# Patient Record
Sex: FEMALE | Race: WHITE | NOT HISPANIC OR LATINO | URBAN - METROPOLITAN AREA
[De-identification: names, ages, dates, MRNs, and addresses within clinical notes are randomized per-mention and may not be internally consistent; named-entity substitution may affect disease eponyms.]

---

## 2019-03-23 ENCOUNTER — EMERGENCY (EMERGENCY)
Facility: HOSPITAL | Age: 64
LOS: 0 days | Discharge: HOME | End: 2019-03-23
Admitting: PHYSICIAN ASSISTANT

## 2019-03-23 VITALS
RESPIRATION RATE: 18 BRPM | HEART RATE: 74 BPM | TEMPERATURE: 97 F | OXYGEN SATURATION: 99 % | SYSTOLIC BLOOD PRESSURE: 132 MMHG | DIASTOLIC BLOOD PRESSURE: 78 MMHG

## 2019-03-23 DIAGNOSIS — Z79.899 OTHER LONG TERM (CURRENT) DRUG THERAPY: ICD-10-CM

## 2019-03-23 DIAGNOSIS — R21 RASH AND OTHER NONSPECIFIC SKIN ERUPTION: ICD-10-CM

## 2019-03-23 DIAGNOSIS — F17.200 NICOTINE DEPENDENCE, UNSPECIFIED, UNCOMPLICATED: ICD-10-CM

## 2019-03-23 DIAGNOSIS — B86 SCABIES: ICD-10-CM

## 2019-03-23 RX ORDER — PERMETHRIN CREAM 5% W/W 50 MG/G
1 CREAM TOPICAL
Qty: 30 | Refills: 1
Start: 2019-03-23 | End: 2019-03-24

## 2019-03-23 NOTE — ED PROVIDER NOTE - CARE PROVIDER_API CALL
Oren Tobias)  Dermatology; Internal Medicine  [address] [place] [address]  Phone: [phone]  Fax: [phone]  Follow Up Time: 4-6 Days

## 2019-03-23 NOTE — ED ADULT NURSE NOTE - NSIMPLEMENTINTERV_GEN_ALL_ED
Implemented All Universal Safety Interventions:  [place] to call system. Call bell, personal items and telephone within reach. Instruct patient to call for assistance. Room bathroom lighting operational. Non-slip footwear when patient is off stretcher. Physically safe environment: no spills, clutter or unnecessary equipment. Stretcher in lowest position, wheels locked, appropriate side rails in place.

## 2019-03-23 NOTE — ED PROVIDER NOTE - PHYSICAL EXAMINATION
CONST: Well appearing in NAD  EYES: PERRL, EOMI, Sclera and conjunctiva clear.   ENT: No nasal discharge. TM's clear B/L without drainage. Oropharynx normal appearing, no erythema or exudates. Uvula midline.  NECK: Non-tender, no meningeal signs  CARD: Normal S1 S2; Normal rate and rhythm  RESP: Equal BS B/L, No wheezes, rhonchi or rales. No distress  GI: Soft, non-tender, non-distended.  MS: Normal ROM in all extremities. No midline spinal tenderness.  SKIN: Diffuse dried red rash all over body. No cellulitis. No mucous membrane involvement  NEURO: A&Ox3, No focal deficits. Strength 5/5 with no sensory deficits. Steady gait

## 2019-03-23 NOTE — ED PROVIDER NOTE - CLINICAL SUMMARY MEDICAL DECISION MAKING FREE TEXT BOX
Pt with itchy rash x 1 week. Pt exposed to scabies again after being treated form same recently. Pt has dried diffuse rash s/w scabies. Will treat with permethrin. Pt educted to wash all clothes and linens in hot watere and to repeat cream in one wee. Derm FU as well.   I have discussed the discharge plan with the patient. The patient agrees with the plan, as discussed.  The patient understands Emergency Department diagnosis is a preliminary diagnosis often based on limited information and that the patient must adhere to the follow-up plan as discussed.  The patient understands that if the symptoms worsen or if prescribed medications do not have the desired/planned effect that the patient may return to the Emergency Department at any time for further evaluation and treatment.

## 2019-03-23 NOTE — ED PROVIDER NOTE - OBJECTIVE STATEMENT
Pt with diffuse itchy rash x 1 week. Worse at night. Was treated for scabies and improved but returned after exposed to others with scabies at her adult home. Denies fever, SOB

## 2019-07-05 ENCOUNTER — EMERGENCY (EMERGENCY)
Facility: HOSPITAL | Age: 64
LOS: 0 days | Discharge: NURSING, REHAB OR LONG-TERM CARE | End: 2019-07-05
Attending: STUDENT IN AN ORGANIZED HEALTH CARE EDUCATION/TRAINING PROGRAM | Admitting: STUDENT IN AN ORGANIZED HEALTH CARE EDUCATION/TRAINING PROGRAM
Payer: MEDICARE

## 2019-07-05 VITALS
HEART RATE: 71 BPM | DIASTOLIC BLOOD PRESSURE: 67 MMHG | RESPIRATION RATE: 18 BRPM | OXYGEN SATURATION: 98 % | TEMPERATURE: 99 F | SYSTOLIC BLOOD PRESSURE: 138 MMHG

## 2019-07-05 VITALS
OXYGEN SATURATION: 99 % | DIASTOLIC BLOOD PRESSURE: 75 MMHG | WEIGHT: 139.99 LBS | HEART RATE: 74 BPM | RESPIRATION RATE: 18 BRPM | SYSTOLIC BLOOD PRESSURE: 141 MMHG | TEMPERATURE: 98 F

## 2019-07-05 DIAGNOSIS — L97.929 NON-PRESSURE CHRONIC ULCER OF UNSPECIFIED PART OF LEFT LOWER LEG WITH UNSPECIFIED SEVERITY: ICD-10-CM

## 2019-07-05 DIAGNOSIS — L97.519 NON-PRESSURE CHRONIC ULCER OF OTHER PART OF RIGHT FOOT WITH UNSPECIFIED SEVERITY: ICD-10-CM

## 2019-07-05 DIAGNOSIS — L03.116 CELLULITIS OF LEFT LOWER LIMB: ICD-10-CM

## 2019-07-05 DIAGNOSIS — L97.919 NON-PRESSURE CHRONIC ULCER OF UNSPECIFIED PART OF RIGHT LOWER LEG WITH UNSPECIFIED SEVERITY: ICD-10-CM

## 2019-07-05 DIAGNOSIS — Z88.0 ALLERGY STATUS TO PENICILLIN: ICD-10-CM

## 2019-07-05 DIAGNOSIS — Z88.1 ALLERGY STATUS TO OTHER ANTIBIOTIC AGENTS STATUS: ICD-10-CM

## 2019-07-05 DIAGNOSIS — M79.604 PAIN IN RIGHT LEG: ICD-10-CM

## 2019-07-05 DIAGNOSIS — Z88.5 ALLERGY STATUS TO NARCOTIC AGENT: ICD-10-CM

## 2019-07-05 DIAGNOSIS — Z79.899 OTHER LONG TERM (CURRENT) DRUG THERAPY: ICD-10-CM

## 2019-07-05 DIAGNOSIS — L97.529 NON-PRESSURE CHRONIC ULCER OF OTHER PART OF LEFT FOOT WITH UNSPECIFIED SEVERITY: ICD-10-CM

## 2019-07-05 DIAGNOSIS — L03.115 CELLULITIS OF RIGHT LOWER LIMB: ICD-10-CM

## 2019-07-05 DIAGNOSIS — Z88.6 ALLERGY STATUS TO ANALGESIC AGENT: ICD-10-CM

## 2019-07-05 LAB
ALBUMIN SERPL ELPH-MCNC: 3.9 G/DL — SIGNIFICANT CHANGE UP (ref 3.5–5.2)
ALP SERPL-CCNC: 84 U/L — SIGNIFICANT CHANGE UP (ref 30–115)
ALT FLD-CCNC: 7 U/L — SIGNIFICANT CHANGE UP (ref 0–41)
ANION GAP SERPL CALC-SCNC: 10 MMOL/L — SIGNIFICANT CHANGE UP (ref 7–14)
AST SERPL-CCNC: 20 U/L — SIGNIFICANT CHANGE UP (ref 0–41)
BASOPHILS # BLD AUTO: 0.01 K/UL — SIGNIFICANT CHANGE UP (ref 0–0.2)
BASOPHILS NFR BLD AUTO: 0.2 % — SIGNIFICANT CHANGE UP (ref 0–1)
BILIRUB SERPL-MCNC: 0.2 MG/DL — SIGNIFICANT CHANGE UP (ref 0.2–1.2)
BUN SERPL-MCNC: 16 MG/DL — SIGNIFICANT CHANGE UP (ref 10–20)
CALCIUM SERPL-MCNC: 8.9 MG/DL — SIGNIFICANT CHANGE UP (ref 8.5–10.1)
CHLORIDE SERPL-SCNC: 106 MMOL/L — SIGNIFICANT CHANGE UP (ref 98–110)
CO2 SERPL-SCNC: 28 MMOL/L — SIGNIFICANT CHANGE UP (ref 17–32)
CREAT SERPL-MCNC: 0.9 MG/DL — SIGNIFICANT CHANGE UP (ref 0.7–1.5)
EOSINOPHIL # BLD AUTO: 0.12 K/UL — SIGNIFICANT CHANGE UP (ref 0–0.7)
EOSINOPHIL NFR BLD AUTO: 1.9 % — SIGNIFICANT CHANGE UP (ref 0–8)
GLUCOSE SERPL-MCNC: 104 MG/DL — HIGH (ref 70–99)
HCT VFR BLD CALC: 38 % — SIGNIFICANT CHANGE UP (ref 37–47)
HGB BLD-MCNC: 12.1 G/DL — SIGNIFICANT CHANGE UP (ref 12–16)
IMM GRANULOCYTES NFR BLD AUTO: 0.3 % — SIGNIFICANT CHANGE UP (ref 0.1–0.3)
LACTATE SERPL-SCNC: 0.8 MMOL/L — SIGNIFICANT CHANGE UP (ref 0.5–2.2)
LYMPHOCYTES # BLD AUTO: 1.71 K/UL — SIGNIFICANT CHANGE UP (ref 1.2–3.4)
LYMPHOCYTES # BLD AUTO: 26.9 % — SIGNIFICANT CHANGE UP (ref 20.5–51.1)
MCHC RBC-ENTMCNC: 27.2 PG — SIGNIFICANT CHANGE UP (ref 27–31)
MCHC RBC-ENTMCNC: 31.8 G/DL — LOW (ref 32–37)
MCV RBC AUTO: 85.4 FL — SIGNIFICANT CHANGE UP (ref 81–99)
MONOCYTES # BLD AUTO: 0.28 K/UL — SIGNIFICANT CHANGE UP (ref 0.1–0.6)
MONOCYTES NFR BLD AUTO: 4.4 % — SIGNIFICANT CHANGE UP (ref 1.7–9.3)
NEUTROPHILS # BLD AUTO: 4.21 K/UL — SIGNIFICANT CHANGE UP (ref 1.4–6.5)
NEUTROPHILS NFR BLD AUTO: 66.3 % — SIGNIFICANT CHANGE UP (ref 42.2–75.2)
NRBC # BLD: 0 /100 WBCS — SIGNIFICANT CHANGE UP (ref 0–0)
PLATELET # BLD AUTO: 250 K/UL — SIGNIFICANT CHANGE UP (ref 130–400)
POTASSIUM SERPL-MCNC: 4.4 MMOL/L — SIGNIFICANT CHANGE UP (ref 3.5–5)
POTASSIUM SERPL-SCNC: 4.4 MMOL/L — SIGNIFICANT CHANGE UP (ref 3.5–5)
PROT SERPL-MCNC: 7.2 G/DL — SIGNIFICANT CHANGE UP (ref 6–8)
RBC # BLD: 4.45 M/UL — SIGNIFICANT CHANGE UP (ref 4.2–5.4)
RBC # FLD: 13.9 % — SIGNIFICANT CHANGE UP (ref 11.5–14.5)
SODIUM SERPL-SCNC: 144 MMOL/L — SIGNIFICANT CHANGE UP (ref 135–146)
WBC # BLD: 6.35 K/UL — SIGNIFICANT CHANGE UP (ref 4.8–10.8)
WBC # FLD AUTO: 6.35 K/UL — SIGNIFICANT CHANGE UP (ref 4.8–10.8)

## 2019-07-05 PROCEDURE — 99284 EMERGENCY DEPT VISIT MOD MDM: CPT

## 2019-07-05 RX ORDER — ACETAMINOPHEN 500 MG
650 TABLET ORAL ONCE
Refills: 0 | Status: COMPLETED | OUTPATIENT
Start: 2019-07-05 | End: 2019-07-05

## 2019-07-05 RX ORDER — SODIUM CHLORIDE 9 MG/ML
1000 INJECTION INTRAMUSCULAR; INTRAVENOUS; SUBCUTANEOUS
Refills: 0 | Status: DISCONTINUED | OUTPATIENT
Start: 2019-07-05 | End: 2019-07-05

## 2019-07-05 RX ORDER — CEFAZOLIN SODIUM 1 G
1000 VIAL (EA) INJECTION ONCE
Refills: 0 | Status: DISCONTINUED | OUTPATIENT
Start: 2019-07-05 | End: 2019-07-05

## 2019-07-05 RX ADMIN — Medication 650 MILLIGRAM(S): at 18:11

## 2019-07-05 RX ADMIN — SODIUM CHLORIDE 250 MILLILITER(S): 9 INJECTION INTRAMUSCULAR; INTRAVENOUS; SUBCUTANEOUS at 14:46

## 2019-07-05 RX ADMIN — SODIUM CHLORIDE 1000 MILLILITER(S): 9 INJECTION INTRAMUSCULAR; INTRAVENOUS; SUBCUTANEOUS at 17:07

## 2019-07-05 RX ADMIN — Medication 650 MILLIGRAM(S): at 17:07

## 2019-07-05 RX ADMIN — Medication 600 MILLIGRAM(S): at 17:06

## 2019-07-05 RX ADMIN — Medication 100 MILLIGRAM(S): at 14:48

## 2019-07-05 NOTE — ED PROVIDER NOTE - ATTENDING CONTRIBUTION TO CARE
62 y/o F from [facility] PMH, HTN CVA, SLE, and as noted,  p/w b/l feet and tib ulcers x months, w/ increased pain and redness x 1 wk  ROS PE above  IMP: cellulitis and ulcers  P: labs, ivf, analgesia, abx, reassess.

## 2019-07-05 NOTE — ED PROVIDER NOTE - NSFOLLOWUPINSTRUCTIONS_ED_ALL_ED_FT
Cellulitis    Cellulitis is a skin infection caused by bacteria. This condition occurs most often in the arms and lower legs but can occur anywhere over the body. Symptoms include redness, swelling, warm skin, tenderness, and chills/fever. If you were prescribed an antibiotic medicine, take it as told by your health care provider. Do not stop taking the antibiotic even if you start to feel better.    SEEK IMMEDIATE MEDICAL CARE IF YOU HAVE ANY OF THE FOLLOWING SYMPTOMS: worsening fever, red streaks coming from affected area, vomiting or diarrhea, or dizziness/lightheadedness.    Please follow up with your vascular physician Dr. Martin within one week.

## 2019-07-05 NOTE — ED ADULT NURSE NOTE - NSIMPLEMENTINTERV_GEN_ALL_ED
Implemented All Fall with Harm Risk Interventions:  [place] to call system. Call bell, personal items and telephone within reach. Instruct patient to call for assistance. Room bathroom lighting operational. Non-slip footwear when patient is off stretcher. Physically safe environment: no spills, clutter or unnecessary equipment. Stretcher in lowest position, wheels locked, appropriate side rails in place. Provide visual cue, wrist band, yellow gown, etc. Monitor gait and stability. Monitor for mental status changes and reorient to person, place, and time. Review medications for side effects contributing to fall risk. Reinforce activity limits and safety measures with patient and family. Provide visual clues: red socks.

## 2019-07-05 NOTE — ED PROVIDER NOTE - OBJECTIVE STATEMENT
63 year old F with hx of HTN, CVA, SLE c/o pain/redness surrounding ulcers over b/l lower extremities. Pt sts she has had ulcers over b/l lower extremities x months and has been following with her vascular Dr. Martin. Pt sts within the last few days she has noticed worsening pain/redness surrounding ulcers. No fever/chills, nausea, vomiting, calf pain, worsening leg swelling, chest pain, sob, purulent discharge, trauma/injuries.

## 2019-07-05 NOTE — ED PROVIDER NOTE - CLINICAL SUMMARY MEDICAL DECISION MAKING FREE TEXT BOX
64 y/o F w/ b/l LE cellulitis, reassuring labs. Pt stable for dc w/ abx, PMD f/up, and care as discussed.  Pt/ family understands plan and signs and symptoms for ED return.

## 2019-07-05 NOTE — ED ADULT NURSE NOTE - CHIEF COMPLAINT QUOTE
pt biba from [facility], sent to ER for further eval of chronic bilateral lower leg cellulitis, pt c/o 10/10 pain to bilat lower legs, open wounds with exudate.

## 2019-07-05 NOTE — ED PROVIDER NOTE - PHYSICAL EXAMINATION
CONSTITUTIONAL: Well-appearing; well-nourished; in no apparent distress.   NECK: Supple; non-tender; no cervical lymphadenopathy.   CARDIOVASCULAR: Normal S1, S2; no murmurs, rubs, or gallops.   RESPIRATORY: Normal chest excursion with respiration; breath sounds clear and equal bilaterally; no wheezes, rhonchi, or rales.  GI/: Normal bowel sounds; non-distended; non-tender; no palpable organomegaly.   MS: + multiple small superficial ulcers noted over b/l anterior tibia/dorsum of feet with surrounding erythema/warmth. no discharge noted. PEdal pulses intact confirmed with doppler. + mild b/l peripheral edema. No calf ttp. Full rom of all extrem  NEURO/PSYCH: A & O x 4; grossly unremarkable. mood and manner are appropriate. No focal deficits. Sensation intact

## 2019-07-05 NOTE — ED PROVIDER NOTE - NS ED ROS FT
Constitutional: no fever, chills, no recent weight loss, change in appetite or malaise  Cardiac: No chest pain, SOB or edema.  Respiratory: No cough or respiratory distress  GI: No nausea, vomiting, diarrhea or abdominal pain.  : No dysuria, frequency, urgency or hematuria  MS: no pain to back or extremities, no loss of ROM, no weakness  Neuro: No headache or weakness. No LOC.  Extrem/skin: + multiple ulcers over b/l LE with surrounding redness/warmth. + swelling over b/l LE  Endocrine: No history of thyroid disease or diabetes.  Except as documented in the HPI, all other systems are negative.

## 2019-07-05 NOTE — ED ADULT TRIAGE NOTE - CHIEF COMPLAINT QUOTE
pt biba from [facility], sent to ER for further eval of chronic bilateral lower leg cellulitis, pt c/o 10/10 pain to bilat lower legs, open wounds with exudate.

## 2019-12-26 ENCOUNTER — INPATIENT (INPATIENT)
Facility: HOSPITAL | Age: 64
LOS: 3 days | Discharge: HOME | End: 2019-12-30
Attending: HOSPITALIST | Admitting: HOSPITALIST
Payer: MEDICARE

## 2019-12-26 VITALS
RESPIRATION RATE: 18 BRPM | DIASTOLIC BLOOD PRESSURE: 69 MMHG | SYSTOLIC BLOOD PRESSURE: 136 MMHG | TEMPERATURE: 99 F | OXYGEN SATURATION: 100 % | HEART RATE: 72 BPM

## 2019-12-26 DIAGNOSIS — Z90.710 ACQUIRED ABSENCE OF BOTH CERVIX AND UTERUS: Chronic | ICD-10-CM

## 2019-12-26 DIAGNOSIS — Z96.659 PRESENCE OF UNSPECIFIED ARTIFICIAL KNEE JOINT: Chronic | ICD-10-CM

## 2019-12-26 DIAGNOSIS — Z82.69 FAMILY HISTORY OF OTHER DISEASES OF THE MUSCULOSKELETAL SYSTEM AND CONNECTIVE TISSUE: Chronic | ICD-10-CM

## 2019-12-26 LAB
ALBUMIN SERPL ELPH-MCNC: 4.1 G/DL — SIGNIFICANT CHANGE UP (ref 3.5–5.2)
ALP SERPL-CCNC: 91 U/L — SIGNIFICANT CHANGE UP (ref 30–115)
ALT FLD-CCNC: 12 U/L — SIGNIFICANT CHANGE UP (ref 0–41)
ANION GAP SERPL CALC-SCNC: 12 MMOL/L — SIGNIFICANT CHANGE UP (ref 7–14)
APTT BLD: 31.2 SEC — SIGNIFICANT CHANGE UP (ref 27–39.2)
AST SERPL-CCNC: 20 U/L — SIGNIFICANT CHANGE UP (ref 0–41)
BASOPHILS # BLD AUTO: 0.01 K/UL — SIGNIFICANT CHANGE UP (ref 0–0.2)
BASOPHILS NFR BLD AUTO: 0.2 % — SIGNIFICANT CHANGE UP (ref 0–1)
BILIRUB SERPL-MCNC: 0.2 MG/DL — SIGNIFICANT CHANGE UP (ref 0.2–1.2)
BUN SERPL-MCNC: 15 MG/DL — SIGNIFICANT CHANGE UP (ref 10–20)
CALCIUM SERPL-MCNC: 9.2 MG/DL — SIGNIFICANT CHANGE UP (ref 8.5–10.1)
CHLORIDE SERPL-SCNC: 103 MMOL/L — SIGNIFICANT CHANGE UP (ref 98–110)
CO2 SERPL-SCNC: 27 MMOL/L — SIGNIFICANT CHANGE UP (ref 17–32)
CREAT SERPL-MCNC: 0.9 MG/DL — SIGNIFICANT CHANGE UP (ref 0.7–1.5)
EOSINOPHIL # BLD AUTO: 0.08 K/UL — SIGNIFICANT CHANGE UP (ref 0–0.7)
EOSINOPHIL NFR BLD AUTO: 1.5 % — SIGNIFICANT CHANGE UP (ref 0–8)
ERYTHROCYTE [SEDIMENTATION RATE] IN BLOOD: 35 MM/HR — HIGH (ref 0–20)
GLUCOSE SERPL-MCNC: 116 MG/DL — HIGH (ref 70–99)
HCT VFR BLD CALC: 38 % — SIGNIFICANT CHANGE UP (ref 37–47)
HGB BLD-MCNC: 12 G/DL — SIGNIFICANT CHANGE UP (ref 12–16)
IMM GRANULOCYTES NFR BLD AUTO: 0.2 % — SIGNIFICANT CHANGE UP (ref 0.1–0.3)
INR BLD: 0.93 RATIO — SIGNIFICANT CHANGE UP (ref 0.65–1.3)
LACTATE SERPL-SCNC: 1 MMOL/L — SIGNIFICANT CHANGE UP (ref 0.7–2)
LYMPHOCYTES # BLD AUTO: 1.31 K/UL — SIGNIFICANT CHANGE UP (ref 1.2–3.4)
LYMPHOCYTES # BLD AUTO: 24.1 % — SIGNIFICANT CHANGE UP (ref 20.5–51.1)
MCHC RBC-ENTMCNC: 27.6 PG — SIGNIFICANT CHANGE UP (ref 27–31)
MCHC RBC-ENTMCNC: 31.6 G/DL — LOW (ref 32–37)
MCV RBC AUTO: 87.4 FL — SIGNIFICANT CHANGE UP (ref 81–99)
MONOCYTES # BLD AUTO: 0.33 K/UL — SIGNIFICANT CHANGE UP (ref 0.1–0.6)
MONOCYTES NFR BLD AUTO: 6.1 % — SIGNIFICANT CHANGE UP (ref 1.7–9.3)
NEUTROPHILS # BLD AUTO: 3.7 K/UL — SIGNIFICANT CHANGE UP (ref 1.4–6.5)
NEUTROPHILS NFR BLD AUTO: 67.9 % — SIGNIFICANT CHANGE UP (ref 42.2–75.2)
NRBC # BLD: 0 /100 WBCS — SIGNIFICANT CHANGE UP (ref 0–0)
PLATELET # BLD AUTO: 236 K/UL — SIGNIFICANT CHANGE UP (ref 130–400)
POTASSIUM SERPL-MCNC: 3.7 MMOL/L — SIGNIFICANT CHANGE UP (ref 3.5–5)
POTASSIUM SERPL-SCNC: 3.7 MMOL/L — SIGNIFICANT CHANGE UP (ref 3.5–5)
PROT SERPL-MCNC: 7.7 G/DL — SIGNIFICANT CHANGE UP (ref 6–8)
PROTHROM AB SERPL-ACNC: 10.7 SEC — SIGNIFICANT CHANGE UP (ref 9.95–12.87)
RBC # BLD: 4.35 M/UL — SIGNIFICANT CHANGE UP (ref 4.2–5.4)
RBC # FLD: 14.8 % — HIGH (ref 11.5–14.5)
SODIUM SERPL-SCNC: 142 MMOL/L — SIGNIFICANT CHANGE UP (ref 135–146)
WBC # BLD: 5.44 K/UL — SIGNIFICANT CHANGE UP (ref 4.8–10.8)
WBC # FLD AUTO: 5.44 K/UL — SIGNIFICANT CHANGE UP (ref 4.8–10.8)

## 2019-12-26 PROCEDURE — 71045 X-RAY EXAM CHEST 1 VIEW: CPT | Mod: 26

## 2019-12-26 PROCEDURE — 99223 1ST HOSP IP/OBS HIGH 75: CPT | Mod: AI

## 2019-12-26 PROCEDURE — 99285 EMERGENCY DEPT VISIT HI MDM: CPT | Mod: GC

## 2019-12-26 RX ORDER — HYDROXYCHLOROQUINE SULFATE 200 MG
200 TABLET ORAL
Refills: 0 | Status: DISCONTINUED | OUTPATIENT
Start: 2019-12-26 | End: 2019-12-30

## 2019-12-26 RX ORDER — ENOXAPARIN SODIUM 100 MG/ML
40 INJECTION SUBCUTANEOUS DAILY
Refills: 0 | Status: DISCONTINUED | OUTPATIENT
Start: 2019-12-26 | End: 2019-12-30

## 2019-12-26 RX ORDER — LOSARTAN POTASSIUM 100 MG/1
1 TABLET, FILM COATED ORAL
Qty: 0 | Refills: 0 | DISCHARGE

## 2019-12-26 RX ORDER — OXYCODONE AND ACETAMINOPHEN 5; 325 MG/1; MG/1
1 TABLET ORAL DAILY
Refills: 0 | Status: DISCONTINUED | OUTPATIENT
Start: 2019-12-26 | End: 2019-12-30

## 2019-12-26 RX ORDER — TRAMADOL HYDROCHLORIDE 50 MG/1
50 TABLET ORAL EVERY 6 HOURS
Refills: 0 | Status: DISCONTINUED | OUTPATIENT
Start: 2019-12-26 | End: 2019-12-30

## 2019-12-26 RX ORDER — LEVOTHYROXINE SODIUM 125 MCG
112 TABLET ORAL DAILY
Refills: 0 | Status: DISCONTINUED | OUTPATIENT
Start: 2019-12-26 | End: 2019-12-30

## 2019-12-26 RX ORDER — LOSARTAN POTASSIUM 100 MG/1
25 TABLET, FILM COATED ORAL DAILY
Refills: 0 | Status: DISCONTINUED | OUTPATIENT
Start: 2019-12-26 | End: 2019-12-30

## 2019-12-26 RX ORDER — CEFAZOLIN SODIUM 1 G
2000 VIAL (EA) INJECTION EVERY 8 HOURS
Refills: 0 | Status: DISCONTINUED | OUTPATIENT
Start: 2019-12-26 | End: 2019-12-28

## 2019-12-26 RX ORDER — CEFAZOLIN SODIUM 1 G
2000 VIAL (EA) INJECTION ONCE
Refills: 0 | Status: COMPLETED | OUTPATIENT
Start: 2019-12-26 | End: 2019-12-26

## 2019-12-26 RX ADMIN — OXYCODONE AND ACETAMINOPHEN 1 TABLET(S): 5; 325 TABLET ORAL at 23:16

## 2019-12-26 RX ADMIN — Medication 100 MILLIGRAM(S): at 19:08

## 2019-12-26 NOTE — PATIENT PROFILE ADULT - NSPROPTRIGHTBILLOFRIGHTS_GEN_A_NUR
After reviewing your labs, I believe they are within normal  limits for your age. Keep working hard on diet and taking your medications that are prescribed. If you have any acute care needs and are having trouble getting an appointment. .. please send me a   [facility] message or have the  send me a message. Have a blessed day and  be kind  to others! If you have any questions, feel free to email thru [facility], or give us   a call back at [phone]. Maranda Peña M.D.   Good Help to Those in Need  \"You maybe whatever you resolve to be\"
Letter mailed to pt.
patient

## 2019-12-26 NOTE — H&P ADULT - HISTORY OF PRESENT ILLNESS
68 yo F with PMH of CVA  with no residual , SLE and HTN comes ot the hospital for the worsening of right LE cellulitis. She had multiple hospitalizations in the past for the same problem was recently in [facility] for the same problem where she received iv antibiotics. She has purulent discharge present and subjective fevers. No purulent drainage.         Denies chills, chest pain, palpitations, shortness of breath, dizziness. 68 yo F with PMH of CVA  with no residual , SLE,Hypothyroidism and HTN comes ot the hospital for the worsening of right LE cellulitis. She had multiple hospitalizations in the past for the same problem was recently in [facility] for the same problem where she received iv antibiotics. She has purulent discharge present and subjective fevers. No purulent drainage.  She also has swelling and redness on the left leg.     She c/o diarrhea for three days. NO nausea, vomiting or abdominal pain.     Denies chills, chest pain, palpitations, shortness of breath, dizziness.

## 2019-12-26 NOTE — H&P ADULT - ATTENDING COMMENTS
A 63 yo female with PMH of  HTN, SLE, CVA  with no residual and hypothyroidism came to ED c/o worsening right leg pain and redness. She has this problem for over a year, she was treated with IV antibiotics for possible cellulitis. She visited a vascular doctor on 11/25 and she was given Unna boot but since then her redness and pain got worse. She noticed small wound with purulent drainage few days ago, she also reports fever. She is able to walk, no other complaints. In the ED /69, HR 72, Temp 98.8F,     PHYSICAL EXAM:  GENERAL: NAD, well-developed  HEAD:  Atraumatic, Normocephalic  EYES: EOMI, PERRLA, conjunctiva and sclera clear  NECK: Supple, No JVD  CHEST/LUNG: Clear to auscultation bilaterally; No wheeze  HEART: Regular rate and rhythm; S1 S2  ABDOMEN: Soft, Nontender, Nondistended; Bowel sounds present  EXTREMITIES:  RLE edema, erythematous changes with small wound, crusted, no active drainage, moderate tenderness.  PSYCH: AAOx3  NEUROLOGY: non-focal    A/P:   Right lower extremity cellulitis  No sepsis on admission. Start on Cefazolin. Burn consult  Venous and arterial duplex. Wound care.    HTN  Continue Losartan.     SLE: continue Plaquenil.

## 2019-12-26 NOTE — H&P ADULT - NSHPPHYSICALEXAM_GEN_ALL_CORE
T(C): 37.1 (12-26-19 @ 15:09), Max: 37.1 (12-26-19 @ 15:09)  HR: 72 (12-26-19 @ 15:09) (72 - 72)  BP: 136/69 (12-26-19 @ 15:09) (136/69 - 136/69)  RR: 18 (12-26-19 @ 15:09) (18 - 18)  SpO2: 100% (12-26-19 @ 15:09) (100% - 100%)    PHYSICAL EXAM:  GENERAL: NAD, well-developed  HEAD:  Atraumatic, Normocephalic  EYES: EOMI, PERRLA, conjunctiva and sclera clear  ENT: Normal tympanic membrane. No nasal obstruction or discharge. No tonsillar exudate, swelling or erythema.  NECK: Supple, No JVD  CHEST/LUNG: Clear to auscultation bilaterally; No wheeze  HEART: Regular rate and rhythm; No murmurs, rubs, or gallops  ABDOMEN: Soft, Nontender, Nondistended; Bowel sounds present  EXTREMITIES:  2+ Peripheral Pulses, No clubbing, cyanosis, or edema  PSYCH: AAOx3  NEUROLOGY: non-focal  SKIN: No rashes or lesions T(C): 37.1 (12-26-19 @ 15:09), Max: 37.1 (12-26-19 @ 15:09)  HR: 72 (12-26-19 @ 15:09) (72 - 72)  BP: 136/69 (12-26-19 @ 15:09) (136/69 - 136/69)  RR: 18 (12-26-19 @ 15:09) (18 - 18)  SpO2: 100% (12-26-19 @ 15:09) (100% - 100%)    PHYSICAL EXAM:  GENERAL: NAD, well-developed  HEAD:  Atraumatic, Normocephalic  EYES: EOMI, PERRLA, conjunctiva and sclera clear  CHEST/LUNG: Clear to auscultation bilaterally; No wheeze  HEART: Regular rate and rhythm; No murmurs, rubs, or gallops  ABDOMEN: Soft, Nontender, Nondistended; Bowel sounds present  EXTREMITIES:  edema present LE open wound in the right side with serosanguinous discharge present warm to touch erythema present Left leg swelling tenderness present   PSYCH: AAOx3  NEUROLOGY: non-focal  SKIN: No rashes or lesions

## 2019-12-26 NOTE — H&P ADULT - NSHPLABSRESULTS_GEN_ALL_CORE
12.0   5.44  )-----------( 236      ( 26 Dec 2019 18:09 )             38.0       12-26    142  |  103  |  15  ----------------------------<  116<H>  3.7   |  27  |  0.9    Ca    9.2      26 Dec 2019 18:09    TPro  7.7  /  Alb  4.1  /  TBili  0.2  /  DBili  x   /  AST  20  /  ALT  12  /  AlkPhos  91  12-26

## 2019-12-26 NOTE — H&P ADULT - NSICDXPASTSURGICALHX_GEN_ALL_CORE_FT
PAST SURGICAL HISTORY:  FH: bilateral hip replacements     H/O abdominal hysterectomy     H/O total knee replacement

## 2019-12-26 NOTE — ED PROVIDER NOTE - ATTENDING CONTRIBUTION TO CARE
70 yo f with pmh of cva, sle, htn, scabies, [facility], prsents with c/o RLE pain, redness and swelling.  pt also with low grade fever and chills at home.  no cough, no abd pain, has mild dysuria.  exam: nad, ncat, perrl, eomi, mmm, rrr, ctab, abd soft, nt,nd, RLE erythematous and weeping, swollen, warm to touch, imp: pt with RLE cellulitis, start abx and admit to med

## 2019-12-26 NOTE — H&P ADULT - NSICDXPASTMEDICALHX_GEN_ALL_CORE_FT
PAST MEDICAL HISTORY:  CVA (cerebral vascular accident) 1987    Essential hypertension     Lupus PAST MEDICAL HISTORY:  CVA (cerebral vascular accident) 1987    Essential hypertension     Hypothyroidism     Lupus

## 2019-12-26 NOTE — H&P ADULT - I WAS PHYSICALLY PRESENT FOR THE KEY PORTIONS OF THE EVALUATION AND MANAGEMENT (E/M) SERVICE PROVIDED.  I AGREE WITH THE ABOVE HISTORY, PHYSICAL, AND PLAN WHICH I HAVE REVIEWED AND EDITED WHERE APPROPRIATE
- Diagnosis


Diagnosis: right sided weakness, paresthesias, neck pain, s/p neck surgery


Code Status: Full Code





- Medication Management


Discharge Medications: electronically signed and located in the Home Medication 

List.





- Orders


Services needed: Registered Nurse, Certified Nursing Aide, Physical Therapy, 

Occupational Therapy


Isolation Type: None


Diet Recommendation: no restrictions on diet


Diet Texture: Regular Texture Diet, Thin Liquids, Meds Whole w/Liquids


Wound Care Instructions: Follow up with Neurosurgery within 1 week for wound 

check


Activity/Weight Bearing Restrictions: Wear rigid C spine collar at all times.


Additional Instructions: 


Return with increasing pain, weakness, numbness or any other concerns.  Take 

your entire course of steroid.


Started amlodipine 5mg daily for HTN secondary to being on steroid and from 

pain. May DC this medication in the future, following recovery. Discuss with 

PCP first.   





- Follow Up Care


Current Providers and Referrals: 


Verna Henderson MD [Primary Care Provider] - 3 days of d/c SNF/Rehab


STEPHANIA Mariano MD [Medical Doctor] - 5-7 days, call for appt.
Statement Selected

## 2019-12-26 NOTE — ED PROVIDER NOTE - OBJECTIVE STATEMENT
68 yo F, poor historian, with PMHx of CVA without residual weakness (1987), SLE, essential hypertension, hx of scabies presents from [facility] with worsening R leg swelling and erythema. She has multiple admissions for cellulitis, only one ER visit documented at [facility] in 07/2019 treated with oral antibiotics, but she frequents [facility]. She was at [facility] on 11/05 where she stayed for 3 days for the treatment of bilateral lower extremity cellulitis with IV antibiotics. She says that there has been an acute worsening of R leg swelling/erythema/pain over an unknown duration, pain is described as burning, associated with oozing of clear liquid over the anteromedial aspect of her R leg, no bleeding, no pus. She was mildly febrile today (T100.6 F) as per patient. Also, she says that she has bladder pressure and urinary frequency since yesterday which occurs when she has a UTI.  Denies chills, chest pain, palpitations, shortness of breath, dizziness.     She says that she is allergic to Aspirin due to intestinal bleeding. Last colonoscopy was normal as per patient. She also reports allergy to morphine which causes altered mental status, Levaquin resulting in whole body stiffness, compazine causing "crazy eyes", penicillin causing nausea.

## 2019-12-26 NOTE — H&P ADULT - ASSESSMENT
63 yo F with PMH of HTN comes to the hospital for worsening lower extremity cellulitis.    #Right LE cellulitis  - IV ancef   - Burn evaluation  - Wound care  - Wound culture    # DVT ppx    # Dispo 65 yo F with PMH of HTN comes to the hospital for worsening lower extremity cellulitis.    #Bilateral cellulitis  - Right >Left   - IV ancef   - Burn evaluation  - Wound care  - Wound culture    # SLE  - Plaquenil    # Diarrhea  - No abdominal pain    - HOld Magnesium  - Doubt C.diff  - IF patient continues to have diarrhea do c.diff pcr     # DVT ppx  - Lovenox  # Dispo  - [facility] assisted living

## 2019-12-27 ENCOUNTER — TRANSCRIPTION ENCOUNTER (OUTPATIENT)
Age: 64
End: 2019-12-27

## 2019-12-27 LAB
ALBUMIN SERPL ELPH-MCNC: 3.8 G/DL — SIGNIFICANT CHANGE UP (ref 3.5–5.2)
ALP SERPL-CCNC: 83 U/L — SIGNIFICANT CHANGE UP (ref 30–115)
ALT FLD-CCNC: 9 U/L — SIGNIFICANT CHANGE UP (ref 0–41)
ANION GAP SERPL CALC-SCNC: 13 MMOL/L — SIGNIFICANT CHANGE UP (ref 7–14)
AST SERPL-CCNC: 19 U/L — SIGNIFICANT CHANGE UP (ref 0–41)
BASOPHILS # BLD AUTO: 0.02 K/UL — SIGNIFICANT CHANGE UP (ref 0–0.2)
BASOPHILS NFR BLD AUTO: 0.4 % — SIGNIFICANT CHANGE UP (ref 0–1)
BILIRUB SERPL-MCNC: 0.3 MG/DL — SIGNIFICANT CHANGE UP (ref 0.2–1.2)
BUN SERPL-MCNC: 13 MG/DL — SIGNIFICANT CHANGE UP (ref 10–20)
CALCIUM SERPL-MCNC: 8.7 MG/DL — SIGNIFICANT CHANGE UP (ref 8.5–10.1)
CHLORIDE SERPL-SCNC: 104 MMOL/L — SIGNIFICANT CHANGE UP (ref 98–110)
CO2 SERPL-SCNC: 26 MMOL/L — SIGNIFICANT CHANGE UP (ref 17–32)
CREAT SERPL-MCNC: 0.8 MG/DL — SIGNIFICANT CHANGE UP (ref 0.7–1.5)
EOSINOPHIL # BLD AUTO: 0 K/UL — SIGNIFICANT CHANGE UP (ref 0–0.7)
EOSINOPHIL NFR BLD AUTO: 0 % — SIGNIFICANT CHANGE UP (ref 0–8)
GLUCOSE SERPL-MCNC: 84 MG/DL — SIGNIFICANT CHANGE UP (ref 70–99)
HCT VFR BLD CALC: 35.6 % — LOW (ref 37–47)
HCV AB S/CO SERPL IA: 0.2 S/CO — SIGNIFICANT CHANGE UP (ref 0–0.99)
HCV AB SERPL-IMP: SIGNIFICANT CHANGE UP
HGB BLD-MCNC: 11.2 G/DL — LOW (ref 12–16)
IMM GRANULOCYTES NFR BLD AUTO: 0.4 % — HIGH (ref 0.1–0.3)
LYMPHOCYTES # BLD AUTO: 1.22 K/UL — SIGNIFICANT CHANGE UP (ref 1.2–3.4)
LYMPHOCYTES # BLD AUTO: 23.6 % — SIGNIFICANT CHANGE UP (ref 20.5–51.1)
MCHC RBC-ENTMCNC: 27.8 PG — SIGNIFICANT CHANGE UP (ref 27–31)
MCHC RBC-ENTMCNC: 31.5 G/DL — LOW (ref 32–37)
MCV RBC AUTO: 88.3 FL — SIGNIFICANT CHANGE UP (ref 81–99)
MONOCYTES # BLD AUTO: 0.32 K/UL — SIGNIFICANT CHANGE UP (ref 0.1–0.6)
MONOCYTES NFR BLD AUTO: 6.2 % — SIGNIFICANT CHANGE UP (ref 1.7–9.3)
NEUTROPHILS # BLD AUTO: 3.59 K/UL — SIGNIFICANT CHANGE UP (ref 1.4–6.5)
NEUTROPHILS NFR BLD AUTO: 69.4 % — SIGNIFICANT CHANGE UP (ref 42.2–75.2)
NRBC # BLD: 0 /100 WBCS — SIGNIFICANT CHANGE UP (ref 0–0)
PLATELET # BLD AUTO: 207 K/UL — SIGNIFICANT CHANGE UP (ref 130–400)
POTASSIUM SERPL-MCNC: 3.9 MMOL/L — SIGNIFICANT CHANGE UP (ref 3.5–5)
POTASSIUM SERPL-SCNC: 3.9 MMOL/L — SIGNIFICANT CHANGE UP (ref 3.5–5)
PROT SERPL-MCNC: 6.6 G/DL — SIGNIFICANT CHANGE UP (ref 6–8)
RBC # BLD: 4.03 M/UL — LOW (ref 4.2–5.4)
RBC # FLD: 14.9 % — HIGH (ref 11.5–14.5)
SODIUM SERPL-SCNC: 143 MMOL/L — SIGNIFICANT CHANGE UP (ref 135–146)
WBC # BLD: 5.17 K/UL — SIGNIFICANT CHANGE UP (ref 4.8–10.8)
WBC # FLD AUTO: 5.17 K/UL — SIGNIFICANT CHANGE UP (ref 4.8–10.8)

## 2019-12-27 PROCEDURE — 99238 HOSP IP/OBS DSCHRG MGMT 30/<: CPT

## 2019-12-27 PROCEDURE — 93970 EXTREMITY STUDY: CPT | Mod: 26

## 2019-12-27 RX ORDER — CEPHALEXIN 500 MG
1 CAPSULE ORAL
Qty: 10 | Refills: 0
Start: 2019-12-27 | End: 2019-12-31

## 2019-12-27 RX ORDER — LANOLIN ALCOHOL/MO/W.PET/CERES
5 CREAM (GRAM) TOPICAL AT BEDTIME
Refills: 0 | Status: DISCONTINUED | OUTPATIENT
Start: 2019-12-27 | End: 2019-12-30

## 2019-12-27 RX ADMIN — LOSARTAN POTASSIUM 25 MILLIGRAM(S): 100 TABLET, FILM COATED ORAL at 06:11

## 2019-12-27 RX ADMIN — TRAMADOL HYDROCHLORIDE 50 MILLIGRAM(S): 50 TABLET ORAL at 15:56

## 2019-12-27 RX ADMIN — Medication 200 MILLIGRAM(S): at 06:11

## 2019-12-27 RX ADMIN — Medication 200 MILLIGRAM(S): at 17:54

## 2019-12-27 RX ADMIN — Medication 100 MILLIGRAM(S): at 11:40

## 2019-12-27 RX ADMIN — Medication 100 MILLIGRAM(S): at 18:00

## 2019-12-27 RX ADMIN — ENOXAPARIN SODIUM 40 MILLIGRAM(S): 100 INJECTION SUBCUTANEOUS at 11:56

## 2019-12-27 RX ADMIN — Medication 112 MICROGRAM(S): at 06:11

## 2019-12-27 RX ADMIN — Medication 100 MILLIGRAM(S): at 03:51

## 2019-12-27 RX ADMIN — Medication 5 MILLIGRAM(S): at 21:37

## 2019-12-27 RX ADMIN — TRAMADOL HYDROCHLORIDE 50 MILLIGRAM(S): 50 TABLET ORAL at 22:06

## 2019-12-27 RX ADMIN — TRAMADOL HYDROCHLORIDE 50 MILLIGRAM(S): 50 TABLET ORAL at 16:30

## 2019-12-27 RX ADMIN — OXYCODONE AND ACETAMINOPHEN 1 TABLET(S): 5; 325 TABLET ORAL at 06:19

## 2019-12-27 NOTE — CONSULT NOTE ADULT - SUBJECTIVE AND OBJECTIVE BOX
LATOYA FULTON  64y, Female  Allergy: aspirin (Unknown)  Compazine (Unknown)  Levaquin (Unknown)  morphine (Unknown)  penicillins (Unknown)      All historical available data reviewed.    HPI:  68 yo F with PMH of CVA  with no residual , SLE,Hypothyroidism and HTN comes ot the hospital for the worsening of right LE cellulitis. She had multiple hospitalizations in the past for the same problem was recently in [facility] for the same problem where she received iv antibiotics. She has purulent discharge present and subjective fevers. No purulent drainage.  She also has swelling and redness on the left leg.     She c/o diarrhea for three days. NO nausea, vomiting or abdominal pain.     Denies chills, chest pain, palpitations, shortness of breath, dizziness. (26 Dec 2019 20:00)    FAMILY HISTORY:    PAST MEDICAL & SURGICAL HISTORY:  Hypothyroidism  CVA (cerebral vascular accident): 1987  Essential hypertension  Lupus  H/O abdominal hysterectomy  H/O total knee replacement  FH: bilateral hip replacements        VITALS:  T(F): 98.5, Max: 98.5 (12-27-19 @ 16:23)  HR: 75  BP: 120/66  RR: 18Vital Signs Last 24 Hrs  T(C): 36.9 (27 Dec 2019 16:23), Max: 36.9 (26 Dec 2019 21:47)  T(F): 98.5 (27 Dec 2019 16:23), Max: 98.5 (27 Dec 2019 16:23)  HR: 75 (27 Dec 2019 16:23) (59 - 75)  BP: 120/66 (27 Dec 2019 16:23) (120/60 - 148/79)  BP(mean): --  RR: 18 (27 Dec 2019 16:23) (14 - 18)  SpO2: 98% (27 Dec 2019 00:46) (98% - 99%)    TESTS & MEASUREMENTS:                        11.2   5.17  )-----------( 207      ( 27 Dec 2019 07:23 )             35.6     12-27    143  |  104  |  13  ----------------------------<  84  3.9   |  26  |  0.8    Ca    8.7      27 Dec 2019 07:23    TPro  6.6  /  Alb  3.8  /  TBili  0.3  /  DBili  x   /  AST  19  /  ALT  9   /  AlkPhos  83  12-27    LIVER FUNCTIONS - ( 27 Dec 2019 07:23 )  Alb: 3.8 g/dL / Pro: 6.6 g/dL / ALK PHOS: 83 U/L / ALT: 9 U/L / AST: 19 U/L / GGT: x                   RADIOLOGY & ADDITIONAL TESTS:  Personal review of radiological diagnostics performed  Echo and EKG results noted when applicable.     ANTIBIOTICS:  ceFAZolin   IVPB 2000 milliGRAM(s) IV Intermittent every 8 hours  hydroxychloroquine 200 milliGRAM(s) Oral two times a day

## 2019-12-27 NOTE — DISCHARGE NOTE PROVIDER - NSDCMRMEDTOKEN_GEN_ALL_CORE_FT
Cozaar 25 mg oral tablet: 1 tab(s) orally once a day  Keflex 250 mg oral capsule: 1 cap(s) orally 2 times a day   magnesium oxide 400 mg (241.3 mg elemental magnesium) oral tablet: 1 tab(s) orally once a day  Percocet 5/325 oral tablet: 1 tab(s) orally once a day, As Needed  Plaquenil 200 mg oral tablet: orally 2 times a day  Synthroid 112 mcg (0.112 mg) oral tablet: 1 tab(s) orally once a day  traMADol 50 mg oral tablet: 1 tab(s) orally 3 to 4 times a day, As Needed Cozaar 25 mg oral tablet: 1 tab(s) orally once a day  doxycycline monohydrate 100 mg oral capsule: 1 cap(s) orally every 12 hours  magnesium oxide 400 mg (241.3 mg elemental magnesium) oral tablet: 1 tab(s) orally once a day  Percocet 5/325 oral tablet: 1 tab(s) orally once a day, As Needed  Plaquenil 200 mg oral tablet: orally 2 times a day  silver sulfADIAZINE 1% topical cream: 1 application topically 2 times a day  Synthroid 112 mcg (0.112 mg) oral tablet: 1 tab(s) orally once a day  traMADol 50 mg oral tablet: 1 tab(s) orally 3 to 4 times a day, As Needed

## 2019-12-27 NOTE — DISCHARGE NOTE PROVIDER - HOSPITAL COURSE
65 yo F with PMH of HTN comes to the hospital for worsening lower extremity cellulitis.        #Bilateral cellulitis    - Right >Left     - keflex        # SLE    - Plaquenil        # Diarrhea    - No abdominal pain      - HOld Magnesium    - Doubt C.diff    - IF patient continues to have diarrhea do c.diff pcr         # DVT ppx    - Lovenox    # Dispo    - [facility] assisted living         D/C today 63 yo F with PMH of HTN and venous insufficiency comes to the hospital for worsening lower extremity cellulitis. No venous ulcers on exam. She was started on Cefazolin. She showed improvement and is stable for discharge on PO antibiotics and vascular follow up as outpatient for stasis dermatitis. 63 yo F with PMH of HTN and venous insufficiency comes to the hospital for worsening lower extremity cellulitis. No venous ulcers on exam. She was started on Cefazolin. She showed improvement and is stable for discharge on PO antibiotics and vascular follow up as outpatient for stasis dermatitis.         Attending     Per Bernardo Timmons Pt was on Plavix per their medication list however pt was not on it here in the hospital. Will add plavix to be resumed outpt        Pt has been stable for discharge         Meds per medrec

## 2019-12-27 NOTE — PROGRESS NOTE ADULT - SUBJECTIVE AND OBJECTIVE BOX
· Culture came back positive with MR   · Contact isolation  · Change p  Jose Antonio Russell to p petra   Doxycycline to treat   · Wound care SUBJECTIVE:    Patient is a 64y old Female who presents with a chief complaint of cellulitis (26 Dec 2019 20:00)    Currently admitted to medicine with the primary diagnosis of Cellulitis     Today is hospital day 1d. This morning she is resting comfortably in bed and reports no new issues or overnight events.     PAST MEDICAL & SURGICAL HISTORY  Hypothyroidism  CVA (cerebral vascular accident): 1987  Essential hypertension  Lupus  H/O abdominal hysterectomy  H/O total knee replacement  FH: bilateral hip replacements    SOCIAL HISTORY:  Negative for smoking/alcohol/drug use.     ALLERGIES:  aspirin (Unknown)  Compazine (Unknown)  Levaquin (Unknown)  morphine (Unknown)  penicillins (Unknown)    MEDICATIONS:  STANDING MEDICATIONS  ceFAZolin   IVPB 2000 milliGRAM(s) IV Intermittent every 8 hours  enoxaparin Injectable 40 milliGRAM(s) SubCutaneous daily  hydroxychloroquine 200 milliGRAM(s) Oral two times a day  levothyroxine 112 MICROGram(s) Oral daily  losartan 25 milliGRAM(s) Oral daily    PRN MEDICATIONS  oxycodone    5 mG/acetaminophen 325 mG 1 Tablet(s) Oral daily PRN  traMADol 50 milliGRAM(s) Oral every 6 hours PRN    VITALS:   T(F): 97.8  HR: 59  BP: 123/78  RR: 18  SpO2: 98%    PHYSICAL EXAM:  GEN: No acute distress  LUNGS: Clear to auscultation bilaterally   HEART: S1/S2 present. RRR.   ABD: Soft, non-tender, non-distended. Bowel sounds present  EXT: LE changes  NEURO: AAOX3      LABS:                        11.2   5.17  )-----------( 207      ( 27 Dec 2019 07:23 )             35.6     12-27    143  |  104  |  13  ----------------------------<  84  3.9   |  26  |  0.8    Ca    8.7      27 Dec 2019 07:23    TPro  6.6  /  Alb  3.8  /  TBili  0.3  /  DBili  x   /  AST  19  /  ALT  9   /  AlkPhos  83  12-27    PT/INR - ( 26 Dec 2019 18:09 )   PT: 10.70 sec;   INR: 0.93 ratio         PTT - ( 26 Dec 2019 18:09 )  PTT:31.2 sec        Lactate, Blood: 1.0 mmol/L (12-26-19 @ 18:09)  Sedimentation Rate, Erythrocyte: 35 mm/Hr <H> (12-26-19 @ 18:09)            RADIOLOGY:

## 2019-12-27 NOTE — PROGRESS NOTE ADULT - ASSESSMENT
65 yo F with PMH of HTN comes to the hospital for worsening lower extremity cellulitis.    #Bilateral cellulitis  - Right >Left   - keflex    # SLE  - Plaquenil    # Diarrhea  - No abdominal pain    - HOld Magnesium  - Doubt C.diff  - IF patient continues to have diarrhea do c.diff pcr     # DVT ppx  - Lovenox  # Dispo  - [facility] assisted living     D/C today

## 2019-12-27 NOTE — DISCHARGE NOTE PROVIDER - NSDCCPCAREPLAN_GEN_ALL_CORE_FT
PRINCIPAL DISCHARGE DIAGNOSIS  Diagnosis: Cellulitis  Assessment and Plan of Treatment: will be discharged on antibiotics.

## 2019-12-27 NOTE — DISCHARGE NOTE PROVIDER - CARE PROVIDER_API CALL
Amadeo Alvarenga)  Plastic Surgery  [address] [place] [address]  Phone: [phone]  Fax: [phone]  Follow Up Time:

## 2019-12-28 PROCEDURE — 99231 SBSQ HOSP IP/OBS SF/LOW 25: CPT

## 2019-12-28 RX ADMIN — Medication 1 APPLICATION(S): at 18:22

## 2019-12-28 RX ADMIN — LOSARTAN POTASSIUM 25 MILLIGRAM(S): 100 TABLET, FILM COATED ORAL at 05:31

## 2019-12-28 RX ADMIN — Medication 200 MILLIGRAM(S): at 18:22

## 2019-12-28 RX ADMIN — Medication 100 MILLIGRAM(S): at 18:21

## 2019-12-28 RX ADMIN — Medication 5 MILLIGRAM(S): at 21:27

## 2019-12-28 RX ADMIN — Medication 100 MILLIGRAM(S): at 11:30

## 2019-12-28 RX ADMIN — Medication 200 MILLIGRAM(S): at 05:31

## 2019-12-28 RX ADMIN — ENOXAPARIN SODIUM 40 MILLIGRAM(S): 100 INJECTION SUBCUTANEOUS at 11:30

## 2019-12-28 RX ADMIN — TRAMADOL HYDROCHLORIDE 50 MILLIGRAM(S): 50 TABLET ORAL at 14:44

## 2019-12-28 RX ADMIN — TRAMADOL HYDROCHLORIDE 50 MILLIGRAM(S): 50 TABLET ORAL at 05:35

## 2019-12-28 RX ADMIN — Medication 112 MICROGRAM(S): at 05:31

## 2019-12-28 RX ADMIN — Medication 100 MILLIGRAM(S): at 02:32

## 2019-12-28 RX ADMIN — OXYCODONE AND ACETAMINOPHEN 1 TABLET(S): 5; 325 TABLET ORAL at 04:06

## 2019-12-28 NOTE — PROGRESS NOTE ADULT - ASSESSMENT
63 yo F with PMH of HTN, SLE, who comes to the hospital for worsening lower extremity cellulitis.    #RLE cellulitis - change to PO doxy x7d, local wound care.  ID appreciated.  duplex neg  #SLE - Plaquenil  #Diarrhea - resolved.  #PPx - lovenox    has been medically stable for discharge since yesterday 12/27, however, from [facility].  d/c monday (or sooner if allowed)

## 2019-12-28 NOTE — PROGRESS NOTE ADULT - SUBJECTIVE AND OBJECTIVE BOX
LATOYA FULTON  64y, Female  Allergy: aspirin (Unknown)  Compazine (Unknown)  Levaquin (Unknown)  morphine (Unknown)  penicillins (Unknown)    Hospital Day: 2d    Patient seen and examined earlier today. no complaints, wants to go back home.    PMH/PSH:  PAST MEDICAL & SURGICAL HISTORY:  Hypothyroidism  CVA (cerebral vascular accident): 1987  Essential hypertension  Lupus  H/O abdominal hysterectomy  H/O total knee replacement  FH: bilateral hip replacements      VITALS:  T(F): 97.1 (12-28-19 @ 08:04), Max: 98.5 (12-27-19 @ 16:23)  HR: 65 (12-28-19 @ 08:04)  BP: 119/56 (12-28-19 @ 08:04) (119/56 - 148/68)  RR: 18 (12-28-19 @ 08:04)  SpO2: --    PHYSICAL EXAM:  GENERAL: NAD  HEENT: MMM  CVS:  RRR  CHEST/LUNG: Good air entry, no wheeze  ABD:  soft, NT/ND +bs  EXTREMITIES:  RLE wrapped  NEURO: AOx3    TESTS & MEASUREMENTS:                          11.2   5.17  )-----------( 207      ( 27 Dec 2019 07:23 )             35.6     PT/INR - ( 26 Dec 2019 18:09 )   PT: 10.70 sec;   INR: 0.93 ratio         PTT - ( 26 Dec 2019 18:09 )  PTT:31.2 sec  12-27    143  |  104  |  13  ----------------------------<  84  3.9   |  26  |  0.8    Ca    8.7      27 Dec 2019 07:23    TPro  6.6  /  Alb  3.8  /  TBili  0.3  /  DBili  x   /  AST  19  /  ALT  9   /  AlkPhos  83  12-27    LIVER FUNCTIONS - ( 27 Dec 2019 07:23 )  Alb: 3.8 g/dL / Pro: 6.6 g/dL / ALK PHOS: 83 U/L / ALT: 9 U/L / AST: 19 U/L / GGT: x                 Culture - Blood (collected 12-26-19 @ 18:09)  Source: .Blood Blood  Preliminary Report (12-28-19 @ 02:16):    No growth to date.          RADIOLOGY & ADDITIONAL TESTS:    MEDICATIONS:  MEDICATIONS  (STANDING):  doxycycline hyclate Capsule 100 milliGRAM(s) Oral every 12 hours  enoxaparin Injectable 40 milliGRAM(s) SubCutaneous daily  hydroxychloroquine 200 milliGRAM(s) Oral two times a day  levothyroxine 112 MICROGram(s) Oral daily  losartan 25 milliGRAM(s) Oral daily  melatonin 5 milliGRAM(s) Oral at bedtime  silver sulfADIAZINE 1% Cream 1 Application(s) Topical two times a day    MEDICATIONS  (PRN):  oxycodone    5 mG/acetaminophen 325 mG 1 Tablet(s) Oral daily PRN Severe Pain (7 - 10)  traMADol 50 milliGRAM(s) Oral every 6 hours PRN Moderate Pain (4 - 6)      HOME MEDICATIONS:  Cozaar 25 mg oral tablet (12-26)  magnesium oxide 400 mg (241.3 mg elemental magnesium) oral tablet (12-26)  Percocet 5/325 oral tablet (12-26)  Plaquenil 200 mg oral tablet (12-26)  Synthroid 112 mcg (0.112 mg) oral tablet (12-26)  traMADol 50 mg oral tablet (12-26)

## 2019-12-29 PROCEDURE — 99231 SBSQ HOSP IP/OBS SF/LOW 25: CPT

## 2019-12-29 RX ADMIN — TRAMADOL HYDROCHLORIDE 50 MILLIGRAM(S): 50 TABLET ORAL at 00:54

## 2019-12-29 RX ADMIN — ENOXAPARIN SODIUM 40 MILLIGRAM(S): 100 INJECTION SUBCUTANEOUS at 11:27

## 2019-12-29 RX ADMIN — TRAMADOL HYDROCHLORIDE 50 MILLIGRAM(S): 50 TABLET ORAL at 15:11

## 2019-12-29 RX ADMIN — Medication 100 MILLIGRAM(S): at 05:41

## 2019-12-29 RX ADMIN — Medication 1 APPLICATION(S): at 05:41

## 2019-12-29 RX ADMIN — Medication 112 MICROGRAM(S): at 05:40

## 2019-12-29 RX ADMIN — Medication 200 MILLIGRAM(S): at 05:40

## 2019-12-29 RX ADMIN — Medication 1 APPLICATION(S): at 17:51

## 2019-12-29 RX ADMIN — Medication 5 MILLIGRAM(S): at 21:50

## 2019-12-29 RX ADMIN — Medication 100 MILLIGRAM(S): at 17:51

## 2019-12-29 RX ADMIN — LOSARTAN POTASSIUM 25 MILLIGRAM(S): 100 TABLET, FILM COATED ORAL at 05:40

## 2019-12-29 RX ADMIN — Medication 200 MILLIGRAM(S): at 17:51

## 2019-12-29 RX ADMIN — TRAMADOL HYDROCHLORIDE 50 MILLIGRAM(S): 50 TABLET ORAL at 14:41

## 2019-12-29 NOTE — PROGRESS NOTE ADULT - SUBJECTIVE AND OBJECTIVE BOX
LATOYA FULTON  64y, Female  Allergy: aspirin (Unknown)  Compazine (Unknown)  Levaquin (Unknown)  morphine (Unknown)  penicillins (Unknown)    Hospital Day: 3d    Patient seen and examined earlier today.  no complaints, legs look better    PMH/PSH:  PAST MEDICAL & SURGICAL HISTORY:  Hypothyroidism  CVA (cerebral vascular accident): 1987  Essential hypertension  Lupus  H/O abdominal hysterectomy  H/O total knee replacement  FH: bilateral hip replacements        VITALS:  T(F): 97.2 (12-29-19 @ 07:51), Max: 97.2 (12-29-19 @ 07:51)  HR: 59 (12-29-19 @ 07:51)  BP: 121/81 (12-29-19 @ 07:51) (121/81 - 134/61)  RR: 18 (12-29-19 @ 07:51)  SpO2: --    PHYSICAL EXAM:  GENERAL: NAD  HEENT: MMM  CVS:  RRR  CHEST/LUNG: Good air entry, no wheeze  ABD:  soft, NT/ND +bs  EXTREMITIES:  RLE wrapped, less erythema  NEURO: AOx3    TESTS & MEASUREMENTS:    Culture - Blood (collected 12-26-19 @ 18:09)  Source: .Blood Blood  Preliminary Report (12-28-19 @ 02:16):    No growth to date.    RADIOLOGY & ADDITIONAL TESTS:    MEDICATIONS:  MEDICATIONS  (STANDING):  doxycycline hyclate Capsule 100 milliGRAM(s) Oral every 12 hours  enoxaparin Injectable 40 milliGRAM(s) SubCutaneous daily  hydroxychloroquine 200 milliGRAM(s) Oral two times a day  levothyroxine 112 MICROGram(s) Oral daily  losartan 25 milliGRAM(s) Oral daily  melatonin 5 milliGRAM(s) Oral at bedtime  silver sulfADIAZINE 1% Cream 1 Application(s) Topical two times a day    MEDICATIONS  (PRN):  oxycodone    5 mG/acetaminophen 325 mG 1 Tablet(s) Oral daily PRN Severe Pain (7 - 10)  traMADol 50 milliGRAM(s) Oral every 6 hours PRN Moderate Pain (4 - 6)      HOME MEDICATIONS:  Cozaar 25 mg oral tablet (12-26)  magnesium oxide 400 mg (241.3 mg elemental magnesium) oral tablet (12-26)  Percocet 5/325 oral tablet (12-26)  Plaquenil 200 mg oral tablet (12-26)  Synthroid 112 mcg (0.112 mg) oral tablet (12-26)  traMADol 50 mg oral tablet (12-26)

## 2019-12-29 NOTE — CONSULT NOTE ADULT - SUBJECTIVE AND OBJECTIVE BOX
64y  Female  HPI:  70 yo F with PMH of CVA  with no residual , SLE,Hypothyroidism and HTN comes ot the hospital for the worsening of right LE cellulitis. She had multiple hospitalizations in the past for the same problem was recently in [facility] for the same problem where she received iv antibiotics. She has purulent discharge present and subjective fevers. No purulent drainage.  She also has swelling and redness on the left leg.     She c/o diarrhea for three days. NO nausea, vomiting or abdominal pain.     Denies chills, chest pain, palpitations, shortness of breath, dizziness. (26 Dec 2019 20:00)    Hospital course***  Allergies    aspirin (Unknown)  Compazine (Unknown)  Levaquin (Unknown)  morphine (Unknown)  penicillins (Unknown)    Intolerances      PAST MEDICAL & SURGICAL HISTORY:  Hypothyroidism  CVA (cerebral vascular accident): 1987  Essential hypertension  Lupus  H/O abdominal hysterectomy  H/O total knee replacement  FH: bilateral hip replacements      Culture - Blood (collected 26 Dec 2019 18:09)  Source: .Blood Blood  Preliminary Report (28 Dec 2019 02:16):    No growth to date.        PE:  PHYSICAL EXAM:  Left leg dried scabs  swelling+

## 2019-12-29 NOTE — PROGRESS NOTE ADULT - ASSESSMENT
63 yo F with PMH of HTN, SLE, who comes to the hospital for worsening lower extremity cellulitis.    #RLE cellulitis - change to PO doxy x7d, local wound care.  ID appreciated.  duplex neg  #SLE - Plaquenil  #Diarrhea - resolved.  #PPx - lovenox    has been medically stable for discharge since yesterday 12/27, however, from [facility].  d/c monday (or sooner if allowed)

## 2019-12-29 NOTE — CONSULT NOTE ADULT - ASSESSMENT
ASSESSMENT:  LLE cellulitis    RECOMMENDATION:  Wound care - SSD/Adaptic/Kerlex/ACE Wrap BID  Elevation  No surgical debridement
64y old Female who presents with a chief complaint of changes LEs RIght >left    IMPRESSION:  Doubt bacterial cellulitis RLE  Chronic changes with desquamation  More inflammation than infection    RECOMMENDATIONS:  Local Silverdene cream q12h to RLE  Ancef 2 gm iv q8h  Change to po Doxycycline 100 mg q12h on 12/28 for 7 more days  recall prn please

## 2019-12-30 ENCOUNTER — TRANSCRIPTION ENCOUNTER (OUTPATIENT)
Age: 64
End: 2019-12-30

## 2019-12-30 VITALS — SYSTOLIC BLOOD PRESSURE: 139 MMHG | RESPIRATION RATE: 18 BRPM | DIASTOLIC BLOOD PRESSURE: 67 MMHG | TEMPERATURE: 99 F

## 2019-12-30 PROCEDURE — 99239 HOSP IP/OBS DSCHRG MGMT >30: CPT

## 2019-12-30 RX ADMIN — Medication 200 MILLIGRAM(S): at 05:07

## 2019-12-30 RX ADMIN — TRAMADOL HYDROCHLORIDE 50 MILLIGRAM(S): 50 TABLET ORAL at 14:17

## 2019-12-30 RX ADMIN — Medication 100 MILLIGRAM(S): at 17:53

## 2019-12-30 RX ADMIN — Medication 112 MICROGRAM(S): at 05:07

## 2019-12-30 RX ADMIN — Medication 200 MILLIGRAM(S): at 17:49

## 2019-12-30 RX ADMIN — OXYCODONE AND ACETAMINOPHEN 1 TABLET(S): 5; 325 TABLET ORAL at 04:43

## 2019-12-30 RX ADMIN — ENOXAPARIN SODIUM 40 MILLIGRAM(S): 100 INJECTION SUBCUTANEOUS at 11:17

## 2019-12-30 RX ADMIN — Medication 1 APPLICATION(S): at 05:07

## 2019-12-30 RX ADMIN — OXYCODONE AND ACETAMINOPHEN 1 TABLET(S): 5; 325 TABLET ORAL at 04:13

## 2019-12-30 RX ADMIN — LOSARTAN POTASSIUM 25 MILLIGRAM(S): 100 TABLET, FILM COATED ORAL at 05:07

## 2019-12-30 RX ADMIN — Medication 100 MILLIGRAM(S): at 05:07

## 2019-12-30 NOTE — DISCHARGE NOTE NURSING/CASE MANAGEMENT/SOCIAL WORK - PATIENT PORTAL LINK FT
You can access the FollowMyHealth Patient Portal offered by [facility] by registering at the following website: [email or web address]. By joining [employer]’s FollowMyHealth portal, you will also be able to view your health information using other applications (apps) compatible with our system.

## 2019-12-30 NOTE — PROGRESS NOTE ADULT - SUBJECTIVE AND OBJECTIVE BOX
LATOYA FULTON  64y, Female  Allergy: aspirin (Unknown)  Compazine (Unknown)  Levaquin (Unknown)  morphine (Unknown)  penicillins (Unknown)    Hospital Day: 3d    Patient seen and examined earlier today.  no complaints, legs look better    PMH/PSH:  PAST MEDICAL & SURGICAL HISTORY:  Hypothyroidism  CVA (cerebral vascular accident): 1987  Essential hypertension  Lupus  H/O abdominal hysterectomy  H/O total knee replacement  FH: bilateral hip replacements      VITALS:  Vital Signs Last 24 Hrs  T(C): 36.2 (30 Dec 2019 08:45), Max: 36.2 (30 Dec 2019 00:00)  T(F): 97.1 (30 Dec 2019 08:45), Max: 97.2 (30 Dec 2019 00:00)  HR: 60 (30 Dec 2019 08:45) (60 - 61)  BP: 127/59 (30 Dec 2019 08:45) (127/59 - 160/75)  RR: 18 (30 Dec 2019 08:45) (18 - 18)      PHYSICAL EXAM:  GENERAL: NAD  HEENT: MMM  CVS:  RRR  CHEST/LUNG: Good air entry, no wheeze  ABD:  soft, NT/ND +bs  EXTREMITIES:  RLE wrapped, less erythema  NEURO: AOx3    TESTS & MEASUREMENTS:    Culture - Blood (collected 12-26-19 @ 18:09)  Source: .Blood Blood  Preliminary Report (12-28-19 @ 02:16):    No growth to date.    RADIOLOGY & ADDITIONAL TESTS:    MEDICATIONS:  MEDICATIONS  (STANDING):  doxycycline hyclate Capsule 100 milliGRAM(s) Oral every 12 hours  enoxaparin Injectable 40 milliGRAM(s) SubCutaneous daily  hydroxychloroquine 200 milliGRAM(s) Oral two times a day  levothyroxine 112 MICROGram(s) Oral daily  losartan 25 milliGRAM(s) Oral daily  melatonin 5 milliGRAM(s) Oral at bedtime  silver sulfADIAZINE 1% Cream 1 Application(s) Topical two times a day    MEDICATIONS  (PRN):  oxycodone    5 mG/acetaminophen 325 mG 1 Tablet(s) Oral daily PRN Severe Pain (7 - 10)  traMADol 50 milliGRAM(s) Oral every 6 hours PRN Moderate Pain (4 - 6)      HOME MEDICATIONS:  Cozaar 25 mg oral tablet (12-26)  magnesium oxide 400 mg (241.3 mg elemental magnesium) oral tablet (12-26)  Percocet 5/325 oral tablet (12-26)  Plaquenil 200 mg oral tablet (12-26)  Synthroid 112 mcg (0.112 mg) oral tablet (12-26)  traMADol 50 mg oral tablet (12-26)

## 2019-12-30 NOTE — PROGRESS NOTE ADULT - ASSESSMENT
65 yo F with PMH of HTN, SLE, who comes to the hospital for worsening lower extremity cellulitis.  Still no acute events pt cleared for d/c since Friday. Med rec done and can be d/lori if accepted to [facility].     #RLE cellulitis - change to PO doxy x7d, local wound care.  ID appreciated.  duplex neg  #SLE - Plaquenil  #Diarrhea - resolved.  #PPx - lovenox    has been medically stable for discharge since yesterday 12/27, however, from [facility].  d/c today. Add Plavix on med list as per [facility] records pt was on this medication.

## 2019-12-30 NOTE — DISCHARGE NOTE NURSING/CASE MANAGEMENT/SOCIAL WORK - NSDCPEPTSTRK_GEN_ALL_CORE
Call 911 for stroke/Need for follow up after discharge/Stroke education booklet/Stroke support groups for patients, families, and friends/Stroke warning signs and symptoms/Signs and symptoms of stroke/Prescribed medications/Risk factors for stroke

## 2019-12-30 NOTE — CHART NOTE - NSCHARTNOTEFT_GEN_A_CORE
<<<RESIDENT DISCHARGE NOTE>>>     LATOYA FULTON  MRN-[id number]    VITAL SIGNS:  T(F): 97.1 (12-30-19 @ 08:45), Max: 97.6 (12-29-19 @ 15:30)  HR: 60 (12-30-19 @ 08:45)  BP: 127/59 (12-30-19 @ 08:45)  SpO2: --      PHYSICAL EXAMINATION:  GENERAL: NAD  HEENT: MMM  CVS:  RRR  CHEST/LUNG: Good air entry, no wheeze  ABD:  soft, NT/ND +bs  EXTREMITIES:  RLE wrapped, less erythema  NEURO: AOx3      TEST RESULTS:              FINAL DISCHARGE INTERVIEW:  Resident(s) Present: (Name:______milton_______), RN Present: (Name:  ____chastity_______)    DISCHARGE MEDICATION RECONCILIATION  reviewed with Attending (Name:____flex_______)    DISPOSITION:   [  ] Home,    [  ] Home with Visiting Nursing Services,   [    ]  SNF/ NH,    [   ] Acute Rehab (4A),   [  x ] Other (Specify:_____group home____)

## 2020-01-01 LAB
CULTURE RESULTS: SIGNIFICANT CHANGE UP
SPECIMEN SOURCE: SIGNIFICANT CHANGE UP

## 2020-01-05 DIAGNOSIS — I10 ESSENTIAL (PRIMARY) HYPERTENSION: ICD-10-CM

## 2020-01-05 DIAGNOSIS — Z88.1 ALLERGY STATUS TO OTHER ANTIBIOTIC AGENTS STATUS: ICD-10-CM

## 2020-01-05 DIAGNOSIS — Z88.0 ALLERGY STATUS TO PENICILLIN: ICD-10-CM

## 2020-01-05 DIAGNOSIS — Z96.659 PRESENCE OF UNSPECIFIED ARTIFICIAL KNEE JOINT: ICD-10-CM

## 2020-01-05 DIAGNOSIS — Z90.710 ACQUIRED ABSENCE OF BOTH CERVIX AND UTERUS: ICD-10-CM

## 2020-01-05 DIAGNOSIS — M32.9 SYSTEMIC LUPUS ERYTHEMATOSUS, UNSPECIFIED: ICD-10-CM

## 2020-01-05 DIAGNOSIS — Z96.643 PRESENCE OF ARTIFICIAL HIP JOINT, BILATERAL: ICD-10-CM

## 2020-01-05 DIAGNOSIS — I87.2 VENOUS INSUFFICIENCY (CHRONIC) (PERIPHERAL): ICD-10-CM

## 2020-01-05 DIAGNOSIS — Z88.5 ALLERGY STATUS TO NARCOTIC AGENT: ICD-10-CM

## 2020-01-05 DIAGNOSIS — Z88.6 ALLERGY STATUS TO ANALGESIC AGENT: ICD-10-CM

## 2020-01-05 DIAGNOSIS — Z86.73 PERSONAL HISTORY OF TRANSIENT ISCHEMIC ATTACK (TIA), AND CEREBRAL INFARCTION WITHOUT RESIDUAL DEFICITS: ICD-10-CM

## 2020-01-05 DIAGNOSIS — L03.115 CELLULITIS OF RIGHT LOWER LIMB: ICD-10-CM

## 2020-01-05 DIAGNOSIS — R26.89 OTHER ABNORMALITIES OF GAIT AND MOBILITY: ICD-10-CM

## 2020-01-05 DIAGNOSIS — F17.210 NICOTINE DEPENDENCE, CIGARETTES, UNCOMPLICATED: ICD-10-CM

## 2020-01-05 DIAGNOSIS — L03.116 CELLULITIS OF LEFT LOWER LIMB: ICD-10-CM

## 2020-01-05 DIAGNOSIS — R19.7 DIARRHEA, UNSPECIFIED: ICD-10-CM

## 2020-01-05 DIAGNOSIS — E03.9 HYPOTHYROIDISM, UNSPECIFIED: ICD-10-CM

## 2020-01-07 PROBLEM — I63.9 CEREBRAL INFARCTION, UNSPECIFIED: Chronic | Status: ACTIVE | Noted: 2019-12-26

## 2020-01-07 PROBLEM — M32.9 SYSTEMIC LUPUS ERYTHEMATOSUS, UNSPECIFIED: Chronic | Status: ACTIVE | Noted: 2019-12-26

## 2020-01-07 PROBLEM — E03.9 HYPOTHYROIDISM, UNSPECIFIED: Chronic | Status: ACTIVE | Noted: 2019-12-26

## 2020-01-07 PROBLEM — I10 ESSENTIAL (PRIMARY) HYPERTENSION: Chronic | Status: ACTIVE | Noted: 2019-12-26

## 2020-01-16 ENCOUNTER — OUTPATIENT (OUTPATIENT)
Dept: OUTPATIENT SERVICES | Facility: HOSPITAL | Age: 65
LOS: 1 days | Discharge: HOME | End: 2020-01-16

## 2020-01-16 ENCOUNTER — APPOINTMENT (OUTPATIENT)
Dept: BURN CARE | Facility: CLINIC | Age: 65
End: 2020-01-16
Payer: MEDICARE

## 2020-01-16 DIAGNOSIS — Z82.69 FAMILY HISTORY OF OTHER DISEASES OF THE MUSCULOSKELETAL SYSTEM AND CONNECTIVE TISSUE: Chronic | ICD-10-CM

## 2020-01-16 DIAGNOSIS — Z96.659 PRESENCE OF UNSPECIFIED ARTIFICIAL KNEE JOINT: Chronic | ICD-10-CM

## 2020-01-16 DIAGNOSIS — Z90.710 ACQUIRED ABSENCE OF BOTH CERVIX AND UTERUS: Chronic | ICD-10-CM

## 2020-01-16 PROBLEM — Z00.00 ENCOUNTER FOR PREVENTIVE HEALTH EXAMINATION: Status: ACTIVE | Noted: 2020-01-16

## 2020-01-16 PROCEDURE — 99213 OFFICE O/P EST LOW 20 MIN: CPT | Mod: 25

## 2020-01-16 PROCEDURE — 16030 DRESS/DEBRID P-THICK BURN L: CPT

## 2020-01-21 DIAGNOSIS — I83.009 VARICOSE VEINS OF UNSPECIFIED LOWER EXTREMITY WITH ULCER OF UNSPECIFIED SITE: ICD-10-CM

## 2020-01-21 DIAGNOSIS — L97.909 NON-PRESSURE CHRONIC ULCER OF UNSPECIFIED PART OF UNSPECIFIED LOWER LEG WITH UNSPECIFIED SEVERITY: ICD-10-CM

## 2020-02-20 ENCOUNTER — APPOINTMENT (OUTPATIENT)
Dept: BURN CARE | Facility: CLINIC | Age: 65
End: 2020-02-20
Payer: MEDICARE

## 2020-02-20 ENCOUNTER — OUTPATIENT (OUTPATIENT)
Dept: OUTPATIENT SERVICES | Facility: HOSPITAL | Age: 65
LOS: 1 days | Discharge: HOME | End: 2020-02-20

## 2020-02-20 DIAGNOSIS — Z96.659 PRESENCE OF UNSPECIFIED ARTIFICIAL KNEE JOINT: Chronic | ICD-10-CM

## 2020-02-20 DIAGNOSIS — Z82.69 FAMILY HISTORY OF OTHER DISEASES OF THE MUSCULOSKELETAL SYSTEM AND CONNECTIVE TISSUE: Chronic | ICD-10-CM

## 2020-02-20 DIAGNOSIS — Z90.710 ACQUIRED ABSENCE OF BOTH CERVIX AND UTERUS: Chronic | ICD-10-CM

## 2020-02-20 PROCEDURE — 16030 DRESS/DEBRID P-THICK BURN L: CPT

## 2020-02-20 PROCEDURE — 99213 OFFICE O/P EST LOW 20 MIN: CPT | Mod: 25

## 2020-04-16 ENCOUNTER — APPOINTMENT (OUTPATIENT)
Dept: BURN CARE | Facility: CLINIC | Age: 65
End: 2020-04-16

## 2020-10-29 NOTE — ED PROVIDER NOTE - CROS ED GI ALL NEG
Jo Guillen needs to be seen for an appointment before further refills are given.     Patient has not seen me since Jan 2019 negative...

## 2022-01-04 NOTE — PATIENT PROFILE ADULT - NSASFUNCLEVELADLTOILET_GEN_A_NUR
You have been given a 24 hour COVID-19 and influenza test here in the emergency department; please remain quarantine at home until your results are received  This should be about  24 hours  You may use Tylenol and ibuprofen for pain and fever relief  Please see the back of bottle for dosing instructions  Please return to the emergency department for worsening symptoms including chest pain, shortness of breath, dizziness, lightheadedness, fainting episodes, fever greater than 103, unremitting nausea, unilateral leg swelling, unremitting fever, etc  Please follow-up with your family practice provider at your earliest convenience  We will call in antibiotic in to your pharmacy if you do have strep throat  2 = assistive person

## 2022-02-09 ENCOUNTER — INPATIENT (INPATIENT)
Facility: HOSPITAL | Age: 67
LOS: 5 days | Discharge: NURSING, REHAB OR LONG-TERM CARE | End: 2022-02-15
Attending: INTERNAL MEDICINE | Admitting: INTERNAL MEDICINE
Payer: MEDICARE

## 2022-02-09 VITALS
DIASTOLIC BLOOD PRESSURE: 58 MMHG | SYSTOLIC BLOOD PRESSURE: 116 MMHG | RESPIRATION RATE: 20 BRPM | HEIGHT: 62 IN | TEMPERATURE: 101 F | OXYGEN SATURATION: 99 % | HEART RATE: 88 BPM

## 2022-02-09 DIAGNOSIS — Z90.710 ACQUIRED ABSENCE OF BOTH CERVIX AND UTERUS: Chronic | ICD-10-CM

## 2022-02-09 DIAGNOSIS — Z82.69 FAMILY HISTORY OF OTHER DISEASES OF THE MUSCULOSKELETAL SYSTEM AND CONNECTIVE TISSUE: Chronic | ICD-10-CM

## 2022-02-09 DIAGNOSIS — Z96.659 PRESENCE OF UNSPECIFIED ARTIFICIAL KNEE JOINT: Chronic | ICD-10-CM

## 2022-02-09 LAB
ANION GAP SERPL CALC-SCNC: 15 MMOL/L — HIGH (ref 7–14)
BASOPHILS # BLD AUTO: 0 K/UL — SIGNIFICANT CHANGE UP (ref 0–0.2)
BASOPHILS NFR BLD AUTO: 0 % — SIGNIFICANT CHANGE UP (ref 0–1)
BUN SERPL-MCNC: 26 MG/DL — HIGH (ref 10–20)
CALCIUM SERPL-MCNC: 8.7 MG/DL — SIGNIFICANT CHANGE UP (ref 8.5–10.1)
CHLORIDE SERPL-SCNC: 100 MMOL/L — SIGNIFICANT CHANGE UP (ref 98–110)
CO2 SERPL-SCNC: 24 MMOL/L — SIGNIFICANT CHANGE UP (ref 17–32)
CREAT SERPL-MCNC: 1.1 MG/DL — SIGNIFICANT CHANGE UP (ref 0.7–1.5)
EOSINOPHIL # BLD AUTO: 0 K/UL — SIGNIFICANT CHANGE UP (ref 0–0.7)
EOSINOPHIL NFR BLD AUTO: 0 % — SIGNIFICANT CHANGE UP (ref 0–8)
GIANT PLATELETS BLD QL SMEAR: PRESENT — SIGNIFICANT CHANGE UP
GLUCOSE SERPL-MCNC: 95 MG/DL — SIGNIFICANT CHANGE UP (ref 70–99)
HCT VFR BLD CALC: 33.1 % — LOW (ref 37–47)
HGB BLD-MCNC: 10.8 G/DL — LOW (ref 12–16)
LACTATE SERPL-SCNC: 1.4 MMOL/L — SIGNIFICANT CHANGE UP (ref 0.7–2)
LYMPHOCYTES # BLD AUTO: 1.09 K/UL — LOW (ref 1.2–3.4)
LYMPHOCYTES # BLD AUTO: 4.4 % — LOW (ref 20.5–51.1)
MANUAL SMEAR VERIFICATION: SIGNIFICANT CHANGE UP
MCHC RBC-ENTMCNC: 27.8 PG — SIGNIFICANT CHANGE UP (ref 27–31)
MCHC RBC-ENTMCNC: 32.6 G/DL — SIGNIFICANT CHANGE UP (ref 32–37)
MCV RBC AUTO: 85.3 FL — SIGNIFICANT CHANGE UP (ref 81–99)
MONOCYTES # BLD AUTO: 0.87 K/UL — HIGH (ref 0.1–0.6)
MONOCYTES NFR BLD AUTO: 3.5 % — SIGNIFICANT CHANGE UP (ref 1.7–9.3)
NEUTROPHILS # BLD AUTO: 22.88 K/UL — HIGH (ref 1.4–6.5)
NEUTROPHILS NFR BLD AUTO: 88.6 % — HIGH (ref 42.2–75.2)
NEUTS BAND # BLD: 3.5 % — SIGNIFICANT CHANGE UP (ref 0–6)
PLAT MORPH BLD: NORMAL — SIGNIFICANT CHANGE UP
PLATELET # BLD AUTO: 208 K/UL — SIGNIFICANT CHANGE UP (ref 130–400)
POTASSIUM SERPL-MCNC: 3 MMOL/L — LOW (ref 3.5–5)
POTASSIUM SERPL-SCNC: 3 MMOL/L — LOW (ref 3.5–5)
RBC # BLD: 3.88 M/UL — LOW (ref 4.2–5.4)
RBC # FLD: 18.2 % — HIGH (ref 11.5–14.5)
RBC BLD AUTO: NORMAL — SIGNIFICANT CHANGE UP
SODIUM SERPL-SCNC: 139 MMOL/L — SIGNIFICANT CHANGE UP (ref 135–146)
WBC # BLD: 24.84 K/UL — HIGH (ref 4.8–10.8)
WBC # FLD AUTO: 24.84 K/UL — HIGH (ref 4.8–10.8)

## 2022-02-09 PROCEDURE — 93010 ELECTROCARDIOGRAM REPORT: CPT

## 2022-02-09 PROCEDURE — 99285 EMERGENCY DEPT VISIT HI MDM: CPT

## 2022-02-09 PROCEDURE — 93970 EXTREMITY STUDY: CPT | Mod: 26

## 2022-02-09 PROCEDURE — 71045 X-RAY EXAM CHEST 1 VIEW: CPT | Mod: 26

## 2022-02-09 RX ORDER — OXYCODONE AND ACETAMINOPHEN 5; 325 MG/1; MG/1
1 TABLET ORAL ONCE
Refills: 0 | Status: DISCONTINUED | OUTPATIENT
Start: 2022-02-09 | End: 2022-02-09

## 2022-02-09 RX ORDER — POTASSIUM CHLORIDE 20 MEQ
40 PACKET (EA) ORAL ONCE
Refills: 0 | Status: COMPLETED | OUTPATIENT
Start: 2022-02-09 | End: 2022-02-09

## 2022-02-09 RX ORDER — SODIUM CHLORIDE 9 MG/ML
2000 INJECTION INTRAMUSCULAR; INTRAVENOUS; SUBCUTANEOUS ONCE
Refills: 0 | Status: COMPLETED | OUTPATIENT
Start: 2022-02-09 | End: 2022-02-09

## 2022-02-09 RX ORDER — ACETAMINOPHEN 500 MG
650 TABLET ORAL ONCE
Refills: 0 | Status: COMPLETED | OUTPATIENT
Start: 2022-02-09 | End: 2022-02-09

## 2022-02-09 RX ADMIN — Medication 40 MILLIEQUIVALENT(S): at 22:44

## 2022-02-09 RX ADMIN — Medication 100 MILLIGRAM(S): at 21:17

## 2022-02-09 RX ADMIN — SODIUM CHLORIDE 2000 MILLILITER(S): 9 INJECTION INTRAMUSCULAR; INTRAVENOUS; SUBCUTANEOUS at 22:44

## 2022-02-09 NOTE — ED ADULT NURSE NOTE - OBJECTIVE STATEMENT
pt is 67 y/o female BIBA for c/o b/l chronic foot cellulitis. pt also reports decreased appetite. pt denies CP, SOb and fevers

## 2022-02-09 NOTE — ED ADULT TRIAGE NOTE - CHIEF COMPLAINT QUOTE
BIBA from [facility] for left leg pain and B/L foot pain with chronic cellulitis. also c'o loss of appetite for 3 weeks

## 2022-02-10 LAB
ALBUMIN SERPL ELPH-MCNC: 3.6 G/DL — SIGNIFICANT CHANGE UP (ref 3.5–5.2)
ALP SERPL-CCNC: 72 U/L — SIGNIFICANT CHANGE UP (ref 30–115)
ALT FLD-CCNC: 10 U/L — SIGNIFICANT CHANGE UP (ref 0–41)
ANION GAP SERPL CALC-SCNC: 13 MMOL/L — SIGNIFICANT CHANGE UP (ref 7–14)
APPEARANCE UR: CLEAR — SIGNIFICANT CHANGE UP
APTT BLD: 33.6 SEC — SIGNIFICANT CHANGE UP (ref 27–39.2)
AST SERPL-CCNC: 21 U/L — SIGNIFICANT CHANGE UP (ref 0–41)
BACTERIA # UR AUTO: ABNORMAL
BILIRUB DIRECT SERPL-MCNC: 0.2 MG/DL — SIGNIFICANT CHANGE UP (ref 0–0.3)
BILIRUB INDIRECT FLD-MCNC: 0.2 MG/DL — SIGNIFICANT CHANGE UP (ref 0.2–1.2)
BILIRUB SERPL-MCNC: 0.4 MG/DL — SIGNIFICANT CHANGE UP (ref 0.2–1.2)
BILIRUB UR-MCNC: NEGATIVE — SIGNIFICANT CHANGE UP
BLD GP AB SCN SERPL QL: SIGNIFICANT CHANGE UP
BUN SERPL-MCNC: 17 MG/DL — SIGNIFICANT CHANGE UP (ref 10–20)
CALCIUM SERPL-MCNC: 8.2 MG/DL — LOW (ref 8.5–10.1)
CHLORIDE SERPL-SCNC: 103 MMOL/L — SIGNIFICANT CHANGE UP (ref 98–110)
CO2 SERPL-SCNC: 25 MMOL/L — SIGNIFICANT CHANGE UP (ref 17–32)
COLOR SPEC: YELLOW — SIGNIFICANT CHANGE UP
CREAT SERPL-MCNC: 0.7 MG/DL — SIGNIFICANT CHANGE UP (ref 0.7–1.5)
DIFF PNL FLD: ABNORMAL
EPI CELLS # UR: 5 /HPF — SIGNIFICANT CHANGE UP (ref 0–5)
ERYTHROCYTE [SEDIMENTATION RATE] IN BLOOD: 109 MM/HR — HIGH (ref 0–20)
GLUCOSE SERPL-MCNC: 76 MG/DL — SIGNIFICANT CHANGE UP (ref 70–99)
GLUCOSE UR QL: NEGATIVE — SIGNIFICANT CHANGE UP
HYALINE CASTS # UR AUTO: 0 /LPF — SIGNIFICANT CHANGE UP (ref 0–7)
INR BLD: 1.06 RATIO — SIGNIFICANT CHANGE UP (ref 0.65–1.3)
KETONES UR-MCNC: NEGATIVE — SIGNIFICANT CHANGE UP
LEUKOCYTE ESTERASE UR-ACNC: NEGATIVE — SIGNIFICANT CHANGE UP
MAGNESIUM SERPL-MCNC: 2 MG/DL — SIGNIFICANT CHANGE UP (ref 1.8–2.4)
NITRITE UR-MCNC: NEGATIVE — SIGNIFICANT CHANGE UP
PH UR: 6 — SIGNIFICANT CHANGE UP (ref 5–8)
POTASSIUM SERPL-MCNC: 3.3 MMOL/L — LOW (ref 3.5–5)
POTASSIUM SERPL-SCNC: 3.3 MMOL/L — LOW (ref 3.5–5)
PROT SERPL-MCNC: 6.8 G/DL — SIGNIFICANT CHANGE UP (ref 6–8)
PROT UR-MCNC: ABNORMAL
PROTHROM AB SERPL-ACNC: 12.2 SEC — SIGNIFICANT CHANGE UP (ref 9.95–12.87)
RAPID RVP RESULT: SIGNIFICANT CHANGE UP
RBC CASTS # UR COMP ASSIST: 10 /HPF — HIGH (ref 0–4)
SARS-COV-2 RNA SPEC QL NAA+PROBE: SIGNIFICANT CHANGE UP
SODIUM SERPL-SCNC: 141 MMOL/L — SIGNIFICANT CHANGE UP (ref 135–146)
SP GR SPEC: >1.05 (ref 1.01–1.03)
UROBILINOGEN FLD QL: SIGNIFICANT CHANGE UP
WBC UR QL: 2 /HPF — SIGNIFICANT CHANGE UP (ref 0–5)

## 2022-02-10 PROCEDURE — 99223 1ST HOSP IP/OBS HIGH 75: CPT

## 2022-02-10 PROCEDURE — 74177 CT ABD & PELVIS W/CONTRAST: CPT | Mod: 26,MA

## 2022-02-10 RX ORDER — HYDROXYCHLOROQUINE SULFATE 200 MG
200 TABLET ORAL AT BEDTIME
Refills: 0 | Status: DISCONTINUED | OUTPATIENT
Start: 2022-02-10 | End: 2022-02-15

## 2022-02-10 RX ORDER — CEFTRIAXONE 500 MG/1
1000 INJECTION, POWDER, FOR SOLUTION INTRAMUSCULAR; INTRAVENOUS EVERY 24 HOURS
Refills: 0 | Status: DISCONTINUED | OUTPATIENT
Start: 2022-02-10 | End: 2022-02-11

## 2022-02-10 RX ORDER — CEPHALEXIN 500 MG
500 CAPSULE ORAL
Refills: 0 | Status: DISCONTINUED | OUTPATIENT
Start: 2022-02-10 | End: 2022-02-10

## 2022-02-10 RX ORDER — TRAMADOL HYDROCHLORIDE 50 MG/1
1 TABLET ORAL
Qty: 0 | Refills: 0 | DISCHARGE

## 2022-02-10 RX ORDER — ENOXAPARIN SODIUM 100 MG/ML
40 INJECTION SUBCUTANEOUS AT BEDTIME
Refills: 0 | Status: DISCONTINUED | OUTPATIENT
Start: 2022-02-10 | End: 2022-02-15

## 2022-02-10 RX ORDER — LOSARTAN POTASSIUM 100 MG/1
25 TABLET, FILM COATED ORAL DAILY
Refills: 0 | Status: DISCONTINUED | OUTPATIENT
Start: 2022-02-10 | End: 2022-02-10

## 2022-02-10 RX ORDER — OXYCODONE AND ACETAMINOPHEN 5; 325 MG/1; MG/1
1 TABLET ORAL EVERY 6 HOURS
Refills: 0 | Status: DISCONTINUED | OUTPATIENT
Start: 2022-02-10 | End: 2022-02-11

## 2022-02-10 RX ORDER — MAGNESIUM OXIDE 400 MG ORAL TABLET 241.3 MG
1 TABLET ORAL
Qty: 0 | Refills: 0 | DISCHARGE

## 2022-02-10 RX ORDER — LEVOTHYROXINE SODIUM 125 MCG
112 TABLET ORAL DAILY
Refills: 0 | Status: DISCONTINUED | OUTPATIENT
Start: 2022-02-10 | End: 2022-02-11

## 2022-02-10 RX ORDER — LOSARTAN POTASSIUM 100 MG/1
25 TABLET, FILM COATED ORAL DAILY
Refills: 0 | Status: DISCONTINUED | OUTPATIENT
Start: 2022-02-11 | End: 2022-02-15

## 2022-02-10 RX ADMIN — ENOXAPARIN SODIUM 40 MILLIGRAM(S): 100 INJECTION SUBCUTANEOUS at 21:50

## 2022-02-10 RX ADMIN — CEFTRIAXONE 100 MILLIGRAM(S): 500 INJECTION, POWDER, FOR SOLUTION INTRAMUSCULAR; INTRAVENOUS at 21:43

## 2022-02-10 RX ADMIN — Medication 650 MILLIGRAM(S): at 00:10

## 2022-02-10 RX ADMIN — OXYCODONE AND ACETAMINOPHEN 1 TABLET(S): 5; 325 TABLET ORAL at 10:40

## 2022-02-10 RX ADMIN — OXYCODONE AND ACETAMINOPHEN 1 TABLET(S): 5; 325 TABLET ORAL at 22:38

## 2022-02-10 RX ADMIN — OXYCODONE AND ACETAMINOPHEN 1 TABLET(S): 5; 325 TABLET ORAL at 00:09

## 2022-02-10 RX ADMIN — Medication 500 MILLIGRAM(S): at 12:25

## 2022-02-10 RX ADMIN — OXYCODONE AND ACETAMINOPHEN 1 TABLET(S): 5; 325 TABLET ORAL at 22:08

## 2022-02-10 RX ADMIN — Medication 650 MILLIGRAM(S): at 01:47

## 2022-02-10 RX ADMIN — Medication 200 MILLIGRAM(S): at 21:52

## 2022-02-10 RX ADMIN — OXYCODONE AND ACETAMINOPHEN 1 TABLET(S): 5; 325 TABLET ORAL at 01:47

## 2022-02-10 RX ADMIN — Medication 1 APPLICATION(S): at 21:50

## 2022-02-10 RX ADMIN — OXYCODONE AND ACETAMINOPHEN 1 TABLET(S): 5; 325 TABLET ORAL at 09:58

## 2022-02-10 NOTE — ED PROVIDER NOTE - ATTENDING CONTRIBUTION TO CARE
I personally evaluated the patient. I reviewed the Resident’s or Physician Assistant’s note (as assigned above), and agree with the findings and plan except as documented in my note.  66 F with hx of HTN, SLE, venous insufficiency, severe eczema and chronic  intermittent lower leg cellulitis was brought in from [facility] for evaluation of worsening LE cellulitis with difficulty ambulating. In addition, pt's note from the assisted living facility states that she has had a poor appetite for the past 3 weeks. No vomiting, diarrhea, abd pain. VS reviewed, and pt is febrile, pt non-toxic appearing, NAD. Head ncat, MMM, neck supple, normal ROM, normal s1s2 without any murmurs, Lungs CTAB with normal work of breathing. abd +BS, s/nd/nttp, no guarding or rebound, extremities wnl, neuro exam grossly normal. + erythema and ttp of b/l distal legs circumferentially with left leg worse than right. Dorsum of the feet with chronic scaly rash. Plan is labs, meds, imaging and manage accordingly.

## 2022-02-10 NOTE — H&P ADULT - NSHPREVIEWOFSYSTEMS_GEN_ALL_CORE
CONSTITUTIONAL: + weakness, no fevers or chills  RESPIRATORY: No cough, wheezing, hemoptysis; No shortness of breath  CARDIOVASCULAR: No chest pain or palpitations  GASTROINTESTINAL: No abdominal or epigastric pain. No nausea, vomiting, or hematemesis; No diarrhea or constipation. No melena or hematochezia.  GENITOURINARY: No dysuria, frequency or hematuria  NEUROLOGICAL: No numbness or weakness  SKIN: as per HPI

## 2022-02-10 NOTE — H&P ADULT - HISTORY OF PRESENT ILLNESS
69 y/o hx of CVA 1987 without residual defects, SLE on plaquenil, Hypothyroid, HTN presents from [facility] for leg pain.  Pt. reports b/l leg pain to touch x weeks, left leg worse than right. No alleviating or aggravating factors. Pt. also endorses generalized weakness with decreased appetite. Pt. denies fever chills, SOB, chest pain. Of note Pt. has not seen rheumatologist in 2 years and has extensive dermatitis/ eczema on left arm and b/l legs.  In ED febrile to 100.8, WBC 24, lactate 1.4 Normocytic anemia at baseline, Hypokalemia which was repleted in ED.  Pt. was given clindamycin for diagnosis if cellulitis.  Incidental finding on duplex and also seen on CT were multiple enlarged lymph nodes without clear etiology neoplastic cause not excluded, also seen on CT was cystic lesion of pancreas head measuring 3.5cm likely needing further endoscopic eval.

## 2022-02-10 NOTE — ED PROVIDER NOTE - PHYSICAL EXAMINATION
CONSTITUTIONAL: febrile on exam, but well-appearing; well-nourished; in no apparent distress.   CARDIOVASCULAR: Normal S1, S2; no murmurs, rubs, or gallops.   RESPIRATORY: Normal chest excursion with respiration; breath sounds clear and equal bilaterally; no wheezes, rhonchi, or rales.  GI/: non-distended; non-tender; no palpable organomegaly.   MS: b/l LE swelling and diffuse acute on chronic skin changes suspicious for cellulitis with some weeping/crusting; otherwise non-tender to palpation. Normal ROM in all four extremities  SKIN: see above, otherwise normal for age and race  NEURO/PSYCH: A & O x 4; grossly unremarkable. mood and manner are appropriate.

## 2022-02-10 NOTE — ED PROVIDER NOTE - NSICDXPASTMEDICALHX_GEN_ALL_CORE_FT
PAST MEDICAL HISTORY:  CVA (cerebral vascular accident) 1987    Essential hypertension     Hypothyroidism     Lupus

## 2022-02-10 NOTE — CONSULT NOTE ADULT - ATTENDING COMMENTS
Patient seen and examined during GI rounds with the GI PA and fellow, Will continue to follow, may benefit from EGD with EUS-FNA, given location of lesion concern exists for ultimate obstruction of CBD if has a solid component , will require ERCP with CBD stent placement prophylactically
70 year old female with a history of SLE who presents with leg pain.    -Patient has tender, warm, erythematous and pruritic rash lower extremities concerning for cellulitis. She has pruritic excoriated skin in bilateral arms. Patient has no systemic symptoms of SLE. She was septic on admission with T 100.8F, WBC 24.8. UA showed protein 30, moderate blood, 10 RBCs, few bacteria. Doppler ultrasound LE showed multiple lymph nodes in the left and right groin, CT abdomen pelvis showed enlarged bilateral inguinal and iliac crest lymph nodes, non specific, and cystic lesion on pancreatic head.   -Dermatitis with cellulitis  -Check labs for SLE activity  -Continue  mg daily   -Can add topical hydrocortisone  -Treatment of cellulitis per primary team   -Lymph node adenopathy could be reactive consider heme eval

## 2022-02-10 NOTE — CONSULT NOTE ADULT - SUBJECTIVE AND OBJECTIVE BOX
HPI:  71 y/o hx of CVA 1987 without residual defects, SLE on plaquenil, Hypothyroid, HTN presents from [facility] for leg pain.  Pt. reports b/l leg pain to touch x weeks, left leg worse than right. No alleviating or aggravating factors. Pt. also endorses generalized weakness with decreased appetite. Pt. denies fever chills, SOB, chest pain. Of note Pt. has not seen rheumatologist in 2 years and has extensive dermatitis/ eczema on left arm and b/l legs.  In ED febrile to 100.8, WBC 24, lactate 1.4 Normocytic anemia at baseline, Hypokalemia which was repleted in ED.  Pt. was given clindamycin for diagnosis if cellulitis.  Incidental finding on duplex and also seen on CT were multiple enlarged lymph nodes without clear etiology neoplastic cause not excluded, also seen on CT was cystic lesion of pancreas head measuring 3.5cm likely needing further endoscopic eval. (10 Feb 2022 08:41)      PAST MEDICAL & SURGICAL HISTORY  Lupus    Essential hypertension    CVA (cerebral vascular accident)  1987    Hypothyroidism    FH: bilateral hip replacements    H/O total knee replacement    H/O abdominal hysterectomy        FAMILY HISTORY:  FAMILY HISTORY:      SOCIAL HISTORY:  []smoker  []Alcohol  []Drug    ALLERGIES:  aspirin (Unknown)  Compazine (Unknown)  Levaquin (Unknown)  morphine (Unknown)  penicillins (Unknown)      MEDICATIONS:  MEDICATIONS  (STANDING):  cephalexin 500 milliGRAM(s) Oral four times a day  enoxaparin Injectable 40 milliGRAM(s) SubCutaneous at bedtime  hydroxychloroquine 200 milliGRAM(s) Oral at bedtime  levothyroxine 112 MICROGram(s) Oral daily  silver sulfADIAZINE 1% Cream 1 Application(s) Topical two times a day    MEDICATIONS  (PRN):  oxycodone    5 mG/acetaminophen 325 mG 1 Tablet(s) Oral every 6 hours PRN Severe Pain (7 - 10)      HOME MEDICATIONS:  Home Medications:  Cozaar 25 mg oral tablet: 1 tab(s) orally once a day (10 Feb 2022 08:44)  Plaquenil 200 mg oral tablet: orally 2 times a day (10 Feb 2022 08:44)  Synthroid 112 mcg (0.112 mg) oral tablet: 1 tab(s) orally once a day (10 Feb 2022 08:44)      VITALS:   T(F): 97.6 (02-10 @ 01:53), Max: 100.9 (02-09 @ 19:29)  HR: 90 (02-10 @ 00:19) (85 - 90)  BP: 110/55 (02-10 @ 00:19) (110/55 - 116/58)  BP(mean): --  RR: 19 (02-10 @ 00:19) (19 - 20)  SpO2: 95% (02-10 @ 00:19) (95% - 99%)    I&O's Summary      REVIEW OF SYSTEMS:  CONSTITUTIONAL: No weakness, fevers or chills  EYES: No visual changes  ENT: No vertigo or throat pain   NECK: No pain or stiffness  RESPIRATORY: No cough, wheezing, hemoptysis; No shortness of breath  CARDIOVASCULAR: No chest pain or palpitations  GASTROINTESTINAL: No abdominal or epigastric pain. No nausea, vomiting, or hematemesis; No diarrhea or constipation. No melena or hematochezia.  GENITOURINARY: No dysuria, frequency or hematuria  NEUROLOGICAL: No numbness or weakness  SKIN: No itching, no rashes  MSK: No pain    PHYSICAL EXAM:  GENERAL: NAD, speaks in full sentences, no signs of respiratory distress  HEAD:  Atraumatic, Normocephalic  EYES: EOMI, PERRLA, non-icteric, no injected sclera  NECK: Supple, No JVD  CHEST/LUNG: Clear to auscultation bilaterally; No wheeze; No crackles; No accessory muscles used  HEART: Regular rate and rhythm; No murmurs;   ABDOMEN: Soft, Nontender, Nondistended; Bowel sounds present; No guarding  EXTREMITIES/ skin left hand with dry and cracked and scaly skin, raised plaques, b/l legs  cracked skin chronic dermatitis, left leg with overlying erythema, warmth, tenderness to touch  PSYCH: AAOx3  NEUROLOGY: non-focal    LABS:                        10.8   24.84 )-----------( 208      ( 09 Feb 2022 20:53 )             33.1     02-09    139  |  100  |  26<H>  ----------------------------<  95  3.0<L>   |  24  |  1.1    Ca    8.7      09 Feb 2022 20:53        Lactate, Blood: 1.4 mmol/L (02-09-22 @ 20:53)          Troponin trend:         HPI:  71 y/o hx of CVA 1987 without residual defects, SLE on plaquenil, Hypothyroid, HTN presents from [facility] for leg pain.  Pt. reports b/l leg pain to touch x weeks, left leg worse than right. No alleviating or aggravating factors. Pt. also endorses generalized weakness with decreased appetite. Pt. denies fever chills, SOB, chest pain. Of note Pt. has not seen rheumatologist in 2 years and has extensive dermatitis/ eczema on left arm and b/l legs.  In ED febrile to 100.8, WBC 24, lactate 1.4 Normocytic anemia at baseline, Hypokalemia which was repleted in ED.  Pt. was given clindamycin for diagnosis if cellulitis.  Incidental finding on duplex and also seen on CT were multiple enlarged lymph nodes without clear etiology neoplastic cause not excluded, also seen on CT was cystic lesion of pancreas head measuring 3.5cm likely needing further endoscopic eval. (10 Feb 2022 08:41)    Patient followed with Dr. Donaldo Dukes ([place]) she hasn't seen him for 2 years (prior to covid-19 pandemic). She was diagnosed with SLE young at age 17. 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 She has been treated with Plaquenil and steroids. Plaquenil was decreased to 1 tab daily due to limited supply at her living facility and need for use for covid. She denies oral ulcers, nasal ulcers, dry eyes, She has photosensitivity and hair loss. Reports a 4 month history of rash on her legs, and arms both are pruritic.       PAST MEDICAL & SURGICAL HISTORY  Lupus    Essential hypertension    CVA (cerebral vascular accident)  1987    Hypothyroidism    FH: bilateral hip replacements    H/O total knee replacement    H/O abdominal hysterectomy        FAMILY HISTORY:  FAMILY HISTORY:      SOCIAL HISTORY:  []smoker  []Alcohol  []Drug    ALLERGIES:  aspirin (Unknown)  Compazine (Unknown)  Levaquin (Unknown)  morphine (Unknown)  penicillins (Unknown)      MEDICATIONS:  MEDICATIONS  (STANDING):  cephalexin 500 milliGRAM(s) Oral four times a day  enoxaparin Injectable 40 milliGRAM(s) SubCutaneous at bedtime  hydroxychloroquine 200 milliGRAM(s) Oral at bedtime  levothyroxine 112 MICROGram(s) Oral daily  silver sulfADIAZINE 1% Cream 1 Application(s) Topical two times a day    MEDICATIONS  (PRN):  oxycodone    5 mG/acetaminophen 325 mG 1 Tablet(s) Oral every 6 hours PRN Severe Pain (7 - 10)      HOME MEDICATIONS:  Home Medications:  Cozaar 25 mg oral tablet: 1 tab(s) orally once a day (10 Feb 2022 08:44)  Plaquenil 200 mg oral tablet: orally 2 times a day (10 Feb 2022 08:44)  Synthroid 112 mcg (0.112 mg) oral tablet: 1 tab(s) orally once a day (10 Feb 2022 08:44)      VITALS:   T(F): 97.6 (02-10 @ 01:53), Max: 100.9 (02-09 @ 19:29)  HR: 90 (02-10 @ 00:19) (85 - 90)  BP: 110/55 (02-10 @ 00:19) (110/55 - 116/58)  BP(mean): --  RR: 19 (02-10 @ 00:19) (19 - 20)  SpO2: 95% (02-10 @ 00:19) (95% - 99%)    I&O's Summary      REVIEW OF SYSTEMS:  CONSTITUTIONAL: No weakness, fevers or chills  EYES: No visual changes  ENT: No vertigo or throat pain   NECK: No pain or stiffness  RESPIRATORY: No cough, wheezing, hemoptysis; No shortness of breath  CARDIOVASCULAR: No chest pain or palpitations  GASTROINTESTINAL: No abdominal or epigastric pain. No nausea, vomiting, or hematemesis; No diarrhea or constipation. No melena or hematochezia.  GENITOURINARY: No dysuria, frequency or hematuria  NEUROLOGICAL: No numbness or weakness  SKIN: +pruritis and rash in arms and legs  MSK: No pain    PHYSICAL EXAM:  GENERAL: NAD, speaks in full sentences, no signs of respiratory distress  HEAD:  Atraumatic, Normocephalic  EYES: EOMI, PERRLA, non-icteric, no injected sclera  NECK: Supple, No JVD  CHEST/LUNG: Clear to auscultation bilaterally; No wheeze; No crackles; No accessory muscles used  HEART: Regular rate and rhythm; No murmurs;   ABDOMEN: Soft, Nontender, Nondistended; Bowel sounds present; No guarding  EXTREMITIES/ skin left hand with dry and cracked and scaly skin, raised plaques, b/l legs  cracked skin chronic dermatitis, left leg with overlying erythema, warmth, tenderness to touch  PSYCH: AAOx3  NEUROLOGY: non-focal    LABS:                        10.8   24.84 )-----------( 208      ( 09 Feb 2022 20:53 )             33.1     02-09    139  |  100  |  26<H>  ----------------------------<  95  3.0<L>   |  24  |  1.1    Ca    8.7      09 Feb 2022 20:53        Lactate, Blood: 1.4 mmol/L (02-09-22 @ 20:53)          Troponin trend:

## 2022-02-10 NOTE — CONSULT NOTE ADULT - SUBJECTIVE AND OBJECTIVE BOX
Patient is a 69 y/o female with PMHx of CVA 1987 without residual defects, SLE on plaquenil, Hypothyroid, HTN who presented from [facility] for leg pain. Patient notes to me pain in legs has become worse and she cannot get comfortable. Patient notes no prior history of Pancreatic or liver issues. She has no abdominal pain, notes no change in appetite, bowels, weight, or discoloration of the skin.        PAST MEDICAL & SURGICAL HISTORY:  Lupus  Essential hypertension  CVA (cerebral vascular accident) 1987  Hypothyroidism  FH: bilateral hip replacements  H/O total knee replacement  H/O abdominal hysterectomy    Social History  Denies Current Tobacco use  Denies Current ETOH use  Denies Current Illicit Drug use       Family Hx:  Father: Non Contributory   Mother: Non Contributory      MEDICATIONS  (STANDING):  cephalexin 500 milliGRAM(s) Oral four times a day  enoxaparin Injectable 40 milliGRAM(s) SubCutaneous at bedtime  hydroxychloroquine 200 milliGRAM(s) Oral at bedtime  levothyroxine 112 MICROGram(s) Oral daily  losartan 25 milliGRAM(s) Oral daily  silver sulfADIAZINE 1% Cream 1 Application(s) Topical two times a day    MEDICATIONS  (PRN):  oxycodone    5 mG/acetaminophen 325 mG 1 Tablet(s) Oral every 6 hours PRN Severe Pain (7 - 10)      Allergies  aspirin (Unknown)  Compazine (Unknown)  Levaquin (Unknown)  morphine (Unknown)  penicillins (Unknown)    Intolerances    Review of Systems  General:  Denies Fatigue, Denies Fever, Denies Weakness ,Denies Weight Loss   HEENT: Denies Trouble Swallowing ,Denies  Sore Throat , Denies Change in hearing/vision/speech ,Denies Dizziness    Cardio: Denies  Chest Pain , Palpitations    Respiratory: Denies worsening of SOB, Denies Cough  Abdomen: Denies abdominal pain, nausea, emesis, or weight ,loss  Neuro: Denies Headache Denies Dizziness, Denies Paresthesias  MSK: See HPI  Psych: Patient denies depression, denies suicidal or homicidal ideations  Integ: Patient Denies rash, or new skin lesions       Vital Signs   T(F): 97.6 (10 Feb 2022 01:53), Max: 100.9 (09 Feb 2022 19:29)  HR: 90 (10 Feb 2022 00:19) (85 - 90)  BP: 110/55 (10 Feb 2022 00:19) (110/55 - 116/58)  RR: 19 (10 Feb 2022 00:19) (19 - 20)  SpO2: 95% (10 Feb 2022 00:19) (95% - 99%)  Physical Exam  Gen: NAD  HEENT: NC/AT, Mucosal Membranes Moist  Cardio: S1/S2 No S3/S4, Regular  Resp: CTA B/L  Abdomen: Soft, ND/NT  Neuro: AAOx3  Extremities: B/L leg excoriation, poor rom  Skin: No jaundice         Labs:                          10.8   24.84 )-----------( 208      ( 09 Feb 2022 20:53 )             33.1       Auto Neutrophil %: 88.6 % (02-09-22 @ 20:53)  Band Neutrophils %: 3.5 % (02-09-22 @ 20:53)    02-09    139  |  100  |  26<H>  ----------------------------<  95  3.0<L>   |  24  |  1.1      Calcium, Total Serum: 8.7 mg/dL (02-09-22 @ 20:53)      LFTs:   Lactate, Blood: 1.4 mmol/L (02-09-22 @ 20:53)      RADIOLOGY & ADDITIONAL STUDIES:  CT Abdomen and Pelvis w/ IV Cont (02.10.22 @ 00:35) >  IMPRESSION:    Enlarged bilateral inguinal and iliac chain lymph nodes as above.   Findings are nonspecific and of unclear etiology. The possibility of   neoplasm is not excluded. Can consider further evaluation with PET/CT or   tissue sampling to evaluate for underlying lymphoproliferative disorder.    Cystic lesion in the pancreatic head measuring up to 3.5 cm without   upstream ductal dilatation (felt to be unrelated pelvic adenopathy).   Further evaluation can be obtained with MRI to evaluate for any solid   components or communication with the CBD. Ultimately endoscopic   evaluation/fluid sampling may be needed secondary to size.    Cholelithiasis without CT evidence for cholecystitis.

## 2022-02-10 NOTE — PATIENT PROFILE ADULT - HAS THE PATIENT RECEIVED THE INFLUENZA VACCINE THIS SEASON?
ASSESSMENT AND PLAN:  Diaper dermatitis  Still flaring The primary goal in irritant or chafing dermatitis is to keep the area as clean and dry as possible. Frequent diaper changes, gentle cleansing with a moistened soft cloth or fragrance-free diaper wipe, exposure to air whenever possible, and the judicious use of topical therapy may be sufficient in most cases.    For inflammation start   -mix  desonide (DESOWEN) 0.05 % ointment in 1 : 1 ratio with ketoconazole cream and ; Apply to buttock  rash BID x 2 weeks.  Dispense: 30 g; Refill: 0  For yeast overgrowth component which is notable in moist areas use  -mix ketoconazole (NIZORAL) 2 % cream with desonide ointment in a 1 : 1 ratio ; Apply topically 2 times daily. Use only twice daily in between diaper changes  Dispense: 15 g; Refill: 0  In between diaper changes apply aquaphor as a barrier of protection    Follow up in 2-4 weeks  
yes...

## 2022-02-10 NOTE — CONSULT NOTE ADULT - ASSESSMENT
Patient is a 71 y/o female with PMHx of CVA 1987 without residual defects, SLE on plaquenil, Hypothyroid, HTN who presented from [facility] for leg pain. Patient notes to me pain in legs has become worse and she cannot get comfortable. Patient notes no prior history of Pancreatic or liver issues. She has no abdominal pain, notes no change in appetite, bowels, weight, or discoloration of the skin. Patient on imaging was found to have a 3.5cm Pancreatic head lesion. Patient without LFT or Coag. Patient cannot get MR imaging because of hardware.     Incidental finding of Pancreatic Head Mass 3.5cm  - No GI complaints  - Obtain CMP and Coags  - Patient wants her leg issues to be addressed before discussing plan for Endoscopic evaluation  - Will continue to follow, may benefit from EGD with EUS, given location of lesion concern exists for ultimate obstruction of CBD  - Will follow Patient is a 71 y/o female with PMHx of CVA 1987 without residual defects, SLE on plaquenil, Hypothyroid, HTN who presented from [facility] for leg pain. Patient notes to me pain in legs has become worse and she cannot get comfortable. Patient notes no prior history of Pancreatic or liver issues. She has no abdominal pain, notes no change in appetite, bowels, weight, or discoloration of the skin. Patient on imaging was found to have a 3.5cm Pancreatic head lesion. Patient without LFT or Coag. Patient cannot get MR imaging because of hardware.     Incidental finding of Pancreatic Head Mass 3.5cm  - No GI complaints  - Obtain CMP and Coags  - Patient wants her leg issues to be addressed before discussing plan for Endoscopic evaluation  - Will continue to follow, may benefit from EGD with EUS-FNA, given location of lesion concern exists for ultimate obstruction of CBD if has a solid component , will require ERCP with CBD stent placement prophylactically   - Will follow

## 2022-02-10 NOTE — H&P ADULT - NSHPPHYSICALEXAM_GEN_ALL_CORE
GENERAL: NAD, speaks in full sentences, no signs of respiratory distress  HEAD:  Atraumatic, Normocephalic  EYES: EOMI, PERRLA, non-icteric, no injected sclera  NECK: Supple, No JVD  CHEST/LUNG: Clear to auscultation bilaterally; No wheeze; No crackles; No accessory muscles used  HEART: Regular rate and rhythm; No murmurs;   ABDOMEN: Soft, Nontender, Nondistended; Bowel sounds present; No guarding  EXTREMITIES/ skin left hand with dry and cracked and scaly skin, raised plaques, b/l legs  cracked skin chronic dermatitis, left leg with overlying erythema, warmth, tenderness to touch  PSYCH: AAOx3  NEUROLOGY: non-focal

## 2022-02-10 NOTE — PATIENT PROFILE ADULT - FALL HARM RISK - HARM RISK INTERVENTIONS

## 2022-02-10 NOTE — ED PROVIDER NOTE - CLINICAL SUMMARY MEDICAL DECISION MAKING FREE TEXT BOX
Pt presented with fever, LE cellulitis and decreased appetite. Required labs, abx, meds. Will admit for further management.

## 2022-02-10 NOTE — H&P ADULT - NSHPSOCIALHISTORY_GEN_ALL_CORE
smokes 3 cigarretes daily has tried to quit in past  lives with roomate at [facility]

## 2022-02-10 NOTE — ED PROVIDER NOTE - CARE PLAN
1 Principal Discharge DX:	Cellulitis  Secondary Diagnosis:	Lymph nodes enlarged  Secondary Diagnosis:	Sepsis

## 2022-02-10 NOTE — ED PROVIDER NOTE - NS ED ROS FT
Constitutional: chills, no recent weight loss  Eyes: no redness/discharge/pain/vision changes  ENT: no rhinorrhea/ear pain/sore throat  Cardiac: No chest pain, SOB or edema.  Respiratory: No cough or respiratory distress  GI: No nausea, vomiting, diarrhea or abdominal pain.  : No dysuria, frequency, urgency or hematuria  MS: no pain to back, no loss of ROM, no weakness  Neuro: No headache or weakness. No LOC.  Skin: No skin rash.  Except as documented in the HPI, all other systems are negative.

## 2022-02-10 NOTE — ED PROVIDER NOTE - OBJECTIVE STATEMENT
pt with pmhx CVA in 1987, HTN, hypothyroidism, Lupus presents from [facility] for LE cellulitis and decreased appetite. admission for same in 2019. Denies fever/chill/HA/dizziness/chest pain/palpitation/sob/abd pain/n/v/d/ black stool/bloody stool/urinary sxs

## 2022-02-10 NOTE — H&P ADULT - ASSESSMENT
69 y/o hx of CVA 1987 without residual defects, SLE on plaquenil, Hypothyroid, HTN presents from [facility] for leg pain. FOund to have CT cystic lesion of pancreatic head and multiple enlarged lymph nodes.    #b/l chronic dermatitis (related to uncontrolled lupus?) with overlyiong cellulitis of left leg  #septic on admission  -keflex, wound consult for diffuse chronic dermatitis  - rheum consult- hasnt seen her Rheum doctor who is in [place] in 2 years, per Pt. plaquenil is now only once a day  -can apply local petroleum to dry skin    #decreased appetite send albumin, consider consulting nutrition    #Incidental findings on [facility] duplex LE and then on CT of multiple enlarged lymph nodes with CT findings of cystic lesion of pancreatic head--  -Consult advanced GI  -MRI    #hypokalemia in ED s/p repletion f/ u repeat labs    #Hypothyroid c/w synthroid- TSH  #HTN c/w cozaar    #DVT ppx- lovenox   71 y/o hx of CVA 1987 without residual defects, SLE on plaquenil, Hypothyroid, HTN presents from [facility] for leg pain. FOund to have CT cystic lesion of pancreatic head and multiple enlarged lymph nodes.    #b/l chronic dermatitis (related to uncontrolled lupus?) with overlyiong cellulitis of left leg  #septic on admission  -keflex, wound consult for diffuse chronic dermatitis  - rheum consult- hasnt seen her Rheum doctor who is in [place] in 2 years, per Pt. plaquenil is now only once a day  -can apply local petroleum to dry skin    #decreased appetite send albumin, consider consulting nutrition    #Incidental findings on [facility] duplex LE and then on CT of multiple enlarged lymph nodes with CT findings of cystic lesion of pancreatic head--  -Consult advanced GI- needs eus, will orer coags/ type and screen/liver enzymes  -Pt. has b./ hip and knee replacement which is not MRI compatible     #hypokalemia in ED s/p repletion f/ u repeat labs    #Hypothyroid c/w synthroid- TSH  #HTN c/w cozaar    #DVT ppx- lovenox

## 2022-02-10 NOTE — H&P ADULT - ATTENDING COMMENTS
patient seen and examined. Discussed with resident and interdisciplinary team    # left lower extremity cellulitis in the setting of chronic dermatitis  # sepsis- POA  monitor blood and urine culture  start on ceftriaxone    # chronic dermatitis  patient with h/o eczema  consult dermatology    # SLE  Rheumatologist evaluated evaluated- check kalpesh, ds dna, c3, c4  continue hydroxychloroquine    # lymphadenopathy- Enlarged bilateral inguinal and iliac chain lymph nodes   # pancreatic head cystic lesion  Gi team consulted- will await further plans for EGD and EUS  Patient will need OP hematology follow up

## 2022-02-10 NOTE — CONSULT NOTE ADULT - ASSESSMENT
71 y/o hx of CVA 1987 without residual defects, SLE on plaquenil, Hypothyroid, HTN presents from [facility] for leg pain.    #Bilateral lower extremities chronic dermatitis with excoriations   #left lower extremity overlying cellulitis   #Lupus   -Patient was diagnosed with lupus at the age of 17. was previously on steroid and then on hydroxychloroquine 200 mg PO BID and then daily  -Currently complaining only of pain in the ankles, no knee pain/swelling, no hand pain/swelling   -Follow up CRP, ESR, Anti-DsDNA ( lupus activity ) and DANIS   -Continue Hydroxychloroquine for now   -Treat cellulitis as per primary team     #This is an incomplete note, attending recs to follow  69 y/o hx of CVA 1987 without residual defects, SLE on plaquenil, Hypothyroid, HTN presents from [facility] for leg pain.    #Bilateral lower extremities chronic dermatitis with excoriations   #left lower extremity overlying cellulitis   #Lupus   -Patient was diagnosed with lupus at the age of 17. was previously on steroid and then on hydroxychloroquine 200 mg PO BID and then daily  -Currently complaining only of pain in the ankles, no knee pain/swelling, no hand pain/swelling   -Follow up CRP, ESR, Anti-DsDNA, C3 and C4 for lupus activity  -Continue Hydroxychloroquine for now  -Consider hematology evaluation for lymphadenopathy, could be SLE related   -Add topical agent for skim itchiness    -Treat cellulitis as per primary team      71 y/o hx of CVA 1987 without residual defects, SLE on plaquenil, Hypothyroid, HTN presents from [facility] for leg pain.    #Bilateral lower extremities chronic dermatitis with excoriations   #left lower extremity overlying cellulitis   #Lupus   -Patient was diagnosed with lupus at the age of 17. was previously on steroid and then on hydroxychloroquine 200 mg PO BID and then daily  -Currently complaining only of pain in the ankles, no knee pain/swelling, no hand pain/swelling   -Follow up CRP, ESR, Anti-DsDNA, C3 and C4 for lupus activity  -Continue Hydroxychloroquine for now  -Consider hematology evaluation for lymphadenopathy, could be SLE related   -Add topical agent for skin pruritis  -Treat cellulitis as per primary team      No

## 2022-02-10 NOTE — H&P ADULT - NSHPLABSRESULTS_GEN_ALL_CORE
10.8   24.84 )-----------( 208      ( 09 Feb 2022 20:53 )             33.1     02-09    139  |  100  |  26<H>  ----------------------------<  95  3.0<L>   |  24  |  1.1    Ca    8.7      09 Feb 2022 20:53        < from: CT Abdomen and Pelvis w/ IV Cont (02.10.22 @ 00:35) >    LOWER CHEST: There is mild bibasilar subsegmental atelectasis. Coronary   artery calcifications are present.    LIVER: Unremarkable.    SPLEEN: Unremarkable.    PANCREAS: There is a 2.9 x 2.5 x 3.5 cm cystic lesion in the pancreatic   head. No evidence for upstream ductal dilatation.    GALLBLADDER AND BILIARY TREE: Cholelithiasis without CT evidence for   pericholecystic inflammation. The distal CBD is not definitively   visualized    ADRENALS: Unremarkable.    KIDNEYS: Symmetric pattern of renal enhancement. No hydronephrosis. There   are renal and parapelvic cysts as well as hypodensities too small to   characterize.    LYMPH NODES: There are bilateral enlarged inguinal and pelvic lymph nodes   along the iliac chains. For reference: Left inguinal lymph node measuring   2.4 x 1.7 cm (6/383), lymph node bordering inguinal/external iliac on the   left measuring 2.2 x 2.0 cm (6/314) and left external iliac lymph node   showing 2.2 x 1.7 cm (6/288). Right inguinal lymph node measuring 3.5 x   1.8 cm (6/327) right external iliac lymph node measuring 2.2 x 1.6 cm   (6/294).    VASCULATURE: Normal caliber abdominal aorta with atherosclerotic changes.    BOWEL: There is no evidence for bowel obstruction or bowel wall   thickening. Colonic diverticulosis without evidence for diverticulitis.    PERITONEUM/RETROPERITONEUM/MESENTERY: There is no ascites or   pneumoperitoneum.    PELVIC VISCERA: Limited evaluation given obscuration by adjacent   bilateral hip hardware. No obvious acute abnormality was identified.    BONES AND SOFT TISSUES: Osteopenia and bilateral total hip arthroplasty.      IMPRESSION:    Enlarged bilateral inguinal and iliac chain lymph nodes as above.   Findings are nonspecific and of unclear etiology. The possibility of   neoplasm is not excluded. Can consider further evaluation with PET/CT or   tissue sampling to evaluate for underlying lymphoproliferative disorder.    Cystic lesion in the pancreatic head measuring up to 3.5 cm without   upstream ductal dilatation (felt to be unrelated pelvic adenopathy).   Further evaluation can be obtained with MRI to evaluate for any solid   components or communication with the CBD. Ultimately endoscopic   evaluation/fluid sampling may be needed secondary to size.    Cholelithiasis without CT evidence for cholecystitis.    < end of copied text >

## 2022-02-11 LAB
ALBUMIN SERPL ELPH-MCNC: 3.3 G/DL — LOW (ref 3.5–5.2)
ALP SERPL-CCNC: 74 U/L — SIGNIFICANT CHANGE UP (ref 30–115)
ALT FLD-CCNC: 18 U/L — SIGNIFICANT CHANGE UP (ref 0–41)
ANION GAP SERPL CALC-SCNC: 14 MMOL/L — SIGNIFICANT CHANGE UP (ref 7–14)
AST SERPL-CCNC: 37 U/L — SIGNIFICANT CHANGE UP (ref 0–41)
BASOPHILS # BLD AUTO: 0.02 K/UL — SIGNIFICANT CHANGE UP (ref 0–0.2)
BASOPHILS NFR BLD AUTO: 0.1 % — SIGNIFICANT CHANGE UP (ref 0–1)
BILIRUB SERPL-MCNC: 0.4 MG/DL — SIGNIFICANT CHANGE UP (ref 0.2–1.2)
BUN SERPL-MCNC: 11 MG/DL — SIGNIFICANT CHANGE UP (ref 10–20)
CALCIUM SERPL-MCNC: 8.4 MG/DL — LOW (ref 8.5–10.1)
CHLORIDE SERPL-SCNC: 102 MMOL/L — SIGNIFICANT CHANGE UP (ref 98–110)
CO2 SERPL-SCNC: 25 MMOL/L — SIGNIFICANT CHANGE UP (ref 17–32)
CREAT SERPL-MCNC: 0.7 MG/DL — SIGNIFICANT CHANGE UP (ref 0.7–1.5)
CRP SERPL-MCNC: 329 MG/L — HIGH
EOSINOPHIL # BLD AUTO: 0.02 K/UL — SIGNIFICANT CHANGE UP (ref 0–0.7)
EOSINOPHIL NFR BLD AUTO: 0.1 % — SIGNIFICANT CHANGE UP (ref 0–8)
GLUCOSE SERPL-MCNC: 93 MG/DL — SIGNIFICANT CHANGE UP (ref 70–99)
HCT VFR BLD CALC: 33.4 % — LOW (ref 37–47)
HGB BLD-MCNC: 10.4 G/DL — LOW (ref 12–16)
IMM GRANULOCYTES NFR BLD AUTO: 1.5 % — HIGH (ref 0.1–0.3)
LYMPHOCYTES # BLD AUTO: 0.66 K/UL — LOW (ref 1.2–3.4)
LYMPHOCYTES # BLD AUTO: 3.8 % — LOW (ref 20.5–51.1)
MAGNESIUM SERPL-MCNC: 2 MG/DL — SIGNIFICANT CHANGE UP (ref 1.8–2.4)
MCHC RBC-ENTMCNC: 26.6 PG — LOW (ref 27–31)
MCHC RBC-ENTMCNC: 31.1 G/DL — LOW (ref 32–37)
MCV RBC AUTO: 85.4 FL — SIGNIFICANT CHANGE UP (ref 81–99)
MONOCYTES # BLD AUTO: 0.63 K/UL — HIGH (ref 0.1–0.6)
MONOCYTES NFR BLD AUTO: 3.6 % — SIGNIFICANT CHANGE UP (ref 1.7–9.3)
MRSA PCR RESULT.: NEGATIVE — SIGNIFICANT CHANGE UP
NEUTROPHILS # BLD AUTO: 15.88 K/UL — HIGH (ref 1.4–6.5)
NEUTROPHILS NFR BLD AUTO: 90.9 % — HIGH (ref 42.2–75.2)
NRBC # BLD: 0 /100 WBCS — SIGNIFICANT CHANGE UP (ref 0–0)
PLATELET # BLD AUTO: 243 K/UL — SIGNIFICANT CHANGE UP (ref 130–400)
POTASSIUM SERPL-MCNC: 3 MMOL/L — LOW (ref 3.5–5)
POTASSIUM SERPL-SCNC: 3 MMOL/L — LOW (ref 3.5–5)
PROT SERPL-MCNC: 6.6 G/DL — SIGNIFICANT CHANGE UP (ref 6–8)
RBC # BLD: 3.91 M/UL — LOW (ref 4.2–5.4)
RBC # FLD: 17.8 % — HIGH (ref 11.5–14.5)
SODIUM SERPL-SCNC: 141 MMOL/L — SIGNIFICANT CHANGE UP (ref 135–146)
TSH SERPL-MCNC: 9.89 UIU/ML — HIGH (ref 0.27–4.2)
WBC # BLD: 17.47 K/UL — HIGH (ref 4.8–10.8)
WBC # FLD AUTO: 17.47 K/UL — HIGH (ref 4.8–10.8)

## 2022-02-11 PROCEDURE — 99232 SBSQ HOSP IP/OBS MODERATE 35: CPT

## 2022-02-11 PROCEDURE — 99233 SBSQ HOSP IP/OBS HIGH 50: CPT

## 2022-02-11 RX ORDER — CEFEPIME 1 G/1
2000 INJECTION, POWDER, FOR SOLUTION INTRAMUSCULAR; INTRAVENOUS ONCE
Refills: 0 | Status: DISCONTINUED | OUTPATIENT
Start: 2022-02-11 | End: 2022-02-11

## 2022-02-11 RX ORDER — GABAPENTIN 400 MG/1
100 CAPSULE ORAL EVERY 8 HOURS
Refills: 0 | Status: DISCONTINUED | OUTPATIENT
Start: 2022-02-11 | End: 2022-02-14

## 2022-02-11 RX ORDER — CEFEPIME 1 G/1
INJECTION, POWDER, FOR SOLUTION INTRAMUSCULAR; INTRAVENOUS
Refills: 0 | Status: DISCONTINUED | OUTPATIENT
Start: 2022-02-11 | End: 2022-02-11

## 2022-02-11 RX ORDER — LANOLIN ALCOHOL/MO/W.PET/CERES
5 CREAM (GRAM) TOPICAL ONCE
Refills: 0 | Status: COMPLETED | OUTPATIENT
Start: 2022-02-11 | End: 2022-02-11

## 2022-02-11 RX ORDER — LEVOTHYROXINE SODIUM 125 MCG
125 TABLET ORAL DAILY
Refills: 0 | Status: DISCONTINUED | OUTPATIENT
Start: 2022-02-12 | End: 2022-02-15

## 2022-02-11 RX ORDER — CEFEPIME 1 G/1
2000 INJECTION, POWDER, FOR SOLUTION INTRAMUSCULAR; INTRAVENOUS EVERY 8 HOURS
Refills: 0 | Status: DISCONTINUED | OUTPATIENT
Start: 2022-02-11 | End: 2022-02-11

## 2022-02-11 RX ORDER — POTASSIUM CHLORIDE 20 MEQ
40 PACKET (EA) ORAL EVERY 4 HOURS
Refills: 0 | Status: DISCONTINUED | OUTPATIENT
Start: 2022-02-11 | End: 2022-02-11

## 2022-02-11 RX ORDER — HYDROMORPHONE HYDROCHLORIDE 2 MG/ML
2 INJECTION INTRAMUSCULAR; INTRAVENOUS; SUBCUTANEOUS EVERY 6 HOURS
Refills: 0 | Status: DISCONTINUED | OUTPATIENT
Start: 2022-02-11 | End: 2022-02-15

## 2022-02-11 RX ORDER — POTASSIUM CHLORIDE 20 MEQ
20 PACKET (EA) ORAL
Refills: 0 | Status: DISCONTINUED | OUTPATIENT
Start: 2022-02-11 | End: 2022-02-11

## 2022-02-11 RX ORDER — POTASSIUM CHLORIDE 20 MEQ
20 PACKET (EA) ORAL
Refills: 0 | Status: COMPLETED | OUTPATIENT
Start: 2022-02-11 | End: 2022-02-12

## 2022-02-11 RX ORDER — POTASSIUM CHLORIDE 20 MEQ
40 PACKET (EA) ORAL ONCE
Refills: 0 | Status: COMPLETED | OUTPATIENT
Start: 2022-02-11 | End: 2022-02-11

## 2022-02-11 RX ORDER — ACETAMINOPHEN 500 MG
650 TABLET ORAL EVERY 12 HOURS
Refills: 0 | Status: DISCONTINUED | OUTPATIENT
Start: 2022-02-11 | End: 2022-02-15

## 2022-02-11 RX ADMIN — Medication 5 MILLIGRAM(S): at 02:22

## 2022-02-11 RX ADMIN — Medication 100 MILLIGRAM(S): at 21:04

## 2022-02-11 RX ADMIN — HYDROMORPHONE HYDROCHLORIDE 2 MILLIGRAM(S): 2 INJECTION INTRAMUSCULAR; INTRAVENOUS; SUBCUTANEOUS at 21:31

## 2022-02-11 RX ADMIN — GABAPENTIN 100 MILLIGRAM(S): 400 CAPSULE ORAL at 09:26

## 2022-02-11 RX ADMIN — GABAPENTIN 100 MILLIGRAM(S): 400 CAPSULE ORAL at 16:36

## 2022-02-11 RX ADMIN — Medication 1 TABLET(S): at 12:04

## 2022-02-11 RX ADMIN — Medication 100 MILLIGRAM(S): at 12:04

## 2022-02-11 RX ADMIN — Medication 1 APPLICATION(S): at 05:54

## 2022-02-11 RX ADMIN — Medication 1 APPLICATION(S): at 17:48

## 2022-02-11 RX ADMIN — HYDROMORPHONE HYDROCHLORIDE 2 MILLIGRAM(S): 2 INJECTION INTRAMUSCULAR; INTRAVENOUS; SUBCUTANEOUS at 21:01

## 2022-02-11 RX ADMIN — Medication 40 MILLIEQUIVALENT(S): at 17:46

## 2022-02-11 RX ADMIN — Medication 200 MILLIGRAM(S): at 21:02

## 2022-02-11 RX ADMIN — Medication 112 MICROGRAM(S): at 05:53

## 2022-02-11 RX ADMIN — LOSARTAN POTASSIUM 25 MILLIGRAM(S): 100 TABLET, FILM COATED ORAL at 05:53

## 2022-02-11 RX ADMIN — OXYCODONE AND ACETAMINOPHEN 1 TABLET(S): 5; 325 TABLET ORAL at 05:52

## 2022-02-11 RX ADMIN — ENOXAPARIN SODIUM 40 MILLIGRAM(S): 100 INJECTION SUBCUTANEOUS at 21:02

## 2022-02-11 RX ADMIN — GABAPENTIN 100 MILLIGRAM(S): 400 CAPSULE ORAL at 21:02

## 2022-02-11 NOTE — PROGRESS NOTE ADULT - SUBJECTIVE AND OBJECTIVE BOX
LATOYA FULTON  66y  Female  ***My note supersedes ALL resident notes that I sign.  My corrections for their notes are in my note.***    I can be reached directly on [id number]. My office number is [phone]. My personal cell number is [phone].    INTERVAL EVENTS: Here for f/u of left leg cellulitis. Pt has chr ulcers in legs. Left leg is very tender. Pt can walk at baseline, but not walk now 2/2 pain.    T(F): 98.3 (02-11-22 @ 14:21), Max: 100.9 (02-11-22 @ 05:44)  HR: 78 (02-11-22 @ 14:21) (67 - 92)  BP: 92/49 (02-11-22 @ 14:21) (92/49 - 166/70)  RR: 17 (02-11-22 @ 14:21) (17 - 18)  SpO2: 98% (02-11-22 @ 05:44) (98% - 98%)    Gen: No resp distress; + left leg pain  HEENT: PERRL, EOMI, mouth clr, nose clr  Neck: no nodes, no JVD, thyroid nl  lungs: clr  hrt: s1 s2 rrr no murmur  abd: soft, NT/ND, no HS megaly  ext: no c/c  left leg: very swollen; hot; very tender (from knee to toes); skin (valery distal) is in rough shape w/ xerosis and severe, deep cracking of skin; no round ulcer  right leg: not much pain; more chronic looking; also w/ deep cracks in skin; chr hyperpig; tr-1+ edema  neuro: aa ox3, cn intact, can move all 3 ext - rt leg can move, but weak; not moving left leg 2/2 pain    LABS:                      10.4    (    85.4   17.47 )-----------( ---------      243      ( 11 Feb 2022 05:59 )             33.4    (    17.8     WBC Count: 17.47 K/uL (02-11-22 @ 05:59)  WBC Count: 24.84 K/uL (02-09-22 @ 20:53)    141   (   102   (   93      02-11-22 @ 05:59  ----------------------               3.0   (   25   (   11                             -----                        0.7  Ca  8.4   Mg  2.0    P   --     LFT  6.6  (  0.4  (  37       02-11-22 @ 05:59  -------------------------  3.3  (  74  (  18    PT/INR - ( 10 Feb 2022 11:00 )   PT: 12.20 sec;   INR: 1.06 ratio    PTT - ( 10 Feb 2022 11:00 )  PTT: 33.6 sec    Urinalysis Basic - ( 10 Feb 2022 02:00 )    Color: Yellow / Appearance: Clear / SG: >1.050 / pH: x  Gluc: x / Ketone: Negative  / Bili: Negative / Urobili: <2 mg/dL   Blood: x / Protein: 30 mg/dL / Nitrite: Negative   Leuk Esterase: Negative / RBC: 10 /HPF / WBC 2 /HPF   Sq Epi: x / Non Sq Epi: 5 /HPF / Bacteria: Few    Culture - Blood (collected 02-09-22 @ 21:14)  Source: .Blood Blood-Peripheral  Preliminary Report (02-11-22 @ 02:01):    No growth to date.    Culture - Blood (collected 02-09-22 @ 21:14)  Source: .Blood Blood-Peripheral  Preliminary Report (02-11-22 @ 02:01):    No growth to date.    RADIOLOGY & ADDITIONAL TESTS:  < from: CT Abdomen and Pelvis w/ IV Cont (02.10.22 @ 00:35) >  FINDINGS:    LOWER CHEST: There is mild bibasilar subsegmental atelectasis. Coronary artery calcifications are present.    LIVER: Unremarkable.    SPLEEN: Unremarkable.    PANCREAS: There is a 2.9 x 2.5 x 3.5 cm cystic lesion in the pancreatic head. No evidence for upstream ductal dilatation.    GALLBLADDER AND BILIARY TREE: Cholelithiasis without CT evidence for   pericholecystic inflammation. The distal CBD is not definitively visualized    ADRENALS: Unremarkable.    KIDNEYS: Symmetric pattern of renal enhancement. No hydronephrosis. There   are renal and parapelvic cysts as well as hypodensities too small to characterize.    LYMPH NODES: There are bilateral enlarged inguinal and pelvic lymph nodes   along the iliac chains. For reference: Left inguinal lymph node measuring   2.4 x 1.7 cm (6/383), lymph node bordering inguinal/external iliac on the   left measuring 2.2 x 2.0 cm (6/314) and left external iliac lymph node   showing 2.2 x 1.7 cm (6/288). Right inguinal lymph node measuring 3.5 x   1.8 cm (6/327) right external iliac lymph node measuring 2.2 x 1.6 cm   (6/294).    VASCULATURE: Normal caliber abdominal aorta with atherosclerotic changes.    BOWEL: There is no evidence for bowel obstruction or bowel wall   thickening. Colonic diverticulosis without evidence for diverticulitis.    PERITONEUM/RETROPERITONEUM/MESENTERY: There is no ascites or pneumoperitoneum.    PELVIC VISCERA: Limited evaluation given obscuration by adjacent   bilateral hip hardware. No obvious acute abnormality was identified.    BONES AND SOFT TISSUES: Osteopenia and bilateral total hip arthroplasty.    IMPRESSION:    Enlarged bilateral inguinal and iliac chain lymph nodes as above.   Findings are nonspecific and of unclear etiology. The possibility of   neoplasm is not excluded. Can consider further evaluation with PET/CT or   tissue sampling to evaluate for underlying lymphoproliferative disorder.    Cystic lesion in the pancreatic head measuring up to 3.5 cm without   upstream ductal dilatation (felt to be unrelated pelvic adenopathy).   Further evaluation can be obtained with MRI to evaluate for any solid   components or communication with the CBD. Ultimately endoscopic   evaluation/fluid sampling may be needed secondary to size.    Cholelithiasis without CT evidence for cholecystitis.    < end of copied text >    < from: [facility] Duplex Lower Ext Vein Scan, Bilat (02.09.22 @ 22:21) >  Impression:    No evidence of deep venous thrombosis or superficial thrombophlebitis in   the visualized veins of bilateral lower extremities.    bilateral anterior tibial veins, peroneal veins were not visualized. Left   posterior tibial vein was not visualized    Multiple lymph nodes seen in the left groin and the right groin. Right   groin lymph nodes measuring 3.4 x 3.1 cm, left groin lymph node measuring   2.4 x 2.7 cm    < end of copied text >    MEDICATIONS:  clindamycin IVPB      clindamycin IVPB 900 milliGRAM(s) IV Intermittent every 8 hours  hydroxychloroquine 200 milliGRAM(s) Oral at bedtime    acetaminophen     Tablet .. 650 milliGRAM(s) Oral every 12 hours PRN  enoxaparin Injectable 40 milliGRAM(s) SubCutaneous at bedtime  gabapentin 100 milliGRAM(s) Oral every 8 hours  HYDROmorphone   Tablet 2 milliGRAM(s) Oral every 6 hours PRN  levothyroxine 112 MICROGram(s) Oral daily  losartan 25 milliGRAM(s) Oral daily  multivitamin 1 Tablet(s) Oral daily  potassium chloride   Powder 40 milliEquivalent(s) Oral every 4 hours  silver sulfADIAZINE 1% Cream 1 Application(s) Topical two times a day  triamcinolone 0.1% Ointment 1 Application(s) Topical every 12 hours

## 2022-02-11 NOTE — PROGRESS NOTE ADULT - SUBJECTIVE AND OBJECTIVE BOX
Patient is a 69 y/o female with PMHx of CVA 1987 without residual defects, SLE on plaquenil, Hypothyroid, HTN who presented from [facility] for leg pain. Patient admitted and being treated for Cellulitis. Rheumatology note was noted. She feels ok today, tolerating breakfast. She still notes to me significant leg pain but somewhat better from yesterday . Liver functions noted and were normal.       PAST MEDICAL & SURGICAL HISTORY:  Lupus  Essential hypertension  CVA (cerebral vascular accident) 1987  Hypothyroidism  FH: bilateral hip replacements  H/O total knee replacement  H/O abdominal hysterectomy        MEDICATIONS  (STANDING):  cephalexin 500 milliGRAM(s) Oral four times a day  enoxaparin Injectable 40 milliGRAM(s) SubCutaneous at bedtime  hydroxychloroquine 200 milliGRAM(s) Oral at bedtime  levothyroxine 112 MICROGram(s) Oral daily  losartan 25 milliGRAM(s) Oral daily  silver sulfADIAZINE 1% Cream 1 Application(s) Topical two times a day    MEDICATIONS  (PRN):  oxycodone    5 mG/acetaminophen 325 mG 1 Tablet(s) Oral every 6 hours PRN Severe Pain (7 - 10)      Allergies  aspirin (Unknown)  Compazine (Unknown)  Levaquin (Unknown)  morphine (Unknown)  penicillins (Unknown)        Review of Systems  General:  Denies Fatigue, Denies Fever, Denies Weakness ,Denies Weight Loss   HEENT: Denies Trouble Swallowing ,Denies  Sore Throat , Denies Change in hearing/vision/speech ,Denies Dizziness    Cardio: Denies  Chest Pain , Palpitations    Respiratory: Denies worsening of SOB, Denies Cough  Abdomen: Denies abdominal pain, nausea, emesis, or weight ,loss  Neuro: Denies Headache Denies Dizziness, Denies Paresthesias  MSK: See HPI  Psych: Patient denies depression, denies suicidal or homicidal ideations  Integ: Patient Denies rash, or new skin lesions       Vital Signs   T(F): 100.9 (11 Feb 2022 05:44), Max: 100.9 (11 Feb 2022 05:44)  HR: 83 (11 Feb 2022 05:44) (67 - 92)  BP: 106/60 (11 Feb 2022 05:44) (106/60 - 166/70)  RR: 18 (11 Feb 2022 05:44) (17 - 18)  SpO2: 98% (11 Feb 2022 05:44) (98% - 100%)  Physical Exam  Gen: NAD  HEENT: NC/AT, Mucosal Membranes Moist  Cardio: S1/S2 No S3/S4, Regular  Resp: CTA B/L  Abdomen: Soft, ND/NT  Neuro: AAOx3  Extremities: B/L leg excoriation, poor rom  Skin: No jaundice         Labs:                        10.4   17.47 )-----------( 243      ( 11 Feb 2022 05:59 )             33.4     02-11    141  |  102  |  11  ----------------------------<  93  3.0<L>   |  25  |  0.7    Ca    8.4<L>      11 Feb 2022 05:59  Mg     2.0     02-11    TPro  6.6  /  Alb  3.3<L>  /  TBili  0.4  /  DBili  x   /  AST  37  /  ALT  18  /  AlkPhos  74  02-11      RADIOLOGY & ADDITIONAL STUDIES:  CT Abdomen and Pelvis w/ IV Cont (02.10.22 @ 00:35) >  IMPRESSION:    Enlarged bilateral inguinal and iliac chain lymph nodes as above.   Findings are nonspecific and of unclear etiology. The possibility of   neoplasm is not excluded. Can consider further evaluation with PET/CT or   tissue sampling to evaluate for underlying lymphoproliferative disorder.    Cystic lesion in the pancreatic head measuring up to 3.5 cm without   upstream ductal dilatation (felt to be unrelated pelvic adenopathy).   Further evaluation can be obtained with MRI to evaluate for any solid   components or communication with the CBD. Ultimately endoscopic   evaluation/fluid sampling may be needed secondary to size.    Cholelithiasis without CT evidence for cholecystitis.

## 2022-02-11 NOTE — PROGRESS NOTE ADULT - SUBJECTIVE AND OBJECTIVE BOX
LATOYA FULTON 66y Female      Patient is a 66y old  Female who presents with a chief complaint of leg pain (11 Feb 2022 15:31)        INTERVAL HPI/OVERNIGHT EVENTS: No acute events overnight. Patient was seen and evaluated at the bedside. The patient denies pain. Vitals stable. Patient denies fever/chills, chest pain, shortness of breath, abdominal pain, headaches, nausea/vomiting, and diarrhea/constipation.      PHYSICAL EXAM:  GENERAL: NAD  HEAD:  Normocephalic  EYES:  conjunctiva and sclera clear  ENMT: Moist mucous membranes  NECK: Supple  NERVOUS SYSTEM:  Alert, awake  CHEST/LUNG: Good air exchange bilaterally, no wheeze  HEART: Regular rate and rhythm        Vital Signs Last 24 Hrs  T(C): 36.8 (11 Feb 2022 14:21), Max: 38.3 (11 Feb 2022 05:44)  T(F): 98.3 (11 Feb 2022 14:21), Max: 100.9 (11 Feb 2022 05:44)  HR: 78 (11 Feb 2022 14:21) (67 - 92)  BP: 92/49 (11 Feb 2022 14:21) (92/49 - 166/70)  BP(mean): --  RR: 17 (11 Feb 2022 14:21) (17 - 18)  SpO2: 98% (11 Feb 2022 05:44) (98% - 98%)      Consultant(s) Notes Reviewed:  [X] YES  [ ] NO  Care Discussed with Consultants/Other Providers [X] YES  [ ] NO  Imaging Personally Reviewed:  [X] YES  [ ] NO      LABS:                        10.4   17.47 )-----------( 243      ( 11 Feb 2022 05:59 )             33.4     02-11    141  |  102  |  11  ----------------------------<  93  3.0<L>   |  25  |  0.7    Ca    8.4<L>      11 Feb 2022 05:59  Mg     2.0     02-11    TPro  6.6  /  Alb  3.3<L>  /  TBili  0.4  /  DBili  x   /  AST  37  /  ALT  18  /  AlkPhos  74  02-11    PT/INR - ( 10 Feb 2022 11:00 )   PT: 12.20 sec;   INR: 1.06 ratio         PTT - ( 10 Feb 2022 11:00 )  PTT:33.6 sec  Urinalysis Basic - ( 10 Feb 2022 02:00 )    Color: Yellow / Appearance: Clear / SG: >1.050 / pH: x  Gluc: x / Ketone: Negative  / Bili: Negative / Urobili: <2 mg/dL   Blood: x / Protein: 30 mg/dL / Nitrite: Negative   Leuk Esterase: Negative / RBC: 10 /HPF / WBC 2 /HPF   Sq Epi: x / Non Sq Epi: 5 /HPF / Bacteria: Few        CAPILLARY BLOOD GLUCOSE               LATOYA FULTNO 66y Female      Patient is a 66y old  Female who presents with a chief complaint of leg pain (11 Feb 2022 15:31)        INTERVAL HPI/OVERNIGHT EVENTS: Pt reporting improvement in her L lower extremity, but R lower extremity is still painful. Pt reports decreased PO intake. Pt is passing BM. Pt unable to ambulate.       PHYSICAL EXAM:  GENERAL: NAD  HEAD:  Normocephalic  EYES:  conjunctiva and sclera clear  ENMT: Moist mucous membranes  NECK: Supple  NERVOUS SYSTEM:  Alert, awake  CHEST/LUNG: Good air exchange bilaterally, no wheeze  SKIN: +pruritis and rash in arms and legs  HEART: Regular rate and rhythm        Vital Signs Last 24 Hrs  T(C): 36.8 (11 Feb 2022 14:21), Max: 38.3 (11 Feb 2022 05:44)  T(F): 98.3 (11 Feb 2022 14:21), Max: 100.9 (11 Feb 2022 05:44)  HR: 78 (11 Feb 2022 14:21) (67 - 92)  BP: 92/49 (11 Feb 2022 14:21) (92/49 - 166/70)  BP(mean): --  RR: 17 (11 Feb 2022 14:21) (17 - 18)  SpO2: 98% (11 Feb 2022 05:44) (98% - 98%)      Consultant(s) Notes Reviewed:  [X] YES  [ ] NO  Care Discussed with Consultants/Other Providers [X] YES  [ ] NO  Imaging Personally Reviewed:  [X] YES  [ ] NO      LABS:                        10.4   17.47 )-----------( 243      ( 11 Feb 2022 05:59 )             33.4     02-11    141  |  102  |  11  ----------------------------<  93  3.0<L>   |  25  |  0.7    Ca    8.4<L>      11 Feb 2022 05:59  Mg     2.0     02-11    TPro  6.6  /  Alb  3.3<L>  /  TBili  0.4  /  DBili  x   /  AST  37  /  ALT  18  /  AlkPhos  74  02-11    PT/INR - ( 10 Feb 2022 11:00 )   PT: 12.20 sec;   INR: 1.06 ratio         PTT - ( 10 Feb 2022 11:00 )  PTT:33.6 sec  Urinalysis Basic - ( 10 Feb 2022 02:00 )    Color: Yellow / Appearance: Clear / SG: >1.050 / pH: x  Gluc: x / Ketone: Negative  / Bili: Negative / Urobili: <2 mg/dL   Blood: x / Protein: 30 mg/dL / Nitrite: Negative   Leuk Esterase: Negative / RBC: 10 /HPF / WBC 2 /HPF   Sq Epi: x / Non Sq Epi: 5 /HPF / Bacteria: Few        CAPILLARY BLOOD GLUCOSE               LATOYA FULTON 66y Female      Patient is a 66y old  Female who presents with a chief complaint of leg pain (11 Feb 2022 15:31)        INTERVAL HPI/OVERNIGHT EVENTS: Pt reporting improvement in her R lower extremity, but L lower extremity is still painful. Pt reports decreased PO intake. Pt is passing BM. Pt unable to ambulate.       PHYSICAL EXAM:  GENERAL: NAD  HEAD:  Normocephalic  EYES:  conjunctiva and sclera clear  ENMT: Moist mucous membranes  NECK: Supple  NERVOUS SYSTEM:  Alert, awake  CHEST/LUNG: Good air exchange bilaterally, no wheeze  SKIN: +pruritis and rash in arms and legs  HEART: Regular rate and rhythm        Vital Signs Last 24 Hrs  T(C): 36.8 (11 Feb 2022 14:21), Max: 38.3 (11 Feb 2022 05:44)  T(F): 98.3 (11 Feb 2022 14:21), Max: 100.9 (11 Feb 2022 05:44)  HR: 78 (11 Feb 2022 14:21) (67 - 92)  BP: 92/49 (11 Feb 2022 14:21) (92/49 - 166/70)  BP(mean): --  RR: 17 (11 Feb 2022 14:21) (17 - 18)  SpO2: 98% (11 Feb 2022 05:44) (98% - 98%)      Consultant(s) Notes Reviewed:  [X] YES  [ ] NO  Care Discussed with Consultants/Other Providers [X] YES  [ ] NO  Imaging Personally Reviewed:  [X] YES  [ ] NO      LABS:                        10.4   17.47 )-----------( 243      ( 11 Feb 2022 05:59 )             33.4     02-11    141  |  102  |  11  ----------------------------<  93  3.0<L>   |  25  |  0.7    Ca    8.4<L>      11 Feb 2022 05:59  Mg     2.0     02-11    TPro  6.6  /  Alb  3.3<L>  /  TBili  0.4  /  DBili  x   /  AST  37  /  ALT  18  /  AlkPhos  74  02-11    PT/INR - ( 10 Feb 2022 11:00 )   PT: 12.20 sec;   INR: 1.06 ratio         PTT - ( 10 Feb 2022 11:00 )  PTT:33.6 sec  Urinalysis Basic - ( 10 Feb 2022 02:00 )    Color: Yellow / Appearance: Clear / SG: >1.050 / pH: x  Gluc: x / Ketone: Negative  / Bili: Negative / Urobili: <2 mg/dL   Blood: x / Protein: 30 mg/dL / Nitrite: Negative   Leuk Esterase: Negative / RBC: 10 /HPF / WBC 2 /HPF   Sq Epi: x / Non Sq Epi: 5 /HPF / Bacteria: Few        CAPILLARY BLOOD GLUCOSE

## 2022-02-11 NOTE — PROGRESS NOTE ADULT - ASSESSMENT
70 year old female with a history of SLE,  CVA 1987 without residual defects, Hypothyroid, HTN presents from [facility] for leg pain. who presents with leg pain.    -Patient has tender, warm, erythematous and pruritic rash lower extremities concerning for cellulitis. She has pruritic excoriated skin in bilateral arms. Patient has no systemic symptoms of SLE. She was septic on admission with T 100.8F, WBC 24.8. UA showed protein 30, moderate blood, 10 RBCs, few bacteria. Doppler ultrasound LE showed multiple lymph nodes in the left and right groin, CT abdomen pelvis showed enlarged bilateral inguinal and iliac crest lymph nodes, non specific, and cystic lesion on pancreatic head.   -Dermatitis with cellulitis  -Elevated inflammatory markers  -dsDNA, C3, C4 pending;  check urine protein, urine creatinine  -Continue  mg daily   -Topical hydrocortisone  -Treatment of cellulitis per primary team   -Lymph node adenopathy could be reactive consider mily baez

## 2022-02-11 NOTE — PROGRESS NOTE ADULT - SUBJECTIVE AND OBJECTIVE BOX
HPI:  71 y/o hx of CVA 1987 without residual defects, SLE on plaquenil, Hypothyroid, HTN presents from [facility] for leg pain.  Pt. reports b/l leg pain to touch x weeks, left leg worse than right. No alleviating or aggravating factors. Pt. also endorses generalized weakness with decreased appetite. Pt. denies fever chills, SOB, chest pain. Of note Pt. has not seen rheumatologist in 2 years and has extensive dermatitis/ eczema on left arm and b/l legs.  In ED febrile to 100.8, WBC 24, lactate 1.4 Normocytic anemia at baseline, Hypokalemia which was repleted in ED.  Pt. was given clindamycin for diagnosis if cellulitis.  Incidental finding on duplex and also seen on CT were multiple enlarged lymph nodes without clear etiology neoplastic cause not excluded, also seen on CT was cystic lesion of pancreas head measuring 3.5cm likely needing further endoscopic eval. (10 Feb 2022 08:41)    Patient followed with Dr. Donaldo Dukes ([place]) she hasn't seen him for 2 years (prior to covid-19 pandemic). She was diagnosed with SLE young at age 17. 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 She has been treated with Plaquenil and steroids. Plaquenil was decreased to 1 tab daily due to limited supply at her living facility and need for use for covid. She denies oral ulcers, nasal ulcers, dry eyes, She has photosensitivity and hair loss. Reports a 4 month history of rash on her legs, and arms both are pruritic.     Interval history:  Pruritis better, still with leg pains. Results reviewed      PAST MEDICAL & SURGICAL HISTORY  Lupus    Essential hypertension    CVA (cerebral vascular accident)  1987    Hypothyroidism    FH: bilateral hip replacements    H/O total knee replacement    H/O abdominal hysterectomy        FAMILY HISTORY:  FAMILY HISTORY:      SOCIAL HISTORY:  []smoker  []Alcohol  []Drug    ALLERGIES:  aspirin (Unknown)  Compazine (Unknown)  Levaquin (Unknown)  morphine (Unknown)  penicillins (Unknown)      MEDICATIONS:  MEDICATIONS  (STANDING):  cephalexin 500 milliGRAM(s) Oral four times a day  enoxaparin Injectable 40 milliGRAM(s) SubCutaneous at bedtime  hydroxychloroquine 200 milliGRAM(s) Oral at bedtime  levothyroxine 112 MICROGram(s) Oral daily  silver sulfADIAZINE 1% Cream 1 Application(s) Topical two times a day    MEDICATIONS  (PRN):  oxycodone    5 mG/acetaminophen 325 mG 1 Tablet(s) Oral every 6 hours PRN Severe Pain (7 - 10)      HOME MEDICATIONS:  Home Medications:  Cozaar 25 mg oral tablet: 1 tab(s) orally once a day (10 Feb 2022 08:44)  Plaquenil 200 mg oral tablet: orally 2 times a day (10 Feb 2022 08:44)  Synthroid 112 mcg (0.112 mg) oral tablet: 1 tab(s) orally once a day (10 Feb 2022 08:44)      VITALS:   T(F): 97.6 (02-10 @ 01:53), Max: 100.9 (02-09 @ 19:29)  HR: 90 (02-10 @ 00:19) (85 - 90)  BP: 110/55 (02-10 @ 00:19) (110/55 - 116/58)  BP(mean): --  RR: 19 (02-10 @ 00:19) (19 - 20)  SpO2: 95% (02-10 @ 00:19) (95% - 99%)    I&O's Summary      REVIEW OF SYSTEMS:  CONSTITUTIONAL: No weakness, fevers or chills  EYES: No visual changes  ENT: No vertigo or throat pain   NECK: No pain or stiffness  RESPIRATORY: No cough, wheezing, hemoptysis; No shortness of breath  CARDIOVASCULAR: No chest pain or palpitations  GASTROINTESTINAL: No abdominal or epigastric pain. No nausea, vomiting, or hematemesis; No diarrhea or constipation. No melena or hematochezia.  GENITOURINARY: No dysuria, frequency or hematuria  NEUROLOGICAL: No numbness or weakness  SKIN: +pruritis and rash in arms and legs  MSK: No pain    PHYSICAL EXAM:  GENERAL: NAD, speaks in full sentences, no signs of respiratory distress  HEAD:  Atraumatic, Normocephalic  EYES: EOMI, PERRLA, non-icteric, no injected sclera  NECK: Supple, No JVD  CHEST/LUNG: Clear to auscultation bilaterally; No wheeze; No crackles; No accessory muscles used  HEART: Regular rate and rhythm; No murmurs;   ABDOMEN: Soft, Nontender, Nondistended; Bowel sounds present; No guarding  EXTREMITIES/ skin left hand with dry and cracked and scaly skin, raised plaques, b/l legs  cracked skin chronic dermatitis, left leg with overlying erythema, warmth, tenderness to touch  PSYCH: AAOx3  NEUROLOGY: non-focal    LABS:                        10.8   24.84 )-----------( 208      ( 09 Feb 2022 20:53 )             33.1     02-09    139  |  100  |  26<H>  ----------------------------<  95  3.0<L>   |  24  |  1.1    Ca    8.7      09 Feb 2022 20:53        Lactate, Blood: 1.4 mmol/L (02-09-22 @ 20:53)          Troponin trend:

## 2022-02-11 NOTE — CONSULT NOTE ADULT - SUBJECTIVE AND OBJECTIVE BOX
LATOYA FULTON  66y, Female  Allergy: aspirin (Unknown)  Compazine (Unknown)  Levaquin (Unknown)  morphine (Unknown)  penicillins (Unknown)      CHIEF COMPLAINT:   leg pain (11 Feb 2022 08:52)      LOS  1d    HPI  HPI:  69 y/o hx of CVA 1987 without residual defects, SLE on plaquenil, Hypothyroid, HTN presents from [facility] for leg pain.  Pt. reports b/l leg pain to touch x weeks, left leg worse than right. No alleviating or aggravating factors. Pt. also endorses generalized weakness with decreased appetite. Pt. denies fever chills, SOB, chest pain. Of note Pt. has not seen rheumatologist in 2 years and has extensive dermatitis/ eczema on left arm and b/l legs.  In ED febrile to 100.8, WBC 24, lactate 1.4 Normocytic anemia at baseline, Hypokalemia which was repleted in ED.  Pt. was given clindamycin for diagnosis if cellulitis.  Incidental finding on duplex and also seen on CT were multiple enlarged lymph nodes without clear etiology neoplastic cause not excluded, also seen on CT was cystic lesion of pancreas head measuring 3.5cm likely needing further endoscopic eval. (10 Feb 2022 08:41)      INFECTIOUS DISEASE HISTORY:  ID consulted for cellulitis  tm 100.9  WBC 24  BCX NGTD     PMH  PAST MEDICAL & SURGICAL HISTORY:  Lupus    Essential hypertension    CVA (cerebral vascular accident)  1987    Hypothyroidism    FH: bilateral hip replacements    H/O total knee replacement    H/O abdominal hysterectomy        FAMILY HISTORY  No pertinent family history in first degree relatives        SOCIAL HISTORY  Social History:  smokes 3 cigarretes daily has tried to quit in past  lives with roomate at [facility] (10 Feb 2022 08:41)        ROS  ***    VITALS:  T(F): 100.9, Max: 100.9 (02-11-22 @ 05:44)  HR: 83  BP: 106/60  RR: 18Vital Signs Last 24 Hrs  T(C): 38.3 (11 Feb 2022 05:44), Max: 38.3 (11 Feb 2022 05:44)  T(F): 100.9 (11 Feb 2022 05:44), Max: 100.9 (11 Feb 2022 05:44)  HR: 83 (11 Feb 2022 05:44) (67 - 92)  BP: 106/60 (11 Feb 2022 05:44) (106/60 - 166/70)  BP(mean): --  RR: 18 (11 Feb 2022 05:44) (17 - 18)  SpO2: 98% (11 Feb 2022 05:44) (98% - 100%)    PHYSICAL EXAM:  ***    TESTS & MEASUREMENTS:                        10.4   17.47 )-----------( 243      ( 11 Feb 2022 05:59 )             33.4     02-11    141  |  102  |  11  ----------------------------<  93  3.0<L>   |  25  |  0.7    Ca    8.4<L>      11 Feb 2022 05:59  Mg     2.0     02-11    TPro  6.6  /  Alb  3.3<L>  /  TBili  0.4  /  DBili  x   /  AST  37  /  ALT  18  /  AlkPhos  74  02-11    eGFR if Non African American: 91 mL/min/1.73M2 (02-11-22 @ 05:59)  eGFR if African American: 105 mL/min/1.73M2 (02-11-22 @ 05:59)  eGFR if Non African American: 91 mL/min/1.73M2 (02-10-22 @ 11:00)  eGFR if African American: 105 mL/min/1.73M2 (02-10-22 @ 11:00)    LIVER FUNCTIONS - ( 11 Feb 2022 05:59 )  Alb: 3.3 g/dL / Pro: 6.6 g/dL / ALK PHOS: 74 U/L / ALT: 18 U/L / AST: 37 U/L / GGT: x           Urinalysis Basic - ( 10 Feb 2022 02:00 )    Color: Yellow / Appearance: Clear / SG: >1.050 / pH: x  Gluc: x / Ketone: Negative  / Bili: Negative / Urobili: <2 mg/dL   Blood: x / Protein: 30 mg/dL / Nitrite: Negative   Leuk Esterase: Negative / RBC: 10 /HPF / WBC 2 /HPF   Sq Epi: x / Non Sq Epi: 5 /HPF / Bacteria: Few        Culture - Blood (collected 02-09-22 @ 21:14)  Source: .Blood Blood-Peripheral  Preliminary Report (02-11-22 @ 02:01):    No growth to date.    Culture - Blood (collected 02-09-22 @ 21:14)  Source: .Blood Blood-Peripheral  Preliminary Report (02-11-22 @ 02:01):    No growth to date.        Lactate, Blood: 1.4 mmol/L (02-09-22 @ 20:53)      INFECTIOUS DISEASES TESTING  Rapid RVP Result: NotDetec (02-10-22 @ 00:30)      INFLAMMATORY MARKERS  Sedimentation Rate, Erythrocyte: 109 mm/Hr (02-10-22 @ 11:00)      RADIOLOGY & ADDITIONAL TESTS:  I have personally reviewed the last Chest xray  CXR      CT  CT Abdomen and Pelvis w/ IV Cont:   ACC: [id number] EXAM:  CT ABDOMEN AND PELVIS IC                          PROCEDURE DATE:  02/10/2022          INTERPRETATION:  CLINICAL STATEMENT: Lymphadenopathy on ultrasound   examination    TECHNIQUE: Contiguous CT images were obtained of the abdomen and pelvis.  Intravenous Contrast:  Intravenous contrast administered.  100 cc Omnipaque 350 intravenous contrast was administered. 0 cc   discarded.  Oral contrast: was not administered.      COMPARISON:  None    FINDINGS:    LOWER CHEST: There is mild bibasilar subsegmental atelectasis. Coronary   artery calcifications are present.    LIVER: Unremarkable.    SPLEEN: Unremarkable.    PANCREAS: There is a 2.9 x 2.5 x 3.5 cm cystic lesion in the pancreatic   head. No evidence for upstream ductal dilatation.    GALLBLADDER AND BILIARY TREE: Cholelithiasis without CT evidence for   pericholecystic inflammation. The distal CBD is not definitively   visualized    ADRENALS: Unremarkable.    KIDNEYS: Symmetric pattern of renal enhancement. No hydronephrosis. There   are renal and parapelvic cysts as well as hypodensities too small to   characterize.    LYMPH NODES: There are bilateral enlarged inguinal and pelvic lymph nodes   along the iliac chains. For reference: Left inguinal lymph node measuring   2.4 x 1.7 cm (6/383), lymph node bordering inguinal/external iliac on the   left measuring 2.2 x 2.0 cm (6/314) and left external iliac lymph node   showing 2.2 x 1.7 cm (6/288). Right inguinal lymph node measuring 3.5 x   1.8 cm (6/327) right external iliac lymph node measuring 2.2 x 1.6 cm   (6/294).    VASCULATURE: Normal caliber abdominal aorta with atherosclerotic changes.    BOWEL: There is no evidence for bowel obstruction or bowel wall   thickening. Colonic diverticulosis without evidence for diverticulitis.    PERITONEUM/RETROPERITONEUM/MESENTERY: There is no ascites or   pneumoperitoneum.    PELVIC VISCERA: Limited evaluation given obscuration by adjacent   bilateral hip hardware. No obvious acute abnormality was identified.    BONES AND SOFT TISSUES: Osteopenia and bilateral total hip arthroplasty.      IMPRESSION:    Enlarged bilateral inguinal and iliac chain lymph nodes as above.   Findings are nonspecific and of unclear etiology. The possibility of   neoplasm is not excluded. Can consider further evaluation with PET/CT or   tissue sampling to evaluate for underlying lymphoproliferative disorder.    Cystic lesion in the pancreatic head measuring up to 3.5 cm without   upstream ductal dilatation (felt to be unrelated pelvic adenopathy).   Further evaluation can be obtained with MRI to evaluate for any solid   components or communication with the CBD. Ultimately endoscopic   evaluation/fluid sampling may be needed secondary to size.    Cholelithiasis without CT evidence for cholecystitis.    --- End of Report ---            PRIYANK MOONEY MD; Attending Radiologist  This document has been electronically signed. Feb 10 2022  3:15AM (02-10-22 @ 00:35)      CARDIOLOGY TESTING  12 Lead ECG:   Ventricular Rate 88 BPM    Atrial Rate 88 BPM    P-R Interval 128 ms    QRS Duration 86 ms    Q-T Interval 372 ms    QTC Calculation(Bazett) 450 ms    P Axis 46 degrees    R Axis 76 degrees    T Axis -71 degrees    Diagnosis Line Normal sinus rhythm with sinus arrhythmia  ST & T wave abnormality, consider inferior ischemia  ST & T wave abnormality, consider anterolateral ischemia  Abnormal ECG    Confirmed by MYKEL OLGUIN MD ([id number]) on 2/9/2022 11:08:37 PM (02-09-22 @ 21:41)      MEDICATIONS  enoxaparin Injectable 40 SubCutaneous at bedtime  gabapentin 100 Oral every 8 hours  hydroxychloroquine 200 Oral at bedtime  levothyroxine 112 Oral daily  losartan 25 Oral daily  multivitamin 1 Oral daily  potassium chloride   Powder 40 Oral every 4 hours  silver sulfADIAZINE 1% Cream 1 Topical two times a day  triamcinolone 0.1% Ointment 1 Topical every 12 hours        ANTIBIOTICS:  hydroxychloroquine 200 milliGRAM(s) Oral at bedtime      ALLERGIES:  aspirin (Unknown)  Compazine (Unknown)  Levaquin (Unknown)  morphine (Unknown)  penicillins (Unknown)           LATOYA FULTON  66y, Female  Allergy: aspirin (Unknown)  Compazine (Unknown)  Levaquin (Unknown)  morphine (Unknown)  penicillins (Unknown)      CHIEF COMPLAINT:   leg pain (11 Feb 2022 08:52)      LOS  1d    HPI  HPI:  71 y/o hx of CVA 1987 without residual defects, SLE on plaquenil, Hypothyroid, HTN presents from [facility] for leg pain.  Pt. reports b/l leg pain to touch x weeks, left leg worse than right. No alleviating or aggravating factors. Pt. also endorses generalized weakness with decreased appetite. Pt. denies fever chills, SOB, chest pain. Of note Pt. has not seen rheumatologist in 2 years and has extensive dermatitis/ eczema on left arm and b/l legs.  In ED febrile to 100.8, WBC 24, lactate 1.4 Normocytic anemia at baseline, Hypokalemia which was repleted in ED.  Pt. was given clindamycin for diagnosis if cellulitis.  Incidental finding on duplex and also seen on CT were multiple enlarged lymph nodes without clear etiology neoplastic cause not excluded, also seen on CT was cystic lesion of pancreas head measuring 3.5cm likely needing further endoscopic eval. (10 Feb 2022 08:41)      INFECTIOUS DISEASE HISTORY:  ID consulted for cellulitis  tm 100.9  WBC 24  BCX NGTD     PMH  PAST MEDICAL & SURGICAL HISTORY:  Lupus    Essential hypertension    CVA (cerebral vascular accident)  1987    Hypothyroidism    FH: bilateral hip replacements    H/O total knee replacement    H/O abdominal hysterectomy        FAMILY HISTORY  No pertinent family history in first degree relatives        SOCIAL HISTORY  Social History:  smokes 3 cigarretes daily has tried to quit in past  lives with roomate at [facility] (10 Feb 2022 08:41)        ROS  General: Denies rigors, nightsweats  HEENT: Denies headache, rhinorrhea, sore throat, eye pain  CV: Denies CP, palpitations  PULM: Denies wheezing, hemoptysis  GI: Denies hematemesis, hematochezia, melena  : Denies discharge, hematuria  MSK: Denies arthralgias, myalgias  SKIN: as noted above   NEURO: Denies paresthesias, weakness  PSYCH: Denies depression, anxiety     VITALS:  T(F): 100.9, Max: 100.9 (02-11-22 @ 05:44)  HR: 83  BP: 106/60  RR: 18Vital Signs Last 24 Hrs  T(C): 38.3 (11 Feb 2022 05:44), Max: 38.3 (11 Feb 2022 05:44)  T(F): 100.9 (11 Feb 2022 05:44), Max: 100.9 (11 Feb 2022 05:44)  HR: 83 (11 Feb 2022 05:44) (67 - 92)  BP: 106/60 (11 Feb 2022 05:44) (106/60 - 166/70)  BP(mean): --  RR: 18 (11 Feb 2022 05:44) (17 - 18)  SpO2: 98% (11 Feb 2022 05:44) (98% - 100%)    PHYSICAL EXAM:  Gen: NAD, resting in bed  HEENT: Normocephalic, atraumatic  Neck: supple, no lymphadenopathy  CV: Regular rate & regular rhythm  Lungs: decreased BS at bases, no fremitus  Abdomen: Soft, BS present  Ext: Warm, well perfused  Neuro: non focal, awake  Skin: LLE edema/erythema/ tenderness to palpation + UE LE plaques  Lines: no phlebitis     TESTS & MEASUREMENTS:                        10.4   17.47 )-----------( 243      ( 11 Feb 2022 05:59 )             33.4     02-11    141  |  102  |  11  ----------------------------<  93  3.0<L>   |  25  |  0.7    Ca    8.4<L>      11 Feb 2022 05:59  Mg     2.0     02-11    TPro  6.6  /  Alb  3.3<L>  /  TBili  0.4  /  DBili  x   /  AST  37  /  ALT  18  /  AlkPhos  74  02-11    eGFR if Non African American: 91 mL/min/1.73M2 (02-11-22 @ 05:59)  eGFR if African American: 105 mL/min/1.73M2 (02-11-22 @ 05:59)  eGFR if Non African American: 91 mL/min/1.73M2 (02-10-22 @ 11:00)  eGFR if African American: 105 mL/min/1.73M2 (02-10-22 @ 11:00)    LIVER FUNCTIONS - ( 11 Feb 2022 05:59 )  Alb: 3.3 g/dL / Pro: 6.6 g/dL / ALK PHOS: 74 U/L / ALT: 18 U/L / AST: 37 U/L / GGT: x           Urinalysis Basic - ( 10 Feb 2022 02:00 )    Color: Yellow / Appearance: Clear / SG: >1.050 / pH: x  Gluc: x / Ketone: Negative  / Bili: Negative / Urobili: <2 mg/dL   Blood: x / Protein: 30 mg/dL / Nitrite: Negative   Leuk Esterase: Negative / RBC: 10 /HPF / WBC 2 /HPF   Sq Epi: x / Non Sq Epi: 5 /HPF / Bacteria: Few        Culture - Blood (collected 02-09-22 @ 21:14)  Source: .Blood Blood-Peripheral  Preliminary Report (02-11-22 @ 02:01):    No growth to date.    Culture - Blood (collected 02-09-22 @ 21:14)  Source: .Blood Blood-Peripheral  Preliminary Report (02-11-22 @ 02:01):    No growth to date.        Lactate, Blood: 1.4 mmol/L (02-09-22 @ 20:53)      INFECTIOUS DISEASES TESTING  Rapid RVP Result: NotDetec (02-10-22 @ 00:30)      INFLAMMATORY MARKERS  Sedimentation Rate, Erythrocyte: 109 mm/Hr (02-10-22 @ 11:00)      RADIOLOGY & ADDITIONAL TESTS:  I have personally reviewed the last Chest xray  CXR      CT  CT Abdomen and Pelvis w/ IV Cont:   ACC: [id number] EXAM:  CT ABDOMEN AND PELVIS IC                          PROCEDURE DATE:  02/10/2022          INTERPRETATION:  CLINICAL STATEMENT: Lymphadenopathy on ultrasound   examination    TECHNIQUE: Contiguous CT images were obtained of the abdomen and pelvis.  Intravenous Contrast:  Intravenous contrast administered.  100 cc Omnipaque 350 intravenous contrast was administered. 0 cc   discarded.  Oral contrast: was not administered.      COMPARISON:  None    FINDINGS:    LOWER CHEST: There is mild bibasilar subsegmental atelectasis. Coronary   artery calcifications are present.    LIVER: Unremarkable.    SPLEEN: Unremarkable.    PANCREAS: There is a 2.9 x 2.5 x 3.5 cm cystic lesion in the pancreatic   head. No evidence for upstream ductal dilatation.    GALLBLADDER AND BILIARY TREE: Cholelithiasis without CT evidence for   pericholecystic inflammation. The distal CBD is not definitively   visualized    ADRENALS: Unremarkable.    KIDNEYS: Symmetric pattern of renal enhancement. No hydronephrosis. There   are renal and parapelvic cysts as well as hypodensities too small to   characterize.    LYMPH NODES: There are bilateral enlarged inguinal and pelvic lymph nodes   along the iliac chains. For reference: Left inguinal lymph node measuring   2.4 x 1.7 cm (6/383), lymph node bordering inguinal/external iliac on the   left measuring 2.2 x 2.0 cm (6/314) and left external iliac lymph node   showing 2.2 x 1.7 cm (6/288). Right inguinal lymph node measuring 3.5 x   1.8 cm (6/327) right external iliac lymph node measuring 2.2 x 1.6 cm   (6/294).    VASCULATURE: Normal caliber abdominal aorta with atherosclerotic changes.    BOWEL: There is no evidence for bowel obstruction or bowel wall   thickening. Colonic diverticulosis without evidence for diverticulitis.    PERITONEUM/RETROPERITONEUM/MESENTERY: There is no ascites or   pneumoperitoneum.    PELVIC VISCERA: Limited evaluation given obscuration by adjacent   bilateral hip hardware. No obvious acute abnormality was identified.    BONES AND SOFT TISSUES: Osteopenia and bilateral total hip arthroplasty.      IMPRESSION:    Enlarged bilateral inguinal and iliac chain lymph nodes as above.   Findings are nonspecific and of unclear etiology. The possibility of   neoplasm is not excluded. Can consider further evaluation with PET/CT or   tissue sampling to evaluate for underlying lymphoproliferative disorder.    Cystic lesion in the pancreatic head measuring up to 3.5 cm without   upstream ductal dilatation (felt to be unrelated pelvic adenopathy).   Further evaluation can be obtained with MRI to evaluate for any solid   components or communication with the CBD. Ultimately endoscopic   evaluation/fluid sampling may be needed secondary to size.    Cholelithiasis without CT evidence for cholecystitis.    --- End of Report ---            PRIYANK MOONEY MD; Attending Radiologist  This document has been electronically signed. Feb 10 2022  3:15AM (02-10-22 @ 00:35)      CARDIOLOGY TESTING  12 Lead ECG:   Ventricular Rate 88 BPM    Atrial Rate 88 BPM    P-R Interval 128 ms    QRS Duration 86 ms    Q-T Interval 372 ms    QTC Calculation(Bazett) 450 ms    P Axis 46 degrees    R Axis 76 degrees    T Axis -71 degrees    Diagnosis Line Normal sinus rhythm with sinus arrhythmia  ST & T wave abnormality, consider inferior ischemia  ST & T wave abnormality, consider anterolateral ischemia  Abnormal ECG    Confirmed by MYKEL OLGUIN MD ([id number]) on 2/9/2022 11:08:37 PM (02-09-22 @ 21:41)      MEDICATIONS  enoxaparin Injectable 40 SubCutaneous at bedtime  gabapentin 100 Oral every 8 hours  hydroxychloroquine 200 Oral at bedtime  levothyroxine 112 Oral daily  losartan 25 Oral daily  multivitamin 1 Oral daily  potassium chloride   Powder 40 Oral every 4 hours  silver sulfADIAZINE 1% Cream 1 Topical two times a day  triamcinolone 0.1% Ointment 1 Topical every 12 hours        ANTIBIOTICS:  hydroxychloroquine 200 milliGRAM(s) Oral at bedtime      ALLERGIES:  aspirin (Unknown)  Compazine (Unknown)  Levaquin (Unknown)  morphine (Unknown)  penicillins (Unknown)

## 2022-02-11 NOTE — PROGRESS NOTE ADULT - ASSESSMENT
Patient is a 71 y/o female with PMHx of CVA 1987 without residual defects, SLE on plaquenil, Hypothyroid, HTN who presented from [facility] for leg pain. Patient admitted and being treated for Cellulitis. Rheumatology note was noted. She feels ok today, tolerating breakfast. She still notes to me significant leg pain but somewhat better from yesterday . Liver functions noted and were normal.  Patient on imaging was found to have a 3.5cm Pancreatic head lesion. Patient with normal LFT and Coags. Obtain tumor markers CA 19-9 and CEA. Patient still thinking about EUS.     Incidental finding of Pancreatic Head Mass 3.5cm  - No GI complaints  - LFT normal  - Obtain tumor markers   - Consider surgical oncology evaluation   - Patient still thinking about EUS

## 2022-02-11 NOTE — PROGRESS NOTE ADULT - ASSESSMENT
69 y/o hx of CVA 1987 without residual defects, SLE on plaquenil, Hypothyroid, HTN presents from [facility] for leg pain. FOund to have CT cystic lesion of pancreatic head and multiple enlarged lymph nodes.    #b/l chronic dermatitis (related to uncontrolled lupus?) with overlyiong cellulitis of left leg  #septic on admission  -keflex, wound consult for diffuse chronic dermatitis  - rheum consult- hasnt seen her Rheum doctor who is in [place] in 2 years, per Pt. plaquenil is now only once a day  -can apply local petroleum to dry skin    #decreased appetite send albumin, consider consulting nutrition    #Incidental findings on [facility] duplex LE and then on CT of multiple enlarged lymph nodes with CT findings of cystic lesion of pancreatic head--  -Consult advanced GI- needs eus, will orer coags/ type and screen/liver enzymes  -Pt. has b./ hip and knee replacement which is not MRI compatible     #hypokalemia in ED s/p repletion f/ u repeat labs    #Hypothyroid c/w synthroid- TSH  #HTN c/w cozaar    #DVT ppx- lovenox   69 y/o hx of CVA 1987 without residual defects, SLE on plaquenil, Hypothyroid, HTN presents from [facility] for leg pain. FOund to have CT cystic lesion of pancreatic head and multiple enlarged lymph nodes.    #b/l chronic dermatitis  with overlyiong cellulitis of left leg  #septic on admission  -keflex, wound consult for diffuse chronic dermatitis  - rheum consult- hasnt seen her Rheum doctor who is in [place] in 2 years, per Pt. plaquenil is now only once a day    #decreased appetite send albumin, consider consulting nutrition    #Incidental findings on VA duplex LE and then on CT of multiple enlarged lymph nodes with CT findings of cystic lesion of pancreatic head--  -Consult advanced GI- needs eus, will orer coags/ type and screen/liver enzymes  -Pt. has b./ hip and knee replacement which is not MRI compatible     #hypokalemia in ED s/p repletion f/ u repeat labs    #Hypothyroid c/w synthroid- TSH  #HTN c/w cozaar    #DVT ppx- lovenox   69 y/o hx of CVA 1987 without residual defects, SLE on plaquenil, Hypothyroid, HTN presents from [facility] for leg pain. FOund to have CT cystic lesion of pancreatic head and multiple enlarged lymph nodes.    #b/l chronic dermatitis with overlying cellulitis of left leg  #septic on admission  - ID and rheum on board  - derm consulted pending evalluation   - wound cs placed   - pt spiked a fever of 100.8 on 2/10  - WBC trending down    - Bcx (2/9):NGTD  - MRSA negative   - as per ID: Clinda IV 600mg q8h x 7 days (PO 300mg TID on D/C)  - US LE negative for DVT   - wound care orders to be adjusted by Dr Tello     #decreased PO intake 2/2 to loss of appetite   #hypokalemia  - Ensure added to diet   - K repleted     # Normocytic anemia likely 2/2 to SLE    - FU on B12 and Folate level     # SLE  - FU complement levels   - cw hydroxychloroquine 200 qhs      #Incidental findings on VA duplex LE and then on CT of multiple enlarged lymph nodes with CT findings of cystic lesion of pancreatic head  - GI on board   - Pt has b./ hip and knee replacement which is not MRI compatible   - No GI complaints  - LFT normal  - FU CEA and Ca19-9   - Patient still thinking about EUS    #Hypothyroid   - c/w synthroid 112 micrograms OD   - TSH 9.89   - FU FT4 and T3 in am     #HTN   - c/w cozaar    #DVT ppx- lovenox

## 2022-02-11 NOTE — PROGRESS NOTE ADULT - ASSESSMENT
71 y/o hx of CVA 1987 without residual defects, SLE on plaquenil, Hypothyroid, HTN presents from [facility] for leg pain. FOund to have CT cystic lesion of pancreatic head and multiple enlarged lymph nodes.    # sepsis (fever, WBC) present on admit 2/2 cellulitis of left leg; severe stasis dermatitis b/l; reactive LN (up to 3.5cm) to infection/inflammation (doubt lymphoma)  ;   send CK lvl  still w/ fever  WBC high, but trending down  V Dupl: neg DVT  bld cx neg x2  ID noted  abx: clinda 600mg iv q8 (po on d/c) - might need more than 7 days total  wound care:   wash legs w/ soap and water and dry; apply silvadene to cracked areas and triamcinolone cream to intact skin; cover cracked skin w/ xeroform; wrap w/ Kerlix; hold in place w/ ace wrap; do q12  obtain WOCN eval  elevate legs  pain: dilaudid 2mg po q6 prn + neurontin 100mg po q8 (might need further increases)   consider a little lasix for left leg edema (po)  could consider outpt PET scan to eval LN further (valery if LDH is high)  cbc q24    # hx SLE  rheum consult- hasn't seen her Rheum doctor, who is in [place], in 2 years  c/w plaquenil 200mg qhs    # normo anemia of chr dz    # CT findings of cystic lesion of pancreatic head (3.5cm)  LFT nl  Adv GI noted: eventual EGD/EUS (? inpt vs outpt)  send CA 19-9; CEA; LDH  too soon to get surg onc eval -> would wait until bx    # hypokalemia   repl orally  f/u BMP    # Hypothyroid  TSH 9.89  incr synthroid to 125mcg po q24  rpt TSH and FT4 in 6 wks (April)    # HTN  c/w cozaar    # c/w MVI    # + GB stones - no further w/u    # OA  hx b/l hip and knee arthroplasties    # Activity: as pain in left leg improves - obtain PT eval; pt is against SNF and wants to go back to [facility] (perhaps PT there?)    # DVT ppx- lovenox    Dispo: iv abx; f/u ID; tumor markers; CK lvl; fix K; wound care; WOCN eval; eventual PT eval  eventually, pt will go back to [place] - not ready for d/c

## 2022-02-12 LAB
ALBUMIN SERPL ELPH-MCNC: 2.9 G/DL — LOW (ref 3.5–5.2)
ALP SERPL-CCNC: 86 U/L — SIGNIFICANT CHANGE UP (ref 30–115)
ALT FLD-CCNC: 50 U/L — HIGH (ref 0–41)
ANION GAP SERPL CALC-SCNC: 12 MMOL/L — SIGNIFICANT CHANGE UP (ref 7–14)
AST SERPL-CCNC: 113 U/L — HIGH (ref 0–41)
BASOPHILS # BLD AUTO: 0.04 K/UL — SIGNIFICANT CHANGE UP (ref 0–0.2)
BASOPHILS NFR BLD AUTO: 0.3 % — SIGNIFICANT CHANGE UP (ref 0–1)
BILIRUB SERPL-MCNC: 0.3 MG/DL — SIGNIFICANT CHANGE UP (ref 0.2–1.2)
BUN SERPL-MCNC: 8 MG/DL — LOW (ref 10–20)
C3 SERPL-MCNC: 145 MG/DL — SIGNIFICANT CHANGE UP (ref 81–157)
C4 SERPL-MCNC: 25 MG/DL — SIGNIFICANT CHANGE UP (ref 13–39)
CALCIUM SERPL-MCNC: 7.5 MG/DL — LOW (ref 8.5–10.1)
CHLORIDE SERPL-SCNC: 105 MMOL/L — SIGNIFICANT CHANGE UP (ref 98–110)
CK SERPL-CCNC: 38 U/L — SIGNIFICANT CHANGE UP (ref 0–225)
CO2 SERPL-SCNC: 23 MMOL/L — SIGNIFICANT CHANGE UP (ref 17–32)
CREAT SERPL-MCNC: 0.6 MG/DL — LOW (ref 0.7–1.5)
DSDNA AB SER-ACNC: 121 IU/ML — HIGH
EOSINOPHIL # BLD AUTO: 0.08 K/UL — SIGNIFICANT CHANGE UP (ref 0–0.7)
EOSINOPHIL NFR BLD AUTO: 0.5 % — SIGNIFICANT CHANGE UP (ref 0–8)
GLUCOSE SERPL-MCNC: 93 MG/DL — SIGNIFICANT CHANGE UP (ref 70–99)
HCT VFR BLD CALC: 29 % — LOW (ref 37–47)
HGB BLD-MCNC: 9.1 G/DL — LOW (ref 12–16)
IMM GRANULOCYTES NFR BLD AUTO: 1.3 % — HIGH (ref 0.1–0.3)
LDH SERPL L TO P-CCNC: 318 — HIGH (ref 50–242)
LYMPHOCYTES # BLD AUTO: 0.97 K/UL — LOW (ref 1.2–3.4)
LYMPHOCYTES # BLD AUTO: 6.4 % — LOW (ref 20.5–51.1)
MAGNESIUM SERPL-MCNC: 2 MG/DL — SIGNIFICANT CHANGE UP (ref 1.8–2.4)
MCHC RBC-ENTMCNC: 26.8 PG — LOW (ref 27–31)
MCHC RBC-ENTMCNC: 31.4 G/DL — LOW (ref 32–37)
MCV RBC AUTO: 85.3 FL — SIGNIFICANT CHANGE UP (ref 81–99)
MONOCYTES # BLD AUTO: 0.91 K/UL — HIGH (ref 0.1–0.6)
MONOCYTES NFR BLD AUTO: 6 % — SIGNIFICANT CHANGE UP (ref 1.7–9.3)
NEUTROPHILS # BLD AUTO: 12.87 K/UL — HIGH (ref 1.4–6.5)
NEUTROPHILS NFR BLD AUTO: 85.5 % — HIGH (ref 42.2–75.2)
NRBC # BLD: 0 /100 WBCS — SIGNIFICANT CHANGE UP (ref 0–0)
PHOSPHATE SERPL-MCNC: 2.1 MG/DL — SIGNIFICANT CHANGE UP (ref 2.1–4.9)
PLATELET # BLD AUTO: 263 K/UL — SIGNIFICANT CHANGE UP (ref 130–400)
POTASSIUM SERPL-MCNC: 4.2 MMOL/L — SIGNIFICANT CHANGE UP (ref 3.5–5)
POTASSIUM SERPL-SCNC: 4.2 MMOL/L — SIGNIFICANT CHANGE UP (ref 3.5–5)
PROT SERPL-MCNC: 5.8 G/DL — LOW (ref 6–8)
RBC # BLD: 3.4 M/UL — LOW (ref 4.2–5.4)
RBC # FLD: 18.3 % — HIGH (ref 11.5–14.5)
SODIUM SERPL-SCNC: 140 MMOL/L — SIGNIFICANT CHANGE UP (ref 135–146)
T4 FREE SERPL-MCNC: 1.2 NG/DL — SIGNIFICANT CHANGE UP (ref 0.9–1.8)
WBC # BLD: 15.07 K/UL — HIGH (ref 4.8–10.8)
WBC # FLD AUTO: 15.07 K/UL — HIGH (ref 4.8–10.8)

## 2022-02-12 PROCEDURE — 99232 SBSQ HOSP IP/OBS MODERATE 35: CPT

## 2022-02-12 RX ORDER — POTASSIUM CHLORIDE 20 MEQ
20 PACKET (EA) ORAL ONCE
Refills: 0 | Status: COMPLETED | OUTPATIENT
Start: 2022-02-12 | End: 2022-02-12

## 2022-02-12 RX ADMIN — GABAPENTIN 100 MILLIGRAM(S): 400 CAPSULE ORAL at 21:11

## 2022-02-12 RX ADMIN — Medication 1 APPLICATION(S): at 18:21

## 2022-02-12 RX ADMIN — Medication 20 MILLIEQUIVALENT(S): at 00:12

## 2022-02-12 RX ADMIN — Medication 1 APPLICATION(S): at 06:10

## 2022-02-12 RX ADMIN — Medication 20 MILLIEQUIVALENT(S): at 01:46

## 2022-02-12 RX ADMIN — HYDROMORPHONE HYDROCHLORIDE 2 MILLIGRAM(S): 2 INJECTION INTRAMUSCULAR; INTRAVENOUS; SUBCUTANEOUS at 06:44

## 2022-02-12 RX ADMIN — Medication 20 MILLIEQUIVALENT(S): at 04:29

## 2022-02-12 RX ADMIN — Medication 125 MICROGRAM(S): at 06:11

## 2022-02-12 RX ADMIN — LOSARTAN POTASSIUM 25 MILLIGRAM(S): 100 TABLET, FILM COATED ORAL at 06:11

## 2022-02-12 RX ADMIN — HYDROMORPHONE HYDROCHLORIDE 2 MILLIGRAM(S): 2 INJECTION INTRAMUSCULAR; INTRAVENOUS; SUBCUTANEOUS at 06:14

## 2022-02-12 RX ADMIN — ENOXAPARIN SODIUM 40 MILLIGRAM(S): 100 INJECTION SUBCUTANEOUS at 21:12

## 2022-02-12 RX ADMIN — Medication 100 MILLIGRAM(S): at 12:44

## 2022-02-12 RX ADMIN — Medication 100 MILLIGRAM(S): at 21:11

## 2022-02-12 RX ADMIN — Medication 100 MILLIGRAM(S): at 06:12

## 2022-02-12 RX ADMIN — HYDROMORPHONE HYDROCHLORIDE 2 MILLIGRAM(S): 2 INJECTION INTRAMUSCULAR; INTRAVENOUS; SUBCUTANEOUS at 21:13

## 2022-02-12 RX ADMIN — HYDROMORPHONE HYDROCHLORIDE 2 MILLIGRAM(S): 2 INJECTION INTRAMUSCULAR; INTRAVENOUS; SUBCUTANEOUS at 22:14

## 2022-02-12 RX ADMIN — GABAPENTIN 100 MILLIGRAM(S): 400 CAPSULE ORAL at 06:11

## 2022-02-12 RX ADMIN — Medication 1 TABLET(S): at 12:46

## 2022-02-12 RX ADMIN — Medication 200 MILLIGRAM(S): at 21:11

## 2022-02-12 NOTE — CONSULT NOTE ADULT - SUBJECTIVE AND OBJECTIVE BOX
HPI:  69 y/o hx of CVA 1987 without residual defects, SLE on plaquenil, Hypothyroid, HTN presents from [facility] for leg pain.  Pt. reports b/l leg pain to touch x weeks, left leg worse than right. No alleviating or aggravating factors. Pt. also endorses generalized weakness with decreased appetite. Pt. denies fever chills, SOB, chest pain. Of note Pt. has not seen rheumatologist in 2 years and has extensive dermatitis/ eczema on left arm and b/l legs.  In ED febrile to 100.8, WBC 24, lactate 1.4 Normocytic anemia at baseline, Hypokalemia which was repleted in ED.  Pt. was given clindamycin for diagnosis if cellulitis.  Incidental finding on duplex and also seen on CT were multiple enlarged lymph nodes without clear etiology neoplastic cause not excluded, also seen on CT was cystic lesion of pancreas head measuring 3.5cm likely needing further endoscopic eval. (10 Feb 2022 08:41)      PAST MEDICAL & SURGICAL HISTORY:  Lupus    Essential hypertension    CVA (cerebral vascular accident)  1987    Hypothyroidism    FH: bilateral hip replacements    H/O total knee replacement    H/O abdominal hysterectomy        Hospital Course:  Pain in LE's valery ankles. at baseline walks with a walker but doing worse now.  TODAY'S SUBJECTIVE & REVIEW OF SYMPTOMS:     Constitutional WNL   Cardio WNL   Resp WNL   GI WNL  Heme WNL  Endo WNL  Skin WNL  MSK WNL  Neuro WNL  Cognitive WNL  Psych WNL      MEDICATIONS  (STANDING):  clindamycin IVPB      clindamycin IVPB 900 milliGRAM(s) IV Intermittent every 8 hours  enoxaparin Injectable 40 milliGRAM(s) SubCutaneous at bedtime  gabapentin 100 milliGRAM(s) Oral every 8 hours  hydroxychloroquine 200 milliGRAM(s) Oral at bedtime  levothyroxine 125 MICROGram(s) Oral daily  losartan 25 milliGRAM(s) Oral daily  multivitamin 1 Tablet(s) Oral daily  silver sulfADIAZINE 1% Cream 1 Application(s) Topical two times a day  triamcinolone 0.1% Cream 1 Application(s) Topical every 12 hours    MEDICATIONS  (PRN):  acetaminophen     Tablet .. 650 milliGRAM(s) Oral every 12 hours PRN Temp greater or equal to 38C (100.4F), Mild Pain (1 - 3)  HYDROmorphone   Tablet 2 milliGRAM(s) Oral every 6 hours PRN Severe Pain (7 - 10)      FAMILY HISTORY:      Allergies    aspirin (Unknown)  Compazine (Unknown)  Levaquin (Unknown)  morphine (Unknown)  penicillins (Unknown)    Intolerances        SOCIAL HISTORY:    [  ] Etoh  [  ] Smoking  [  ] Substance abuse     Home Environment:  [  ] Home Alone  [  ] Lives with Family  [  ] Home Health Aid    Dwelling:  [  ] Apartment  [  ] Private House  [ x ] Adult Home  [  ] Skilled Nursing Facility      [  ] Short Term  [  ] Long Term  [  ] Stairs       Elevator [  ]    FUNCTIONAL STATUS PTA: (Check all that apply)  Ambulation: [ x  ]Independent    [  ] Dependent     [  ] Non-Ambulatory  Assistive Device: [  ] SA Cane  [  ]  Q Cane  [x  ] Walker  [  ]  Wheelchair  ADL : [  ] Independent  [  ]  Dependent       Vital Signs Last 24 Hrs  T(C): 37.5 (12 Feb 2022 14:46), Max: 37.5 (12 Feb 2022 14:46)  T(F): 99.5 (12 Feb 2022 14:46), Max: 99.5 (12 Feb 2022 14:46)  HR: 74 (12 Feb 2022 14:46) (74 - 91)  BP: 107/55 (12 Feb 2022 14:46) (107/55 - 110/55)  BP(mean): --  RR: 19 (12 Feb 2022 14:46) (17 - 19)  SpO2: --      PHYSICAL EXAM: Alert & Oriented X3  GENERAL: NAD, well-groomed, well-developed  HEAD:  Atraumatic, Normocephalic  EYES: EOMI, PERRLA, conjunctiva and sclera clear  NECK: Supple, No JVD, Normal thyroid  CHEST/LUNG: Clear bilaterally; No rales, rhonchi, wheezing, or rubs  HEART: Regular rate and rhythm; No murmurs, rubs, or gallops  ABDOMEN: Soft, Nontender, Nondistended; Bowel sounds present  EXTREMITIES:  2+ Peripheral Pulses, No clubbing, cyanosis, or edema    NERVOUS SYSTEM:  Cranial Nerves 2-12 intact [  ] Abnormal  [  ]  ROM: WFL all extremities [  ]  Abnormal [  ]  Motor Strength: WFL all extremities  [  ]  Abnormal [x  ] 2/5 LE's with decreased effort and with pain which can limit strength as well  Sensation: intact to light touch [  ] Abnormal [  ]  Reflexes: Symmetric [  ]  Abnormal [  ]    FUNCTIONAL STATUS:  Bed Mobility: Independent [  ]  Supervision [  ]  Needs Assistance [  ]  N/A [  ]  Transfers: Independent [  ]  Supervision [  ]  Needs Assistance [  ]  N/A [  ]   Ambulation: Independent [  ]  Supervision [  ]  Needs Assistance [  ]  N/A [  ]  ADL: Independent [  ] Requires Assistance [  ] N/A [  ]      LABS:                        9.1    15.07 )-----------( 263      ( 12 Feb 2022 04:30 )             29.0     02-12    140  |  105  |  8<L>  ----------------------------<  93  4.2   |  23  |  0.6<L>    Ca    7.5<L>      12 Feb 2022 04:30  Phos  2.1     02-12  Mg     2.0     02-12    TPro  5.8<L>  /  Alb  2.9<L>  /  TBili  0.3  /  DBili  x   /  AST  113<H>  /  ALT  50<H>  /  AlkPhos  86  02-12          RADIOLOGY & ADDITIONAL STUDIES:    Assesment:

## 2022-02-12 NOTE — PROGRESS NOTE ADULT - ASSESSMENT
71 y/o hx of CVA 1987 without residual defects, SLE on plaquenil, Hypothyroid, HTN presents from [facility] for leg pain. FOund to have CT cystic lesion of pancreatic head and multiple enlarged lymph nodes.    # sepsis (fever, WBC) present on admit 2/2 cellulitis of left leg;   # severe stasis dermatitis b/l; reactive LN (up to 3.5cm) to infection/inflammation (doubt lymphoma)  - patient is hemodynamically stable and has been afebrile for past 24 hours   - ; , CK 38  - WBC high, but trending down to 15 from 24 on admission   - V Dupl: neg DVT  - bld cx neg x2  - ID noted: abx: clinda 600mg iv q8 (po on d/c) - might need more than 7 days total  - c/w wound care    - pain: dilaudid 2mg po q6 prn + neurontin 100mg po q8 (might need further increases)   - could consider outpt PET scan to eval LN further    # hx SLE:   - Rheumatology is following  - dsDNA, C3, C4 pending  - c/w Plaquenil 200mg qhs    # normo anemia of chr dz    # CT findings of cystic lesion of pancreatic head (3.5cm)  - Adv GI noted: eventual EGD/EUS - pt prefer outpt procedure   - send CA 19-9; CEA; LDH     # hypokalemia: replete     # HypothyroidTSH 9.89, incr synthroid to 125mcg po q24     # HTN: c/w cozaar    # c/w MVI    # + GB stones - no further w/u    # OA: hx b/l hip and knee arthroplasties    Activity: as pain in left leg improves - obtain PT eval;   DVT ppx- lovenox  Dispo: iv abx; f/u ID; tumor markers; wound care; WOCN eval; pt will go back to [facility]

## 2022-02-12 NOTE — CONSULT NOTE ADULT - ASSESSMENT
IMPRESSION: Rehab of debility, SLE    PRECAUTIONS: [ x ] Cardiac  [  ] Respiratory  [  ] Seizures [  ] Contact Isolation  [  ] Droplet Isolation  [  ] Other    Weight Bearing Status:     RECOMMENDATION:    Out of Bed to Chair     DVT/Decubiti Prophylaxis    REHAB PLAN:     [x   ] Bedside P/T 3-5 times a week   [   ]   Bedside O/T  2-3 times a week             [   ] No Rehab Therapy Indicated                   [   ]  Speech Therapy   Conditioning/ROM                                    ADL  Bed Mobility                                               Conditioning/ROM  Transfers                                                     Bed Mobility  Sitting /Standing Balance                         Transfers                                        Gait Training                                               Sitting/Standing Balance  Stair Training [   ]Applicable                    Home equipment Eval                                                                        Splinting  [   ] Only      GOALS:   ADL   [x   ]   Independent                    Transfers  [  x ] Independent                          Ambulation  [x   ] Independent     [ x   ] With device                            [   ]  CG                                                         [   ]  CG                                                                  [   ] CG                            [    ] Min A                                                   [   ] Min A                                                              [   ] Min  A          DISCHARGE PLAN:   [   ]  Good candidate for Intensive Rehabilitation/Hospital based-[facility]A [facility]                                             Will tolerate 3hrs Intensive Rehab Daily                                       [  xx  ]  Short Term Rehab in Skilled Nursing Facility                                       [    ]  Home with Outpatient or  services                                         [    ]  Possible Candidate for Intensive Hospital based Rehab

## 2022-02-12 NOTE — PHARMACOTHERAPY INTERVENTION NOTE - COMMENTS
Triamcinolone ointment not available. Called Md Pedersen to switch to cream formulation which is available but no response.

## 2022-02-12 NOTE — PROGRESS NOTE ADULT - SUBJECTIVE AND OBJECTIVE BOX
SUBJECTIVE:    Patient is a 66y old Female who presents with a chief complaint of leg pain (11 Feb 2022 17:07)    Currently admitted to medicine with the primary diagnosis of Cellulitis       Today is hospital day 2d. This morning she is resting comfortably in bed and reports no new issues or overnight events.     PAST MEDICAL & SURGICAL HISTORY  Lupus    Essential hypertension    CVA (cerebral vascular accident)  1987    Hypothyroidism    FH: bilateral hip replacements    H/O total knee replacement    H/O abdominal hysterectomy      SOCIAL HISTORY:  Negative for smoking/alcohol/drug use.     ALLERGIES:  aspirin (Unknown)  Compazine (Unknown)  Levaquin (Unknown)  morphine (Unknown)  penicillins (Unknown)    MEDICATIONS:  STANDING MEDICATIONS  clindamycin IVPB      clindamycin IVPB 900 milliGRAM(s) IV Intermittent every 8 hours  enoxaparin Injectable 40 milliGRAM(s) SubCutaneous at bedtime  gabapentin 100 milliGRAM(s) Oral every 8 hours  hydroxychloroquine 200 milliGRAM(s) Oral at bedtime  levothyroxine 125 MICROGram(s) Oral daily  losartan 25 milliGRAM(s) Oral daily  multivitamin 1 Tablet(s) Oral daily  silver sulfADIAZINE 1% Cream 1 Application(s) Topical two times a day  triamcinolone 0.1% Cream 1 Application(s) Topical every 12 hours    PRN MEDICATIONS  acetaminophen     Tablet .. 650 milliGRAM(s) Oral every 12 hours PRN  HYDROmorphone   Tablet 2 milliGRAM(s) Oral every 6 hours PRN    VITALS:   T(F): 99.5  HR: 74  BP: 107/55  RR: 19  SpO2: --    LABS:                        9.1    15.07 )-----------( 263      ( 12 Feb 2022 04:30 )             29.0     02-12    140  |  105  |  8<L>  ----------------------------<  93  4.2   |  23  |  0.6<L>    Ca    7.5<L>      12 Feb 2022 04:30  Phos  2.1     02-12  Mg     2.0     02-12    TPro  5.8<L>  /  Alb  2.9<L>  /  TBili  0.3  /  DBili  x   /  AST  113<H>  /  ALT  50<H>  /  AlkPhos  86  02-12          Creatine Kinase, Serum: 38 U/L (02-12-22 @ 04:30)      Culture - Urine (collected 10 Feb 2022 02:00)  Source: Clean Catch Clean Catch (Midstream)  Preliminary Report (12 Feb 2022 10:13):    >100,000 CFU/ml Gram Negative Rods    Culture - Blood (collected 09 Feb 2022 21:14)  Source: .Blood Blood-Peripheral  Preliminary Report (11 Feb 2022 02:01):    No growth to date.    Culture - Blood (collected 09 Feb 2022 21:14)  Source: .Blood Blood-Peripheral  Preliminary Report (11 Feb 2022 02:01):    No growth to date.      CARDIAC MARKERS ( 12 Feb 2022 04:30 )  x     / x     / 38 U/L / x     / x          RADIOLOGY:    < from: VA Duplex Lower Ext Vein Scan, Bilat (02.09.22 @ 22:21) >  Impression:    No evidence of deep venous thrombosis or superficial thrombophlebitis in   the visualized veins of bilateral lower extremities.    bilateral anterior tibial veins, peroneal veins were not visualized. Left   posterior tibial vein was not visualized    Multiple lymph nodes seen in the left groin and the right groin. Right   groin lymph nodes measuring 3.4 x 3.1 cm, left groin lymph node measuring   2.4 x 2.7 cm    < end of copied text >  < from: CT Abdomen and Pelvis w/ IV Cont (02.10.22 @ 00:35) >    IMPRESSION:    Enlarged bilateral inguinal and iliac chain lymph nodes as above.   Findings are nonspecific and of unclear etiology. The possibility of   neoplasm is not excluded. Can consider further evaluation with PET/CT or   tissue sampling to evaluate for underlying lymphoproliferative disorder.    Cystic lesion in the pancreatic head measuring up to 3.5 cm without   upstream ductal dilatation (felt to be unrelated pelvic adenopathy).   Further evaluation can be obtained with MRI to evaluate for any solid   components or communication with the CBD. Ultimately endoscopic   evaluation/fluid sampling may be needed secondary to size.    Cholelithiasis without CT evidence for cholecystitis.    < end of copied text >      PHYSICAL EXAM:  GEN: No acute distress  LUNGS: Clear to auscultation bilaterally   HEART: S1/S2 present. RRR.   ABD: Soft, non-tender, non-distended. Bowel sounds present  EXT: left leg is swollen; and tender, xerosis and severe, deep cracking of skin; no round ulcer  NEURO: AAOX3

## 2022-02-13 LAB
ANION GAP SERPL CALC-SCNC: 12 MMOL/L — SIGNIFICANT CHANGE UP (ref 7–14)
BASOPHILS # BLD AUTO: 0.06 K/UL — SIGNIFICANT CHANGE UP (ref 0–0.2)
BASOPHILS NFR BLD AUTO: 0.5 % — SIGNIFICANT CHANGE UP (ref 0–1)
BUN SERPL-MCNC: 8 MG/DL — LOW (ref 10–20)
CALCIUM SERPL-MCNC: 7.8 MG/DL — LOW (ref 8.5–10.1)
CANCER AG19-9 SERPL-ACNC: 2 U/ML — SIGNIFICANT CHANGE UP
CEA SERPL-MCNC: 1.6 NG/ML — SIGNIFICANT CHANGE UP (ref 0–3.8)
CHLORIDE SERPL-SCNC: 108 MMOL/L — SIGNIFICANT CHANGE UP (ref 98–110)
CO2 SERPL-SCNC: 23 MMOL/L — SIGNIFICANT CHANGE UP (ref 17–32)
CREAT SERPL-MCNC: <0.5 MG/DL — LOW (ref 0.7–1.5)
EOSINOPHIL # BLD AUTO: 0.22 K/UL — SIGNIFICANT CHANGE UP (ref 0–0.7)
EOSINOPHIL NFR BLD AUTO: 1.8 % — SIGNIFICANT CHANGE UP (ref 0–8)
FOLATE SERPL-MCNC: 9.5 NG/ML — SIGNIFICANT CHANGE UP
GLUCOSE SERPL-MCNC: 98 MG/DL — SIGNIFICANT CHANGE UP (ref 70–99)
HCT VFR BLD CALC: 27.7 % — LOW (ref 37–47)
HGB BLD-MCNC: 8.8 G/DL — LOW (ref 12–16)
IMM GRANULOCYTES NFR BLD AUTO: 5.1 % — HIGH (ref 0.1–0.3)
LYMPHOCYTES # BLD AUTO: 1.37 K/UL — SIGNIFICANT CHANGE UP (ref 1.2–3.4)
LYMPHOCYTES # BLD AUTO: 11.3 % — LOW (ref 20.5–51.1)
MAGNESIUM SERPL-MCNC: 1.9 MG/DL — SIGNIFICANT CHANGE UP (ref 1.8–2.4)
MCHC RBC-ENTMCNC: 27.2 PG — SIGNIFICANT CHANGE UP (ref 27–31)
MCHC RBC-ENTMCNC: 31.8 G/DL — LOW (ref 32–37)
MCV RBC AUTO: 85.5 FL — SIGNIFICANT CHANGE UP (ref 81–99)
MONOCYTES # BLD AUTO: 0.88 K/UL — HIGH (ref 0.1–0.6)
MONOCYTES NFR BLD AUTO: 7.3 % — SIGNIFICANT CHANGE UP (ref 1.7–9.3)
NEUTROPHILS # BLD AUTO: 8.96 K/UL — HIGH (ref 1.4–6.5)
NEUTROPHILS NFR BLD AUTO: 74 % — SIGNIFICANT CHANGE UP (ref 42.2–75.2)
NRBC # BLD: 0 /100 WBCS — SIGNIFICANT CHANGE UP (ref 0–0)
PLATELET # BLD AUTO: 279 K/UL — SIGNIFICANT CHANGE UP (ref 130–400)
POTASSIUM SERPL-MCNC: 4 MMOL/L — SIGNIFICANT CHANGE UP (ref 3.5–5)
POTASSIUM SERPL-SCNC: 4 MMOL/L — SIGNIFICANT CHANGE UP (ref 3.5–5)
RBC # BLD: 3.24 M/UL — LOW (ref 4.2–5.4)
RBC # FLD: 18.6 % — HIGH (ref 11.5–14.5)
SODIUM SERPL-SCNC: 143 MMOL/L — SIGNIFICANT CHANGE UP (ref 135–146)
VIT B12 SERPL-MCNC: 585 PG/ML — SIGNIFICANT CHANGE UP (ref 232–1245)
WBC # BLD: 12.11 K/UL — HIGH (ref 4.8–10.8)
WBC # FLD AUTO: 12.11 K/UL — HIGH (ref 4.8–10.8)

## 2022-02-13 PROCEDURE — 99232 SBSQ HOSP IP/OBS MODERATE 35: CPT

## 2022-02-13 RX ADMIN — HYDROMORPHONE HYDROCHLORIDE 2 MILLIGRAM(S): 2 INJECTION INTRAMUSCULAR; INTRAVENOUS; SUBCUTANEOUS at 21:05

## 2022-02-13 RX ADMIN — HYDROMORPHONE HYDROCHLORIDE 2 MILLIGRAM(S): 2 INJECTION INTRAMUSCULAR; INTRAVENOUS; SUBCUTANEOUS at 04:57

## 2022-02-13 RX ADMIN — LOSARTAN POTASSIUM 25 MILLIGRAM(S): 100 TABLET, FILM COATED ORAL at 06:04

## 2022-02-13 RX ADMIN — HYDROMORPHONE HYDROCHLORIDE 2 MILLIGRAM(S): 2 INJECTION INTRAMUSCULAR; INTRAVENOUS; SUBCUTANEOUS at 03:21

## 2022-02-13 RX ADMIN — Medication 1 TABLET(S): at 13:48

## 2022-02-13 RX ADMIN — GABAPENTIN 100 MILLIGRAM(S): 400 CAPSULE ORAL at 06:04

## 2022-02-13 RX ADMIN — Medication 125 MICROGRAM(S): at 06:04

## 2022-02-13 RX ADMIN — Medication 100 MILLIGRAM(S): at 13:49

## 2022-02-13 RX ADMIN — ENOXAPARIN SODIUM 40 MILLIGRAM(S): 100 INJECTION SUBCUTANEOUS at 21:05

## 2022-02-13 RX ADMIN — HYDROMORPHONE HYDROCHLORIDE 2 MILLIGRAM(S): 2 INJECTION INTRAMUSCULAR; INTRAVENOUS; SUBCUTANEOUS at 22:06

## 2022-02-13 RX ADMIN — Medication 1 APPLICATION(S): at 06:04

## 2022-02-13 RX ADMIN — Medication 200 MILLIGRAM(S): at 21:05

## 2022-02-13 RX ADMIN — Medication 100 MILLIGRAM(S): at 06:04

## 2022-02-13 RX ADMIN — Medication 100 MILLIGRAM(S): at 21:04

## 2022-02-13 RX ADMIN — GABAPENTIN 100 MILLIGRAM(S): 400 CAPSULE ORAL at 21:05

## 2022-02-13 RX ADMIN — GABAPENTIN 100 MILLIGRAM(S): 400 CAPSULE ORAL at 13:48

## 2022-02-13 RX ADMIN — Medication 1 APPLICATION(S): at 06:05

## 2022-02-13 NOTE — PROGRESS NOTE ADULT - ATTENDING COMMENTS
Patient seen and examined during GI rounds with the GI PA and fellow, Will continue to follow, may benefit from EGD with EUS-FNA, given location of lesion concern exists for ultimate obstruction of CBD if has a solid component , will require ERCP with CBD stent placement prophylactically
ROS and physical exam as above  plan of care discussed with resident and the patient  Note is updated    #Progress Note Handoff  pending: WOCN eval; PT follow up; Rheumatology follow up; improvement in pain  Disposition: pt will go back to [facility], will anticipate for the AM, COVID PCR ordered

## 2022-02-13 NOTE — PROGRESS NOTE ADULT - ASSESSMENT
69 y/o hx of CVA 1987 without residual defects, SLE on plaquenil, Hypothyroid, HTN presents from [facility] for leg pain. FOund to have CT cystic lesion of pancreatic head and multiple enlarged lymph nodes.    #b/l chronic dermatitis with overlying cellulitis of left leg  #septic on admission  - ID and rheum on board  - derm consulted pending evalluation   - wound cs placed   - pt spiked a fever of 100.8 on 2/10  - WBC trending down    - Bcx (2/9):NGTD  - MRSA negative   - as per ID: Clinda IV 600mg q8h x 7 days (PO 300mg TID on D/C)  - US LE negative for DVT   - wound care: wash legs w/ soap and water and dry; apply silvadene to cracked areas and triamcinolone cream to intact skin; cover cracked skin w/ xeroform; wrap w/ Kerlix; hold in place w/ ace wrap; do q12  - FU wound care eval     #decreased PO intake 2/2 to loss of appetite   #hypokalemia- resolved   - Ensure added to diet       # Normocytic anemia likely 2/2 to SLE    - B12 585   - folic acid 9.5     # SLE  - C3 145, C4 25  - DsDNA 121   - cw hydroxychloroquine 200 qhs      #Incidental findings on VA duplex LE and then on CT of multiple enlarged lymph nodes with CT findings of cystic lesion of pancreatic head  - GI on board   - Pt has b./ hip and knee replacement which is not MRI compatible   - No GI complaints  - LFT normal  -    - FU CEA and Ca19-9   - Patient still thinking about EUS    #Hypothyroid   - synthroid increased to 125 micrograms OD   - TSH 9.89   - FT4 1.2     #HTN   - c/w cozaar    #DVT ppx- lovenox   69 y/o hx of CVA 1987 without residual defects, SLE on plaquenil, Hypothyroid, HTN presents from [facility] for leg pain. FOund to have CT cystic lesion of pancreatic head and multiple enlarged lymph nodes.    # sepsis (fever, WBC) present on admit 2/2 cellulitis of left leg;   # severe stasis dermatitis b/l; reactive LN (up to 3.5cm) to infection/inflammation ( lymphoma?)  - patient is hemodynamically stable and Tmax 100.3 orally this AM  - ; , CK 38  - WBC high, but trending down to 12 from 24 on admission   - V Dupl: neg DVT  - bld cx neg x2  - ID noted: abx: clinda 600mg iv q8 (po on d/c) - might need more than 7 days total  - c/w wound care    - pain: dilaudid 2mg po q6 prn + neurontin 100mg po q8    - could consider outpt PET scan to eval LN further    # hx SLE:   - dsDNA is kyara, C3, C4 are normal level  - c/w Plaquenil 200mg qhs  - Rheumatology f/u     # normo anemia of chr dz    # CT findings of cystic lesion of pancreatic head (3.5cm)  - Adv GI noted: eventual EGD/EUS - pt prefer outpt procedure after resolution of infection   - CA 19-9; CEA wood normal levels      # hypokalemia: replete     # HypothyroidTSH 9.89, incr synthroid to 125mcg po q24     # HTN: c/w cozaar    # c/w MVI    # + GB stones - no further w/u    # OA: hx b/l hip and knee arthroplasties    DVT ppx- Lovenox

## 2022-02-13 NOTE — PROGRESS NOTE ADULT - SUBJECTIVE AND OBJECTIVE BOX
LATOYA FULTON 66y Female      Patient is a 66y old  Female who presents with a chief complaint of leg pain (12 Feb 2022 19:09)        INTERVAL HPI/OVERNIGHT EVENTS: No acute events overnight.       PHYSICAL EXAM:  GENERAL: NAD  HEAD:  Normocephalic  EYES:  conjunctiva and sclera clear  ENMT: Moist mucous membranes  NECK: Supple  NERVOUS SYSTEM:  Alert, awake  CHEST/LUNG: Good air exchange bilaterally, no wheeze  HEART: Regular rate and rhythm        Vital Signs Last 24 Hrs  T(C): 37 (12 Feb 2022 19:53), Max: 37.5 (12 Feb 2022 14:46)  T(F): 98.6 (12 Feb 2022 19:53), Max: 99.5 (12 Feb 2022 14:46)  HR: 89 (12 Feb 2022 19:53) (74 - 91)  BP: 103/60 (12 Feb 2022 19:53) (103/60 - 110/55)  BP(mean): --  RR: 18 (12 Feb 2022 20:07) (18 - 19)  SpO2: 99% (12 Feb 2022 20:07) (99% - 99%)      Consultant(s) Notes Reviewed:  [X] YES  [ ] NO  Care Discussed with Consultants/Other Providers [X] YES  [ ] NO  Imaging Personally Reviewed:  [X] YES  [ ] NO      LABS:                        9.1    15.07 )-----------( 263      ( 12 Feb 2022 04:30 )             29.0     02-12    140  |  105  |  8<L>  ----------------------------<  93  4.2   |  23  |  0.6<L>    Ca    7.5<L>      12 Feb 2022 04:30  Phos  2.1     02-12  Mg     2.0     02-12    TPro  5.8<L>  /  Alb  2.9<L>  /  TBili  0.3  /  DBili  x   /  AST  113<H>  /  ALT  50<H>  /  AlkPhos  86  02-12          CAPILLARY BLOOD GLUCOSE

## 2022-02-14 ENCOUNTER — TRANSCRIPTION ENCOUNTER (OUTPATIENT)
Age: 67
End: 2022-02-14

## 2022-02-14 LAB
-  AMIKACIN: SIGNIFICANT CHANGE UP
-  AMOXICILLIN/CLAVULANIC ACID: SIGNIFICANT CHANGE UP
-  AMPICILLIN/SULBACTAM: SIGNIFICANT CHANGE UP
-  AMPICILLIN: SIGNIFICANT CHANGE UP
-  AZTREONAM: SIGNIFICANT CHANGE UP
-  CEFAZOLIN: SIGNIFICANT CHANGE UP
-  CEFEPIME: SIGNIFICANT CHANGE UP
-  CEFOXITIN: SIGNIFICANT CHANGE UP
-  CEFTRIAXONE: SIGNIFICANT CHANGE UP
-  CIPROFLOXACIN: SIGNIFICANT CHANGE UP
-  ERTAPENEM: SIGNIFICANT CHANGE UP
-  GENTAMICIN: SIGNIFICANT CHANGE UP
-  IMIPENEM: SIGNIFICANT CHANGE UP
-  LEVOFLOXACIN: SIGNIFICANT CHANGE UP
-  MEROPENEM: SIGNIFICANT CHANGE UP
-  NITROFURANTOIN: SIGNIFICANT CHANGE UP
-  PIPERACILLIN/TAZOBACTAM: SIGNIFICANT CHANGE UP
-  TIGECYCLINE: SIGNIFICANT CHANGE UP
-  TOBRAMYCIN: SIGNIFICANT CHANGE UP
-  TRIMETHOPRIM/SULFAMETHOXAZOLE: SIGNIFICANT CHANGE UP
ALBUMIN SERPL ELPH-MCNC: 2.7 G/DL — LOW (ref 3.5–5.2)
ALP SERPL-CCNC: 87 U/L — SIGNIFICANT CHANGE UP (ref 30–115)
ALT FLD-CCNC: 73 U/L — HIGH (ref 0–41)
ANION GAP SERPL CALC-SCNC: 12 MMOL/L — SIGNIFICANT CHANGE UP (ref 7–14)
AST SERPL-CCNC: 90 U/L — HIGH (ref 0–41)
BASOPHILS # BLD AUTO: 0.1 K/UL — SIGNIFICANT CHANGE UP (ref 0–0.2)
BASOPHILS NFR BLD AUTO: 0.8 % — SIGNIFICANT CHANGE UP (ref 0–1)
BILIRUB SERPL-MCNC: 0.3 MG/DL — SIGNIFICANT CHANGE UP (ref 0.2–1.2)
BUN SERPL-MCNC: 11 MG/DL — SIGNIFICANT CHANGE UP (ref 10–20)
CALCIUM SERPL-MCNC: 8.5 MG/DL — SIGNIFICANT CHANGE UP (ref 8.5–10.1)
CHLORIDE SERPL-SCNC: 103 MMOL/L — SIGNIFICANT CHANGE UP (ref 98–110)
CO2 SERPL-SCNC: 21 MMOL/L — SIGNIFICANT CHANGE UP (ref 17–32)
CREAT SERPL-MCNC: 0.7 MG/DL — SIGNIFICANT CHANGE UP (ref 0.7–1.5)
EOSINOPHIL # BLD AUTO: 0.32 K/UL — SIGNIFICANT CHANGE UP (ref 0–0.7)
EOSINOPHIL NFR BLD AUTO: 2.7 % — SIGNIFICANT CHANGE UP (ref 0–8)
GLUCOSE SERPL-MCNC: 113 MG/DL — HIGH (ref 70–99)
HCT VFR BLD CALC: 30.6 % — LOW (ref 37–47)
HGB BLD-MCNC: 9.9 G/DL — LOW (ref 12–16)
IMM GRANULOCYTES NFR BLD AUTO: 11.4 % — HIGH (ref 0.1–0.3)
LYMPHOCYTES # BLD AUTO: 1.3 K/UL — SIGNIFICANT CHANGE UP (ref 1.2–3.4)
LYMPHOCYTES # BLD AUTO: 10.9 % — LOW (ref 20.5–51.1)
MAGNESIUM SERPL-MCNC: 1.9 MG/DL — SIGNIFICANT CHANGE UP (ref 1.8–2.4)
MCHC RBC-ENTMCNC: 27.3 PG — SIGNIFICANT CHANGE UP (ref 27–31)
MCHC RBC-ENTMCNC: 32.4 G/DL — SIGNIFICANT CHANGE UP (ref 32–37)
MCV RBC AUTO: 84.5 FL — SIGNIFICANT CHANGE UP (ref 81–99)
METHOD TYPE: SIGNIFICANT CHANGE UP
MONOCYTES # BLD AUTO: 0.76 K/UL — HIGH (ref 0.1–0.6)
MONOCYTES NFR BLD AUTO: 6.4 % — SIGNIFICANT CHANGE UP (ref 1.7–9.3)
NEUTROPHILS # BLD AUTO: 8.07 K/UL — HIGH (ref 1.4–6.5)
NEUTROPHILS NFR BLD AUTO: 67.8 % — SIGNIFICANT CHANGE UP (ref 42.2–75.2)
NRBC # BLD: 0 /100 WBCS — SIGNIFICANT CHANGE UP (ref 0–0)
PLATELET # BLD AUTO: 337 K/UL — SIGNIFICANT CHANGE UP (ref 130–400)
POTASSIUM SERPL-MCNC: 4.2 MMOL/L — SIGNIFICANT CHANGE UP (ref 3.5–5)
POTASSIUM SERPL-SCNC: 4.2 MMOL/L — SIGNIFICANT CHANGE UP (ref 3.5–5)
PROT SERPL-MCNC: 6 G/DL — SIGNIFICANT CHANGE UP (ref 6–8)
RBC # BLD: 3.62 M/UL — LOW (ref 4.2–5.4)
RBC # FLD: 18.7 % — HIGH (ref 11.5–14.5)
SARS-COV-2 RNA SPEC QL NAA+PROBE: SIGNIFICANT CHANGE UP
SODIUM SERPL-SCNC: 136 MMOL/L — SIGNIFICANT CHANGE UP (ref 135–146)
WBC # BLD: 11.91 K/UL — HIGH (ref 4.8–10.8)
WBC # FLD AUTO: 11.91 K/UL — HIGH (ref 4.8–10.8)

## 2022-02-14 PROCEDURE — 99232 SBSQ HOSP IP/OBS MODERATE 35: CPT

## 2022-02-14 RX ORDER — GABAPENTIN 400 MG/1
200 CAPSULE ORAL EVERY 8 HOURS
Refills: 0 | Status: DISCONTINUED | OUTPATIENT
Start: 2022-02-14 | End: 2022-02-15

## 2022-02-14 RX ORDER — LEVOTHYROXINE SODIUM 125 MCG
1 TABLET ORAL
Qty: 0 | Refills: 0 | DISCHARGE
Start: 2022-02-14

## 2022-02-14 RX ORDER — OXYCODONE AND ACETAMINOPHEN 5; 325 MG/1; MG/1
1 TABLET ORAL ONCE
Refills: 0 | Status: DISCONTINUED | OUTPATIENT
Start: 2022-02-14 | End: 2022-02-14

## 2022-02-14 RX ORDER — GABAPENTIN 400 MG/1
2 CAPSULE ORAL
Qty: 0 | Refills: 0 | DISCHARGE
Start: 2022-02-14

## 2022-02-14 RX ORDER — DIPHENHYDRAMINE HCL 50 MG
25 CAPSULE ORAL ONCE
Refills: 0 | Status: COMPLETED | OUTPATIENT
Start: 2022-02-14 | End: 2022-02-14

## 2022-02-14 RX ADMIN — Medication 1 TABLET(S): at 11:25

## 2022-02-14 RX ADMIN — HYDROMORPHONE HYDROCHLORIDE 2 MILLIGRAM(S): 2 INJECTION INTRAMUSCULAR; INTRAVENOUS; SUBCUTANEOUS at 11:58

## 2022-02-14 RX ADMIN — Medication 1 APPLICATION(S): at 05:56

## 2022-02-14 RX ADMIN — Medication 100 MILLIGRAM(S): at 05:54

## 2022-02-14 RX ADMIN — GABAPENTIN 200 MILLIGRAM(S): 400 CAPSULE ORAL at 21:19

## 2022-02-14 RX ADMIN — HYDROMORPHONE HYDROCHLORIDE 2 MILLIGRAM(S): 2 INJECTION INTRAMUSCULAR; INTRAVENOUS; SUBCUTANEOUS at 04:08

## 2022-02-14 RX ADMIN — Medication 650 MILLIGRAM(S): at 21:50

## 2022-02-14 RX ADMIN — Medication 125 MICROGRAM(S): at 05:56

## 2022-02-14 RX ADMIN — HYDROMORPHONE HYDROCHLORIDE 2 MILLIGRAM(S): 2 INJECTION INTRAMUSCULAR; INTRAVENOUS; SUBCUTANEOUS at 11:25

## 2022-02-14 RX ADMIN — Medication 200 MILLIGRAM(S): at 21:19

## 2022-02-14 RX ADMIN — OXYCODONE AND ACETAMINOPHEN 1 TABLET(S): 5; 325 TABLET ORAL at 21:50

## 2022-02-14 RX ADMIN — Medication 25 MILLIGRAM(S): at 14:03

## 2022-02-14 RX ADMIN — LOSARTAN POTASSIUM 25 MILLIGRAM(S): 100 TABLET, FILM COATED ORAL at 05:55

## 2022-02-14 RX ADMIN — GABAPENTIN 200 MILLIGRAM(S): 400 CAPSULE ORAL at 14:03

## 2022-02-14 RX ADMIN — Medication 100 MILLIGRAM(S): at 14:04

## 2022-02-14 RX ADMIN — GABAPENTIN 100 MILLIGRAM(S): 400 CAPSULE ORAL at 05:55

## 2022-02-14 RX ADMIN — ENOXAPARIN SODIUM 40 MILLIGRAM(S): 100 INJECTION SUBCUTANEOUS at 21:19

## 2022-02-14 RX ADMIN — OXYCODONE AND ACETAMINOPHEN 1 TABLET(S): 5; 325 TABLET ORAL at 14:03

## 2022-02-14 RX ADMIN — HYDROMORPHONE HYDROCHLORIDE 2 MILLIGRAM(S): 2 INJECTION INTRAMUSCULAR; INTRAVENOUS; SUBCUTANEOUS at 05:59

## 2022-02-14 RX ADMIN — OXYCODONE AND ACETAMINOPHEN 1 TABLET(S): 5; 325 TABLET ORAL at 21:47

## 2022-02-14 RX ADMIN — Medication 100 MILLIGRAM(S): at 21:19

## 2022-02-14 RX ADMIN — OXYCODONE AND ACETAMINOPHEN 1 TABLET(S): 5; 325 TABLET ORAL at 14:22

## 2022-02-14 RX ADMIN — Medication 650 MILLIGRAM(S): at 21:47

## 2022-02-14 NOTE — PHYSICAL THERAPY INITIAL EVALUATION ADULT - GAIT TRAINING, PT EVAL
The patient will improve gait using  rolling walker with Supervision for 25 feet by discharge to decrease burden of care.

## 2022-02-14 NOTE — DISCHARGE NOTE PROVIDER - NSDCCPCAREPLAN_GEN_ALL_CORE_FT
PRINCIPAL DISCHARGE DIAGNOSIS  Diagnosis: Cellulitis  Assessment and Plan of Treatment: You came here for cellulitis of your lower extremities. You were started on clindamycin      SECONDARY DISCHARGE DIAGNOSES  Diagnosis: Lymph nodes enlarged  Assessment and Plan of Treatment:     Diagnosis: Pancreatic cyst  Assessment and Plan of Treatment:      PRINCIPAL DISCHARGE DIAGNOSIS  Diagnosis: Cellulitis  Assessment and Plan of Treatment: You came here for cellulitis of your lower extremities. You were started on clindamycin through IV and you will be discharged on PO medications to complete a 7 day course.      SECONDARY DISCHARGE DIAGNOSES  Diagnosis: Lymph nodes enlarged  Assessment and Plan of Treatment: You were noted to have enlarged lymph noded on the CT scan you had on admission. You will need to repeat the CT after the infection clears. If your lymph nodes are still enlarged you will need to have a PET performed.    Diagnosis: Pancreatic cyst  Assessment and Plan of Treatment: You were noted to have a pancreatic mass on the CT scan you had. You were seen by the GI team and you wanted to delay further work up untill your infection clears. You were counseled about the importance of following up with the GI team as outpatient to rule out any serious causes like possible malignancy.

## 2022-02-14 NOTE — DISCHARGE NOTE PROVIDER - NSDCMRMEDTOKEN_GEN_ALL_CORE_FT
Cozaar 25 mg oral tablet: 1 tab(s) orally once a day  Plaquenil 200 mg oral tablet: orally 2 times a day  silver sulfADIAZINE 1% topical cream: 1 application topically 2 times a day  Synthroid 112 mcg (0.112 mg) oral tablet: 1 tab(s) orally once a day   Cozaar 25 mg oral tablet: 1 tab(s) orally once a day  gabapentin 100 mg oral capsule: 2 cap(s) orally every 8 hours  levothyroxine 125 mcg (0.125 mg) oral tablet: 1 tab(s) orally once a day  Multiple Vitamins oral tablet: 1 tab(s) orally once a day  Plaquenil 200 mg oral tablet: orally 2 times a day  silver sulfADIAZINE 1% topical cream: 1 application topically 2 times a day  Synthroid 112 mcg (0.112 mg) oral tablet: 1 tab(s) orally once a day  triamcinolone 0.1% topical cream: 1 application topically every 12 hours   acetaminophen 325 mg oral tablet: 2 tab(s) orally every 12 hours, As needed, Temp greater or equal to 38C (100.4F), Mild Pain (1 - 3)  cephalexin 500 mg oral capsule: 500 milligram(s) orally 4 times a day  Cozaar 25 mg oral tablet: 1 tab(s) orally once a day  gabapentin 100 mg oral capsule: 2 cap(s) orally every 8 hours  HYDROmorphone 2 mg oral tablet: 1 tab(s) orally every 6 hours, As needed, Severe Pain (7 - 10)  levothyroxine 125 mcg (0.125 mg) oral tablet: 1 tab(s) orally once a day  Multiple Vitamins oral tablet: 1 tab(s) orally once a day  Plaquenil 200 mg oral tablet: orally 2 times a day  silver sulfADIAZINE 1% topical cream: 1 application topically 2 times a day  triamcinolone 0.1% topical cream: 1 application topically every 12 hours

## 2022-02-14 NOTE — PROGRESS NOTE ADULT - ASSESSMENT
ASSESSMENT  69 y/o hx of CVA 1987 without residual defects, SLE on plaquenil, Hypothyroid, HTN presents from [facility] for leg pain.    IMPRESSION  #LLE cellulitis , improving  #Asymptomatic bacteriuria  #Sepsis on admission t 100.9  WBC>12    2/9 BCX NGTD     CTAP Enlarged bilateral inguinal and iliac chain lymph nodes as above. Findings are nonspecific and of unclear etiology. The possibility of neoplasm is not excluded.  #SLE- uncontrolled  #CT There is mild bibasilar subsegmental atelectasis.   #CT There is a 2.9 x 2.5 x 3.5 cm cystic lesion in the pancreatic head  #Abx allergy: PCN - unknown- but not anaphylaxis  Levaquin (confusion)    Creatinine, Serum: 0.7 (02-11-22 @ 05:59)    Weight (kg): 63.5 (02-10-22 @ 09:49)    RECOMMENDATIONS  - On Clinda IV 600mg q8h x 7 days (PO 300mg TID on D/C)  - Monitor LE erythema  - Leg elevation    If any questions, please call or send a message on [employer] Teams  Please continue to update ID with any pertinent new laboratory or radiographic findings  Spectra [phone]

## 2022-02-14 NOTE — DISCHARGE NOTE PROVIDER - CARE PROVIDER_API CALL
KATHY HUGO  Internal Medicine  [address] [address]  Phone: [phone]  Fax: [phone])-  Follow Up Time: 2 weeks    Danika Fraser (DO)  Internal Medicine; Rheumatology  [address] [place] [address]  Phone: [phone]  Fax: [phone]  Follow Up Time: 2 weeks

## 2022-02-14 NOTE — PROGRESS NOTE ADULT - ASSESSMENT
71 y/o hx of CVA 1987 without residual defects, SLE on plaquenil, Hypothyroid, HTN presents from [facility] for leg pain. FOund to have CT cystic lesion of pancreatic head and multiple enlarged lymph nodes.    # sepsis (fever, WBC) present on admit 2/2 cellulitis of left leg; severe stasis dermatitis b/l; reactive LN (up to 3.5cm) to infection/inflammation (doubt lymphoma)  ;   CK lvl 38  afeb  WBC was very high, but trending down well now  V Dupl: neg DVT  bld cx neg x2  ID noted  abx: clinda 900mg iv q8 - can change to 300mg po q8 for 7 days total (2/10-2/17)  wound care:   wash legs w/ soap and water and dry; apply silvadene to cracked areas and triamcinolone cream to intact skin; cover cracked skin w/ xeroform; wrap w/ Kerlix; hold in place w/ ace wrap; do q24 (pt refused 2x/day)  WOCN eval  elevate legs  pain: dilaudid 2mg po q6 prn + incr neurontin 200mg po q8 (might need further increases)   cbc q24    # enlarged LN pelvic/inguinal up to 3.5cm    if pt had lymphoma, would NOT have suspected left leg to improve so much  once abx are over and legs are healing well (end of Feb) - would repeat CT A/P w/ IV cont to re-eval LN, if still > 2cm (and certainly if > 3.5cm), could consider outpt PET scan to eval LN further and poss LN bx vs rxn w/ surgery    # hx SLE  rheum consult- hasn't seen her Rheum doctor, who is in [place], in 2 years  c/w plaquenil 200mg qhs    # normo anemia of chr dz    # CT findings of cystic lesion of pancreatic head (3.5cm)  LFT nl  Adv GI noted: eventual EGD/EUS (outpt)  CA 19-9 2; CEA 1.6;  too soon to get surg onc eval -> would wait until bx    # hypokalemia - better    # Hypothyroid  TSH 9.89  incr synthroid to 125mcg po q24  rpt TSH and FT4 in 6 wks (April)    # HTN  c/w cozaar    # c/w MVI    # + GB stones - no further w/u    # OA  hx b/l hip and knee arthroplasties    # Activity: PT eval; pt prefers to go back to [facility], but not walking well - likely need SNF upon d/c     # DVT ppx- lovenox    Dispo: change to oral abx; wound care; WOCN eval; PT eval  eventually, seems pt needs STR at SNF (vs at [facility]) - f/u CM - anticipate d/c ebony

## 2022-02-14 NOTE — PHYSICAL THERAPY INITIAL EVALUATION ADULT - PERTINENT HX OF CURRENT PROBLEM, REHAB EVAL
69 y/o hx of CVA 1987 without residual defects, SLE on plaquenil, Hypothyroid, HTN presents from [facility] for leg pain, generalized weakness and decreased appetite.

## 2022-02-14 NOTE — DISCHARGE NOTE PROVIDER - HOSPITAL COURSE
History of Present Illness:   71 y/o hx of CVA 1987 without residual defects, SLE on plaquenil, Hypothyroid, HTN presents from [facility] for leg pain.  Pt. reports b/l leg pain to touch x weeks, left leg worse than right. No alleviating or aggravating factors. Pt. also endorses generalized weakness with decreased appetite. Pt. denies fever chills, SOB, chest pain. Of note Pt. has not seen rheumatologist in 2 years and has extensive dermatitis/ eczema on left arm and b/l legs.    ED course:   febrile to 100.8, WBC 24, lactate 1.4 Normocytic anemia at baseline, Hypokalemia which was repleted in ED.  Pt. was given clindamycin for diagnosis if cellulitis.  Incidental finding on duplex and also seen on CT were multiple enlarged lymph nodes without clear etiology neoplastic cause not excluded, also seen on CT was cystic lesion of pancreas head measuring 3.5cm likely needing further endoscopic eval.      Hospital course:    Pt admitted for sepsis 2/2 to left leg cellulitis. Pt seen by infectious team and was started on IV clindamycin to be discharged on PO clinda 300mg TID for 7 day total . Upon dc pt was afebrile for more then   - V Dupl: neg DVT  - bld cx neg x2  - ID noted: abx: clinda 600mg iv q8 (po on d/c) - might need more than 7 days total  - c/w wound care    - pain: dilaudid 2mg po q6 prn + neurontin 100mg po q8    - could consider outpt PET scan to eval LN further   History of Present Illness:   69 y/o hx of CVA 1987 without residual defects, SLE on plaquenil, Hypothyroid, HTN presents from [facility] for leg pain.  Pt. reports b/l leg pain to touch x weeks, left leg worse than right. No alleviating or aggravating factors. Pt. also endorses generalized weakness with decreased appetite. Pt. denies fever chills, SOB, chest pain. Of note Pt. has not seen rheumatologist in 2 years and has extensive dermatitis/ eczema on left arm and b/l legs.    ED course:   febrile to 100.8, WBC 24, lactate 1.4 Normocytic anemia at baseline, Hypokalemia which was repleted in ED.  Pt. was given clindamycin for diagnosis if cellulitis.  Incidental finding on duplex and also seen on CT were multiple enlarged lymph nodes without clear etiology neoplastic cause not excluded, also seen on CT was cystic lesion of pancreas head measuring 3.5cm likely needing further endoscopic eval.      Hospital course:    Pt admitted for sepsis 2/2 to left leg cellulitis. Pt seen by infectious team and was started on IV clindamycin to be discharged on PO clinda 300mg TID for 7 day total . Upon dc pt was afebrile for more then 48h with WBC trending down.  For pain pt was started on dilaudid 2mg po q6 prn + neurontin 100mg po q8    Pt was also found to have on CT A/P:   1) Enlarged bilateral inguinal and iliac chain lymph nodes emasuring up to 3.5 cm. Pt to have a PET scan as OP to r/o lymphoma.   2) Cystic lesion in the pancreatic head measuring up to 3.5 cm without upstream ductal dilatation (felt to be unrelated pelvic adenopathy). [facility] were consulted. Patient not amendable to inpatient evaluation of Pancreatic cystic lesion. she was aware that the biggest concern would be cancer and that delay in diagnosis could delay treatment and lead to disease progression and Biliary obstruction. Patient will think about outpatient follow up.     History of Present Illness:   69 y/o hx of CVA 1987 without residual defects, SLE on plaquenil, Hypothyroid, HTN presents from [facility] for leg pain.  Pt. reports b/l leg pain to touch x weeks, left leg worse than right. No alleviating or aggravating factors. Pt. also endorses generalized weakness with decreased appetite. Pt. denies fever chills, SOB, chest pain. Of note Pt. has not seen rheumatologist in 2 years and has extensive dermatitis/ eczema on left arm and b/l legs.    ED course:   febrile to 100.8, WBC 24, lactate 1.4 Normocytic anemia at baseline, Hypokalemia which was repleted in ED.  Pt. was given clindamycin for diagnosis if cellulitis.  Incidental finding on duplex and also seen on CT were multiple enlarged lymph nodes without clear etiology neoplastic cause not excluded, also seen on CT was cystic lesion of pancreas head measuring 3.5cm likely needing further endoscopic eval.      Hospital course:    Pt admitted for sepsis 2/2 to left leg cellulitis. BCX negative and Urine Cx grew Klebsiella pneumoniae and E coli. Pt seen by infectious team and was started on IV clindamycin to be discharged on PO keflex 7 day total. Upon dc pt was afebrile for more then 48h with WBC trending down.  For pain pt was started on dilaudid 2mg po q6 prn + neurontin 200mg po q8    Pt was also found to have on CT A/P:   1) Enlarged bilateral inguinal and iliac chain lymph nodes emasuring up to 3.5 cm. Pt to have a PET scan as OP to r/o lymphoma.   2) Cystic lesion in the pancreatic head measuring up to 3.5 cm without upstream ductal dilatation (felt to be unrelated pelvic adenopathy). [facility] were consulted. Patient not amendable to inpatient evaluation of Pancreatic cystic lesion. she was aware that the biggest concern would be cancer and that delay in diagnosis could delay treatment and lead to disease progression and Biliary obstruction. Patient will think about outpatient follow up.

## 2022-02-14 NOTE — PROGRESS NOTE ADULT - SUBJECTIVE AND OBJECTIVE BOX
LATOYA FULTON  66y, Female  Allergy: aspirin (Unknown)  Compazine (Unknown)  Levaquin (Unknown)  morphine (Unknown)  penicillins (Unknown)      LOS  4d    CHIEF COMPLAINT: leg pain (14 Feb 2022 13:16)      INTERVAL EVENTS/HPI  - No acute events overnight, denies urinary symptoms   - T(F): , Max: 100 (02-13-22 @ 20:33)  - Denies any worsening symptoms  - Tolerating medication  - WBC Count: 11.91 (02-14-22 @ 06:00)  WBC Count: 12.11 (02-13-22 @ 04:30)     - Creatinine, Serum: 0.7 (02-14-22 @ 06:00)  Creatinine, Serum: <0.5 (02-13-22 @ 04:30)       ROS  General: Denies rigors, nightsweats  HEENT: Denies headache, rhinorrhea, sore throat, eye pain  CV: Denies CP, palpitations  PULM: Denies wheezing, hemoptysis  GI: Denies hematemesis, hematochezia, melena  : Denies discharge, hematuria  MSK: Denies arthralgias, myalgias  SKIN: Denies rash, lesions  NEURO: Denies paresthesias, weakness  PSYCH: Denies depression, anxiety    VITALS:  T(F): 96.4, Max: 100 (02-13-22 @ 20:33)  HR: 101  BP: 137/60  RR: 18Vital Signs Last 24 Hrs  T(C): 35.8 (14 Feb 2022 14:26), Max: 37.8 (13 Feb 2022 20:33)  T(F): 96.4 (14 Feb 2022 14:26), Max: 100 (13 Feb 2022 20:33)  HR: 101 (14 Feb 2022 14:26) (96 - 102)  BP: 137/60 (14 Feb 2022 14:26) (135/70 - 142/84)  BP(mean): --  RR: 18 (13 Feb 2022 20:33) (18 - 18)  SpO2: --    PHYSICAL EXAM:  Gen: NAD, resting in bed  HEENT: Normocephalic, atraumatic  Neck: supple, no lymphadenopathy  CV: Regular rate & regular rhythm  Lungs: decreased BS at bases, no fremitus  Abdomen: Soft, BS present  Ext: Warm, well perfused, LLE decreased edema / erythema  Neuro: non focal, awake  Skin: no rash, no erythema  Lines: no phlebitis    FH: Non-contributory  Social Hx: Non-contributory    TESTS & MEASUREMENTS:                        9.9    11.91 )-----------( 337      ( 14 Feb 2022 06:00 )             30.6     02-14    136  |  103  |  11  ----------------------------<  113<H>  4.2   |  21  |  0.7    Ca    8.5      14 Feb 2022 06:00  Mg     1.9     02-14    TPro  6.0  /  Alb  2.7<L>  /  TBili  0.3  /  DBili  x   /  AST  90<H>  /  ALT  73<H>  /  AlkPhos  87  02-14    eGFR if Non African American: 91 mL/min/1.73M2 (02-14-22 @ 06:00)  eGFR if African American: 105 mL/min/1.73M2 (02-14-22 @ 06:00)    LIVER FUNCTIONS - ( 14 Feb 2022 06:00 )  Alb: 2.7 g/dL / Pro: 6.0 g/dL / ALK PHOS: 87 U/L / ALT: 73 U/L / AST: 90 U/L / GGT: x               Culture - Urine (collected 02-10-22 @ 02:00)  Source: Clean Catch Clean Catch (Midstream)  Preliminary Report (02-12-22 @ 19:38):    >100,000 CFU/ml Klebsiella pneumoniae    50,000 - 99,000 CFU/mL Escherichia coli    Culture - Blood (collected 02-09-22 @ 21:14)  Source: .Blood Blood-Peripheral  Preliminary Report (02-11-22 @ 02:01):    No growth to date.    Culture - Blood (collected 02-09-22 @ 21:14)  Source: .Blood Blood-Peripheral  Preliminary Report (02-11-22 @ 02:01):    No growth to date.        Lactate, Blood: 1.4 mmol/L (02-09-22 @ 20:53)      INFECTIOUS DISEASES TESTING  COVID-19 PCR: NotDetec (02-14-22 @ 06:10)  MRSA PCR Result.: Negative (02-11-22 @ 11:20)  Rapid RVP Result: NotDetec (02-10-22 @ 00:30)      INFLAMMATORY MARKERS  Sedimentation Rate, Erythrocyte: 109 mm/Hr (02-10-22 @ 11:00)  C-Reactive Protein, Serum: 329 mg/L (02-10-22 @ 11:00)      RADIOLOGY & ADDITIONAL TESTS:  I have personally reviewed the last available Chest xray  CXR      CT      CARDIOLOGY TESTING  12 Lead ECG:   Ventricular Rate 88 BPM    Atrial Rate 88 BPM    P-R Interval 128 ms    QRS Duration 86 ms    Q-T Interval 372 ms    QTC Calculation(Bazett) 450 ms    P Axis 46 degrees    R Axis 76 degrees    T Axis -71 degrees    Diagnosis Line Normal sinus rhythm with sinus arrhythmia  ST & T wave abnormality, consider inferior ischemia  ST & T wave abnormality, consider anterolateral ischemia  Abnormal ECG    Confirmed by MYKEL OLGUIN MD ([id number]) on 2/9/2022 11:08:37 PM (02-09-22 @ 21:41)      MEDICATIONS  clindamycin IVPB 900 IV Intermittent every 8 hours  clindamycin IVPB     enoxaparin Injectable 40 SubCutaneous at bedtime  gabapentin 200 Oral every 8 hours  hydroxychloroquine 200 Oral at bedtime  levothyroxine 125 Oral daily  losartan 25 Oral daily  multivitamin 1 Oral daily  silver sulfADIAZINE 1% Cream 1 Topical two times a day  triamcinolone 0.1% Cream 1 Topical every 12 hours      WEIGHT  Weight (kg): 63.5 (02-10-22 @ 09:49)  Creatinine, Serum: 0.7 mg/dL (02-14-22 @ 06:00)      ANTIBIOTICS:  clindamycin IVPB 900 milliGRAM(s) IV Intermittent every 8 hours  clindamycin IVPB      hydroxychloroquine 200 milliGRAM(s) Oral at bedtime      All available historical records have been reviewed

## 2022-02-14 NOTE — PHYSICAL THERAPY INITIAL EVALUATION ADULT - GENERAL OBSERVATIONS, REHAB EVAL
PT IE completed. Pt encountered semifowler in bed, in NAD, +IV lock, +primafit, agreeable to PT. Pt requires Min A supine to sit, sit<>stand and was able to take 4 steps bed to chair and back 2x. Refused to amb further due to pain (9/10) upon movt and wt bearing activities L lower leg. Also noted pt is very itchy. RN made aware re pain and itching, will be giving her percoset and benadryl. PT IE completed. Pt encountered semifowler in bed, in NAD, +IV lock, +primafit, agreeable to PT. Pt requires Min A supine to sit, sit<>stand and was able to take 4 steps bed to chair and back 2x. Refused to amb further due to pain (9/10) upon movt and wt bearing activities L lower leg. Also noted pt is very itchy everywhere. RN made aware re pain and itching, will be giving her percoset and benadryl.

## 2022-02-14 NOTE — CHART NOTE - NSCHARTNOTEFT_GEN_A_CORE
Patient not amendable to inpatient evaluation of Pancreatic cystic lesion. she was aware that the biggest concern would be cancer and that delay in diagnosis could delay treatment and lead to disease progression and Biliary obstruction. Patient will think about outpatient follow up, as also her wishes reflected in note from Medicine team.  Please set her up for an appointment at the [facility] Tuesday Afternoons- [phone] prior to discharge, if she changes her mind please recall advanced team at [phone]

## 2022-02-14 NOTE — PROGRESS NOTE ADULT - SUBJECTIVE AND OBJECTIVE BOX
LATOYA FULTON 66y Female      Patient is a 66y old  Female who presents with a chief complaint of leg pain (14 Feb 2022 15:33)        INTERVAL HPI/OVERNIGHT EVENTS: No acute events overnight. Pt still complaining of lower extremity pain that is improving with dilaudid. Pt willing to be discharged.       PHYSICAL EXAM:  GENERAL: NAD  HEAD:  Normocephalic  EYES:  conjunctiva and sclera clear  ENMT: Moist mucous membranes  NECK: Supple  NERVOUS SYSTEM:  Alert, awake  CHEST/LUNG: Good air exchange bilaterally, no wheeze  HEART: Regular rate and rhythm  SKIN: improving redness of LE         Vital Signs Last 24 Hrs  T(C): 35.8 (14 Feb 2022 14:26), Max: 37.8 (13 Feb 2022 20:33)  T(F): 96.4 (14 Feb 2022 14:26), Max: 100 (13 Feb 2022 20:33)  HR: 101 (14 Feb 2022 14:26) (96 - 102)  BP: 137/60 (14 Feb 2022 14:26) (135/70 - 142/84)  BP(mean): --  RR: 18 (13 Feb 2022 20:33) (18 - 18)  SpO2: --      Consultant(s) Notes Reviewed:  [X] YES  [ ] NO  Care Discussed with Consultants/Other Providers [X] YES  [ ] NO  Imaging Personally Reviewed:  [X] YES  [ ] NO      LABS:                        9.9    11.91 )-----------( 337      ( 14 Feb 2022 06:00 )             30.6     02-14    136  |  103  |  11  ----------------------------<  113<H>  4.2   |  21  |  0.7    Ca    8.5      14 Feb 2022 06:00  Mg     1.9     02-14    TPro  6.0  /  Alb  2.7<L>  /  TBili  0.3  /  DBili  x   /  AST  90<H>  /  ALT  73<H>  /  AlkPhos  87  02-14          CAPILLARY BLOOD GLUCOSE

## 2022-02-14 NOTE — PROGRESS NOTE ADULT - SUBJECTIVE AND OBJECTIVE BOX
LATOYA FULTON  66y  Female  ***My note supersedes ALL resident notes that I sign.  My corrections for their notes are in my note.***    I can be reached directly on [employer] [id number]. My office number is [phone]. My personal cell number is [phone].    INTERVAL EVENTS: Here for f/u of leg pain. Pt looks much better than when I first saw here. She is still c/o b/l leg pain. Pt did not walk far w/ PT.     T(F): 96.4 (02-14-22 @ 14:26), Max: 100 (02-13-22 @ 20:33)  HR: 101 (02-14-22 @ 14:26) (96 - 102)  BP: 137/60 (02-14-22 @ 14:26) (135/70 - 142/84)  RR: 18 (02-13-22 @ 20:33) (18 - 18)  SpO2: --    Gen: No resp distress; + b/l leg pain  HEENT: PERRL, EOMI, mouth clr, nose clr  Neck: no nodes, no JVD, thyroid nl  lungs: clr  hrt: s1 s2 rrr no murmur  abd: soft, NT/ND, no HS megaly  ext: no c/c  left leg: much less swollen; redness is markedly better; still w/ pain; dressing in place - no bleeding  right leg: mild pain; tr edema; dressing in place - no bleeding  neuro: aa ox3, cn intact, can move all 3 ext - rt leg can move, but weak; moving left leg, but not as much 2/2 pain    LABS:                      9.9     (    84.5   11.91 )-----------( ---------      337      ( 14 Feb 2022 06:00 )             30.6    (    18.7     WBC Count: 11.91 K/uL (02-14-22 @ 06:00) - improving well  WBC Count: 12.11 K/uL (02-13-22 @ 04:30)  WBC Count: 15.07 K/uL (02-12-22 @ 04:30)  WBC Count: 17.47 K/uL (02-11-22 @ 05:59)  WBC Count: 24.84 K/uL (02-09-22 @ 20:53)    Hemoglobin: 9.9 g/dL (02-14 @ 06:00)  Hemoglobin: 8.8 g/dL (02-13 @ 04:30)  Hemoglobin: 9.1 g/dL (02-12 @ 04:30)  Hemoglobin: 10.4 g/dL (02-11 @ 05:59)  Hemoglobin: 10.8 g/dL (02-09 @ 20:53)    136   (   103   (   113      02-14-22 @ 06:00  ----------------------               4.2   (   21   (   11                             -----                        0.7  Ca  8.5   Mg  1.9    P   --     LFT  6.0  (  0.3  (  90       02-14-22 @ 06:00  -------------------------  2.7  (  87  (  73    Culture - Urine (collected 02-10-22 @ 02:00)  Source: Clean Catch Clean Catch (Midstream)  Preliminary Report (02-12-22 @ 19:38):    >100,000 CFU/ml Klebsiella pneumoniae    50,000 - 99,000 CFU/mL Escherichia coli    Culture - Blood (collected 02-09-22 @ 21:14)  Source: .Blood Blood-Peripheral  Preliminary Report (02-11-22 @ 02:01):    No growth to date.    Culture - Blood (collected 02-09-22 @ 21:14)  Source: .Blood Blood-Peripheral  Preliminary Report (02-11-22 @ 02:01):    No growth to date.    RADIOLOGY & ADDITIONAL TESTS:    MEDICATIONS:  clindamycin IVPB 900 milliGRAM(s) IV Intermittent every 8 hours  clindamycin IVPB      hydroxychloroquine 200 milliGRAM(s) Oral at bedtime    acetaminophen     Tablet .. 650 milliGRAM(s) Oral every 12 hours PRN  enoxaparin Injectable 40 milliGRAM(s) SubCutaneous at bedtime  gabapentin 200 milliGRAM(s) Oral every 8 hours  HYDROmorphone   Tablet 2 milliGRAM(s) Oral every 6 hours PRN  levothyroxine 125 MICROGram(s) Oral daily  losartan 25 milliGRAM(s) Oral daily  multivitamin 1 Tablet(s) Oral daily  silver sulfADIAZINE 1% Cream 1 Application(s) Topical two times a day  triamcinolone 0.1% Cream 1 Application(s) Topical every 12 hours

## 2022-02-14 NOTE — PROGRESS NOTE ADULT - ASSESSMENT
69 y/o hx of CVA 1987 without residual defects, SLE on plaquenil, Hypothyroid, HTN presents from [facility] for leg pain. Found to have CT cystic lesion of pancreatic head and multiple enlarged lymph nodes.    # sepsis (fever, WBC) present on admit 2/2 cellulitis of left leg;   # severe stasis dermatitis b/l; reactive LN (up to 3.5cm) to infection/inflammation ( lymphoma?)  - afebrile for more than 48hrs now   - ; , CK 38  - WBC trending down to 11.9k today   - V Dupl: neg DVT  - bld cx neg x2  - Abx: On IV clinda to be discharged on PO clinda for a total of 7 days   - c/w wound care    - pain regimen dilaudid 2mg po q6 prn + neurontin increased to 200mg q8h  - PT: ambulated from chair to bed today   - outpatient PET scan to eval LN further    # hx SLE:   - dsDNA is high, C3, C4 are normal level  - DANIS pending   - c/w Plaquenil 200mg qhs  - Rheumatology f/u     # normo anemia of chr dz    # CT findings of cystic lesion of pancreatic head (3.5cm)  - Adv GI noted: eventual EGD/EUS - pt prefers to have the FU done as OP after her cellulitis resolves   - CEA and  normal levels  -       # hypokalemia: resolved    # Hypothyroid  -TSH 9.89  - increased synthroid to 125mcg po q24     # HTN:   - c/w cozaar    # c/w MVI    # + GB stones   - no further w/u    # OA: hx b/l hip and knee arthroplasties    DVT ppx- Lovenox   69 y/o hx of CVA 1987 without residual defects, SLE on plaquenil, Hypothyroid, HTN presents from [facility] for leg pain. Found to have CT cystic lesion of pancreatic head and multiple enlarged lymph nodes.    # sepsis (fever, WBC) present on admit 2/2 cellulitis of left leg;   # severe stasis dermatitis b/l; reactive LN (up to 3.5cm) to infection/inflammation ( lymphoma?)  - afebrile for more than 48hrs now   - ; , CK 38  - WBC trending down to 11.9k today   - V Dupl: neg DVT  - bld cx neg x2  - Abx: On IV clinda to be discharged on PO clinda for a total of 7 days   - c/w wound care    - pain regimen dilaudid 2mg po q6 prn + neurontin increased to 200mg q8h  - PT: ambulated from chair to bed today   - outpatient PET scan to eval LN further    #mild transaminitis  - AST and ALT trending up   - continue to monitor     # hx SLE:   - dsDNA is high, C3, C4 are normal level  - DANIS pending   - c/w Plaquenil 200mg qhs  - Rheumatology f/u     # normo anemia of chr dz    # CT findings of cystic lesion of pancreatic head (3.5cm)  - Adv GI noted: eventual EGD/EUS - pt prefers to have the FU done as OP after her cellulitis resolves   - CEA and  normal levels  -       # hypokalemia: resolved    # Hypothyroid  -TSH 9.89  - increased synthroid to 125mcg po q24     # HTN:   - c/w cozaar    # c/w MVI    # + GB stones   - no further w/u    # OA: hx b/l hip and knee arthroplasties    DVT ppx- Lovenox

## 2022-02-15 ENCOUNTER — TRANSCRIPTION ENCOUNTER (OUTPATIENT)
Age: 67
End: 2022-02-15

## 2022-02-15 VITALS
HEART RATE: 104 BPM | TEMPERATURE: 99 F | SYSTOLIC BLOOD PRESSURE: 137 MMHG | RESPIRATION RATE: 18 BRPM | DIASTOLIC BLOOD PRESSURE: 84 MMHG

## 2022-02-15 LAB
-  AMIKACIN: SIGNIFICANT CHANGE UP
-  AMOXICILLIN/CLAVULANIC ACID: SIGNIFICANT CHANGE UP
-  AMPICILLIN/SULBACTAM: SIGNIFICANT CHANGE UP
-  AMPICILLIN: SIGNIFICANT CHANGE UP
-  AZTREONAM: SIGNIFICANT CHANGE UP
-  CEFAZOLIN: SIGNIFICANT CHANGE UP
-  CEFEPIME: SIGNIFICANT CHANGE UP
-  CEFOXITIN: SIGNIFICANT CHANGE UP
-  CEFTRIAXONE: SIGNIFICANT CHANGE UP
-  CIPROFLOXACIN: SIGNIFICANT CHANGE UP
-  ERTAPENEM: SIGNIFICANT CHANGE UP
-  GENTAMICIN: SIGNIFICANT CHANGE UP
-  IMIPENEM: SIGNIFICANT CHANGE UP
-  LEVOFLOXACIN: SIGNIFICANT CHANGE UP
-  MEROPENEM: SIGNIFICANT CHANGE UP
-  NITROFURANTOIN: SIGNIFICANT CHANGE UP
-  PIPERACILLIN/TAZOBACTAM: SIGNIFICANT CHANGE UP
-  TIGECYCLINE: SIGNIFICANT CHANGE UP
-  TOBRAMYCIN: SIGNIFICANT CHANGE UP
-  TRIMETHOPRIM/SULFAMETHOXAZOLE: SIGNIFICANT CHANGE UP
ALBUMIN SERPL ELPH-MCNC: 3.1 G/DL — LOW (ref 3.5–5.2)
ALP SERPL-CCNC: 92 U/L — SIGNIFICANT CHANGE UP (ref 30–115)
ALT FLD-CCNC: 64 U/L — HIGH (ref 0–41)
ANA PAT FLD IF-IMP: ABNORMAL
ANA TITR SER: ABNORMAL
ANION GAP SERPL CALC-SCNC: 13 MMOL/L — SIGNIFICANT CHANGE UP (ref 7–14)
AST SERPL-CCNC: 59 U/L — HIGH (ref 0–41)
BASOPHILS # BLD AUTO: 0.02 K/UL — SIGNIFICANT CHANGE UP (ref 0–0.2)
BASOPHILS NFR BLD AUTO: 0.2 % — SIGNIFICANT CHANGE UP (ref 0–1)
BILIRUB SERPL-MCNC: 0.4 MG/DL — SIGNIFICANT CHANGE UP (ref 0.2–1.2)
BUN SERPL-MCNC: 13 MG/DL — SIGNIFICANT CHANGE UP (ref 10–20)
CALCIUM SERPL-MCNC: 8.3 MG/DL — LOW (ref 8.5–10.1)
CHLORIDE SERPL-SCNC: 103 MMOL/L — SIGNIFICANT CHANGE UP (ref 98–110)
CO2 SERPL-SCNC: 24 MMOL/L — SIGNIFICANT CHANGE UP (ref 17–32)
CREAT SERPL-MCNC: 0.6 MG/DL — LOW (ref 0.7–1.5)
CULTURE RESULTS: SIGNIFICANT CHANGE UP
EOSINOPHIL # BLD AUTO: 0.2 K/UL — SIGNIFICANT CHANGE UP (ref 0–0.7)
EOSINOPHIL NFR BLD AUTO: 1.8 % — SIGNIFICANT CHANGE UP (ref 0–8)
GLUCOSE SERPL-MCNC: 98 MG/DL — SIGNIFICANT CHANGE UP (ref 70–99)
HCT VFR BLD CALC: 32.2 % — LOW (ref 37–47)
HGB BLD-MCNC: 10.2 G/DL — LOW (ref 12–16)
IMM GRANULOCYTES NFR BLD AUTO: 12.6 % — HIGH (ref 0.1–0.3)
LYMPHOCYTES # BLD AUTO: 1.08 K/UL — LOW (ref 1.2–3.4)
LYMPHOCYTES # BLD AUTO: 9.6 % — LOW (ref 20.5–51.1)
MAGNESIUM SERPL-MCNC: 1.8 MG/DL — SIGNIFICANT CHANGE UP (ref 1.8–2.4)
MCHC RBC-ENTMCNC: 26.8 PG — LOW (ref 27–31)
MCHC RBC-ENTMCNC: 31.7 G/DL — LOW (ref 32–37)
MCV RBC AUTO: 84.7 FL — SIGNIFICANT CHANGE UP (ref 81–99)
METHOD TYPE: SIGNIFICANT CHANGE UP
MONOCYTES # BLD AUTO: 0.67 K/UL — HIGH (ref 0.1–0.6)
MONOCYTES NFR BLD AUTO: 5.9 % — SIGNIFICANT CHANGE UP (ref 1.7–9.3)
NEUTROPHILS # BLD AUTO: 7.89 K/UL — HIGH (ref 1.4–6.5)
NEUTROPHILS NFR BLD AUTO: 69.9 % — SIGNIFICANT CHANGE UP (ref 42.2–75.2)
NRBC # BLD: 0 /100 WBCS — SIGNIFICANT CHANGE UP (ref 0–0)
ORGANISM # SPEC MICROSCOPIC CNT: SIGNIFICANT CHANGE UP
PLATELET # BLD AUTO: 425 K/UL — HIGH (ref 130–400)
POTASSIUM SERPL-MCNC: 4.3 MMOL/L — SIGNIFICANT CHANGE UP (ref 3.5–5)
POTASSIUM SERPL-SCNC: 4.3 MMOL/L — SIGNIFICANT CHANGE UP (ref 3.5–5)
PROT SERPL-MCNC: 6.8 G/DL — SIGNIFICANT CHANGE UP (ref 6–8)
RBC # BLD: 3.8 M/UL — LOW (ref 4.2–5.4)
RBC # FLD: 18.6 % — HIGH (ref 11.5–14.5)
SODIUM SERPL-SCNC: 140 MMOL/L — SIGNIFICANT CHANGE UP (ref 135–146)
SPECIMEN SOURCE: SIGNIFICANT CHANGE UP
WBC # BLD: 11.28 K/UL — HIGH (ref 4.8–10.8)
WBC # FLD AUTO: 11.28 K/UL — HIGH (ref 4.8–10.8)

## 2022-02-15 PROCEDURE — 99233 SBSQ HOSP IP/OBS HIGH 50: CPT

## 2022-02-15 RX ORDER — ACETAMINOPHEN 500 MG
2 TABLET ORAL
Qty: 0 | Refills: 0 | DISCHARGE
Start: 2022-02-15

## 2022-02-15 RX ORDER — CEPHALEXIN 500 MG
500 CAPSULE ORAL
Qty: 0 | Refills: 0 | DISCHARGE
Start: 2022-02-15

## 2022-02-15 RX ORDER — CEPHALEXIN 500 MG
500 CAPSULE ORAL
Refills: 0 | Status: DISCONTINUED | OUTPATIENT
Start: 2022-02-15 | End: 2022-02-15

## 2022-02-15 RX ORDER — LEVOTHYROXINE SODIUM 125 MCG
1 TABLET ORAL
Qty: 0 | Refills: 0 | DISCHARGE

## 2022-02-15 RX ORDER — HYDROMORPHONE HYDROCHLORIDE 2 MG/ML
1 INJECTION INTRAMUSCULAR; INTRAVENOUS; SUBCUTANEOUS
Qty: 0 | Refills: 0 | DISCHARGE
Start: 2022-02-15

## 2022-02-15 RX ADMIN — Medication 100 MILLIGRAM(S): at 05:20

## 2022-02-15 RX ADMIN — GABAPENTIN 200 MILLIGRAM(S): 400 CAPSULE ORAL at 21:18

## 2022-02-15 RX ADMIN — Medication 125 MICROGRAM(S): at 05:20

## 2022-02-15 RX ADMIN — GABAPENTIN 200 MILLIGRAM(S): 400 CAPSULE ORAL at 13:33

## 2022-02-15 RX ADMIN — Medication 1 TABLET(S): at 12:11

## 2022-02-15 RX ADMIN — Medication 500 MILLIGRAM(S): at 17:41

## 2022-02-15 RX ADMIN — Medication 1 APPLICATION(S): at 17:41

## 2022-02-15 RX ADMIN — HYDROMORPHONE HYDROCHLORIDE 2 MILLIGRAM(S): 2 INJECTION INTRAMUSCULAR; INTRAVENOUS; SUBCUTANEOUS at 07:39

## 2022-02-15 RX ADMIN — ENOXAPARIN SODIUM 40 MILLIGRAM(S): 100 INJECTION SUBCUTANEOUS at 21:18

## 2022-02-15 RX ADMIN — Medication 200 MILLIGRAM(S): at 21:18

## 2022-02-15 RX ADMIN — Medication 1 APPLICATION(S): at 17:43

## 2022-02-15 RX ADMIN — HYDROMORPHONE HYDROCHLORIDE 2 MILLIGRAM(S): 2 INJECTION INTRAMUSCULAR; INTRAVENOUS; SUBCUTANEOUS at 08:41

## 2022-02-15 RX ADMIN — Medication 1 APPLICATION(S): at 05:21

## 2022-02-15 RX ADMIN — GABAPENTIN 200 MILLIGRAM(S): 400 CAPSULE ORAL at 05:19

## 2022-02-15 RX ADMIN — LOSARTAN POTASSIUM 25 MILLIGRAM(S): 100 TABLET, FILM COATED ORAL at 05:20

## 2022-02-15 NOTE — PROGRESS NOTE ADULT - ASSESSMENT
69 y/o hx of CVA 1987 without residual defects, SLE on plaquenil, Hypothyroid, HTN presents from [facility] for leg pain. FOund to have CT cystic lesion of pancreatic head and multiple enlarged lymph nodes.    # residual fever (Tmax in 24 hrs 100.5)  could be related to + UCx w/ Kleb (though no urine symp): will change abx to keflex (see below)  DS DNA is +, which means lupus could be active (pt has pain), but C3 C4 are nl - I spoke w/ rheum: no change in tx for now; no steroids - pt should see rheum (either him or her [place] [facility] w/in 1 mo)  pt has reactive LN and residual cellulitis, which can explain fever as well  I do NOT think she has lymphoma (ie, B Symptoms) - she has no wt loss or night sweats    # sepsis (fever, WBC) present on admit 2/2 cellulitis of left leg; severe stasis dermatitis b/l; reactive LN (up to 3.5cm) to infection/inflammation (doubt lymphoma)  ;   CK lvl 38  WBC was very high, but trending down well now  V Dupl: neg DVT  bld cx neg x2  ID noted  abx: Keflex 500mg po q6 and tx for 1 wk (2/10-2/17) - (3 days of abx should be OK for simple UTI)  wound care:   wash legs w/ soap and water and dry; apply silvadene to cracked areas and triamcinolone cream to intact skin; cover cracked skin w/ xeroform; wrap w/ Kerlix; hold in place w/ ace wrap; do q24 (pt refused 2x/day)  elevate legs  pain: dilaudid 2mg po q6 prn + neurontin 200mg po q8 (might need further increases)   cbc q24    # enlarged LN pelvic/inguinal up to 3.5cm    if pt had lymphoma, would NOT have suspected left leg to improve so much  could be related to lupus too  once abx are over and legs are healing well (end of Feb) - would repeat CT A/P w/ IV cont to re-eval LN, if still > 2cm (and certainly if > 3.5cm), could consider outpt PET scan to eval LN further and poss LN bx vs rxn w/ surgery    # hx SLE  rheum consult- hasn't seen her Rheum doctor, who is in [place], in 2 years  needs rheum f/u in 1 mo as outpt  c/w plaquenil 200mg qhs    # normo anemia of chr dz    # CT findings of cystic lesion of pancreatic head (3.5cm)  LFT nl  Adv GI noted: eventual EGD/EUS (outpt)  CA 19-9 2; CEA 1.6;  too soon to get surg onc eval -> would wait until bx    # Hypothyroid  TSH 9.89  incr synthroid to 125mcg po q24  rpt TSH and FT4 in 6 wks (April)    # HTN  c/w cozaar    # c/w MVI    # + GB stones - no further w/u    # OA  hx b/l hip and knee arthroplasties    # Activity: PT eval; not walking well -  need SNF upon d/c     # DVT ppx- lovenox    Dispo: change to oral abx (keflex); wound care; PT eval  eventually, pt needs STR at SNF (before going back to [facility]) - f/u CM - (stable for d/c)

## 2022-02-15 NOTE — DISCHARGE NOTE NURSING/CASE MANAGEMENT/SOCIAL WORK - PATIENT PORTAL LINK FT
You can access the FollowMyHealth Patient Portal offered by [facility] by registering at the following website: [email or web address]. By joining [employer]’s FollowMyHealth portal, you will also be able to view your health information using other applications (apps) compatible with our system.

## 2022-02-15 NOTE — PROGRESS NOTE ADULT - TIME BILLING
d/c plan    care coord
I have personally seen and examined this patient.  I have reviewed all pertinent clinical information and reviewed all relevant imaging and diagnostic studies personally.   I counseled the patient about diagnostic testing and treatment plan. All questions were answered.  I discussed recommendations with the primary team.

## 2022-02-15 NOTE — DISCHARGE NOTE NURSING/CASE MANAGEMENT/SOCIAL WORK - NSDCFUADDAPPT_GEN_ALL_CORE_FT
Follow up with rheumato in 1 month ( you  in [place] or Dr Fraser at [facility])

## 2022-02-15 NOTE — PROGRESS NOTE ADULT - SUBJECTIVE AND OBJECTIVE BOX
LATOYA FULTON  66y  Female  ***My note supersedes ALL resident notes that I sign.  My corrections for their notes are in my note.***    I can be reached directly on [employer] [phone]. My office number is [phone]. My personal cell number is [phone].    INTERVAL EVENTS: Here for f/u of leg infection. Pt denies urine symptoms, but had fever last night. Pt is OK w/ going to SNF for STR rehab. Pain is still present, but better than from admission. Pt only doing dressing changes in AM and refusing in PM (mostly because of pain).    T(F): 97.2 (02-15-22 @ 05:35), Max: 100.5 (02-14-22 @ 21:38)  HR: 89 (02-15-22 @ 05:35) (89 - 101)  BP: 117/64 (02-15-22 @ 05:35) (117/64 - 145/75)  RR: 18 (02-15-22 @ 05:35) (18 - 18)  SpO2: --    Gen: No resp distress; + b/l leg pain  HEENT: PERRL, EOMI, mouth clr, nose clr  Neck: no nodes, no JVD, thyroid nl  lungs: clr  hrt: s1 s2 rrr no murmur  abd: soft, NT/ND, no HS megaly  ext: no c/c  left leg: much less swollen; redness is markedly better; still w/ pain; dressing in place - no bleeding  right leg: mild pain; tr edema; dressing in place - no bleeding  neuro: aa ox3, cn intact, can move all 3 ext - rt leg can move, but weak; moving left leg, but not as much 2/2 pain    LABS:                      10.2    (    84.7   11.28 )-----------( ---------      425      ( 15 Feb 2022 04:30 )             32.2    (    18.6     140   (   103   (   98      02-15-22 @ 04:30  ----------------------               4.3   (   24   (   13                             -----                        0.6  Ca  8.3   Mg  1.8    P   --     LFT  6.8  (  0.4  (  59       02-15-22 @ 04:30  -------------------------  3.1  (  92  (  64    Alb 3.1  T julia 0.4   AP 92  AST 59  ALT 64  02-15-22 @ 04:30 - better  Alb 2.7  T julia 0.3   AP 87  AST 90  ALT 73  02-14-22 @ 06:00  Alb 2.9  T julia 0.3   AP 86    ALT 50  02-12-22 @ 04:30  Alb 3.3  T julia 0.4   AP 74  AST 37  ALT 18  02-11-22 @ 05:59    Culture - Urine (collected 02-10-22 @ 02:00)  Source: Clean Catch Clean Catch (Midstream)  Preliminary Report (02-12-22 @ 19:38):    >100,000 CFU/ml Klebsiella pneumoniae    50,000 - 99,000 CFU/mL Escherichia coli  Organism: Klebsiella pneumoniae (02-14-22 @ 18:54)  Organism: Klebsiella pneumoniae (02-14-22 @ 18:54)      -  Amikacin: S <=16      -  Amoxicillin/Clavulanic Acid: S <=8/4      -  Ampicillin: R >16 These ampicillin results predict results for amoxicillin      -  Ampicillin/Sulbactam: S 8/4 Enterobacter, Klebsiella aerogenes, Citrobacter, and Serratia may develop resistance during prolonged therapy (3-4 days)      -  Aztreonam: S <=4      -  Cefazolin: S <=2 (MIC_CL_COM_ENTERIC_CEFAZU) For uncomplicated UTI with K. pneumoniae, E. coli, or P. mirablis: ANNE-MARIE <=16 is sensitive and ANNE-MARIE >=32 is resistant. This also predicts results for oral agents cefaclor, cefdinir, cefpodoxime, cefprozil, cefuroxime axetil, cephalexin and locarbef for uncomplicated UTI. Note that some isolates may be susceptible to these agents while testing resistant to cefazolin.      -  Cefepime: S <=2      -  Cefoxitin: S <=8      -  Ceftriaxone: S <=1 Enterobacter, Klebsiella aerogenes, Citrobacter, and Serratia may develop resistance during prolonged therapy      -  Ciprofloxacin: S <=0.25      -  Ertapenem: S <=0.5      -  Gentamicin: S <=2      -  Imipenem: S <=1      -  Levofloxacin: S <=0.5      -  Meropenem: S <=1      -  Nitrofurantoin: S <=32 Should not be used to treat pyelonephritis      -  Piperacillin/Tazobactam: S <=8      -  Tigecycline: S <=2      -  Tobramycin: S <=2      -  Trimethoprim/Sulfamethoxazole: S <=0.5/9.5      Method Type: ANNE-MARIE    Culture - Blood (collected 02-09-22 @ 21:14)  Source: .Blood Blood-Peripheral  Final Report (02-15-22 @ 02:00):    No Growth Final    Culture - Blood (collected 02-09-22 @ 21:14)  Source: .Blood Blood-Peripheral  Final Report (02-15-22 @ 02:00):    No Growth Final    RADIOLOGY & ADDITIONAL TESTS:    MEDICATIONS:  cephalexin 500 milliGRAM(s) Oral four times a day  hydroxychloroquine 200 milliGRAM(s) Oral at bedtime    acetaminophen     Tablet .. 650 milliGRAM(s) Oral every 12 hours PRN  enoxaparin Injectable 40 milliGRAM(s) SubCutaneous at bedtime  gabapentin 200 milliGRAM(s) Oral every 8 hours  HYDROmorphone   Tablet 2 milliGRAM(s) Oral every 6 hours PRN  levothyroxine 125 MICROGram(s) Oral daily  losartan 25 milliGRAM(s) Oral daily  multivitamin 1 Tablet(s) Oral daily  silver sulfADIAZINE 1% Cream 1 Application(s) Topical two times a day  triamcinolone 0.1% Cream 1 Application(s) Topical every 12 hours

## 2022-02-15 NOTE — PROGRESS NOTE ADULT - PROVIDER SPECIALTY LIST ADULT
Infectious Disease
Internal Medicine
Internal Medicine
Rheumatology
Gastroenterology
Hospitalist
Internal Medicine

## 2022-02-17 DIAGNOSIS — Z53.29 PROCEDURE AND TREATMENT NOT CARRIED OUT BECAUSE OF PATIENT'S DECISION FOR OTHER REASONS: ICD-10-CM

## 2022-02-17 DIAGNOSIS — M32.9 SYSTEMIC LUPUS ERYTHEMATOSUS, UNSPECIFIED: ICD-10-CM

## 2022-02-17 DIAGNOSIS — D63.8 ANEMIA IN OTHER CHRONIC DISEASES CLASSIFIED ELSEWHERE: ICD-10-CM

## 2022-02-17 DIAGNOSIS — A41.9 SEPSIS, UNSPECIFIED ORGANISM: ICD-10-CM

## 2022-02-17 DIAGNOSIS — Z86.73 PERSONAL HISTORY OF TRANSIENT ISCHEMIC ATTACK (TIA), AND CEREBRAL INFARCTION WITHOUT RESIDUAL DEFICITS: ICD-10-CM

## 2022-02-17 DIAGNOSIS — Z96.643 PRESENCE OF ARTIFICIAL HIP JOINT, BILATERAL: ICD-10-CM

## 2022-02-17 DIAGNOSIS — R59.9 ENLARGED LYMPH NODES, UNSPECIFIED: ICD-10-CM

## 2022-02-17 DIAGNOSIS — E03.9 HYPOTHYROIDISM, UNSPECIFIED: ICD-10-CM

## 2022-02-17 DIAGNOSIS — Z96.653 PRESENCE OF ARTIFICIAL KNEE JOINT, BILATERAL: ICD-10-CM

## 2022-02-17 DIAGNOSIS — L03.116 CELLULITIS OF LEFT LOWER LIMB: ICD-10-CM

## 2022-02-17 DIAGNOSIS — K80.20 CALCULUS OF GALLBLADDER WITHOUT CHOLECYSTITIS WITHOUT OBSTRUCTION: ICD-10-CM

## 2022-02-17 DIAGNOSIS — I10 ESSENTIAL (PRIMARY) HYPERTENSION: ICD-10-CM

## 2022-02-17 DIAGNOSIS — K86.2 CYST OF PANCREAS: ICD-10-CM

## 2022-02-17 DIAGNOSIS — Z88.1 ALLERGY STATUS TO OTHER ANTIBIOTIC AGENTS STATUS: ICD-10-CM

## 2022-02-17 DIAGNOSIS — I87.2 VENOUS INSUFFICIENCY (CHRONIC) (PERIPHERAL): ICD-10-CM

## 2022-02-17 DIAGNOSIS — Z88.0 ALLERGY STATUS TO PENICILLIN: ICD-10-CM

## 2022-02-17 DIAGNOSIS — Z90.710 ACQUIRED ABSENCE OF BOTH CERVIX AND UTERUS: ICD-10-CM

## 2022-02-17 DIAGNOSIS — Z88.5 ALLERGY STATUS TO NARCOTIC AGENT: ICD-10-CM

## 2022-02-18 LAB — T3FREE SERPL-MCNC: 1.5 PG/ML — LOW

## 2022-09-21 ENCOUNTER — EMERGENCY (EMERGENCY)
Facility: HOSPITAL | Age: 67
LOS: 0 days | Discharge: HOME | End: 2022-09-22
Attending: EMERGENCY MEDICINE | Admitting: EMERGENCY MEDICINE

## 2022-09-21 VITALS
RESPIRATION RATE: 18 BRPM | TEMPERATURE: 98 F | OXYGEN SATURATION: 98 % | SYSTOLIC BLOOD PRESSURE: 157 MMHG | HEART RATE: 71 BPM | DIASTOLIC BLOOD PRESSURE: 73 MMHG

## 2022-09-21 VITALS
TEMPERATURE: 98 F | HEART RATE: 64 BPM | HEIGHT: 62 IN | OXYGEN SATURATION: 99 % | SYSTOLIC BLOOD PRESSURE: 183 MMHG | RESPIRATION RATE: 17 BRPM | DIASTOLIC BLOOD PRESSURE: 88 MMHG

## 2022-09-21 DIAGNOSIS — Z88.5 ALLERGY STATUS TO NARCOTIC AGENT: ICD-10-CM

## 2022-09-21 DIAGNOSIS — Z88.1 ALLERGY STATUS TO OTHER ANTIBIOTIC AGENTS STATUS: ICD-10-CM

## 2022-09-21 DIAGNOSIS — R30.0 DYSURIA: ICD-10-CM

## 2022-09-21 DIAGNOSIS — Z88.0 ALLERGY STATUS TO PENICILLIN: ICD-10-CM

## 2022-09-21 DIAGNOSIS — I49.1 ATRIAL PREMATURE DEPOLARIZATION: ICD-10-CM

## 2022-09-21 DIAGNOSIS — M32.9 SYSTEMIC LUPUS ERYTHEMATOSUS, UNSPECIFIED: ICD-10-CM

## 2022-09-21 DIAGNOSIS — R11.2 NAUSEA WITH VOMITING, UNSPECIFIED: ICD-10-CM

## 2022-09-21 DIAGNOSIS — Z90.710 ACQUIRED ABSENCE OF BOTH CERVIX AND UTERUS: Chronic | ICD-10-CM

## 2022-09-21 DIAGNOSIS — Z82.69 FAMILY HISTORY OF OTHER DISEASES OF THE MUSCULOSKELETAL SYSTEM AND CONNECTIVE TISSUE: Chronic | ICD-10-CM

## 2022-09-21 DIAGNOSIS — Z86.73 PERSONAL HISTORY OF TRANSIENT ISCHEMIC ATTACK (TIA), AND CEREBRAL INFARCTION WITHOUT RESIDUAL DEFICITS: ICD-10-CM

## 2022-09-21 DIAGNOSIS — Z20.822 CONTACT WITH AND (SUSPECTED) EXPOSURE TO COVID-19: ICD-10-CM

## 2022-09-21 DIAGNOSIS — I10 ESSENTIAL (PRIMARY) HYPERTENSION: ICD-10-CM

## 2022-09-21 DIAGNOSIS — Z96.659 PRESENCE OF UNSPECIFIED ARTIFICIAL KNEE JOINT: Chronic | ICD-10-CM

## 2022-09-21 DIAGNOSIS — Z88.6 ALLERGY STATUS TO ANALGESIC AGENT: ICD-10-CM

## 2022-09-21 DIAGNOSIS — E03.9 HYPOTHYROIDISM, UNSPECIFIED: ICD-10-CM

## 2022-09-21 LAB
ALBUMIN SERPL ELPH-MCNC: 5 G/DL — SIGNIFICANT CHANGE UP (ref 3.5–5.2)
ALP SERPL-CCNC: 98 U/L — SIGNIFICANT CHANGE UP (ref 30–115)
ALT FLD-CCNC: 11 U/L — SIGNIFICANT CHANGE UP (ref 0–41)
ANION GAP SERPL CALC-SCNC: 13 MMOL/L — SIGNIFICANT CHANGE UP (ref 7–14)
APPEARANCE UR: CLEAR — SIGNIFICANT CHANGE UP
AST SERPL-CCNC: 23 U/L — SIGNIFICANT CHANGE UP (ref 0–41)
BASOPHILS # BLD AUTO: 0.01 K/UL — SIGNIFICANT CHANGE UP (ref 0–0.2)
BASOPHILS NFR BLD AUTO: 0.1 % — SIGNIFICANT CHANGE UP (ref 0–1)
BILIRUB DIRECT SERPL-MCNC: <0.2 MG/DL — SIGNIFICANT CHANGE UP (ref 0–0.3)
BILIRUB INDIRECT FLD-MCNC: >0.3 MG/DL — SIGNIFICANT CHANGE UP (ref 0.2–1.2)
BILIRUB SERPL-MCNC: 0.5 MG/DL — SIGNIFICANT CHANGE UP (ref 0.2–1.2)
BILIRUB UR-MCNC: NEGATIVE — SIGNIFICANT CHANGE UP
BUN SERPL-MCNC: 10 MG/DL — SIGNIFICANT CHANGE UP (ref 10–20)
CALCIUM SERPL-MCNC: 9.7 MG/DL — SIGNIFICANT CHANGE UP (ref 8.4–10.5)
CHLORIDE SERPL-SCNC: 97 MMOL/L — LOW (ref 98–110)
CO2 SERPL-SCNC: 29 MMOL/L — SIGNIFICANT CHANGE UP (ref 17–32)
COLOR SPEC: COLORLESS — SIGNIFICANT CHANGE UP
CREAT SERPL-MCNC: 0.7 MG/DL — SIGNIFICANT CHANGE UP (ref 0.7–1.5)
DIFF PNL FLD: ABNORMAL
EGFR: 95 ML/MIN/1.73M2 — SIGNIFICANT CHANGE UP
EOSINOPHIL # BLD AUTO: 0.02 K/UL — SIGNIFICANT CHANGE UP (ref 0–0.7)
EOSINOPHIL NFR BLD AUTO: 0.2 % — SIGNIFICANT CHANGE UP (ref 0–8)
GLUCOSE SERPL-MCNC: 114 MG/DL — HIGH (ref 70–99)
GLUCOSE UR QL: NEGATIVE — SIGNIFICANT CHANGE UP
HCT VFR BLD CALC: 38.5 % — SIGNIFICANT CHANGE UP (ref 37–47)
HGB BLD-MCNC: 13 G/DL — SIGNIFICANT CHANGE UP (ref 12–16)
IMM GRANULOCYTES NFR BLD AUTO: 0.3 % — SIGNIFICANT CHANGE UP (ref 0.1–0.3)
KETONES UR-MCNC: NEGATIVE — SIGNIFICANT CHANGE UP
LACTATE SERPL-SCNC: 1.5 MMOL/L — SIGNIFICANT CHANGE UP (ref 0.7–2)
LEUKOCYTE ESTERASE UR-ACNC: NEGATIVE — SIGNIFICANT CHANGE UP
LIDOCAIN IGE QN: 51 U/L — SIGNIFICANT CHANGE UP (ref 7–60)
LYMPHOCYTES # BLD AUTO: 0.61 K/UL — LOW (ref 1.2–3.4)
LYMPHOCYTES # BLD AUTO: 7.1 % — LOW (ref 20.5–51.1)
MCHC RBC-ENTMCNC: 29.7 PG — SIGNIFICANT CHANGE UP (ref 27–31)
MCHC RBC-ENTMCNC: 33.8 G/DL — SIGNIFICANT CHANGE UP (ref 32–37)
MCV RBC AUTO: 87.9 FL — SIGNIFICANT CHANGE UP (ref 81–99)
MONOCYTES # BLD AUTO: 0.22 K/UL — SIGNIFICANT CHANGE UP (ref 0.1–0.6)
MONOCYTES NFR BLD AUTO: 2.5 % — SIGNIFICANT CHANGE UP (ref 1.7–9.3)
NEUTROPHILS # BLD AUTO: 7.75 K/UL — HIGH (ref 1.4–6.5)
NEUTROPHILS NFR BLD AUTO: 89.8 % — HIGH (ref 42.2–75.2)
NITRITE UR-MCNC: NEGATIVE — SIGNIFICANT CHANGE UP
NRBC # BLD: 0 /100 WBCS — SIGNIFICANT CHANGE UP (ref 0–0)
PH UR: 7 — SIGNIFICANT CHANGE UP (ref 5–8)
PLATELET # BLD AUTO: 255 K/UL — SIGNIFICANT CHANGE UP (ref 130–400)
POTASSIUM SERPL-MCNC: 3.7 MMOL/L — SIGNIFICANT CHANGE UP (ref 3.5–5)
POTASSIUM SERPL-SCNC: 3.7 MMOL/L — SIGNIFICANT CHANGE UP (ref 3.5–5)
PROT SERPL-MCNC: 8.5 G/DL — HIGH (ref 6–8)
PROT UR-MCNC: SIGNIFICANT CHANGE UP
RBC # BLD: 4.38 M/UL — SIGNIFICANT CHANGE UP (ref 4.2–5.4)
RBC # FLD: 15.9 % — HIGH (ref 11.5–14.5)
SARS-COV-2 RNA SPEC QL NAA+PROBE: SIGNIFICANT CHANGE UP
SODIUM SERPL-SCNC: 139 MMOL/L — SIGNIFICANT CHANGE UP (ref 135–146)
SP GR SPEC: 1.01 — SIGNIFICANT CHANGE UP (ref 1.01–1.03)
TROPONIN T SERPL-MCNC: <0.01 NG/ML — SIGNIFICANT CHANGE UP
UROBILINOGEN FLD QL: SIGNIFICANT CHANGE UP
WBC # BLD: 8.64 K/UL — SIGNIFICANT CHANGE UP (ref 4.8–10.8)
WBC # FLD AUTO: 8.64 K/UL — SIGNIFICANT CHANGE UP (ref 4.8–10.8)

## 2022-09-21 PROCEDURE — 93010 ELECTROCARDIOGRAM REPORT: CPT

## 2022-09-21 PROCEDURE — 71045 X-RAY EXAM CHEST 1 VIEW: CPT | Mod: 26

## 2022-09-21 PROCEDURE — 99285 EMERGENCY DEPT VISIT HI MDM: CPT

## 2022-09-21 RX ORDER — FAMOTIDINE 10 MG/ML
20 INJECTION INTRAVENOUS ONCE
Refills: 0 | Status: COMPLETED | OUTPATIENT
Start: 2022-09-21 | End: 2022-09-21

## 2022-09-21 RX ORDER — SODIUM CHLORIDE 9 MG/ML
1000 INJECTION INTRAMUSCULAR; INTRAVENOUS; SUBCUTANEOUS ONCE
Refills: 0 | Status: COMPLETED | OUTPATIENT
Start: 2022-09-21 | End: 2022-09-21

## 2022-09-21 RX ORDER — ONDANSETRON 8 MG/1
4 TABLET, FILM COATED ORAL ONCE
Refills: 0 | Status: COMPLETED | OUTPATIENT
Start: 2022-09-21 | End: 2022-09-21

## 2022-09-21 RX ADMIN — SODIUM CHLORIDE 1000 MILLILITER(S): 9 INJECTION INTRAMUSCULAR; INTRAVENOUS; SUBCUTANEOUS at 23:55

## 2022-09-21 RX ADMIN — ONDANSETRON 4 MILLIGRAM(S): 8 TABLET, FILM COATED ORAL at 20:28

## 2022-09-21 RX ADMIN — FAMOTIDINE 20 MILLIGRAM(S): 10 INJECTION INTRAVENOUS at 20:27

## 2022-09-21 RX ADMIN — SODIUM CHLORIDE 1000 MILLILITER(S): 9 INJECTION INTRAMUSCULAR; INTRAVENOUS; SUBCUTANEOUS at 20:28

## 2022-09-21 NOTE — ED PROVIDER NOTE - CLINICAL SUMMARY MEDICAL DECISION MAKING FREE TEXT BOX
Patient evaluated for vomiting, symptoms improved in ED.  Labs reviewed, urine negative for infection.  Possibly side effects of antibiotic.  Advised close follow-up with PMD for reevaluation and patient agreed.  Reported feeling well to go home.  Strict return precautions advised and patient verbalized understanding.

## 2022-09-21 NOTE — ED PROVIDER NOTE - OBJECTIVE STATEMENT
68 yo F with PMHx of CVA without residual deficits, SLE on Plaquenil, hypothyroidism, and HTN presents to the ED from [facility] c/o nausea and few episodes of NBNB vomiting that started around 2pm. Pt states symptoms have nearly resolved upon ED arrival. Pt states she has had mild dysuria x 2 days and was started on erythromycin this morning. Pt states she usually gets these symptoms after taking antibiotics. She denies other complaints. Pt denies fever, chills, abdominal pain, diarrhea, headache, dizziness, weakness, chest pain, SOB, back pain, LOC, trauma, cough, calf pain/swelling, recent travel, recent surgery.

## 2022-09-21 NOTE — ED PROVIDER NOTE - NS ED ROS FT
Review of Systems  Constitutional:  No fever, chills.  Eyes:  No visual changes, eye pain, or discharge.  ENMT:  No hearing changes, pain, or discharge. No nasal congestion, discharge, or bleeding. No throat pain, swelling, or difficulty swallowing.  Cardiac:  No chest pain, palpitations, syncope, or edema.  Respiratory:  No dyspnea, cough. No hemoptysis.  GI:  No diarrhea, or abdominal pain. (+) nausea, vomiting  :  No hematuria, frequency. (+) dysuria.  MS:  No back pain.  Skin:  No skin rash, pruritis, jaundice, or lesions.  Neuro:  No headache, dizziness, loss of sensation, or focal weakness.  No change in mental status.

## 2022-09-21 NOTE — ED PROVIDER NOTE - PATIENT PORTAL LINK FT
You can access the FollowMyHealth Patient Portal offered by [facility] by registering at the following website: [email or web address]. By joining [employer]’s FollowMyHealth portal, you will also be able to view your health information using other applications (apps) compatible with our system.

## 2022-09-21 NOTE — ED ADULT TRIAGE NOTE - GLASGOW COMA SCALE: BEST VERBAL RESPONSE, MLM
(V5) oriented Clofazimine Counseling:  I discussed with the patient the risks of clofazimine including but not limited to skin and eye pigmentation, liver damage, nausea/vomiting, gastrointestinal bleeding and allergy.

## 2022-09-21 NOTE — ED PROVIDER NOTE - PHYSICAL EXAMINATION
VITAL SIGNS: I have reviewed nursing notes and confirm.  CONSTITUTIONAL: Well-developed; well-nourished; in no acute distress.  SKIN: Skin exam is warm and dry, no acute rash.  HEAD: Normocephalic; atraumatic.  EYES: PERRL, EOM intact; conjunctiva and sclera clear.  ENT: No nasal discharge; airway clear.   NECK: Supple; non tender.  CARD: S1, S2 normal; no murmurs, gallops, or rubs. Regular rate and rhythm.  RESP: No wheezes, rales or rhonchi. Speaking in full sentences.   ABD: Normal bowel sounds; soft; non-distended; non-tender; No rebound or guarding. No CVA tenderness.  EXT: Normal ROM. No clubbing, cyanosis or edema.  NEURO: Alert, oriented. Grossly unremarkable. No focal deficits.

## 2022-09-22 LAB
BACTERIA # UR AUTO: ABNORMAL
EPI CELLS # UR: 1 /HPF — SIGNIFICANT CHANGE UP (ref 0–5)
HYALINE CASTS # UR AUTO: 0 /LPF — SIGNIFICANT CHANGE UP (ref 0–7)
RBC CASTS # UR COMP ASSIST: 10 /HPF — HIGH (ref 0–4)
WBC UR QL: 1 /HPF — SIGNIFICANT CHANGE UP (ref 0–5)

## 2022-09-25 RX ORDER — NITROFURANTOIN MACROCRYSTAL 50 MG
1 CAPSULE ORAL
Qty: 14 | Refills: 0
Start: 2022-09-25 | End: 2022-10-01

## 2023-02-20 ENCOUNTER — INPATIENT (INPATIENT)
Facility: HOSPITAL | Age: 68
LOS: 3 days | Discharge: NURSING, REHAB OR LONG-TERM CARE | DRG: 300 | End: 2023-02-24
Attending: STUDENT IN AN ORGANIZED HEALTH CARE EDUCATION/TRAINING PROGRAM | Admitting: STUDENT IN AN ORGANIZED HEALTH CARE EDUCATION/TRAINING PROGRAM
Payer: MEDICARE

## 2023-02-20 VITALS
TEMPERATURE: 98 F | OXYGEN SATURATION: 96 % | HEART RATE: 66 BPM | DIASTOLIC BLOOD PRESSURE: 70 MMHG | SYSTOLIC BLOOD PRESSURE: 148 MMHG

## 2023-02-20 DIAGNOSIS — Z90.710 ACQUIRED ABSENCE OF BOTH CERVIX AND UTERUS: Chronic | ICD-10-CM

## 2023-02-20 DIAGNOSIS — R09.89 OTHER SPECIFIED SYMPTOMS AND SIGNS INVOLVING THE CIRCULATORY AND RESPIRATORY SYSTEMS: ICD-10-CM

## 2023-02-20 DIAGNOSIS — Z96.659 PRESENCE OF UNSPECIFIED ARTIFICIAL KNEE JOINT: Chronic | ICD-10-CM

## 2023-02-20 DIAGNOSIS — Z82.69 FAMILY HISTORY OF OTHER DISEASES OF THE MUSCULOSKELETAL SYSTEM AND CONNECTIVE TISSUE: Chronic | ICD-10-CM

## 2023-02-20 DIAGNOSIS — L03.90 CELLULITIS, UNSPECIFIED: ICD-10-CM

## 2023-02-20 LAB
ALBUMIN SERPL ELPH-MCNC: 4.1 G/DL — SIGNIFICANT CHANGE UP (ref 3.5–5.2)
ALP SERPL-CCNC: 71 U/L — SIGNIFICANT CHANGE UP (ref 30–115)
ALT FLD-CCNC: 6 U/L — SIGNIFICANT CHANGE UP (ref 0–41)
ANION GAP SERPL CALC-SCNC: 6 MMOL/L — LOW (ref 7–14)
AST SERPL-CCNC: 12 U/L — SIGNIFICANT CHANGE UP (ref 0–41)
BASOPHILS # BLD AUTO: 0.02 K/UL — SIGNIFICANT CHANGE UP (ref 0–0.2)
BASOPHILS NFR BLD AUTO: 0.3 % — SIGNIFICANT CHANGE UP (ref 0–1)
BILIRUB SERPL-MCNC: 0.3 MG/DL — SIGNIFICANT CHANGE UP (ref 0.2–1.2)
BUN SERPL-MCNC: 13 MG/DL — SIGNIFICANT CHANGE UP (ref 10–20)
CALCIUM SERPL-MCNC: 9.2 MG/DL — SIGNIFICANT CHANGE UP (ref 8.4–10.4)
CHLORIDE SERPL-SCNC: 107 MMOL/L — SIGNIFICANT CHANGE UP (ref 98–110)
CO2 SERPL-SCNC: 29 MMOL/L — SIGNIFICANT CHANGE UP (ref 17–32)
CREAT SERPL-MCNC: 0.7 MG/DL — SIGNIFICANT CHANGE UP (ref 0.7–1.5)
EGFR: 95 ML/MIN/1.73M2 — SIGNIFICANT CHANGE UP
EOSINOPHIL # BLD AUTO: 0.14 K/UL — SIGNIFICANT CHANGE UP (ref 0–0.7)
EOSINOPHIL NFR BLD AUTO: 2.4 % — SIGNIFICANT CHANGE UP (ref 0–8)
FLUAV AG NPH QL: SIGNIFICANT CHANGE UP
FLUBV AG NPH QL: SIGNIFICANT CHANGE UP
GLUCOSE SERPL-MCNC: 77 MG/DL — SIGNIFICANT CHANGE UP (ref 70–99)
HCT VFR BLD CALC: 34 % — LOW (ref 37–47)
HGB BLD-MCNC: 10.9 G/DL — LOW (ref 12–16)
IMM GRANULOCYTES NFR BLD AUTO: 0.3 % — SIGNIFICANT CHANGE UP (ref 0.1–0.3)
LACTATE SERPL-SCNC: 0.6 MMOL/L — LOW (ref 0.7–2)
LYMPHOCYTES # BLD AUTO: 1.4 K/UL — SIGNIFICANT CHANGE UP (ref 1.2–3.4)
LYMPHOCYTES # BLD AUTO: 23.8 % — SIGNIFICANT CHANGE UP (ref 20.5–51.1)
MCHC RBC-ENTMCNC: 28.4 PG — SIGNIFICANT CHANGE UP (ref 27–31)
MCHC RBC-ENTMCNC: 32.1 G/DL — SIGNIFICANT CHANGE UP (ref 32–37)
MCV RBC AUTO: 88.5 FL — SIGNIFICANT CHANGE UP (ref 81–99)
MONOCYTES # BLD AUTO: 0.39 K/UL — SIGNIFICANT CHANGE UP (ref 0.1–0.6)
MONOCYTES NFR BLD AUTO: 6.6 % — SIGNIFICANT CHANGE UP (ref 1.7–9.3)
NEUTROPHILS # BLD AUTO: 3.91 K/UL — SIGNIFICANT CHANGE UP (ref 1.4–6.5)
NEUTROPHILS NFR BLD AUTO: 66.6 % — SIGNIFICANT CHANGE UP (ref 42.2–75.2)
NRBC # BLD: 0 /100 WBCS — SIGNIFICANT CHANGE UP (ref 0–0)
PLATELET # BLD AUTO: 252 K/UL — SIGNIFICANT CHANGE UP (ref 130–400)
POTASSIUM SERPL-MCNC: 3.7 MMOL/L — SIGNIFICANT CHANGE UP (ref 3.5–5)
POTASSIUM SERPL-SCNC: 3.7 MMOL/L — SIGNIFICANT CHANGE UP (ref 3.5–5)
PROT SERPL-MCNC: 7.3 G/DL — SIGNIFICANT CHANGE UP (ref 6–8)
RBC # BLD: 3.84 M/UL — LOW (ref 4.2–5.4)
RBC # FLD: 14.4 % — SIGNIFICANT CHANGE UP (ref 11.5–14.5)
RSV RNA NPH QL NAA+NON-PROBE: SIGNIFICANT CHANGE UP
SARS-COV-2 RNA SPEC QL NAA+PROBE: SIGNIFICANT CHANGE UP
SODIUM SERPL-SCNC: 142 MMOL/L — SIGNIFICANT CHANGE UP (ref 135–146)
WBC # BLD: 5.88 K/UL — SIGNIFICANT CHANGE UP (ref 4.8–10.8)
WBC # FLD AUTO: 5.88 K/UL — SIGNIFICANT CHANGE UP (ref 4.8–10.8)

## 2023-02-20 PROCEDURE — 85730 THROMBOPLASTIN TIME PARTIAL: CPT

## 2023-02-20 PROCEDURE — 80061 LIPID PANEL: CPT

## 2023-02-20 PROCEDURE — 93971 EXTREMITY STUDY: CPT | Mod: LT

## 2023-02-20 PROCEDURE — 97116 GAIT TRAINING THERAPY: CPT | Mod: GP

## 2023-02-20 PROCEDURE — 83735 ASSAY OF MAGNESIUM: CPT

## 2023-02-20 PROCEDURE — 73610 X-RAY EXAM OF ANKLE: CPT | Mod: 26,RT

## 2023-02-20 PROCEDURE — 85025 COMPLETE CBC W/AUTO DIFF WBC: CPT

## 2023-02-20 PROCEDURE — U0003: CPT

## 2023-02-20 PROCEDURE — 93971 EXTREMITY STUDY: CPT | Mod: 26,RT

## 2023-02-20 PROCEDURE — 83036 HEMOGLOBIN GLYCOSYLATED A1C: CPT

## 2023-02-20 PROCEDURE — 93925 LOWER EXTREMITY STUDY: CPT

## 2023-02-20 PROCEDURE — 86665 EPSTEIN-BARR CAPSID VCA: CPT

## 2023-02-20 PROCEDURE — 97110 THERAPEUTIC EXERCISES: CPT | Mod: GP

## 2023-02-20 PROCEDURE — 99223 1ST HOSP IP/OBS HIGH 75: CPT

## 2023-02-20 PROCEDURE — 80053 COMPREHEN METABOLIC PANEL: CPT

## 2023-02-20 PROCEDURE — 86663 EPSTEIN-BARR ANTIBODY: CPT

## 2023-02-20 PROCEDURE — 94640 AIRWAY INHALATION TREATMENT: CPT

## 2023-02-20 PROCEDURE — 73630 X-RAY EXAM OF FOOT: CPT | Mod: 26,RT

## 2023-02-20 PROCEDURE — 36415 COLL VENOUS BLD VENIPUNCTURE: CPT

## 2023-02-20 PROCEDURE — 85610 PROTHROMBIN TIME: CPT

## 2023-02-20 PROCEDURE — 80048 BASIC METABOLIC PNL TOTAL CA: CPT

## 2023-02-20 PROCEDURE — 97162 PT EVAL MOD COMPLEX 30 MIN: CPT | Mod: GP

## 2023-02-20 PROCEDURE — U0005: CPT

## 2023-02-20 PROCEDURE — 86664 EPSTEIN-BARR NUCLEAR ANTIGEN: CPT

## 2023-02-20 RX ORDER — ACETAMINOPHEN 500 MG
650 TABLET ORAL ONCE
Refills: 0 | Status: COMPLETED | OUTPATIENT
Start: 2023-02-20 | End: 2023-02-20

## 2023-02-20 RX ORDER — ACETAMINOPHEN 500 MG
650 TABLET ORAL EVERY 6 HOURS
Refills: 0 | Status: ACTIVE | OUTPATIENT
Start: 2023-02-20 | End: 2024-01-19

## 2023-02-20 RX ORDER — PETROLATUM,WHITE
1 JELLY (GRAM) TOPICAL
Refills: 0 | Status: ACTIVE | OUTPATIENT
Start: 2023-02-20 | End: 2024-01-19

## 2023-02-20 RX ORDER — CLOTRIMAZOLE 10 MG
1 TROCHE MUCOUS MEMBRANE
Qty: 0 | Refills: 0 | DISCHARGE

## 2023-02-20 RX ORDER — HYDROXYCHLOROQUINE SULFATE 200 MG
0 TABLET ORAL
Qty: 0 | Refills: 0 | DISCHARGE

## 2023-02-20 RX ORDER — ALENDRONATE SODIUM 70 MG/1
1 TABLET ORAL
Qty: 0 | Refills: 0 | DISCHARGE

## 2023-02-20 RX ORDER — ATENOLOL 25 MG/1
1 TABLET ORAL
Qty: 0 | Refills: 0 | DISCHARGE

## 2023-02-20 RX ORDER — HYDROXYCHLOROQUINE SULFATE 200 MG
1 TABLET ORAL
Qty: 0 | Refills: 0 | DISCHARGE

## 2023-02-20 RX ORDER — ACETAMINOPHEN WITH CODEINE 300MG-30MG
0 TABLET ORAL
Qty: 0 | Refills: 0 | DISCHARGE

## 2023-02-20 RX ORDER — LANOLIN ALCOHOL/MO/W.PET/CERES
3 CREAM (GRAM) TOPICAL AT BEDTIME
Refills: 0 | Status: DISCONTINUED | OUTPATIENT
Start: 2023-02-20 | End: 2023-02-21

## 2023-02-20 RX ORDER — LANOLIN ALCOHOL/MO/W.PET/CERES
1 CREAM (GRAM) TOPICAL
Qty: 0 | Refills: 0 | DISCHARGE

## 2023-02-20 RX ORDER — SALICYLIC ACID 0.5 %
1 CLEANSER (GRAM) TOPICAL
Qty: 0 | Refills: 0 | DISCHARGE

## 2023-02-20 RX ORDER — CEFAZOLIN SODIUM 1 G
2000 VIAL (EA) INJECTION ONCE
Refills: 0 | Status: COMPLETED | OUTPATIENT
Start: 2023-02-20 | End: 2023-02-20

## 2023-02-20 RX ORDER — SODIUM CHLORIDE 9 MG/ML
1000 INJECTION, SOLUTION INTRAVENOUS ONCE
Refills: 0 | Status: COMPLETED | OUTPATIENT
Start: 2023-02-20 | End: 2023-02-20

## 2023-02-20 RX ORDER — ONDANSETRON 8 MG/1
4 TABLET, FILM COATED ORAL EVERY 8 HOURS
Refills: 0 | Status: ACTIVE | OUTPATIENT
Start: 2023-02-20 | End: 2024-01-19

## 2023-02-20 RX ADMIN — SODIUM CHLORIDE 1000 MILLILITER(S): 9 INJECTION, SOLUTION INTRAVENOUS at 12:57

## 2023-02-20 RX ADMIN — Medication 100 MILLIGRAM(S): at 15:29

## 2023-02-20 NOTE — ED PROVIDER NOTE - CLINICAL SUMMARY MEDICAL DECISION MAKING FREE TEXT BOX
ccruz- pt signed out to me by Dr Ann, p/w cellulitis, prelim doppler neg for dvt, ancef given, admit to medicine

## 2023-02-20 NOTE — ED PROVIDER NOTE - PROGRESS NOTE DETAILS
Note authored by Dr. Ann: Signed out to Dr. Guillermo follow-up results of the duplex and dispo Note authored by Dr. Ann: Duplex done pending results SO: Vascular US read as negative (unofficial)

## 2023-02-20 NOTE — H&P ADULT - NSHPPHYSICALEXAM_GEN_ALL_CORE
VITAL SIGNS: AFebrile, vital signs stable  CONSTITUTIONAL: Well-developed; well-nourished; in no acute distress.  SKIN: maculopapular edematous rash w/ excoriations of right anterior ankle with appropriate demarcation of erythema. Changes consistent w/ lipodermatosclerosis of the skin of LE. Foul odor  HEAD: Normocephalic; atraumatic.  EYES: Extraocular movements intact; conjunctiva and sclera clear.  ENT: No nasal discharge; airway clear. Moist mucus membranes.  NECK: Supple; non tender. No rigidity  CARD: Regular rate and rhythm. Normal S1, S2; no murmurs, gallops, or rubs.  RESP: Lungs clear to auscultation bilaterally. No wheezes, rales or rhonchi.  ABD: Abdomen soft; non-tender; non-distended;   EXT: Normal ROM. no pitting edema. b/l calf tenderness to palpation.  NEURO: Alert and oriented x 3. No focal deficits.  PSYCH: Cooperative, appropriate.

## 2023-02-20 NOTE — ED PROVIDER NOTE - OBJECTIVE STATEMENT
Patient is a 67 year old female with PMH of HTN, CVA, Lupus presenting for leg pain. Patient endorses lower right leg cellulitis x1 months that has failed to improve with outpatient antibiotics. Patient states she is now unable to bear weight on her right leg secondary to pain and swelling. Patient does not recall if she injured her leg yesterday. Patient denies chest pain, shortness of breath, dizziness, of additional complaints.

## 2023-02-20 NOTE — H&P ADULT - HISTORY OF PRESENT ILLNESS
67-year-old female w/ HTN, SLE, CVA (1987) w/o residual deficit, hypothyroidism, hx sepsis secondary to LE cellulitis presenting w/ 1 month hx of poorly healing RLE cellulitis w/o worsening pain, can't bear weight.    PMHx: osteopenia, inguinal iliac LN, Pancreatic head cystic lesion, coronary calcifications, diverticulosis  PSHx: b/l hip replacement, total knee replacement, hysterectomy    In ED, VS: T 36.4, /70, HR 66, SpO2 96% on RA 67-year-old female w/ HTN, SLE, CVA (1987) w/o residual deficit, hypothyroidism, hx sepsis secondary to LE cellulitis presenting w/ 1 month hx of poorly healing RLE lesions w/ worsening pain, can't bear weight. Patient received doxycycline for 14 days previously w/o benefit. No fever, chills, N/V, expansion of lesion.    PMHx: osteopenia, inguinal iliac LN, Pancreatic head cystic lesion, coronary calcifications, diverticulosis, hearing loss, frequent UTIs (6 in past year), GERD, hypercoagulable disorder?, Urinary incontinence (Urge & stress), urinary prolapse, PVD, Pre-DM, Insomnia  PSHx: b/l hip replacement, total knee replacement, hysterectomy    In ED, VS: T 36.4, /70, HR 66, SpO2 96% on RA

## 2023-02-20 NOTE — ED ADULT NURSE NOTE - CAS EDP DISCH DISPOSITION ADMI
YAHIR/[facility] Dorsal Nasal Flap Text: The defect edges were debeveled with a #15 scalpel blade.  Given the location of the defect and the proximity to free margins a dorsal nasal flap was deemed most appropriate.  Using a sterile surgical marker, an appropriate dorsal nasal flap was drawn around the defect.    The area thus outlined was incised deep to adipose tissue with a #15 scalpel blade.  The skin margins were undermined to an appropriate distance in all directions utilizing iris scissors.

## 2023-02-20 NOTE — ED PROVIDER NOTE - ATTENDING CONTRIBUTION TO CARE
67-year-old female smoker chronic, hypothyroidism now presents with worsening swelling and redness of the right lower extremity sent in from assisted living facility for worsening cellulitis.  As well as rule out DVT.  Patient is hemodynamically stable.  No respiratory distress pink conjunctive a lungs clear abdomen soft right lower extremity with good distal pulses posterior tibial and dorsalis pedis, anterior surface erythema with scaling and warmth to touch.  No crepitus.  No calf tenderness.    Plan:  labs, imaging, meds, reassess

## 2023-02-20 NOTE — ED PROVIDER NOTE - PHYSICAL EXAMINATION
VITAL SIGNS: I have reviewed nursing notes and confirm.  CONSTITUTIONAL: Well-appearing, non-toxic, in NAD  SKIN: Warm dry, normal skin turgor  HEAD: NCAT  EYES: No conjunctival injection, scleral anicteric  ENT: Moist mucous membranes, normal pharynx with no erythema or exudates  NECK: Supple; full ROM. Nontender. No cervical LAD  CARD: RRR, no murmurs, rubs or gallops  RESP: Clear to ausculation bilaterally.  No rales, rhonchi, or wheezing.  ABD: Soft, non-distended, non-tender, no rebound or guarding. No CVA tenderness  EXT: Full ROM; Patient has erythema, swelling, and crusty non-indurated lesions noted to right foot and ankle; pulses present; no bony tenderness, +2 pedal edema, right calf tenderness present. No obvious deformity noted  NEURO: Normal motor, normal sensory. CN II-XII grossly intact. Cerebellar testing normal. Normal gait.  PSYCH: Cooperative, appropriate.

## 2023-02-20 NOTE — H&P ADULT - ATTENDING COMMENTS
66 YO F w/ a PMH of HTN, SLE, CVA, and hypothyroidism who presents to the hospital w/ a c/o redness of her RLE for the past x 1 month. Associated with pain and swelling. Denies any fevers/chills, numbness of distal extremity, joint pain, ABD pain, N/V/D, CP, SOB, sore throat, cough, palpitations, or headaches.     In the ED, XRs of RLE are pending official read. Dopplers of LEs are pending. Pt was started on IVFs (LR) and IV ABXs (Cefazolin) in the ED.     FMHx:   -No family Hx of early cardiac death, CAD, asthma, or genetic disorders identified    Physical exam shows pt in NAD. VSS, afebrile, not hypoxic on RA. A&Ox3. Non-focal neuro exam. Muscle strength/sensation intact. CTA B/L with no W/C/R. RRR, no M/G/R. ABD is soft and non-tender, normoactive BSs. Right LE w/ erysipelas noted on the dorsal aspect of the foot and extending to above the ankle, minimal skin breakdown noted, no foul smell, no drainage; there is lipodermatosclerotic skin changes noted in the B/L LEs. Labs and radiology as above.    RLE venous stasis dermatitis; no sepsis present on admission. IV Lasix and transition to PO. A1c/Lipids. ESR/CRP. Elevation of LEs. Compression wraps with ACE bandages. Topical CSs. Silver Sulfadiazine on open ulcerations. PRN pain meds. Wash LEs daily with soap and water. Elevate LEs. FU official LE doppler results. On discharge, give pt an Rx for static compression wraps and vascular Out-pt FU.     Normocytic anemia, at baseline. Pt denies bleeding symptoms. Replace as necessary.     Hx of HTN, SLE, CVA, and hypothyroidism. Restart home meds, except as stated above. DVT PPX. Inform PCP of pt's admission to hospital. My note supersedes the residents note.     Date seen by [email or web address]

## 2023-02-20 NOTE — H&P ADULT - ASSESSMENT
67-year-old female w/ HTN, SLE, CVA (1987) w/o residual deficit, hypothyroidism, hx sepsis secondary to LE cellulitis presenting w/ 1 month hx of poorly healing RLE cellulitis w/o worsening pain, can't bear weight.    # hx sepsis secondary to LE cellulitis  # Cellulitis  * s/p cefazolin  - f/u XR right foot & ankle (unofficially -ve)  - f/u venous duplex RLE (unofficially -ve)  -     # Normocytic anemia  - f/u reticulocyte count, iron, TIBC, ferritin, B12, folate    # SLE  * elevated dsDNA & DANIS; nl C3 & C4    # coronary calcifications  # HTN  # CVA (1987) w/o residual deficit    # hypothyroidism  * hx elevated TSH ~ 9  - c/w levothyroxine  - f/u TSH    # inguinal iliac LN  - f/u EBV titers  - Previous discharge requested patient to do outpatient PET to r/o lymphoma    # Pancreatic head cystic lesion  *  & CEA within normal limits    # diverticulosis  - high fiber diet    # osteopenia  # b/l hip replacement  # total knee replacement  # hysterectomy  - outpatient f/u    # Diet DASH, TLC 67-year-old female w/ HTN, SLE, CVA (1987) w/o residual deficit, hypothyroidism, hx sepsis secondary to LE cellulitis presenting w/ 1 month hx of poorly healing RLE cellulitis w/o worsening pain, can't bear weight.    # hx sepsis secondary to LE cellulitis  # Stasis Dermatitis w/ possible Erysipelas   * s/p cefazolin  - f/u XR right foot & ankle (unofficially -ve)  - f/u venous duplex RLE (unofficially -ve)  - Ordered LLE venous duplex (tender as well)  - c/w Lasix  - Deonte stockings  - Avoid urine reaching the leg from incontinence episodes, c/w PureWick or PrimaFit. Consider Wilkes catheter if failing  - Silver Sulfadiazine + betamethasone  - foot care, petrolatum jelly    # frequent UTIs (6 in past year)  # Urinary incontinence (Urge & stress)  # urinary prolapse  # Urinary Incontinence  - Consider prophylactic Antibiotics  - Outpatient referral to  UroGyn [previously offered Sx but refused]  - Educated about Kegel Exercises & Pessaries    # Normocytic anemia  - f/u reticulocyte count, iron, TIBC, ferritin, B12, folate    # SLE  # Hypercoagulable disorder?  * elevated dsDNA & DANIS; nl C3 & C4  - c/w hydroxychloroquine  - Check lupus profile for lupus anticoagulant, anticardiolipin, anti-beta2 glycoprotein    # coronary calcifications  # HTN  # CVA (1987) w/o residual deficit  # PVD  # Pre-DM  - c/w losartan, Lasix, atenolol    # hypothyroidism  * hx elevated TSH ~ 9  * Levothyroxine dose increased 1 week prior to admission as per patient  - c/w levothyroxine    # inguinal iliac LN  - f/u EBV titers  - Previous discharge requested patient to do outpatient PET to r/o lymphoma [depends on [facility]]    # Pancreatic head cystic lesion  *  & CEA within normal limits  - needs EUS/EGD w/ possible ERCP for CBD stent [depends on [facility]]    # diverticulosis  - high fiber diet    # osteopenia  # b/l hip replacement  # total knee replacement  # hysterectomy  # hearing loss  - outpatient f/u    # GERD  - c/w Protonix    # Insomnia  - c/w Melatonin    # Diet DASH, TLC    # GI PPx Protonix    # Code DNI, DNR. Patient would not like to do any surgeries

## 2023-02-21 LAB
A1C WITH ESTIMATED AVERAGE GLUCOSE RESULT: 5.5 % — SIGNIFICANT CHANGE UP (ref 4–5.6)
ALBUMIN SERPL ELPH-MCNC: 4 G/DL — SIGNIFICANT CHANGE UP (ref 3.5–5.2)
ALP SERPL-CCNC: 75 U/L — SIGNIFICANT CHANGE UP (ref 30–115)
ALT FLD-CCNC: <5 U/L — SIGNIFICANT CHANGE UP (ref 0–41)
ANION GAP SERPL CALC-SCNC: 14 MMOL/L — SIGNIFICANT CHANGE UP (ref 7–14)
APTT BLD: 33.7 SEC — SIGNIFICANT CHANGE UP (ref 27–39.2)
AST SERPL-CCNC: 14 U/L — SIGNIFICANT CHANGE UP (ref 0–41)
BASOPHILS # BLD AUTO: 0.01 K/UL — SIGNIFICANT CHANGE UP (ref 0–0.2)
BASOPHILS NFR BLD AUTO: 0.1 % — SIGNIFICANT CHANGE UP (ref 0–1)
BILIRUB SERPL-MCNC: 0.4 MG/DL — SIGNIFICANT CHANGE UP (ref 0.2–1.2)
BUN SERPL-MCNC: 10 MG/DL — SIGNIFICANT CHANGE UP (ref 10–20)
CALCIUM SERPL-MCNC: 9.4 MG/DL — SIGNIFICANT CHANGE UP (ref 8.4–10.5)
CHLORIDE SERPL-SCNC: 101 MMOL/L — SIGNIFICANT CHANGE UP (ref 98–110)
CHOLEST SERPL-MCNC: 216 MG/DL — HIGH
CO2 SERPL-SCNC: 26 MMOL/L — SIGNIFICANT CHANGE UP (ref 17–32)
CREAT SERPL-MCNC: 0.7 MG/DL — SIGNIFICANT CHANGE UP (ref 0.7–1.5)
EBV EA AB SER IA-ACNC: 16.1 U/ML — HIGH
EBV EA AB TITR SER IF: POSITIVE
EBV EA IGG SER-ACNC: POSITIVE
EBV NA IGG SER IA-ACNC: >600 U/ML — HIGH
EBV PATRN SPEC IB-IMP: SIGNIFICANT CHANGE UP
EBV VCA IGG AVIDITY SER QL IA: POSITIVE
EBV VCA IGM SER IA-ACNC: 14.4 U/ML — SIGNIFICANT CHANGE UP
EBV VCA IGM SER IA-ACNC: >750 U/ML — HIGH
EBV VCA IGM TITR FLD: NEGATIVE — SIGNIFICANT CHANGE UP
EGFR: 95 ML/MIN/1.73M2 — SIGNIFICANT CHANGE UP
EOSINOPHIL # BLD AUTO: 0.08 K/UL — SIGNIFICANT CHANGE UP (ref 0–0.7)
EOSINOPHIL NFR BLD AUTO: 1.1 % — SIGNIFICANT CHANGE UP (ref 0–8)
ESTIMATED AVERAGE GLUCOSE: 111 MG/DL — SIGNIFICANT CHANGE UP (ref 68–114)
GLUCOSE SERPL-MCNC: 81 MG/DL — SIGNIFICANT CHANGE UP (ref 70–99)
HCT VFR BLD CALC: 37.5 % — SIGNIFICANT CHANGE UP (ref 37–47)
HDLC SERPL-MCNC: 48 MG/DL — LOW
HGB BLD-MCNC: 12.2 G/DL — SIGNIFICANT CHANGE UP (ref 12–16)
IMM GRANULOCYTES NFR BLD AUTO: 0.3 % — SIGNIFICANT CHANGE UP (ref 0.1–0.3)
INR BLD: 0.93 RATIO — SIGNIFICANT CHANGE UP (ref 0.65–1.3)
LIPID PNL WITH DIRECT LDL SERPL: 142 MG/DL — HIGH
LYMPHOCYTES # BLD AUTO: 1.03 K/UL — LOW (ref 1.2–3.4)
LYMPHOCYTES # BLD AUTO: 14.4 % — LOW (ref 20.5–51.1)
MAGNESIUM SERPL-MCNC: 1.7 MG/DL — LOW (ref 1.8–2.4)
MCHC RBC-ENTMCNC: 28.8 PG — SIGNIFICANT CHANGE UP (ref 27–31)
MCHC RBC-ENTMCNC: 32.5 G/DL — SIGNIFICANT CHANGE UP (ref 32–37)
MCV RBC AUTO: 88.7 FL — SIGNIFICANT CHANGE UP (ref 81–99)
MONOCYTES # BLD AUTO: 0.4 K/UL — SIGNIFICANT CHANGE UP (ref 0.1–0.6)
MONOCYTES NFR BLD AUTO: 5.6 % — SIGNIFICANT CHANGE UP (ref 1.7–9.3)
NEUTROPHILS # BLD AUTO: 5.61 K/UL — SIGNIFICANT CHANGE UP (ref 1.4–6.5)
NEUTROPHILS NFR BLD AUTO: 78.5 % — HIGH (ref 42.2–75.2)
NON HDL CHOLESTEROL: 168 MG/DL — HIGH
NRBC # BLD: 0 /100 WBCS — SIGNIFICANT CHANGE UP (ref 0–0)
PLATELET # BLD AUTO: 258 K/UL — SIGNIFICANT CHANGE UP (ref 130–400)
POTASSIUM SERPL-MCNC: 3.7 MMOL/L — SIGNIFICANT CHANGE UP (ref 3.5–5)
POTASSIUM SERPL-SCNC: 3.7 MMOL/L — SIGNIFICANT CHANGE UP (ref 3.5–5)
PROT SERPL-MCNC: 7.2 G/DL — SIGNIFICANT CHANGE UP (ref 6–8)
PROTHROM AB SERPL-ACNC: 10.6 SEC — SIGNIFICANT CHANGE UP (ref 9.95–12.87)
RBC # BLD: 4.23 M/UL — SIGNIFICANT CHANGE UP (ref 4.2–5.4)
RBC # FLD: 13.9 % — SIGNIFICANT CHANGE UP (ref 11.5–14.5)
SODIUM SERPL-SCNC: 141 MMOL/L — SIGNIFICANT CHANGE UP (ref 135–146)
TRIGL SERPL-MCNC: 130 MG/DL — SIGNIFICANT CHANGE UP
WBC # BLD: 7.15 K/UL — SIGNIFICANT CHANGE UP (ref 4.8–10.8)
WBC # FLD AUTO: 7.15 K/UL — SIGNIFICANT CHANGE UP (ref 4.8–10.8)

## 2023-02-21 PROCEDURE — 99232 SBSQ HOSP IP/OBS MODERATE 35: CPT

## 2023-02-21 PROCEDURE — 93971 EXTREMITY STUDY: CPT | Mod: 26,LT

## 2023-02-21 RX ORDER — KETOROLAC TROMETHAMINE 30 MG/ML
15 SYRINGE (ML) INJECTION ONCE
Refills: 0 | Status: DISCONTINUED | OUTPATIENT
Start: 2023-02-21 | End: 2023-02-22

## 2023-02-21 RX ORDER — FLUOCINONIDE/EMOLLIENT BASE 0.05 %
1 CREAM (GRAM) TOPICAL
Refills: 0 | Status: DISCONTINUED | OUTPATIENT
Start: 2023-02-21 | End: 2023-02-21

## 2023-02-21 RX ORDER — FLUTICASONE PROPIONATE 50 MCG
1 SPRAY, SUSPENSION NASAL
Refills: 0 | Status: ACTIVE | OUTPATIENT
Start: 2023-02-21 | End: 2024-01-20

## 2023-02-21 RX ORDER — HEPARIN SODIUM 5000 [USP'U]/ML
5000 INJECTION INTRAVENOUS; SUBCUTANEOUS EVERY 8 HOURS
Refills: 0 | Status: ACTIVE | OUTPATIENT
Start: 2023-02-21 | End: 2024-01-20

## 2023-02-21 RX ORDER — PANTOPRAZOLE SODIUM 20 MG/1
40 TABLET, DELAYED RELEASE ORAL
Refills: 0 | Status: ACTIVE | OUTPATIENT
Start: 2023-02-21 | End: 2024-01-20

## 2023-02-21 RX ORDER — ATENOLOL 25 MG/1
25 TABLET ORAL DAILY
Refills: 0 | Status: ACTIVE | OUTPATIENT
Start: 2023-02-21 | End: 2024-01-20

## 2023-02-21 RX ORDER — HYDROXYCHLOROQUINE SULFATE 200 MG
200 TABLET ORAL DAILY
Refills: 0 | Status: ACTIVE | OUTPATIENT
Start: 2023-02-21 | End: 2024-01-20

## 2023-02-21 RX ORDER — CHOLECALCIFEROL (VITAMIN D3) 125 MCG
5000 CAPSULE ORAL DAILY
Refills: 0 | Status: ACTIVE | OUTPATIENT
Start: 2023-02-21 | End: 2024-01-20

## 2023-02-21 RX ORDER — LEVOTHYROXINE SODIUM 125 MCG
175 TABLET ORAL DAILY
Refills: 0 | Status: ACTIVE | OUTPATIENT
Start: 2023-02-21 | End: 2024-01-20

## 2023-02-21 RX ORDER — LOSARTAN POTASSIUM 100 MG/1
25 TABLET, FILM COATED ORAL DAILY
Refills: 0 | Status: ACTIVE | OUTPATIENT
Start: 2023-02-21 | End: 2024-01-20

## 2023-02-21 RX ORDER — LANOLIN ALCOHOL/MO/W.PET/CERES
10 CREAM (GRAM) TOPICAL AT BEDTIME
Refills: 0 | Status: ACTIVE | OUTPATIENT
Start: 2023-02-21 | End: 2024-01-20

## 2023-02-21 RX ORDER — SALICYLIC ACID 0.5 %
1 CLEANSER (GRAM) TOPICAL
Refills: 0 | Status: ACTIVE | OUTPATIENT
Start: 2023-02-21 | End: 2024-01-20

## 2023-02-21 RX ORDER — MAGNESIUM OXIDE 400 MG ORAL TABLET 241.3 MG
400 TABLET ORAL
Refills: 0 | Status: ACTIVE | OUTPATIENT
Start: 2023-02-21 | End: 2024-01-20

## 2023-02-21 RX ORDER — FUROSEMIDE 40 MG
20 TABLET ORAL DAILY
Refills: 0 | Status: ACTIVE | OUTPATIENT
Start: 2023-02-21 | End: 2024-01-20

## 2023-02-21 RX ADMIN — Medication 10 MILLIGRAM(S): at 22:44

## 2023-02-21 RX ADMIN — LOSARTAN POTASSIUM 25 MILLIGRAM(S): 100 TABLET, FILM COATED ORAL at 05:43

## 2023-02-21 RX ADMIN — ATENOLOL 25 MILLIGRAM(S): 25 TABLET ORAL at 05:43

## 2023-02-21 RX ADMIN — Medication 5000 UNIT(S): at 12:31

## 2023-02-21 RX ADMIN — Medication 1 APPLICATION(S): at 05:43

## 2023-02-21 RX ADMIN — Medication 1 APPLICATION(S): at 18:37

## 2023-02-21 RX ADMIN — Medication 20 MILLIGRAM(S): at 05:44

## 2023-02-21 RX ADMIN — Medication 1 SPRAY(S): at 05:44

## 2023-02-21 RX ADMIN — Medication 1 APPLICATION(S): at 23:13

## 2023-02-21 RX ADMIN — MAGNESIUM OXIDE 400 MG ORAL TABLET 400 MILLIGRAM(S): 241.3 TABLET ORAL at 12:30

## 2023-02-21 RX ADMIN — Medication 1 APPLICATION(S): at 12:37

## 2023-02-21 RX ADMIN — Medication 1 APPLICATION(S): at 18:35

## 2023-02-21 RX ADMIN — HEPARIN SODIUM 5000 UNIT(S): 5000 INJECTION INTRAVENOUS; SUBCUTANEOUS at 22:45

## 2023-02-21 RX ADMIN — Medication 1 APPLICATION(S): at 05:44

## 2023-02-21 RX ADMIN — Medication 650 MILLIGRAM(S): at 04:13

## 2023-02-21 RX ADMIN — Medication 1 APPLICATION(S): at 18:38

## 2023-02-21 RX ADMIN — Medication 200 MILLIGRAM(S): at 12:30

## 2023-02-21 RX ADMIN — Medication 175 MICROGRAM(S): at 05:43

## 2023-02-21 RX ADMIN — HEPARIN SODIUM 5000 UNIT(S): 5000 INJECTION INTRAVENOUS; SUBCUTANEOUS at 14:12

## 2023-02-21 RX ADMIN — PANTOPRAZOLE SODIUM 40 MILLIGRAM(S): 20 TABLET, DELAYED RELEASE ORAL at 08:18

## 2023-02-21 RX ADMIN — MAGNESIUM OXIDE 400 MG ORAL TABLET 400 MILLIGRAM(S): 241.3 TABLET ORAL at 18:38

## 2023-02-21 NOTE — PATIENT PROFILE ADULT - FUNCTIONAL ASSESSMENT - BASIC MOBILITY 6.
2-calculated by average/Not able to assess (calculate score using [facility] averaging method)

## 2023-02-21 NOTE — ED ADULT NURSE REASSESSMENT NOTE - NS ED NURSE REASSESS COMMENT FT1
Pt. lying on stretcher, breathing with ease on RA. Pt. A&O x4. 20g noted to the L arm; IV intact, no redness/swelling at site. Primafit in place. Report given, awaiting transport.

## 2023-02-21 NOTE — PROGRESS NOTE ADULT - SUBJECTIVE AND OBJECTIVE BOX
INTERVAL HPI/OVERNIGHT EVENTS:    SUBJECTIVE: Patient seen and examined at bedside.     cc: le erythema  no cp, sob, abd pain, fever  no ha, dizziness, lightheadedness, syncope    OBJECTIVE:    VITAL SIGNS:  Vital Signs Last 24 Hrs  T(C): 36.8 (21 Feb 2023 08:16), Max: 36.8 (21 Feb 2023 08:16)  T(F): 98.3 (21 Feb 2023 08:16), Max: 98.3 (21 Feb 2023 08:16)  HR: 53 (21 Feb 2023 08:16) (53 - 78)  BP: 122/81 (21 Feb 2023 08:16) (122/81 - 199/95)  BP(mean): --  RR: 17 (21 Feb 2023 08:16) (17 - 18)  SpO2: 95% (21 Feb 2023 08:16) (95% - 98%)    Parameters below as of 21 Feb 2023 08:16  Patient On (Oxygen Delivery Method): room air          PHYSICAL EXAM:    General: NAD  HEENT: NC/AT; PERRL, clear conjunctiva  Neck: supple  Respiratory: CTA b/l  Cardiovascular: +S1/S2; RRR  Abdomen: soft, NT/ND; +BS x4  Extremities: WWP, 2+ peripheral pulses b/l; no LE edema  Skin: RLE erythema  Neurological:    MEDICATIONS:  MEDICATIONS  (STANDING):  atenolol  Tablet 25 milliGRAM(s) Oral daily  betamethasone valerate 0.1% Cream 1 Application(s) Topical two times a day  cholecalciferol 5000 Unit(s) Oral daily  clotrimazole 1% Cream 1 Application(s) Topical two times a day  fluticasone propionate 50 MICROgram(s)/spray Nasal Spray 1 Spray(s) Both Nostrils <User Schedule>  furosemide    Tablet 20 milliGRAM(s) Oral daily  heparin   Injectable 5000 Unit(s) SubCutaneous every 8 hours  hydroxychloroquine 200 milliGRAM(s) Oral daily  levothyroxine 175 MICROGram(s) Oral daily  losartan 25 milliGRAM(s) Oral daily  magnesium oxide 400 milliGRAM(s) Oral three times a day with meals  melatonin 10 milliGRAM(s) Oral at bedtime  pantoprazole    Tablet 40 milliGRAM(s) Oral before breakfast  petrolatum white Ointment 1 Application(s) Topical two times a day  silver sulfADIAZINE 1% Cream 1 Application(s) Topical two times a day  vitamin A &amp; D Ointment 1 Application(s) Topical four times a day    MEDICATIONS  (PRN):  acetaminophen     Tablet .. 650 milliGRAM(s) Oral every 6 hours PRN Temp greater or equal to 38C (100.4F), Mild Pain (1 - 3)  aluminum hydroxide/magnesium hydroxide/simethicone Suspension 30 milliLiter(s) Oral every 4 hours PRN Dyspepsia  ondansetron Injectable 4 milliGRAM(s) IV Push every 8 hours PRN Nausea and/or Vomiting      ALLERGIES:  Allergies    aspirin (Other)  Compazine (Other)  Levaquin (Other)  morphine (Other)  penicillins (Other)    Intolerances        LABS:                        12.2   7.15  )-----------( 258      ( 21 Feb 2023 05:59 )             37.5     Hemoglobin: 12.2 g/dL (02-21 @ 05:59)  Hemoglobin: 10.9 g/dL (02-20 @ 13:21)    CBC Full  -  ( 21 Feb 2023 05:59 )  WBC Count : 7.15 K/uL  RBC Count : 4.23 M/uL  Hemoglobin : 12.2 g/dL  Hematocrit : 37.5 %  Platelet Count - Automated : 258 K/uL  Mean Cell Volume : 88.7 fL  Mean Cell Hemoglobin : 28.8 pg  Mean Cell Hemoglobin Concentration : 32.5 g/dL  Auto Neutrophil # : 5.61 K/uL  Auto Lymphocyte # : 1.03 K/uL  Auto Monocyte # : 0.40 K/uL  Auto Eosinophil # : 0.08 K/uL  Auto Basophil # : 0.01 K/uL  Auto Neutrophil % : 78.5 %  Auto Lymphocyte % : 14.4 %  Auto Monocyte % : 5.6 %  Auto Eosinophil % : 1.1 %  Auto Basophil % : 0.1 %    02-21    141  |  101  |  10  ----------------------------<  81  3.7   |  26  |  0.7    Ca    9.4      21 Feb 2023 05:59  Mg     1.7     02-21    TPro  7.2  /  Alb  4.0  /  TBili  0.4  /  DBili  x   /  AST  14  /  ALT  <5  /  AlkPhos  75  02-21    Creatinine Trend: 0.7<--, 0.7<--  LIVER FUNCTIONS - ( 21 Feb 2023 05:59 )  Alb: 4.0 g/dL / Pro: 7.2 g/dL / ALK PHOS: 75 U/L / ALT: <5 U/L / AST: 14 U/L / GGT: x           PT/INR - ( 21 Feb 2023 05:59 )   PT: 10.60 sec;   INR: 0.93 ratio         PTT - ( 21 Feb 2023 05:59 )  PTT:33.7 sec    hs Troponin:              CSF:                      EKG:   MICROBIOLOGY:    IMAGING:      Labs, imaging, EKG personally reviewed    RADIOLOGY & ADDITIONAL TESTS: Reviewed.

## 2023-02-21 NOTE — PATIENT PROFILE ADULT - STATED REASON FOR ADMISSION
Called in results to patient.  Patient stated that she already stopped pcn. Advised pt that she should not stop taking any abts until instructed by a provider.  Patient understood.  Advised pt to stay off abts.  cellulitis

## 2023-02-21 NOTE — CONSULT NOTE ADULT - SUBJECTIVE AND OBJECTIVE BOX
Podiatry Consult Note    Subjective:  LATOYA FULTON  Seen Bedside 67y Female  .   Patient is a 67y old  Female who presents with a chief complaint of Cellulitis (21 Feb 2023 10:34)    HPI:  67-year-old female w/ HTN, SLE, CVA (1987) w/o residual deficit, hypothyroidism, hx sepsis secondary to LE cellulitis presenting w/ 1 month hx of poorly healing RLE lesions w/ worsening pain, can't bear weight. Patient received doxycycline for 14 days previously w/o benefit. No fever, chills, N/V, expansion of lesion.    PMHx: osteopenia, inguinal iliac LN, Pancreatic head cystic lesion, coronary calcifications, diverticulosis, hearing loss, frequent UTIs (6 in past year), GERD, hypercoagulable disorder?, Urinary incontinence (Urge & stress), urinary prolapse, PVD, Pre-DM, Insomnia  PSHx: b/l hip replacement, total knee replacement, hysterectomy    In ED, VS: T 36.4, /70, HR 66, SpO2 96% on RA (20 Feb 2023 20:18)      Past Medical History and Surgical History  PAST MEDICAL & SURGICAL HISTORY:  Lupus      Essential hypertension      CVA (cerebral vascular accident)  1987      Hypothyroidism      FH: bilateral hip replacements      H/O total knee replacement      H/O abdominal hysterectomy           Review of Systems:  [X] Ten point review of systems is otherwise negative except as noted     Objective:  Vital Signs Last 24 Hrs  T(C): 36.7 (21 Feb 2023 15:27), Max: 36.8 (21 Feb 2023 08:16)  T(F): 98 (21 Feb 2023 15:27), Max: 98.3 (21 Feb 2023 08:16)  HR: 53 (21 Feb 2023 15:27) (53 - 70)  BP: 177/85 (21 Feb 2023 15:27) (122/81 - 191/93)  BP(mean): --  RR: 16 (21 Feb 2023 15:27) (16 - 18)  SpO2: 97% (21 Feb 2023 15:27) (95% - 98%)    Parameters below as of 21 Feb 2023 15:27  Patient On (Oxygen Delivery Method): room air                            12.2   7.15  )-----------( 258      ( 21 Feb 2023 05:59 )             37.5                 02-21    141  |  101  |  10  ----------------------------<  81  3.7   |  26  |  0.7    Ca    9.4      21 Feb 2023 05:59  Mg     1.7     02-21    TPro  7.2  /  Alb  4.0  /  TBili  0.4  /  DBili  x   /  AST  14  /  ALT  <5  /  AlkPhos  75  02-21        Physical Exam - Lower Extremity Focused:   Derm:   hyperpigmented coloration to the skin of the dorsal right foot. No open lesions.   Vascular: DP and PT Pulses Diminished; Foot is Warm to Warm to the touch; Capillary Refill Time < 3 Seconds;    Neuro: Protective Sensation Diminished / Moderately Neuropathic   MSK: Pain On Palpation at Wound Site     Assessment:  R Tibia osteonecrosis.   Plan:  Chart reviewed and Patient evaluated. All Questions and Concerns Addressed and Answered  XR Imaging  Foot   Diffuse osteopenia. No acute fracture or dislocation. No ankle joint effusion. There is osteonecrosis measuring 2 cm in the distal tibia and 2.6 cm in the calcaneus. Diffuse soft tissue swelling/edema. There are degenerative changes of the hindfoot, midfoot and forefoot joints. Apparent hallux valgus deformity and second through fifth hammertoe deformities. Chronic healed fracture deformity of the fifth   Weight Bearing Status; WBAT   Recommend; Lower Extremity Arterial Duplex B/L;   No signs of infection.   Recommend orthopedic consult for osteonecrosis of distal tibia.  Discussed Plan w/     Podiatry

## 2023-02-21 NOTE — PATIENT PROFILE ADULT - FALL HARM RISK - HARM RISK INTERVENTIONS
Assistance with ambulation/Assistance OOB with selected safe patient handling equipment/Communicate Risk of Fall with Harm to all staff/Discuss with provider need for PT consult/Monitor gait and stability/Provide patient with walking aids - walker, cane, crutches/Reinforce activity limits and safety measures with patient and family/Tailored Fall Risk Interventions/Use of alarms - bed, chair and/or voice tab/Visual Cue: Yellow wristband and red socks/Bed in lowest position, wheels locked, appropriate side rails in place/Call bell, personal items and telephone in reach/Instruct patient to call for assistance before getting out of bed or chair/Non-slip footwear when patient is out of bed/[place] to call system/Physically safe environment - no spills, clutter or unnecessary equipment/Purposeful Proactive Rounding/Room/bathroom lighting operational, light cord in reach

## 2023-02-21 NOTE — PATIENT PROFILE ADULT - SURGICAL SITE INCISION
I, Ruben Monge, performed the initial face to face bedside interview with this patient regarding history of present illness, review of symptoms and relevant past medical, social and family history.  I completed an independent physical examination.  I was the provider who initially evaluated this patient.  The history, relevant review of systems, past medical and surgical history, medical decision making, and physical examination was documented by the scribe in my presence and I attest to the accuracy of the documentation. Follow-up on ordered tests (ie labs, radiologic studies) and re-evaluation of the patient's status has been communicated to the ACP.  Disposition of the patient will be based on test outcome and response to ED interventions. no

## 2023-02-22 LAB
DRVVT RATIO: 1.07 RATIO — SIGNIFICANT CHANGE UP (ref 0–1.21)
DRVVT SCREEN TO CONFIRM RATIO: SIGNIFICANT CHANGE UP
NORMALIZED SCT PPP-RTO: 1.49 RATIO — HIGH (ref 0–1.16)
NORMALIZED SCT PPP-RTO: ABNORMAL

## 2023-02-22 PROCEDURE — 93925 LOWER EXTREMITY STUDY: CPT | Mod: 26

## 2023-02-22 PROCEDURE — 99232 SBSQ HOSP IP/OBS MODERATE 35: CPT

## 2023-02-22 RX ADMIN — Medication 175 MICROGRAM(S): at 05:14

## 2023-02-22 RX ADMIN — Medication 1 APPLICATION(S): at 18:34

## 2023-02-22 RX ADMIN — Medication 1 APPLICATION(S): at 05:08

## 2023-02-22 RX ADMIN — MAGNESIUM OXIDE 400 MG ORAL TABLET 400 MILLIGRAM(S): 241.3 TABLET ORAL at 12:19

## 2023-02-22 RX ADMIN — Medication 20 MILLIGRAM(S): at 05:06

## 2023-02-22 RX ADMIN — Medication 10 MILLIGRAM(S): at 21:53

## 2023-02-22 RX ADMIN — Medication 1 SPRAY(S): at 05:08

## 2023-02-22 RX ADMIN — ATENOLOL 25 MILLIGRAM(S): 25 TABLET ORAL at 09:06

## 2023-02-22 RX ADMIN — HEPARIN SODIUM 5000 UNIT(S): 5000 INJECTION INTRAVENOUS; SUBCUTANEOUS at 14:18

## 2023-02-22 RX ADMIN — Medication 1 APPLICATION(S): at 17:40

## 2023-02-22 RX ADMIN — Medication 5000 UNIT(S): at 12:21

## 2023-02-22 RX ADMIN — MAGNESIUM OXIDE 400 MG ORAL TABLET 400 MILLIGRAM(S): 241.3 TABLET ORAL at 17:44

## 2023-02-22 RX ADMIN — Medication 200 MILLIGRAM(S): at 12:19

## 2023-02-22 RX ADMIN — Medication 1 APPLICATION(S): at 12:22

## 2023-02-22 RX ADMIN — PANTOPRAZOLE SODIUM 40 MILLIGRAM(S): 20 TABLET, DELAYED RELEASE ORAL at 05:06

## 2023-02-22 RX ADMIN — Medication 650 MILLIGRAM(S): at 20:15

## 2023-02-22 RX ADMIN — LOSARTAN POTASSIUM 25 MILLIGRAM(S): 100 TABLET, FILM COATED ORAL at 05:06

## 2023-02-22 RX ADMIN — MAGNESIUM OXIDE 400 MG ORAL TABLET 400 MILLIGRAM(S): 241.3 TABLET ORAL at 09:06

## 2023-02-22 RX ADMIN — Medication 1 APPLICATION(S): at 05:07

## 2023-02-22 RX ADMIN — HEPARIN SODIUM 5000 UNIT(S): 5000 INJECTION INTRAVENOUS; SUBCUTANEOUS at 21:53

## 2023-02-22 RX ADMIN — HEPARIN SODIUM 5000 UNIT(S): 5000 INJECTION INTRAVENOUS; SUBCUTANEOUS at 05:06

## 2023-02-22 RX ADMIN — Medication 1 APPLICATION(S): at 17:39

## 2023-02-22 RX ADMIN — Medication 1 APPLICATION(S): at 23:08

## 2023-02-22 NOTE — PROGRESS NOTE ADULT - SUBJECTIVE AND OBJECTIVE BOX
LATOYA FULTON  67y  [facility] 4B 010 B      Patient is a 67y old  Female who presents with a chief complaint of Cellulitis (22 Feb 2023 09:26)      INTERVAL HPI/OVERNIGHT EVENTS:        REVIEW OF SYSTEMS:  CONSTITUTIONAL: No fever, weight loss, or fatigue  EYES: No eye pain, visual disturbances, or discharge  ENMT:  No difficulty hearing, tinnitus, vertigo; No sinus or throat pain  NECK: No pain or stiffness  BREASTS: No pain, masses, or nipple discharge  RESPIRATORY: No cough, wheezing, chills or hemoptysis; No shortness of breath  CARDIOVASCULAR: No chest pain, palpitations, dizziness, or leg swelling  GASTROINTESTINAL: No abdominal or epigastric pain. No nausea, vomiting, or hematemesis; No diarrhea or constipation. No melena or hematochezia.  GENITOURINARY: No dysuria, frequency, hematuria, or incontinence  NEUROLOGICAL: No headaches, memory loss, loss of strength, numbness, or tremors  SKIN: No itching, burning, rashes, or lesions   LYMPH NODES: No enlarged glands  ENDOCRINE: No heat or cold intolerance; No hair loss  MUSCULOSKELETAL: No joint pain or swelling; No muscle, back, or extremity pain  PSYCHIATRIC: No depression, anxiety, mood swings, or difficulty sleeping  HEME/LYMPH: No easy bruising, or bleeding gums  ALLERY AND IMMUNOLOGIC: No hives or eczema  FAMILY HISTORY:    T(C): 36.7 (02-22-23 @ 08:00), Max: 36.7 (02-21-23 @ 15:27)  HR: 59 (02-22-23 @ 08:00) (53 - 59)  BP: 177/88 (02-22-23 @ 08:00) (143/72 - 187/90)  RR: 18 (02-22-23 @ 08:00) (16 - 18)  SpO2: 97% (02-22-23 @ 08:00) (97% - 98%)  Wt(kg): --Vital Signs Last 24 Hrs  T(C): 36.7 (22 Feb 2023 08:00), Max: 36.7 (21 Feb 2023 15:27)  T(F): 98.1 (22 Feb 2023 08:00), Max: 98.1 (22 Feb 2023 08:00)  HR: 59 (22 Feb 2023 08:00) (53 - 59)  BP: 177/88 (22 Feb 2023 08:00) (143/72 - 187/90)  BP(mean): --  RR: 18 (22 Feb 2023 08:00) (16 - 18)  SpO2: 97% (22 Feb 2023 08:00) (97% - 98%)    Parameters below as of 22 Feb 2023 05:04  Patient On (Oxygen Delivery Method): room air        PHYSICAL EXAM:  GENERAL: NAD, well-groomed, well-developed  HEAD:  Atraumatic, Normocephalic  EYES: EOMI, PERRLA, conjunctiva and sclera clear  ENMT: No tonsillar erythema, exudates, or enlargement; Moist mucous membranes, Good dentition, No lesions  NECK: Supple, No JVD, Normal thyroid  NERVOUS SYSTEM:  Alert & Oriented X3, Good concentration; Motor Strength 5/5 B/L upper and lower extremities; DTRs 2+ intact and symmetric  PULM: Clear to auscultation bilaterally  CARDIAC: Regular rate and rhythm; No murmurs, rubs, or gallops  GI: Soft, Nontender, Nondistended; Bowel sounds present  EXTREMITIES:  r foot chronic skin changes   LYMPH: No lymphadenopathy noted  SKIN: No rashes or lesions    Consultant(s) Notes Reviewed:  [x ] YES  [ ] NO  Care Discussed with Consultants/Other Providers [ x] YES  [ ] NO    LABS:                            12.2   7.15  )-----------( 258      ( 21 Feb 2023 05:59 )             37.5   02-21    141  |  101  |  10  ----------------------------<  81  3.7   |  26  |  0.7    Ca    9.4      21 Feb 2023 05:59  Mg     1.7     02-21    TPro  7.2  /  Alb  4.0  /  TBili  0.4  /  DBili  x   /  AST  14  /  ALT  <5  /  AlkPhos  75  02-21            acetaminophen     Tablet .. 650 milliGRAM(s) Oral every 6 hours PRN  aluminum hydroxide/magnesium hydroxide/simethicone Suspension 30 milliLiter(s) Oral every 4 hours PRN  atenolol  Tablet 25 milliGRAM(s) Oral daily  betamethasone valerate 0.1% Cream 1 Application(s) Topical two times a day  cholecalciferol 5000 Unit(s) Oral daily  clotrimazole 1% Cream 1 Application(s) Topical two times a day  fluticasone propionate 50 MICROgram(s)/spray Nasal Spray 1 Spray(s) Both Nostrils <User Schedule>  furosemide    Tablet 20 milliGRAM(s) Oral daily  heparin   Injectable 5000 Unit(s) SubCutaneous every 8 hours  hydroxychloroquine 200 milliGRAM(s) Oral daily  levothyroxine 175 MICROGram(s) Oral daily  losartan 25 milliGRAM(s) Oral daily  magnesium oxide 400 milliGRAM(s) Oral three times a day with meals  melatonin 10 milliGRAM(s) Oral at bedtime  ondansetron Injectable 4 milliGRAM(s) IV Push every 8 hours PRN  pantoprazole    Tablet 40 milliGRAM(s) Oral before breakfast  petrolatum white Ointment 1 Application(s) Topical two times a day  silver sulfADIAZINE 1% Cream 1 Application(s) Topical two times a day  vitamin A &amp; D Ointment 1 Application(s) Topical four times a day      HEALTH ISSUES - PROBLEM Dx:  Suspected deep vein thrombosis (DVT)            Case Discussed with House Staff   45 minutes spent on total encounter; more than 50% of the visit was spent counseling and/or coordinating care by the attending physician.   Spectra [phone]

## 2023-02-22 NOTE — PROGRESS NOTE ADULT - SUBJECTIVE AND OBJECTIVE BOX
LATOYA FULTON 67y Female  MRN#: [id number]   Hospital Day: 2d    HPI:  67-year-old female w/ HTN, SLE, CVA (1987) w/o residual deficit, hypothyroidism, hx sepsis secondary to LE cellulitis presenting w/ 1 month hx of poorly healing RLE lesions w/ worsening pain, can't bear weight. Patient received doxycycline for 14 days previously w/o benefit. No fever, chills, N/V, expansion of lesion.    PMHx: osteopenia, inguinal iliac LN, Pancreatic head cystic lesion, coronary calcifications, diverticulosis, hearing loss, frequent UTIs (6 in past year), GERD, hypercoagulable disorder?, Urinary incontinence (Urge & stress), urinary prolapse, PVD, Pre-DM, Insomnia  PSHx: b/l hip replacement, total knee replacement, hysterectomy    In ED, VS: T 36.4, /70, HR 66, SpO2 96% on RA (20 Feb 2023 20:18)      SUBJECTIVE  Patient is a 67y old Female who presents with a chief complaint of Cellulitis (21 Feb 2023 18:37)  Currently admitted to medicine with the primary diagnosis of Cellulitis      INTERVAL HPI AND OVERNIGHT EVENTS:  Patient was examined and seen at bedside. This morning she is resting comfortably in bed and reports no issues or overnight events.      OBJECTIVE  PAST MEDICAL & SURGICAL HISTORY  Lupus    Essential hypertension    CVA (cerebral vascular accident)  1987    Hypothyroidism    FH: bilateral hip replacements    H/O total knee replacement    H/O abdominal hysterectomy      ALLERGIES:  aspirin (Other)  Compazine (Other)  Levaquin (Other)  morphine (Other)  penicillins (Other)    MEDICATIONS:  STANDING MEDICATIONS  atenolol  Tablet 25 milliGRAM(s) Oral daily  betamethasone valerate 0.1% Cream 1 Application(s) Topical two times a day  cholecalciferol 5000 Unit(s) Oral daily  clotrimazole 1% Cream 1 Application(s) Topical two times a day  fluticasone propionate 50 MICROgram(s)/spray Nasal Spray 1 Spray(s) Both Nostrils <User Schedule>  furosemide    Tablet 20 milliGRAM(s) Oral daily  heparin   Injectable 5000 Unit(s) SubCutaneous every 8 hours  hydroxychloroquine 200 milliGRAM(s) Oral daily  ketorolac   Injectable 15 milliGRAM(s) IV Push once  levothyroxine 175 MICROGram(s) Oral daily  losartan 25 milliGRAM(s) Oral daily  magnesium oxide 400 milliGRAM(s) Oral three times a day with meals  melatonin 10 milliGRAM(s) Oral at bedtime  pantoprazole    Tablet 40 milliGRAM(s) Oral before breakfast  petrolatum white Ointment 1 Application(s) Topical two times a day  silver sulfADIAZINE 1% Cream 1 Application(s) Topical two times a day  vitamin A &amp; D Ointment 1 Application(s) Topical four times a day    PRN MEDICATIONS  acetaminophen     Tablet .. 650 milliGRAM(s) Oral every 6 hours PRN  aluminum hydroxide/magnesium hydroxide/simethicone Suspension 30 milliLiter(s) Oral every 4 hours PRN  ondansetron Injectable 4 milliGRAM(s) IV Push every 8 hours PRN      VITAL SIGNS: Last 24 Hours  T(C): 36.7 (22 Feb 2023 08:00), Max: 36.7 (21 Feb 2023 15:27)  T(F): 98.1 (22 Feb 2023 08:00), Max: 98.1 (22 Feb 2023 08:00)  HR: 59 (22 Feb 2023 08:00) (53 - 59)  BP: 177/88 (22 Feb 2023 08:00) (143/72 - 187/90)  BP(mean): --  RR: 18 (22 Feb 2023 08:00) (16 - 18)  SpO2: 97% (22 Feb 2023 08:00) (97% - 98%)    LABS:                        12.2   7.15  )-----------( 258      ( 21 Feb 2023 05:59 )             37.5     02-21    141  |  101  |  10  ----------------------------<  81  3.7   |  26  |  0.7    Ca    9.4      21 Feb 2023 05:59  Mg     1.7     02-21    TPro  7.2  /  Alb  4.0  /  TBili  0.4  /  DBili  x   /  AST  14  /  ALT  <5  /  AlkPhos  75  02-21    PT/INR - ( 21 Feb 2023 05:59 )   PT: 10.60 sec;   INR: 0.93 ratio         PTT - ( 21 Feb 2023 05:59 )  PTT:33.7 sec        PHYSICAL EXAM:  CONSTITUTIONAL: No acute distress, well-developed, well-groomed, AAOx3  HEAD: Atraumatic, normocephalic  EYES: EOM intact, PERRLA, conjunctiva and sclera clear  ENT: Supple, no masses, no thyromegaly, no bruits, no JVD; moist mucous membranes  PULMONARY: Clear to auscultation bilaterally; no wheezes, rales, or rhonchi  CARDIOVASCULAR: Regular rate and rhythm; no murmurs, rubs, or gallops  GASTROINTESTINAL: Soft, non-tender, non-distended; bowel sounds present  MUSCULOSKELETAL: 2+ peripheral pulses; no clubbing, no cyanosis, no edema  NEUROLOGY: non-focal  SKIN: No rashes or lesions; warm and dry    ASSESSMENT & PLAN   LATOYA FULTON 67y Female  MRN#: [id number]   Hospital Day: 2d    HPI:  67-year-old female w/ HTN, SLE, CVA (1987) w/o residual deficit, hypothyroidism, hx sepsis secondary to LE cellulitis presenting w/ 1 month hx of poorly healing RLE lesions w/ worsening pain, can't bear weight. Patient received doxycycline for 14 days previously w/o benefit. No fever, chills, N/V, expansion of lesion.    PMHx: osteopenia, inguinal iliac LN, Pancreatic head cystic lesion, coronary calcifications, diverticulosis, hearing loss, frequent UTIs (6 in past year), GERD, hypercoagulable disorder?, Urinary incontinence (Urge & stress), urinary prolapse, PVD, Pre-DM, Insomnia  PSHx: b/l hip replacement, total knee replacement, hysterectomy    In ED, VS: T 36.4, /70, HR 66, SpO2 96% on RA (20 Feb 2023 20:18)      SUBJECTIVE  Patient is a 67y old Female who presents with a chief complaint of Cellulitis (21 Feb 2023 18:37)  Currently admitted to medicine with the primary diagnosis of Cellulitis      INTERVAL HPI AND OVERNIGHT EVENTS:  Patient was examined and seen at bedside. This morning she is resting comfortably in bed and reports no issues or overnight events.      OBJECTIVE  PAST MEDICAL & SURGICAL HISTORY  Lupus    Essential hypertension    CVA (cerebral vascular accident)  1987    Hypothyroidism    FH: bilateral hip replacements    H/O total knee replacement    H/O abdominal hysterectomy      ALLERGIES:  aspirin (Other)  Compazine (Other)  Levaquin (Other)  morphine (Other)  penicillins (Other)    MEDICATIONS:  STANDING MEDICATIONS  atenolol  Tablet 25 milliGRAM(s) Oral daily  betamethasone valerate 0.1% Cream 1 Application(s) Topical two times a day  cholecalciferol 5000 Unit(s) Oral daily  clotrimazole 1% Cream 1 Application(s) Topical two times a day  fluticasone propionate 50 MICROgram(s)/spray Nasal Spray 1 Spray(s) Both Nostrils <User Schedule>  furosemide    Tablet 20 milliGRAM(s) Oral daily  heparin   Injectable 5000 Unit(s) SubCutaneous every 8 hours  hydroxychloroquine 200 milliGRAM(s) Oral daily  ketorolac   Injectable 15 milliGRAM(s) IV Push once  levothyroxine 175 MICROGram(s) Oral daily  losartan 25 milliGRAM(s) Oral daily  magnesium oxide 400 milliGRAM(s) Oral three times a day with meals  melatonin 10 milliGRAM(s) Oral at bedtime  pantoprazole    Tablet 40 milliGRAM(s) Oral before breakfast  petrolatum white Ointment 1 Application(s) Topical two times a day  silver sulfADIAZINE 1% Cream 1 Application(s) Topical two times a day  vitamin A &amp; D Ointment 1 Application(s) Topical four times a day    PRN MEDICATIONS  acetaminophen     Tablet .. 650 milliGRAM(s) Oral every 6 hours PRN  aluminum hydroxide/magnesium hydroxide/simethicone Suspension 30 milliLiter(s) Oral every 4 hours PRN  ondansetron Injectable 4 milliGRAM(s) IV Push every 8 hours PRN      VITAL SIGNS: Last 24 Hours  T(C): 36.7 (22 Feb 2023 08:00), Max: 36.7 (21 Feb 2023 15:27)  T(F): 98.1 (22 Feb 2023 08:00), Max: 98.1 (22 Feb 2023 08:00)  HR: 59 (22 Feb 2023 08:00) (53 - 59)  BP: 177/88 (22 Feb 2023 08:00) (143/72 - 187/90)  BP(mean): --  RR: 18 (22 Feb 2023 08:00) (16 - 18)  SpO2: 97% (22 Feb 2023 08:00) (97% - 98%)    LABS:                        12.2   7.15  )-----------( 258      ( 21 Feb 2023 05:59 )             37.5     02-21    141  |  101  |  10  ----------------------------<  81  3.7   |  26  |  0.7    Ca    9.4      21 Feb 2023 05:59  Mg     1.7     02-21    TPro  7.2  /  Alb  4.0  /  TBili  0.4  /  DBili  x   /  AST  14  /  ALT  <5  /  AlkPhos  75  02-21    PT/INR - ( 21 Feb 2023 05:59 )   PT: 10.60 sec;   INR: 0.93 ratio         PTT - ( 21 Feb 2023 05:59 )  PTT:33.7 sec        PHYSICAL EXAM:  CONSTITUTIONAL: No acute distress, well-developed, well-groomed, AAOx3  HEAD: Atraumatic, normocephalic  EYES: EOM intact, PERRLA, conjunctiva and sclera clear  ENT: Supple, no masses, no thyromegaly, no bruits, no JVD; moist mucous membranes  PULMONARY: Clear to auscultation bilaterally; no wheezes, rales, or rhonchi  CARDIOVASCULAR: Regular rate and rhythm; no murmurs, rubs, or gallops  GASTROINTESTINAL: Soft, non-tender, non-distended; bowel sounds present  MUSCULOSKELETAL: 2+ peripheral pulses; right lower extremity chronic dermatitis changes with erythema and pain on palpation, no pus   NEUROLOGY: non-focal      ASSESSMENT & PLAN  67-year-old female w/ HTN, SLE, CVA (1987) w/o residual deficit, hypothyroidism, hx sepsis secondary to LE cellulitis presenting w/ 1 month hx of poorly healing RLE cellulitis w/o worsening pain, can't bear weight.    # hx sepsis secondary to LE cellulitis  # Stasis Dermatitis w/ possible Erysipelas on RLE   * s/p cefazolin  - XR right foot: Diffuse osteopenia. No acute fracture or dislocation. No ankle joint effusion. There is osteonecrosis measuring 2 cm in the distal tibia and 2.6 cm in the calcaneus. Diffuse soft tissue swelling/edema. There are degenerative changes of the hindfoot, midfoot and forefoot joints. Apparent hallux valgus deformity and second through fifth hammertoe deformities. Chronic healed fracture deformity of the fifth   - bilateral venous duplex negative   - podiatry eval noted  - f/u arterial duplex   - f/u ortho consult for osteonecrosis of distal tibia   - c/w Lasix  - Deonte stockings  - Silver Sulfadiazine + betamethasone  - foot care, petrolatum jelly  - off antibiotics for now     # frequent UTIs (6 in past year)  # Urinary incontinence (Urge & stress)  # urinary prolapse  # Urinary Incontinence  - Consider prophylactic Antibiotics  - Outpatient referral to  UroGyn [previously offered Sx but refused]  - Educated about Kegel Exercises & Pessaries    # Normocytic anemia  - f/u reticulocyte count, iron, TIBC, ferritin, B12, folate    # SLE  # Hypercoagulable disorder?  * elevated dsDNA & DANIS; nl C3 & C4  - c/w hydroxychloroquine  - Check lupus profile for lupus anticoagulant, anticardiolipin, anti-beta2 glycoprotein    # coronary calcifications  # HTN  # CVA (1987) w/o residual deficit  # PVD  # Pre-DM  - c/w losartan, Lasix, atenolol    # hypothyroidism  * hx elevated TSH ~ 9  * Levothyroxine dose increased 1 week prior to admission as per patient  - c/w levothyroxine    # inguinal iliac LN  - f/u EBV titers  - Previous discharge requested patient to do outpatient PET to r/o lymphoma [depends on [facility]]    # Pancreatic head cystic lesion  *  & CEA within normal limits  - needs EUS/EGD w/ possible ERCP for CBD stent [depends on [facility]]    # diverticulosis  - high fiber diet    # osteopenia  # b/l hip replacement  # total knee replacement  # hysterectomy  # hearing loss  - outpatient f/u    # GERD  - c/w Protonix    # Insomnia  - c/w Melatonin    # Diet DASH, TLC    # GI PPx Protonix    # Code DNI, DNR. Patient would not like to do any surgeries

## 2023-02-23 LAB
ANION GAP SERPL CALC-SCNC: 13 MMOL/L — SIGNIFICANT CHANGE UP (ref 7–14)
BASOPHILS # BLD AUTO: 0.01 K/UL — SIGNIFICANT CHANGE UP (ref 0–0.2)
BASOPHILS NFR BLD AUTO: 0.2 % — SIGNIFICANT CHANGE UP (ref 0–1)
BUN SERPL-MCNC: 20 MG/DL — SIGNIFICANT CHANGE UP (ref 10–20)
CALCIUM SERPL-MCNC: 8.9 MG/DL — SIGNIFICANT CHANGE UP (ref 8.4–10.4)
CHLORIDE SERPL-SCNC: 105 MMOL/L — SIGNIFICANT CHANGE UP (ref 98–110)
CO2 SERPL-SCNC: 24 MMOL/L — SIGNIFICANT CHANGE UP (ref 17–32)
CREAT SERPL-MCNC: 0.7 MG/DL — SIGNIFICANT CHANGE UP (ref 0.7–1.5)
EGFR: 95 ML/MIN/1.73M2 — SIGNIFICANT CHANGE UP
EOSINOPHIL # BLD AUTO: 0.1 K/UL — SIGNIFICANT CHANGE UP (ref 0–0.7)
EOSINOPHIL NFR BLD AUTO: 1.5 % — SIGNIFICANT CHANGE UP (ref 0–8)
GLUCOSE SERPL-MCNC: 103 MG/DL — HIGH (ref 70–99)
HCT VFR BLD CALC: 39.7 % — SIGNIFICANT CHANGE UP (ref 37–47)
HGB BLD-MCNC: 12.7 G/DL — SIGNIFICANT CHANGE UP (ref 12–16)
IMM GRANULOCYTES NFR BLD AUTO: 0.5 % — HIGH (ref 0.1–0.3)
LYMPHOCYTES # BLD AUTO: 1.48 K/UL — SIGNIFICANT CHANGE UP (ref 1.2–3.4)
LYMPHOCYTES # BLD AUTO: 22.4 % — SIGNIFICANT CHANGE UP (ref 20.5–51.1)
MCHC RBC-ENTMCNC: 28.3 PG — SIGNIFICANT CHANGE UP (ref 27–31)
MCHC RBC-ENTMCNC: 32 G/DL — SIGNIFICANT CHANGE UP (ref 32–37)
MCV RBC AUTO: 88.6 FL — SIGNIFICANT CHANGE UP (ref 81–99)
MONOCYTES # BLD AUTO: 0.41 K/UL — SIGNIFICANT CHANGE UP (ref 0.1–0.6)
MONOCYTES NFR BLD AUTO: 6.2 % — SIGNIFICANT CHANGE UP (ref 1.7–9.3)
NEUTROPHILS # BLD AUTO: 4.58 K/UL — SIGNIFICANT CHANGE UP (ref 1.4–6.5)
NEUTROPHILS NFR BLD AUTO: 69.2 % — SIGNIFICANT CHANGE UP (ref 42.2–75.2)
NRBC # BLD: 0 /100 WBCS — SIGNIFICANT CHANGE UP (ref 0–0)
PLATELET # BLD AUTO: 260 K/UL — SIGNIFICANT CHANGE UP (ref 130–400)
POTASSIUM SERPL-MCNC: 3.6 MMOL/L — SIGNIFICANT CHANGE UP (ref 3.5–5)
POTASSIUM SERPL-SCNC: 3.6 MMOL/L — SIGNIFICANT CHANGE UP (ref 3.5–5)
RBC # BLD: 4.48 M/UL — SIGNIFICANT CHANGE UP (ref 4.2–5.4)
RBC # FLD: 14.1 % — SIGNIFICANT CHANGE UP (ref 11.5–14.5)
SODIUM SERPL-SCNC: 142 MMOL/L — SIGNIFICANT CHANGE UP (ref 135–146)
WBC # BLD: 6.61 K/UL — SIGNIFICANT CHANGE UP (ref 4.8–10.8)
WBC # FLD AUTO: 6.61 K/UL — SIGNIFICANT CHANGE UP (ref 4.8–10.8)

## 2023-02-23 PROCEDURE — 99232 SBSQ HOSP IP/OBS MODERATE 35: CPT

## 2023-02-23 RX ADMIN — Medication 200 MILLIGRAM(S): at 12:23

## 2023-02-23 RX ADMIN — Medication 1 APPLICATION(S): at 18:34

## 2023-02-23 RX ADMIN — Medication 1 APPLICATION(S): at 05:53

## 2023-02-23 RX ADMIN — MAGNESIUM OXIDE 400 MG ORAL TABLET 400 MILLIGRAM(S): 241.3 TABLET ORAL at 12:23

## 2023-02-23 RX ADMIN — Medication 1 APPLICATION(S): at 18:35

## 2023-02-23 RX ADMIN — ATENOLOL 25 MILLIGRAM(S): 25 TABLET ORAL at 05:50

## 2023-02-23 RX ADMIN — Medication 175 MICROGRAM(S): at 05:51

## 2023-02-23 RX ADMIN — Medication 1 APPLICATION(S): at 05:52

## 2023-02-23 RX ADMIN — Medication 1 SPRAY(S): at 05:52

## 2023-02-23 RX ADMIN — HEPARIN SODIUM 5000 UNIT(S): 5000 INJECTION INTRAVENOUS; SUBCUTANEOUS at 05:52

## 2023-02-23 RX ADMIN — Medication 1 APPLICATION(S): at 06:28

## 2023-02-23 RX ADMIN — Medication 10 MILLIGRAM(S): at 22:56

## 2023-02-23 RX ADMIN — HEPARIN SODIUM 5000 UNIT(S): 5000 INJECTION INTRAVENOUS; SUBCUTANEOUS at 18:32

## 2023-02-23 RX ADMIN — MAGNESIUM OXIDE 400 MG ORAL TABLET 400 MILLIGRAM(S): 241.3 TABLET ORAL at 18:32

## 2023-02-23 RX ADMIN — PANTOPRAZOLE SODIUM 40 MILLIGRAM(S): 20 TABLET, DELAYED RELEASE ORAL at 06:27

## 2023-02-23 RX ADMIN — Medication 1 APPLICATION(S): at 13:21

## 2023-02-23 RX ADMIN — MAGNESIUM OXIDE 400 MG ORAL TABLET 400 MILLIGRAM(S): 241.3 TABLET ORAL at 09:41

## 2023-02-23 RX ADMIN — Medication 5000 UNIT(S): at 12:25

## 2023-02-23 RX ADMIN — Medication 20 MILLIGRAM(S): at 05:51

## 2023-02-23 RX ADMIN — LOSARTAN POTASSIUM 25 MILLIGRAM(S): 100 TABLET, FILM COATED ORAL at 05:50

## 2023-02-23 RX ADMIN — Medication 1 APPLICATION(S): at 18:33

## 2023-02-23 RX ADMIN — HEPARIN SODIUM 5000 UNIT(S): 5000 INJECTION INTRAVENOUS; SUBCUTANEOUS at 22:57

## 2023-02-23 NOTE — PROGRESS NOTE ADULT - SUBJECTIVE AND OBJECTIVE BOX
LATOYA FULTON 67y Female  MRN#: [id number]   Hospital Day: 3d    HPI:  67-year-old female w/ HTN, SLE, CVA (1987) w/o residual deficit, hypothyroidism, hx sepsis secondary to LE cellulitis presenting w/ 1 month hx of poorly healing RLE lesions w/ worsening pain, can't bear weight. Patient received doxycycline for 14 days previously w/o benefit. No fever, chills, N/V, expansion of lesion.    PMHx: osteopenia, inguinal iliac LN, Pancreatic head cystic lesion, coronary calcifications, diverticulosis, hearing loss, frequent UTIs (6 in past year), GERD, hypercoagulable disorder?, Urinary incontinence (Urge & stress), urinary prolapse, PVD, Pre-DM, Insomnia  PSHx: b/l hip replacement, total knee replacement, hysterectomy    In ED, VS: T 36.4, /70, HR 66, SpO2 96% on RA (20 Feb 2023 20:18)      SUBJECTIVE  Patient is a 67y old Female who presents with a chief complaint of Cellulitis (23 Feb 2023 11:22)  Currently admitted to medicine with the primary diagnosis of Cellulitis      INTERVAL HPI AND OVERNIGHT EVENTS:  Patient was examined and seen at bedside. This morning she is resting comfortably in bed and reports no issues or overnight events.      OBJECTIVE  PAST MEDICAL & SURGICAL HISTORY  Lupus    Essential hypertension    CVA (cerebral vascular accident)  1987    Hypothyroidism    FH: bilateral hip replacements    H/O total knee replacement    H/O abdominal hysterectomy      ALLERGIES:  aspirin (Other)  Compazine (Other)  Levaquin (Other)  morphine (Other)  penicillins (Other)    MEDICATIONS:  STANDING MEDICATIONS  atenolol  Tablet 25 milliGRAM(s) Oral daily  betamethasone valerate 0.1% Cream 1 Application(s) Topical two times a day  cholecalciferol 5000 Unit(s) Oral daily  clotrimazole 1% Cream 1 Application(s) Topical two times a day  fluticasone propionate 50 MICROgram(s)/spray Nasal Spray 1 Spray(s) Both Nostrils <User Schedule>  furosemide    Tablet 20 milliGRAM(s) Oral daily  heparin   Injectable 5000 Unit(s) SubCutaneous every 8 hours  hydroxychloroquine 200 milliGRAM(s) Oral daily  levothyroxine 175 MICROGram(s) Oral daily  losartan 25 milliGRAM(s) Oral daily  magnesium oxide 400 milliGRAM(s) Oral three times a day with meals  melatonin 10 milliGRAM(s) Oral at bedtime  pantoprazole    Tablet 40 milliGRAM(s) Oral before breakfast  petrolatum white Ointment 1 Application(s) Topical two times a day  silver sulfADIAZINE 1% Cream 1 Application(s) Topical two times a day  vitamin A &amp; D Ointment 1 Application(s) Topical four times a day    PRN MEDICATIONS  acetaminophen     Tablet .. 650 milliGRAM(s) Oral every 6 hours PRN  aluminum hydroxide/magnesium hydroxide/simethicone Suspension 30 milliLiter(s) Oral every 4 hours PRN  ondansetron Injectable 4 milliGRAM(s) IV Push every 8 hours PRN      VITAL SIGNS: Last 24 Hours  T(C): 35.7 (23 Feb 2023 08:00), Max: 36.8 (22 Feb 2023 23:55)  T(F): 96.3 (23 Feb 2023 08:00), Max: 98.3 (22 Feb 2023 23:55)  HR: 49 (23 Feb 2023 08:00) (49 - 57)  BP: 127/67 (23 Feb 2023 08:00) (116/57 - 127/67)  BP(mean): --  RR: 18 (23 Feb 2023 08:00) (18 - 18)  SpO2: 97% (23 Feb 2023 08:00) (97% - 97%)    LABS:                        12.7   6.61  )-----------( 260      ( 23 Feb 2023 07:39 )             39.7     02-23    142  |  105  |  20  ----------------------------<  103<H>  3.6   |  24  |  0.7    Ca    8.9      23 Feb 2023 07:39      PHYSICAL EXAM:  CONSTITUTIONAL: No acute distress, well-developed, well-groomed, AAOx3  HEAD: Atraumatic, normocephalic  EYES: EOM intact, PERRLA, conjunctiva and sclera clear  ENT: Supple, no masses, no thyromegaly, no bruits, no JVD; moist mucous membranes  PULMONARY: Clear to auscultation bilaterally; no wheezes, rales, or rhonchi  CARDIOVASCULAR: Regular rate and rhythm; no murmurs, rubs, or gallops  GASTROINTESTINAL: Soft, non-tender, non-distended; bowel sounds present  MUSCULOSKELETAL: 2+ peripheral pulses; no clubbing, no cyanosis, no edema  NEUROLOGY: non-focal  SKIN: No rashes or lesions; warm and dry    ASSESSMENT & PLAN       LATOYA FULTON 67y Female  MRN#: [id number]   Hospital Day: 3d    HPI:  67-year-old female w/ HTN, SLE, CVA (1987) w/o residual deficit, hypothyroidism, hx sepsis secondary to LE cellulitis presenting w/ 1 month hx of poorly healing RLE lesions w/ worsening pain, can't bear weight. Patient received doxycycline for 14 days previously w/o benefit. No fever, chills, N/V, expansion of lesion.    PMHx: osteopenia, inguinal iliac LN, Pancreatic head cystic lesion, coronary calcifications, diverticulosis, hearing loss, frequent UTIs (6 in past year), GERD, hypercoagulable disorder?, Urinary incontinence (Urge & stress), urinary prolapse, PVD, Pre-DM, Insomnia  PSHx: b/l hip replacement, total knee replacement, hysterectomy    In ED, VS: T 36.4, /70, HR 66, SpO2 96% on RA (20 Feb 2023 20:18)      SUBJECTIVE  Patient is a 67y old Female who presents with a chief complaint of Cellulitis (23 Feb 2023 11:22)  Currently admitted to medicine with the primary diagnosis of Cellulitis      INTERVAL HPI AND OVERNIGHT EVENTS:  Patient was examined and seen at bedside. This morning she is resting comfortably in bed and reports no issues or overnight events. She  reported improvement in her right lower extremity pain.       OBJECTIVE  PAST MEDICAL & SURGICAL HISTORY  Lupus    Essential hypertension    CVA (cerebral vascular accident)  1987    Hypothyroidism    FH: bilateral hip replacements    H/O total knee replacement    H/O abdominal hysterectomy      ALLERGIES:  aspirin (Other)  Compazine (Other)  Levaquin (Other)  morphine (Other)  penicillins (Other)    MEDICATIONS:  STANDING MEDICATIONS  atenolol  Tablet 25 milliGRAM(s) Oral daily  betamethasone valerate 0.1% Cream 1 Application(s) Topical two times a day  cholecalciferol 5000 Unit(s) Oral daily  clotrimazole 1% Cream 1 Application(s) Topical two times a day  fluticasone propionate 50 MICROgram(s)/spray Nasal Spray 1 Spray(s) Both Nostrils <User Schedule>  furosemide    Tablet 20 milliGRAM(s) Oral daily  heparin   Injectable 5000 Unit(s) SubCutaneous every 8 hours  hydroxychloroquine 200 milliGRAM(s) Oral daily  levothyroxine 175 MICROGram(s) Oral daily  losartan 25 milliGRAM(s) Oral daily  magnesium oxide 400 milliGRAM(s) Oral three times a day with meals  melatonin 10 milliGRAM(s) Oral at bedtime  pantoprazole    Tablet 40 milliGRAM(s) Oral before breakfast  petrolatum white Ointment 1 Application(s) Topical two times a day  silver sulfADIAZINE 1% Cream 1 Application(s) Topical two times a day  vitamin A &amp; D Ointment 1 Application(s) Topical four times a day    PRN MEDICATIONS  acetaminophen     Tablet .. 650 milliGRAM(s) Oral every 6 hours PRN  aluminum hydroxide/magnesium hydroxide/simethicone Suspension 30 milliLiter(s) Oral every 4 hours PRN  ondansetron Injectable 4 milliGRAM(s) IV Push every 8 hours PRN      VITAL SIGNS: Last 24 Hours  T(C): 35.7 (23 Feb 2023 08:00), Max: 36.8 (22 Feb 2023 23:55)  T(F): 96.3 (23 Feb 2023 08:00), Max: 98.3 (22 Feb 2023 23:55)  HR: 49 (23 Feb 2023 08:00) (49 - 57)  BP: 127/67 (23 Feb 2023 08:00) (116/57 - 127/67)  BP(mean): --  RR: 18 (23 Feb 2023 08:00) (18 - 18)  SpO2: 97% (23 Feb 2023 08:00) (97% - 97%)    LABS:                        12.7   6.61  )-----------( 260      ( 23 Feb 2023 07:39 )             39.7     02-23    142  |  105  |  20  ----------------------------<  103<H>  3.6   |  24  |  0.7    Ca    8.9      23 Feb 2023 07:39      PHYSICAL EXAM:  CONSTITUTIONAL: No acute distress, well-developed, well-groomed, AAOx3  HEAD: Atraumatic, normocephalic  EYES: EOM intact, PERRLA, conjunctiva and sclera clear  ENT: Supple, no masses, no thyromegaly, no bruits, no JVD; moist mucous membranes  PULMONARY: Clear to auscultation bilaterally; no wheezes, rales, or rhonchi  CARDIOVASCULAR: Regular rate and rhythm; no murmurs, rubs, or gallops  GASTROINTESTINAL: Soft, non-tender, non-distended; bowel sounds present  MUSCULOSKELETAL: 2+ peripheral pulses; right LE chronic ulcer healing, no pus   NEUROLOGY: non-focal      ASSESSMENT & PLAN  67-year-old female w/ HTN, SLE, CVA (1987) w/o residual deficit, hypothyroidism, hx sepsis secondary to LE cellulitis presenting w/ 1 month hx of poorly healing RLE cellulitis w/o worsening pain, can't bear weight.    # hx sepsis secondary to LE cellulitis  # Stasis Dermatitis w/ possible Erysipelas on RLE   * s/p cefazolin  - XR right foot: Diffuse osteopenia. No acute fracture or dislocation. No ankle joint effusion. There is osteonecrosis measuring 2 cm in the distal tibia and 2.6 cm in the calcaneus. Diffuse soft tissue swelling/edema. There are degenerative changes of the hindfoot, midfoot and forefoot joints. Apparent hallux valgus deformity and second through fifth hammertoe deformities. Chronic healed fracture deformity of the fifth   - bilateral venous duplex negative   - podiatry eval noted  - arterial duplex normal   - f/u ortho consult for osteonecrosis of distal tibia--> OP f/u   - c/w Lasix  - Deonte stockings  - Silver Sulfadiazine + betamethasone  - foot care, petrolatum jelly  - off antibiotics for now     # frequent UTIs (6 in past year)  # Urinary incontinence (Urge & stress)  # urinary prolapse  # Urinary Incontinence  - Consider prophylactic Antibiotics  - Outpatient referral to  UroGyn [previously offered Sx but refused]  - Educated about Kegel Exercises & Pessaries    # Normocytic anemia  - f/u reticulocyte count, iron, TIBC, ferritin, B12, folate    # SLE  # Hypercoagulable disorder?  * elevated dsDNA & DANIS; nl C3 & C4  - c/w hydroxychloroquine  - Check lupus profile for lupus anticoagulant, anticardiolipin, anti-beta2 glycoprotein    # coronary calcifications  # HTN  # CVA (1987) w/o residual deficit  # PVD  # Pre-DM  - c/w losartan, Lasix, atenolol    # hypothyroidism  * hx elevated TSH ~ 9  * Levothyroxine dose increased 1 week prior to admission as per patient  - c/w levothyroxine    # inguinal iliac LN  - f/u EBV titers  - Previous discharge requested patient to do outpatient PET to r/o lymphoma [depends on [facility]]    # Pancreatic head cystic lesion  *  & CEA within normal limits  - needs EUS/EGD w/ possible ERCP for CBD stent [depends on [facility]]    # diverticulosis  - high fiber diet    # osteopenia  # b/l hip replacement  # total knee replacement  # hysterectomy  # hearing loss  - outpatient f/u    # GERD  - c/w Protonix    # Insomnia  - c/w Melatonin    # Diet DASH, TLC    # GI PPx Protonix    # Code DNI, DNR. Patient would not like to do any surgeries

## 2023-02-23 NOTE — PROGRESS NOTE ADULT - SUBJECTIVE AND OBJECTIVE BOX
LATOYA FULTON  67y  [facility] 4B 010 B      Patient is a 67y old  Female who presents with a chief complaint of Cellulitis (22 Feb 2023 13:28)      INTERVAL HPI/OVERNIGHT EVENTS:    no acute events overnight     REVIEW OF SYSTEMS:  CONSTITUTIONAL: No fever, weight loss, or fatigue  EYES: No eye pain, visual disturbances, or discharge  ENMT:  No difficulty hearing, tinnitus, vertigo; No sinus or throat pain  NECK: No pain or stiffness  BREASTS: No pain, masses, or nipple discharge  RESPIRATORY: No cough, wheezing, chills or hemoptysis; No shortness of breath  CARDIOVASCULAR: No chest pain, palpitations, dizziness, or leg swelling  GASTROINTESTINAL: No abdominal or epigastric pain. No nausea, vomiting, or hematemesis; No diarrhea or constipation. No melena or hematochezia.  GENITOURINARY: No dysuria, frequency, hematuria, or incontinence  NEUROLOGICAL: No headaches, memory loss, loss of strength, numbness, or tremors  SKIN: No itching, burning, rashes, or lesions   LYMPH NODES: No enlarged glands  ENDOCRINE: No heat or cold intolerance; No hair loss  MUSCULOSKELETAL: No joint pain or swelling; No muscle, back, or extremity pain  PSYCHIATRIC: No depression, anxiety, mood swings, or difficulty sleeping  HEME/LYMPH: No easy bruising, or bleeding gums  ALLERY AND IMMUNOLOGIC: No hives or eczema  FAMILY HISTORY:    T(C): 36.8 (02-22-23 @ 23:55), Max: 36.8 (02-22-23 @ 15:33)  HR: 57 (02-22-23 @ 23:55) (57 - 58)  BP: 116/57 (02-22-23 @ 23:55) (116/57 - 121/62)  RR: 18 (02-22-23 @ 23:55) (18 - 18)  SpO2: 94% (02-22-23 @ 15:33) (94% - 94%)  Wt(kg): --Vital Signs Last 24 Hrs  T(C): 36.8 (22 Feb 2023 23:55), Max: 36.8 (22 Feb 2023 15:33)  T(F): 98.3 (22 Feb 2023 23:55), Max: 98.3 (22 Feb 2023 23:55)  HR: 57 (22 Feb 2023 23:55) (57 - 58)  BP: 116/57 (22 Feb 2023 23:55) (116/57 - 121/62)  BP(mean): --  RR: 18 (22 Feb 2023 23:55) (18 - 18)  SpO2: 94% (22 Feb 2023 15:33) (94% - 94%)    Parameters below as of 22 Feb 2023 15:33  Patient On (Oxygen Delivery Method): room air        PHYSICAL EXAM:  GENERAL: NAD, well-groomed, well-developed  HEAD:  Atraumatic, Normocephalic  EYES: EOMI, PERRLA, conjunctiva and sclera clear  ENMT: No tonsillar erythema, exudates, or enlargement; Moist mucous membranes, Good dentition, No lesions  NECK: Supple, No JVD, Normal thyroid  NERVOUS SYSTEM:  Alert & Oriented X3, Good concentration; Motor Strength 5/5 B/L upper and lower extremities; DTRs 2+ intact and symmetric  PULM: Clear to auscultation bilaterally  CARDIAC: Regular rate and rhythm; No murmurs, rubs, or gallops  GI: Soft, Nontender, Nondistended; Bowel sounds present  EXTREMITIES:  2+ Peripheral Pulses, No clubbing, cyanosis, or edema R LE chronic skin changes of ankle and distal   LYMPH: No lymphadenopathy noted  SKIN: No rashes or lesions    Consultant(s) Notes Reviewed:  [x ] YES  [ ] NO  Care Discussed with Consultants/Other Providers [ x] YES  [ ] NO    LABS:                            12.7   6.61  )-----------( 260      ( 23 Feb 2023 07:39 )             39.7   02-23    142  |  105  |  20  ----------------------------<  103<H>  3.6   |  24  |  0.7    Ca    8.9      23 Feb 2023 07:39              acetaminophen     Tablet .. 650 milliGRAM(s) Oral every 6 hours PRN  aluminum hydroxide/magnesium hydroxide/simethicone Suspension 30 milliLiter(s) Oral every 4 hours PRN  atenolol  Tablet 25 milliGRAM(s) Oral daily  betamethasone valerate 0.1% Cream 1 Application(s) Topical two times a day  cholecalciferol 5000 Unit(s) Oral daily  clotrimazole 1% Cream 1 Application(s) Topical two times a day  fluticasone propionate 50 MICROgram(s)/spray Nasal Spray 1 Spray(s) Both Nostrils <User Schedule>  furosemide    Tablet 20 milliGRAM(s) Oral daily  heparin   Injectable 5000 Unit(s) SubCutaneous every 8 hours  hydroxychloroquine 200 milliGRAM(s) Oral daily  levothyroxine 175 MICROGram(s) Oral daily  losartan 25 milliGRAM(s) Oral daily  magnesium oxide 400 milliGRAM(s) Oral three times a day with meals  melatonin 10 milliGRAM(s) Oral at bedtime  ondansetron Injectable 4 milliGRAM(s) IV Push every 8 hours PRN  pantoprazole    Tablet 40 milliGRAM(s) Oral before breakfast  petrolatum white Ointment 1 Application(s) Topical two times a day  silver sulfADIAZINE 1% Cream 1 Application(s) Topical two times a day  vitamin A &amp; D Ointment 1 Application(s) Topical four times a day      HEALTH ISSUES - PROBLEM Dx:  Suspected deep vein thrombosis (DVT)            Case Discussed with House Staff   Spectra [phone]

## 2023-02-24 ENCOUNTER — TRANSCRIPTION ENCOUNTER (OUTPATIENT)
Age: 68
End: 2023-02-24

## 2023-02-24 VITALS
RESPIRATION RATE: 18 BRPM | OXYGEN SATURATION: 97 % | TEMPERATURE: 98 F | HEART RATE: 56 BPM | DIASTOLIC BLOOD PRESSURE: 61 MMHG | SYSTOLIC BLOOD PRESSURE: 114 MMHG

## 2023-02-24 LAB — SARS-COV-2 RNA SPEC QL NAA+PROBE: SIGNIFICANT CHANGE UP

## 2023-02-24 PROCEDURE — 99232 SBSQ HOSP IP/OBS MODERATE 35: CPT

## 2023-02-24 RX ORDER — TRAMADOL HYDROCHLORIDE 50 MG/1
1 TABLET ORAL
Qty: 0 | Refills: 0 | DISCHARGE
Start: 2023-02-24

## 2023-02-24 RX ORDER — TRAMADOL HYDROCHLORIDE 50 MG/1
50 TABLET ORAL EVERY 8 HOURS
Refills: 0 | Status: DISCONTINUED | OUTPATIENT
Start: 2023-02-24 | End: 2023-02-24

## 2023-02-24 RX ORDER — CHOLECALCIFEROL (VITAMIN D3) 125 MCG
1 CAPSULE ORAL
Qty: 0 | Refills: 0 | DISCHARGE

## 2023-02-24 RX ORDER — CHOLECALCIFEROL (VITAMIN D3) 125 MCG
5000 CAPSULE ORAL
Qty: 0 | Refills: 0 | DISCHARGE
Start: 2023-02-24

## 2023-02-24 RX ADMIN — Medication 1 APPLICATION(S): at 05:18

## 2023-02-24 RX ADMIN — Medication 1 APPLICATION(S): at 17:10

## 2023-02-24 RX ADMIN — MAGNESIUM OXIDE 400 MG ORAL TABLET 400 MILLIGRAM(S): 241.3 TABLET ORAL at 08:10

## 2023-02-24 RX ADMIN — ATENOLOL 25 MILLIGRAM(S): 25 TABLET ORAL at 05:16

## 2023-02-24 RX ADMIN — Medication 1 APPLICATION(S): at 17:13

## 2023-02-24 RX ADMIN — Medication 1 SPRAY(S): at 05:17

## 2023-02-24 RX ADMIN — MAGNESIUM OXIDE 400 MG ORAL TABLET 400 MILLIGRAM(S): 241.3 TABLET ORAL at 17:13

## 2023-02-24 RX ADMIN — Medication 5000 UNIT(S): at 13:11

## 2023-02-24 RX ADMIN — Medication 20 MILLIGRAM(S): at 05:16

## 2023-02-24 RX ADMIN — Medication 650 MILLIGRAM(S): at 02:36

## 2023-02-24 RX ADMIN — Medication 1 APPLICATION(S): at 11:58

## 2023-02-24 RX ADMIN — MAGNESIUM OXIDE 400 MG ORAL TABLET 400 MILLIGRAM(S): 241.3 TABLET ORAL at 11:54

## 2023-02-24 RX ADMIN — Medication 650 MILLIGRAM(S): at 04:00

## 2023-02-24 RX ADMIN — PANTOPRAZOLE SODIUM 40 MILLIGRAM(S): 20 TABLET, DELAYED RELEASE ORAL at 05:18

## 2023-02-24 RX ADMIN — Medication 650 MILLIGRAM(S): at 11:54

## 2023-02-24 RX ADMIN — Medication 200 MILLIGRAM(S): at 11:55

## 2023-02-24 RX ADMIN — HEPARIN SODIUM 5000 UNIT(S): 5000 INJECTION INTRAVENOUS; SUBCUTANEOUS at 05:17

## 2023-02-24 RX ADMIN — TRAMADOL HYDROCHLORIDE 50 MILLIGRAM(S): 50 TABLET ORAL at 15:58

## 2023-02-24 RX ADMIN — Medication 175 MICROGRAM(S): at 05:19

## 2023-02-24 RX ADMIN — Medication 1 APPLICATION(S): at 00:54

## 2023-02-24 RX ADMIN — LOSARTAN POTASSIUM 25 MILLIGRAM(S): 100 TABLET, FILM COATED ORAL at 05:16

## 2023-02-24 RX ADMIN — HEPARIN SODIUM 5000 UNIT(S): 5000 INJECTION INTRAVENOUS; SUBCUTANEOUS at 13:11

## 2023-02-24 NOTE — PROGRESS NOTE ADULT - SUBJECTIVE AND OBJECTIVE BOX
LATOYA FULTON  67y  [facility] 4B 010 B      Patient is a 67y old  Female who presents with a chief complaint of Cellulitis (24 Feb 2023 10:27)      INTERVAL HPI/OVERNIGHT EVENTS:    no events overnight     REVIEW OF SYSTEMS:  CONSTITUTIONAL: No fever, weight loss, or fatigue  EYES: No eye pain, visual disturbances, or discharge  ENMT:  No difficulty hearing, tinnitus, vertigo; No sinus or throat pain  NECK: No pain or stiffness  BREASTS: No pain, masses, or nipple discharge  RESPIRATORY: No cough, wheezing, chills or hemoptysis; No shortness of breath  CARDIOVASCULAR: No chest pain, palpitations, dizziness, or leg swelling  GASTROINTESTINAL: No abdominal or epigastric pain. No nausea, vomiting, or hematemesis; No diarrhea or constipation. No melena or hematochezia.  GENITOURINARY: No dysuria, frequency, hematuria, or incontinence  NEUROLOGICAL: No headaches, memory loss, loss of strength, numbness, or tremors  SKIN: No itching, burning, rashes, or lesions   LYMPH NODES: No enlarged glands  ENDOCRINE: No heat or cold intolerance; No hair loss  MUSCULOSKELETAL: No joint pain or swelling; No muscle, back, or extremity pain  PSYCHIATRIC: No depression, anxiety, mood swings, or difficulty sleeping  HEME/LYMPH: No easy bruising, or bleeding gums  ALLERY AND IMMUNOLOGIC: No hives or eczema  FAMILY HISTORY:    T(C): 36 (02-24-23 @ 08:52), Max: 36.7 (02-24-23 @ 05:20)  HR: 50 (02-24-23 @ 08:52) (50 - 67)  BP: 128/60 (02-24-23 @ 08:52) (124/64 - 148/71)  RR: 18 (02-24-23 @ 08:52) (18 - 18)  SpO2: 98% (02-24-23 @ 05:20) (98% - 98%)  Wt(kg): --Vital Signs Last 24 Hrs  T(C): 36 (24 Feb 2023 08:52), Max: 36.7 (24 Feb 2023 05:20)  T(F): 96.8 (24 Feb 2023 08:52), Max: 98.1 (24 Feb 2023 05:20)  HR: 50 (24 Feb 2023 08:52) (50 - 67)  BP: 128/60 (24 Feb 2023 08:52) (124/64 - 148/71)  BP(mean): --  RR: 18 (24 Feb 2023 08:52) (18 - 18)  SpO2: 98% (24 Feb 2023 05:20) (98% - 98%)    Parameters below as of 24 Feb 2023 05:20  Patient On (Oxygen Delivery Method): room air        PHYSICAL EXAM:  GENERAL: NAD, well-groomed, well-developed  HEAD:  Atraumatic, Normocephalic  EYES: EOMI, PERRLA, conjunctiva and sclera clear  ENMT: No tonsillar erythema, exudates, or enlargement; Moist mucous membranes, Good dentition, No lesions  NECK: Supple, No JVD, Normal thyroid  NERVOUS SYSTEM:  Alert & Oriented X3, Good concentration; Motor Strength 5/5 B/L upper and lower extremities; DTRs 2+ intact and symmetric  PULM: Clear to auscultation bilaterally  CARDIAC: Regular rate and rhythm; No murmurs, rubs, or gallops  GI: Soft, Nontender, Nondistended; Bowel sounds present  EXTREMITIES:  R Foot chronic skin changes   LYMPH: No lymphadenopathy noted  SKIN: No rashes or lesions    Consultant(s) Notes Reviewed:  [x ] YES  [ ] NO  Care Discussed with Consultants/Other Providers [ x] YES  [ ] NO    LABS:                            12.7   6.61  )-----------( 260      ( 23 Feb 2023 07:39 )             39.7   02-23    142  |  105  |  20  ----------------------------<  103<H>  3.6   |  24  |  0.7    Ca    8.9      23 Feb 2023 07:39              acetaminophen     Tablet .. 650 milliGRAM(s) Oral every 6 hours PRN  aluminum hydroxide/magnesium hydroxide/simethicone Suspension 30 milliLiter(s) Oral every 4 hours PRN  atenolol  Tablet 25 milliGRAM(s) Oral daily  betamethasone valerate 0.1% Cream 1 Application(s) Topical two times a day  cholecalciferol 5000 Unit(s) Oral daily  clotrimazole 1% Cream 1 Application(s) Topical two times a day  fluticasone propionate 50 MICROgram(s)/spray Nasal Spray 1 Spray(s) Both Nostrils <User Schedule>  furosemide    Tablet 20 milliGRAM(s) Oral daily  heparin   Injectable 5000 Unit(s) SubCutaneous every 8 hours  hydroxychloroquine 200 milliGRAM(s) Oral daily  levothyroxine 175 MICROGram(s) Oral daily  losartan 25 milliGRAM(s) Oral daily  magnesium oxide 400 milliGRAM(s) Oral three times a day with meals  melatonin 10 milliGRAM(s) Oral at bedtime  ondansetron Injectable 4 milliGRAM(s) IV Push every 8 hours PRN  pantoprazole    Tablet 40 milliGRAM(s) Oral before breakfast  petrolatum white Ointment 1 Application(s) Topical two times a day  silver sulfADIAZINE 1% Cream 1 Application(s) Topical two times a day  vitamin A &amp; D Ointment 1 Application(s) Topical four times a day      HEALTH ISSUES - PROBLEM Dx:  Suspected deep vein thrombosis (DVT)            Case Discussed with House Staff   45 minutes spent on total encounter; more than 50% of the visit was spent counseling and/or coordinating care by the attending physician.   Spectra [phone]

## 2023-02-24 NOTE — DISCHARGE NOTE NURSING/CASE MANAGEMENT/SOCIAL WORK - NSDCPEFALRISK_GEN_ALL_CORE
For information on Fall & Injury Prevention, visit: [email or web address] OR  [email or web address] OR  https://www.cdc.gov/steadi/patient.html

## 2023-02-24 NOTE — DISCHARGE NOTE PROVIDER - HOSPITAL COURSE
67-year-old female w/ HTN, SLE, CVA (1987) w/o residual deficit, hypothyroidism, hx sepsis secondary to LE cellulitis presenting w/ 1 month hx of poorly healing RLE cellulitis w/o worsening pain, can't bear weight.    # Stasis Dermatitis w/ possible Erysipelas on RLE    s/p cefazolin  no abx   lymphedema     # frequent UTIs (6 in past year) secondary Urinary incontinence (Urge & stress) and urinary prolapse  Outpatient referral to  UroGyn [previously offered Sx but refused]    # Normocytic anemia  monitor     # SLE  hypercoagulable workup underway     # coronary calcifications    # HTN  BP: 128/60 (24 Feb 2023 08:52) (124/64 - 148/71)  controlled     # CVA (1987) w/o residual deficit    # PVD    # Pre-DM  Glucose, Serum: 103 mg/dL (02.23.23 @ 07:39)  controlled     # hypothyroidism  on levothyroxine     # inguinal iliac LN  EBV igG positive   onc op eval    # Pancreatic head cystic lesion  Op GI eval     # diverticulosis  - high fiber diet    # osteopenia    # b/l hip replacement    # total knee replacement    # hysterectomy    # hearing loss  - outpatient f/u    # GERD  - c/w Protonix 67-year-old female w/ HTN, SLE, CVA (1987) w/o residual deficit, hypothyroidism, hx sepsis secondary to LE cellulitis presenting w/ 1 month hx of poorly healing RLE lesions w/ worsening pain, can't bear weight. Patient received doxycycline for 14 days previously w/o benefit. No fever, chills, N/V, expansion of lesion.    PMHx: osteopenia, inguinal iliac LN, Pancreatic head cystic lesion, coronary calcifications, diverticulosis, hearing loss, frequent UTIs (6 in past year), GERD, hypercoagulable disorder?, Urinary incontinence (Urge & stress), urinary prolapse, PVD, Pre-DM, Insomnia  PSHx: b/l hip replacement, total knee replacement, hysterectomy    # hx sepsis secondary to LE cellulitis  # Stasis Dermatitis w/ possible Erysipelas on RLE   * s/p cefazolin  - XR right foot: Diffuse osteopenia. No acute fracture or dislocation. No ankle joint effusion. There is osteonecrosis measuring 2 cm in the distal tibia and 2.6 cm in the calcaneus. Diffuse soft tissue swelling/edema. There are degenerative changes of the hindfoot, midfoot and forefoot joints. Apparent hallux valgus deformity and second through fifth hammertoe deformities. Chronic healed fracture deformity of the fifth   - bilateral venous duplex negative   - podiatry eval noted  - arterial duplex normal   - f/u ortho consult for osteonecrosis of distal tibia--> OP f/u   - c/w Lasix  - Deonte stockings  - Silver Sulfadiazine + betamethasone  - foot care, petrolatum jelly  - off antibiotics for now     # frequent UTIs (6 in past year)  # Urinary incontinence (Urge & stress)  # urinary prolapse  # Urinary Incontinence  - Consider prophylactic Antibiotics  - Outpatient referral to  UroGyn [previously offered Sx but refused]  - Educated about Kegel Exercises & Pessaries    # Normocytic anemia  - f/u reticulocyte count, iron, TIBC, ferritin, B12, folate    # SLE  # Hypercoagulable disorder?  * elevated dsDNA & DANIS; nl C3 & C4  - c/w hydroxychloroquine  - Check lupus profile for lupus anticoagulant, anticardiolipin, anti-beta2 glycoprotein    # coronary calcifications  # HTN  # CVA (1987) w/o residual deficit  # PVD  # Pre-DM  - c/w losartan, Lasix, atenolol    # hypothyroidism  * hx elevated TSH ~ 9  * Levothyroxine dose increased 1 week prior to admission as per patient  - c/w levothyroxine    # inguinal iliac LN  - f/u EBV titers  - Previous discharge requested patient to do outpatient PET to r/o lymphoma [depends on [facility]]    # Pancreatic head cystic lesion  *  & CEA within normal limits  - needs EUS/EGD w/ possible ERCP for CBD stent [depends on [facility]]    # diverticulosis  - high fiber diet    # osteopenia  # b/l hip replacement  # total knee replacement  # hysterectomy  # hearing loss  - outpatient f/u    # GERD  - c/w Protonix    # Insomnia  - c/w Melatonin    # Diet DASH, TLC    # GI PPx Protonix

## 2023-02-24 NOTE — DISCHARGE NOTE NURSING/CASE MANAGEMENT/SOCIAL WORK - PATIENT PORTAL LINK FT
You can access the FollowMyHealth Patient Portal offered by [facility] by registering at the following website: [email or web address]. By joining [employer]’s FollowMyHealth portal, you will also be able to view your health information using other applications (apps) compatible with our system.

## 2023-02-24 NOTE — PROGRESS NOTE ADULT - ASSESSMENT
67-year-old female w/ HTN, SLE, CVA (1987) w/o residual deficit, hypothyroidism, hx sepsis secondary to LE cellulitis presenting w/ 1 month hx of poorly healing RLE cellulitis w/o worsening pain, can't bear weight.    # Stasis Dermatitis w/ possible Erysipelas on RLE    s/p cefazolin  no abx   lymphedema     # frequent UTIs (6 in past year) secondary Urinary incontinence (Urge & stress) and urinary prolapse  Outpatient referral to  UroGyn [previously offered Sx but refused]    # Normocytic anemia  Hemoglobin: 12.2 g/dL (02.21.23 @ 05:59)  monitor     # SLE  hypercoagulable workup underway     # coronary calcifications    # HTN  BP: 116/57 (22 Feb 2023 23:55) (116/57 - 121/62)  controlled     # CVA (1987) w/o residual deficit    # PVD    # Pre-DM  Glucose, Serum: 103 mg/dL (02.23.23 @ 07:39)  controlled     # hypothyroidism  on levothyroxine     # inguinal iliac LN  EBV igG positive   onc op eval    # Pancreatic head cystic lesion  Op GI eval     # diverticulosis  - high fiber diet    # osteopenia    # b/l hip replacement    # total knee replacement    # hysterectomy    # hearing loss  - outpatient f/u    # GERD  - c/w Protonix    #Hypomagnesemia replete     # Insomnia  - c/w Melatonin    #Overweight BMI 26 patient needs to see dieitian outpatient for further evaluation     #osteonecrosis measuring 2 cm in the distal tibia and  2.6 cm in the calcaneus - spoke with orthopedics , no intervention       PROGRESS NOTE HANDOFF    Pending:  PT and placement     Family discussion: patient verbalized understanding and agreeable to plan of care     Disposition: FAMILIA 
67-year-old female w/ HTN, SLE, CVA (1987) w/o residual deficit, hypothyroidism, hx sepsis secondary to LE cellulitis presenting w/ 1 month hx of poorly healing RLE cellulitis w/o worsening pain, can't bear weight.    # Stasis Dermatitis w/ possible Erysipelas on RLE    s/p cefazolin  no abx   lymphedema     # frequent UTIs (6 in past year) secondary Urinary incontinence (Urge & stress) and urinary prolapse  Outpatient referral to  UroGyn [previously offered Sx but refused]    # Normocytic anemia  Hemoglobin: 12.2 g/dL (02.21.23 @ 05:59)  monitor     # SLE  hypercoagulable workup underway     # coronary calcifications    # HTN  BP: 177/88 (22 Feb 2023 08:00) (143/72 - 187/90)  elevated, repeat bp     # CVA (1987) w/o residual deficit    # PVD    # Pre-DM  Glucose, Serum: 81 mg/dL (02.21.23 @ 05:59)  controlled     # hypothyroidism  on levothyroxine     # inguinal iliac LN  EBV igG positive   onc op eval    # Pancreatic head cystic lesion  Op GI eval     # diverticulosis  - high fiber diet    # osteopenia    # b/l hip replacement    # total knee replacement    # hysterectomy    # hearing loss  - outpatient f/u    # GERD  - c/w Protonix    #Hypomagnesemia replete     # Insomnia  - c/w Melatonin    #Overweight BMI 26 patient needs to see dieitian outpatient for further evaluation     #osteonecrosis measuring 2 cm in the distal tibia and  2.6 cm in the calcaneus - ortho evaluation       PROGRESS NOTE HANDOFF    Pending:  orthopedics evaluation , pt     Family discussion: patient verbalized understanding and agreeable to plan of care     Disposition: FAMILIA 
67-year-old female w/ HTN, SLE, CVA (1987) w/o residual deficit, hypothyroidism, hx sepsis secondary to LE cellulitis presenting w/ 1 month hx of poorly healing RLE cellulitis w/o worsening pain, can't bear weight.    # Stasis Dermatitis w/ possible Erysipelas on RLE    s/p cefazolin  no abx   lymphedema     # frequent UTIs (6 in past year) secondary Urinary incontinence (Urge & stress) and urinary prolapse  Outpatient referral to  UroGyn [previously offered Sx but refused]    # Normocytic anemia  monitor     # SLE  hypercoagulable workup underway     # coronary calcifications    # HTN  BP: 128/60 (24 Feb 2023 08:52) (124/64 - 148/71)  controlled     # CVA (1987) w/o residual deficit    # PVD    # Pre-DM  Glucose, Serum: 103 mg/dL (02.23.23 @ 07:39)  controlled     # hypothyroidism  on levothyroxine     # inguinal iliac LN  EBV igG positive   onc op eval    # Pancreatic head cystic lesion  Op GI eval     # diverticulosis  - high fiber diet    # osteopenia    # b/l hip replacement    # total knee replacement    # hysterectomy    # hearing loss  - outpatient f/u    # GERD  - c/w Protonix    #Hypomagnesemia replete     # Insomnia  - c/w Melatonin    #Overweight BMI 26 patient needs to see dieitian outpatient for further evaluation     #osteonecrosis measuring 2 cm in the distal tibia and  2.6 cm in the calcaneus - spoke with orthopedics , no intervention       PROGRESS NOTE HANDOFF    Pending:  PT and placement     Family discussion: patient verbalized understanding and agreeable to plan of care     Disposition: FAMILIA 
67F PMHx HTN, SLE, CVA 1987, hypothyroidism here with RLE pain, erythema.    #RLE pain, erythema  chronic, with inability to ambulate  suspected venous stasis dermatitis rather than cellulitis  monitor off abx  wound care  va duplex prelim neg  lasix 20  outpt vasc f/u  PT  #HTN  atenolol 25  losartan 25  #SLE  plaquenil 200  #CVA  #Hypothyroidism  synthroid 175  #DVT ppx  subq hep    #Progress Note Handoff:  Pending (specify):  Consults_________, Tests________, Test Results_______, Other____pt_____  Family discussion: d/w pt at bedside re: treatment plan, primary dx  Disposition: Home___/SNF___/Other________/Unknown at this time____x____

## 2023-02-24 NOTE — DISCHARGE NOTE PROVIDER - CARE PROVIDER_API CALL
Jamison Reed)  Orthopaedic Surgery  [address] [place] [address]  Phone: [phone]  Fax: [phone]  Follow Up Time: 1 month

## 2023-02-24 NOTE — PROGRESS NOTE ADULT - PROVIDER SPECIALTY LIST ADULT
Internal Medicine
Hospitalist
Hospitalist
Internal Medicine
Hospitalist

## 2023-02-24 NOTE — DISCHARGE NOTE PROVIDER - NSDCMRMEDTOKEN_GEN_ALL_CORE_FT
acetaminophen 325 mg oral tablet: 2 tab(s) orally every 12 hours, As needed, Temp greater or equal to 38C (100.4F), Mild Pain (1 - 3)  alendronate 70 mg oral tablet: 1 tab(s) orally once a week  APAP/CODEINE #3 TAB: 1 tab(s) orally 3 times a day, As Needed  atenolol 25 mg oral tablet: 1 tab(s) orally once a day  cholecalciferol oral tablet: 5000 unit(s) orally once a day  clotrimazole 1% topical cream (obsolete): Apply topically to affected area once a day b/l breasts  Cozaar 25 mg oral tablet: 1 tab(s) orally once a day  fluticasone 50 mcg/inh nasal spray: 1 spray(s) nasal once a day  Lasix 20 mg oral tablet: 1 tab(s) orally once a day  levothyroxine 175 mcg (0.175 mg) oral tablet: 1 tab(s) orally once a day  Melatonin 10 mg oral tablet: 1 tab(s) orally once a day (at bedtime)  Plaquenil 200 mg oral tablet: 1 tab(s) orally once a day  Protonix 40 mg oral delayed release tablet: 1 tab(s) orally once a day  traMADol 50 mg oral tablet: 1 tab(s) orally every 8 hours, As needed, Severe Pain (7 - 10)  vitamin A and D topical ointment: Apply topically to affected area 4 times a day legs

## 2023-02-24 NOTE — DISCHARGE NOTE PROVIDER - NSDCCPCAREPLAN_GEN_ALL_CORE_FT
PRINCIPAL DISCHARGE DIAGNOSIS  Diagnosis: Cellulitis  Assessment and Plan of Treatment: You wree admitted with right leg ulcer. You were treated with local wound care. Chronic stasis dermatitis changes caused this. No antibiotics received. You will be discharged today. Continue same medications. Please come back in case of severe pain not responsive to pain medications, pus coming out from wound. You also need to follow up with orthopedics as outpatient for osteonecrosis of distal tibia found on xray

## 2023-02-24 NOTE — PROGRESS NOTE ADULT - SUBJECTIVE AND OBJECTIVE BOX
LATOYA FULTON 67y Female  MRN#: [id number]   Hospital Day: 4d    HPI:  67-year-old female w/ HTN, SLE, CVA (1987) w/o residual deficit, hypothyroidism, hx sepsis secondary to LE cellulitis presenting w/ 1 month hx of poorly healing RLE lesions w/ worsening pain, can't bear weight. Patient received doxycycline for 14 days previously w/o benefit. No fever, chills, N/V, expansion of lesion.    PMHx: osteopenia, inguinal iliac LN, Pancreatic head cystic lesion, coronary calcifications, diverticulosis, hearing loss, frequent UTIs (6 in past year), GERD, hypercoagulable disorder?, Urinary incontinence (Urge & stress), urinary prolapse, PVD, Pre-DM, Insomnia  PSHx: b/l hip replacement, total knee replacement, hysterectomy    In ED, VS: T 36.4, /70, HR 66, SpO2 96% on RA (20 Feb 2023 20:18)      SUBJECTIVE  Patient is a 67y old Female who presents with a chief complaint of Cellulitis (23 Feb 2023 16:03)  Currently admitted to medicine with the primary diagnosis of Cellulitis      INTERVAL HPI AND OVERNIGHT EVENTS:  Patient was examined and seen at bedside. This morning she is resting comfortably in bed and reports no issues or overnight events.      OBJECTIVE  PAST MEDICAL & SURGICAL HISTORY  Lupus    Essential hypertension    CVA (cerebral vascular accident)  1987    Hypothyroidism    FH: bilateral hip replacements    H/O total knee replacement    H/O abdominal hysterectomy      ALLERGIES:  aspirin (Other)  Compazine (Other)  Levaquin (Other)  morphine (Other)  penicillins (Other)    MEDICATIONS:  STANDING MEDICATIONS  atenolol  Tablet 25 milliGRAM(s) Oral daily  betamethasone valerate 0.1% Cream 1 Application(s) Topical two times a day  cholecalciferol 5000 Unit(s) Oral daily  clotrimazole 1% Cream 1 Application(s) Topical two times a day  fluticasone propionate 50 MICROgram(s)/spray Nasal Spray 1 Spray(s) Both Nostrils <User Schedule>  furosemide    Tablet 20 milliGRAM(s) Oral daily  heparin   Injectable 5000 Unit(s) SubCutaneous every 8 hours  hydroxychloroquine 200 milliGRAM(s) Oral daily  levothyroxine 175 MICROGram(s) Oral daily  losartan 25 milliGRAM(s) Oral daily  magnesium oxide 400 milliGRAM(s) Oral three times a day with meals  melatonin 10 milliGRAM(s) Oral at bedtime  pantoprazole    Tablet 40 milliGRAM(s) Oral before breakfast  petrolatum white Ointment 1 Application(s) Topical two times a day  silver sulfADIAZINE 1% Cream 1 Application(s) Topical two times a day  vitamin A &amp; D Ointment 1 Application(s) Topical four times a day    PRN MEDICATIONS  acetaminophen     Tablet .. 650 milliGRAM(s) Oral every 6 hours PRN  aluminum hydroxide/magnesium hydroxide/simethicone Suspension 30 milliLiter(s) Oral every 4 hours PRN  ondansetron Injectable 4 milliGRAM(s) IV Push every 8 hours PRN      VITAL SIGNS: Last 24 Hours  T(C): 36 (24 Feb 2023 08:52), Max: 36.7 (24 Feb 2023 05:20)  T(F): 96.8 (24 Feb 2023 08:52), Max: 98.1 (24 Feb 2023 05:20)  HR: 50 (24 Feb 2023 08:52) (50 - 67)  BP: 128/60 (24 Feb 2023 08:52) (124/64 - 148/71)  BP(mean): --  RR: 18 (24 Feb 2023 08:52) (18 - 18)  SpO2: 98% (24 Feb 2023 05:20) (98% - 98%)    LABS:                        12.7   6.61  )-----------( 260      ( 23 Feb 2023 07:39 )             39.7     02-23    142  |  105  |  20  ----------------------------<  103<H>  3.6   |  24  |  0.7    Ca    8.9      23 Feb 2023 07:39        PHYSICAL EXAM:  CONSTITUTIONAL: No acute distress, AAOx3  HEAD: Atraumatic, normocephalic  EYES: EOM intact, PERRLA, conjunctiva and sclera clear  ENT: Supple, no masses, no thyromegaly, no bruits, no JVD; moist mucous membranes  PULMONARY: Clear to auscultation bilaterally; no wheezes, rales, or rhonchi  CARDIOVASCULAR: Regular rate and rhythm; no murmurs, rubs, or gallops  GASTROINTESTINAL: Soft, non-tender, non-distended; bowel sounds present  MUSCULOSKELETAL: 2+ peripheral pulses;  no edema, right sided chronic dermatitis changes heeling   NEUROLOGY: non-focal    ASSESSMENT & PLAN    67-year-old female w/ HTN, SLE, CVA (1987) w/o residual deficit, hypothyroidism, hx sepsis secondary to LE cellulitis presenting w/ 1 month hx of poorly healing RLE cellulitis w/o worsening pain, can't bear weight.    # hx sepsis secondary to LE cellulitis  # Stasis Dermatitis w/ possible Erysipelas on RLE   * s/p cefazolin  - XR right foot: Diffuse osteopenia. No acute fracture or dislocation. No ankle joint effusion. There is osteonecrosis measuring 2 cm in the distal tibia and 2.6 cm in the calcaneus. Diffuse soft tissue swelling/edema. There are degenerative changes of the hindfoot, midfoot and forefoot joints. Apparent hallux valgus deformity and second through fifth hammertoe deformities. Chronic healed fracture deformity of the fifth   - bilateral venous duplex negative   - podiatry eval noted  - arterial duplex normal   - f/u ortho consult for osteonecrosis of distal tibia--> OP f/u   - c/w Lasix  - Deonte stockings  - Silver Sulfadiazine + betamethasone  - foot care, petrolatum jelly  - off antibiotics for now     # frequent UTIs (6 in past year)  # Urinary incontinence (Urge & stress)  # urinary prolapse  # Urinary Incontinence  - Consider prophylactic Antibiotics  - Outpatient referral to  UroGyn [previously offered Sx but refused]  - Educated about Kegel Exercises & Pessaries    # Normocytic anemia  - f/u reticulocyte count, iron, TIBC, ferritin, B12, folate    # SLE  # Hypercoagulable disorder?  * elevated dsDNA & DANIS; nl C3 & C4  - c/w hydroxychloroquine  - Check lupus profile for lupus anticoagulant, anticardiolipin, anti-beta2 glycoprotein    # coronary calcifications  # HTN  # CVA (1987) w/o residual deficit  # PVD  # Pre-DM  - c/w losartan, Lasix, atenolol    # hypothyroidism  * hx elevated TSH ~ 9  * Levothyroxine dose increased 1 week prior to admission as per patient  - c/w levothyroxine    # inguinal iliac LN  - f/u EBV titers  - Previous discharge requested patient to do outpatient PET to r/o lymphoma [depends on [facility]]    # Pancreatic head cystic lesion  *  & CEA within normal limits  - needs EUS/EGD w/ possible ERCP for CBD stent [depends on [facility]]    # diverticulosis  - high fiber diet    # osteopenia  # b/l hip replacement  # total knee replacement  # hysterectomy  # hearing loss  - outpatient f/u    # GERD  - c/w Protonix    # Insomnia  - c/w Melatonin    # Diet DASH, TLC    # GI PPx Protonix    # Code DNI, DNR. Patient would not like to do any surgeries

## 2023-03-02 DIAGNOSIS — M87.9 OSTEONECROSIS, UNSPECIFIED: ICD-10-CM

## 2023-03-02 DIAGNOSIS — Z88.0 ALLERGY STATUS TO PENICILLIN: ICD-10-CM

## 2023-03-02 DIAGNOSIS — E83.42 HYPOMAGNESEMIA: ICD-10-CM

## 2023-03-02 DIAGNOSIS — I10 ESSENTIAL (PRIMARY) HYPERTENSION: ICD-10-CM

## 2023-03-02 DIAGNOSIS — Z88.6 ALLERGY STATUS TO ANALGESIC AGENT: ICD-10-CM

## 2023-03-02 DIAGNOSIS — Z96.643 PRESENCE OF ARTIFICIAL HIP JOINT, BILATERAL: ICD-10-CM

## 2023-03-02 DIAGNOSIS — Z88.8 ALLERGY STATUS TO OTHER DRUGS, MEDICAMENTS AND BIOLOGICAL SUBSTANCES STATUS: ICD-10-CM

## 2023-03-02 DIAGNOSIS — A46 ERYSIPELAS: ICD-10-CM

## 2023-03-02 DIAGNOSIS — D68.59 OTHER PRIMARY THROMBOPHILIA: ICD-10-CM

## 2023-03-02 DIAGNOSIS — K21.9 GASTRO-ESOPHAGEAL REFLUX DISEASE WITHOUT ESOPHAGITIS: ICD-10-CM

## 2023-03-02 DIAGNOSIS — I87.2 VENOUS INSUFFICIENCY (CHRONIC) (PERIPHERAL): ICD-10-CM

## 2023-03-02 DIAGNOSIS — D64.9 ANEMIA, UNSPECIFIED: ICD-10-CM

## 2023-03-02 DIAGNOSIS — H91.93 UNSPECIFIED HEARING LOSS, BILATERAL: ICD-10-CM

## 2023-03-02 DIAGNOSIS — Z88.5 ALLERGY STATUS TO NARCOTIC AGENT: ICD-10-CM

## 2023-03-02 DIAGNOSIS — Z90.710 ACQUIRED ABSENCE OF BOTH CERVIX AND UTERUS: ICD-10-CM

## 2023-03-02 DIAGNOSIS — M32.9 SYSTEMIC LUPUS ERYTHEMATOSUS, UNSPECIFIED: ICD-10-CM

## 2023-03-02 DIAGNOSIS — G47.00 INSOMNIA, UNSPECIFIED: ICD-10-CM

## 2023-03-02 DIAGNOSIS — K57.90 DIVERTICULOSIS OF INTESTINE, PART UNSPECIFIED, WITHOUT PERFORATION OR ABSCESS WITHOUT BLEEDING: ICD-10-CM

## 2023-03-02 DIAGNOSIS — E03.9 HYPOTHYROIDISM, UNSPECIFIED: ICD-10-CM

## 2023-03-02 DIAGNOSIS — K86.9 DISEASE OF PANCREAS, UNSPECIFIED: ICD-10-CM

## 2023-03-02 DIAGNOSIS — R32 UNSPECIFIED URINARY INCONTINENCE: ICD-10-CM

## 2023-03-02 DIAGNOSIS — E66.3 OVERWEIGHT: ICD-10-CM

## 2023-03-02 DIAGNOSIS — Z96.653 PRESENCE OF ARTIFICIAL KNEE JOINT, BILATERAL: ICD-10-CM

## 2023-03-02 DIAGNOSIS — R73.03 PREDIABETES: ICD-10-CM

## 2023-03-02 DIAGNOSIS — Z86.73 PERSONAL HISTORY OF TRANSIENT ISCHEMIC ATTACK (TIA), AND CEREBRAL INFARCTION WITHOUT RESIDUAL DEFICITS: ICD-10-CM

## 2023-03-02 DIAGNOSIS — Z88.1 ALLERGY STATUS TO OTHER ANTIBIOTIC AGENTS STATUS: ICD-10-CM

## 2023-03-08 NOTE — ED ADULT TRIAGE NOTE - PAIN RATING/NUMBER SCALE (0-10): ACTIVITY
Presented with worsening of bilateral knee/leg pain and swelling starting yesterday, significantly worse today  Bilateral knee x-ray with moderate to severe tricompartmental osteoarthritis and chondrocalcinosis  No evidence of erythema or acute inflammation    Likely flareup of osteoarthritis, unclear etiology  · Pain control  · Given IV steroid x1  ·  -> 180  · Orthopedic evaluation appreciated   · Continue PT/OT  · Pain control  · Add Tylenol 975 po BID 6

## 2023-07-20 NOTE — CONSULT NOTE ADULT - TIME BILLING
I have personally seen and examined this patient.  I have reviewed all pertinent clinical information and reviewed all relevant imaging and diagnostic studies personally.   I counseled the patient about diagnostic testing and treatment plan. All questions were answered.  I discussed recommendations with the primary team. 23-Jul-2023

## 2023-08-30 NOTE — CONSULT NOTE ADULT - RESPIRATORY
Spoke with pt on the phone relayed that [facility] has reviewed her MRI, there is nothing acute and she will discuss in detail at next appt. Verbalized understanding.    Breath Sounds equal & clear to percussion & auscultation, no accessory muscle use

## 2024-01-15 ENCOUNTER — EMERGENCY (EMERGENCY)
Facility: HOSPITAL | Age: 69
LOS: 0 days | Discharge: HOME | End: 2024-01-15
Attending: EMERGENCY MEDICINE
Payer: MEDICARE

## 2024-01-15 VITALS
OXYGEN SATURATION: 98 % | TEMPERATURE: 98 F | WEIGHT: 130.07 LBS | DIASTOLIC BLOOD PRESSURE: 68 MMHG | RESPIRATION RATE: 18 BRPM | HEIGHT: 62 IN | SYSTOLIC BLOOD PRESSURE: 136 MMHG | HEART RATE: 58 BPM

## 2024-01-15 DIAGNOSIS — Z90.710 ACQUIRED ABSENCE OF BOTH CERVIX AND UTERUS: Chronic | ICD-10-CM

## 2024-01-15 DIAGNOSIS — G89.29 OTHER CHRONIC PAIN: ICD-10-CM

## 2024-01-15 DIAGNOSIS — Z88.0 ALLERGY STATUS TO PENICILLIN: ICD-10-CM

## 2024-01-15 DIAGNOSIS — M32.9 SYSTEMIC LUPUS ERYTHEMATOSUS, UNSPECIFIED: ICD-10-CM

## 2024-01-15 DIAGNOSIS — Z87.891 PERSONAL HISTORY OF NICOTINE DEPENDENCE: ICD-10-CM

## 2024-01-15 DIAGNOSIS — M79.89 OTHER SPECIFIED SOFT TISSUE DISORDERS: ICD-10-CM

## 2024-01-15 DIAGNOSIS — E03.9 HYPOTHYROIDISM, UNSPECIFIED: ICD-10-CM

## 2024-01-15 DIAGNOSIS — I10 ESSENTIAL (PRIMARY) HYPERTENSION: ICD-10-CM

## 2024-01-15 DIAGNOSIS — Z88.5 ALLERGY STATUS TO NARCOTIC AGENT: ICD-10-CM

## 2024-01-15 DIAGNOSIS — Z88.6 ALLERGY STATUS TO ANALGESIC AGENT: ICD-10-CM

## 2024-01-15 DIAGNOSIS — M79.661 PAIN IN RIGHT LOWER LEG: ICD-10-CM

## 2024-01-15 DIAGNOSIS — Z88.8 ALLERGY STATUS TO OTHER DRUGS, MEDICAMENTS AND BIOLOGICAL SUBSTANCES: ICD-10-CM

## 2024-01-15 DIAGNOSIS — Z88.1 ALLERGY STATUS TO OTHER ANTIBIOTIC AGENTS STATUS: ICD-10-CM

## 2024-01-15 DIAGNOSIS — Z86.73 PERSONAL HISTORY OF TRANSIENT ISCHEMIC ATTACK (TIA), AND CEREBRAL INFARCTION WITHOUT RESIDUAL DEFICITS: ICD-10-CM

## 2024-01-15 DIAGNOSIS — Z82.69 FAMILY HISTORY OF OTHER DISEASES OF THE MUSCULOSKELETAL SYSTEM AND CONNECTIVE TISSUE: Chronic | ICD-10-CM

## 2024-01-15 DIAGNOSIS — Z96.659 PRESENCE OF UNSPECIFIED ARTIFICIAL KNEE JOINT: Chronic | ICD-10-CM

## 2024-01-15 PROCEDURE — 93970 EXTREMITY STUDY: CPT | Mod: 26

## 2024-01-15 PROCEDURE — 73610 X-RAY EXAM OF ANKLE: CPT | Mod: 26,RT

## 2024-01-15 PROCEDURE — 73590 X-RAY EXAM OF LOWER LEG: CPT | Mod: 26,RT

## 2024-01-15 PROCEDURE — 73610 X-RAY EXAM OF ANKLE: CPT | Mod: RT

## 2024-01-15 PROCEDURE — 99284 EMERGENCY DEPT VISIT MOD MDM: CPT | Mod: 25

## 2024-01-15 PROCEDURE — 93970 EXTREMITY STUDY: CPT

## 2024-01-15 PROCEDURE — 73590 X-RAY EXAM OF LOWER LEG: CPT | Mod: RT

## 2024-01-15 PROCEDURE — 73630 X-RAY EXAM OF FOOT: CPT | Mod: 26,RT

## 2024-01-15 PROCEDURE — 73630 X-RAY EXAM OF FOOT: CPT | Mod: RT

## 2024-01-15 PROCEDURE — 99285 EMERGENCY DEPT VISIT HI MDM: CPT | Mod: FS

## 2024-01-15 RX ORDER — ACETAMINOPHEN 500 MG
650 TABLET ORAL ONCE
Refills: 0 | Status: COMPLETED | OUTPATIENT
Start: 2024-01-15 | End: 2024-01-15

## 2024-01-15 NOTE — ED PROVIDER NOTE - WR INTERPRETATION 2
MSK XR negative - No fracture, No dislocation, No foreign body. osteonecrosis visualized in prior xrays

## 2024-01-15 NOTE — ED ADULT TRIAGE NOTE - CHIEF COMPLAINT QUOTE
Sent in from [facility]  living for worsening leg pain right leg pain . Sent in from [facility]  living for worsening leg pain right leg pain . Sent in from [facility]  living for worsening leg pain right leg pain .

## 2024-01-15 NOTE — ED ADULT NURSE NOTE - CHIEF COMPLAINT QUOTE
Sent in from [facility]  living for worsening leg pain right leg pain . Sent in from [facility]  living for worsening leg pain right leg pain . Sent in from [facility]  living for worsening leg pain right leg pain .

## 2024-01-15 NOTE — ED PROVIDER NOTE - ATTENDING APP SHARED VISIT CONTRIBUTION OF CARE
68-year-old female, past medical history of CVA, hypertension, hypothyroidism, lupus, presenting with 6 months of right lower extremity pain, intermittent, no alleviating or aggravating factors, no associated symptoms.  Of note she was treated for cellulitis of the foot but completed a course of antibiotics.  She states that she has scabbing and dry skin over the foot but that is normal for her over the last couple of months.      CONSTITUTIONAL: Well-developed; well-nourished; in no acute distress.   SKIN: warm, dry  HEAD: Normocephalic  ENT: No nasal discharge  NECK: Supple  EXT: Normal ROM.  RLE pitting edema 1+ with scaling and dry skin of right foot, palpable PT pulse right ankle, mild tenderness diffusely of leg  NEURO: Alert, oriented, grossly unremarkable  PSYCH: Cooperative, appropriate.

## 2024-01-15 NOTE — ED PROVIDER NOTE - NSFOLLOWUPCLINICS_GEN_ALL_ED_FT
[facility]  Burn  [address], [address]  [place] [address]  Phone: [phone]  Fax:   Follow Up Time: 1-3 Days    [facility]  Podiatry  .  [place]   Phone: [phone]  Fax:   Follow Up Time: 1-3 Days     [facility]  Burn  [address], [address]  [place] [address]  Phone: [phone]  Fax:   Follow Up Time: 1-3 Days    [facility]  Podiatry  .  [place]   Phone: [phone]  Fax:   Follow Up Time: 1-3 Days     [facility]  Burn  [address], [address]  [place] [address]  Phone: [phone]  Fax:   Follow Up Time: 1-3 Days    [facility]  Podiatry  .  [place]   Phone: [phone]  Fax:   Follow Up Time: 1-3 Days     [facility]  Burn  [address], [address]  [place] [address]  Phone: [phone]  Fax:   Follow Up Time: 1-3 Days    [facility]  Podiatry  .  [place]   Phone: [phone]  Fax:   Follow Up Time: 1-3 Days

## 2024-01-15 NOTE — ED PROVIDER NOTE - PROGRESS NOTE DETAILS
CP: prelim duplex negative for dvt. +multiple lymph nodes in groin. DVT study is negative and the patient is able to ambulate.  Patient is stable for discharge

## 2024-01-15 NOTE — ED ADULT NURSE NOTE - NSFALLASSESSNEED_ED_ALL_ED
COPD/PN Week 2 Survey      Responses   [facility] patient discharged from?  [place]   Does the patient have one of the following disease processes/diagnoses(primary or secondary)?  COPD/Pneumonia   Was the primary reason for admission:  COPD exacerbation   Week 2 attempt successful?  Yes   Call start time  1339   Call end time  1341   Discharge diagnosis  **COPD with acute exacerbation    Meds reviewed with patient/caregiver?  Yes   Is the patient having any side effects they believe may be caused by any medication additions or changes?  No   Does the patient have all medications ordered at discharge?  Yes   Is the patient taking all medications as directed (includes completed medication regime)?  Yes   Does the patient have a primary care provider?   Yes   Does the patient have an appointment with their PCP or specialist within 7 days of discharge?  Yes   Has the patient kept scheduled appointments due by today?  Yes   Pulse Ox monitoring  None   Psychosocial issues?  No   Did the patient receive a copy of their discharge instructions?  Yes   Nursing interventions  Reviewed instructions with patient   What is the patient's perception of their health status since discharge?  Improving   Nursing Interventions  Nurse provided patient education   Are the patient's immunizations up to date?   Yes   Nursing interventions  Educated on importance of maintaining up to date immunizations as advised by provider   Is the patient/caregiver able to teach back the hierarchy of who to call/visit for symptoms/problems? PCP, Specialist, Home health nurse, Urgent Care, ED, 911  Yes   Is the patient able to teach back COPD zones?  Yes   Nursing interventions  Education provided on various zones   Patient reports what zone on this call?  Green Zone   Green Zone  Reports doing well, Sleeping well, Breathing without shortness of breath, Usual activity and exercise level, Appetite is good, Usual amount of phlegm/mucus without  difficulty coughing up   Green Zone interventions:  Take daily medications, Avoid indoor/outdoor triggers   Week 2 call completed?  Yes          Krishna Medrano RN   no

## 2024-01-15 NOTE — ED PROVIDER NOTE - PHYSICAL EXAMINATION
CONST: Well appearing in NAD  CARD: Normal S1 S2; Normal rate and rhythm  RESP: Equal BS B/L, No wheezes, rhonchi or rales. No distress  GI: Soft, non-tender, non-distended.  MS: Normal ROM in all extremities. No midline spinal tenderness. TTP RLE posterior and anteriorly, red scabbing wound to dorsum R foot w/o pus or drainage  SKIN: Warm, dry, Good turgor  NEURO: A&Ox3, Strength 5/5 with no sensory deficits. Steady gait

## 2024-01-15 NOTE — ED PROVIDER NOTE - PATIENT PORTAL LINK FT
You can access the FollowMyHealth Patient Portal offered by [facility] by registering at the following website: [email or web address]. By joining [employer]’s FollowMyHealth portal, you will also be able to view your health information using other applications (apps) compatible with our system. You can access the FollowMyHealth Patient Portal offered by [facility] by registering at the following website: [email or web address]. By joining [employer]’s FollowMyHealth portal, you will also be able to view your health information using other applications (apps) compatible with our system. You can access the FollowMyHealth Patient Portal offered by [facility] by registering at the following website: [email or web address]. By joining [employer]’s FollowMyHealth portal, you will also be able to view your health information using other applications (apps) compatible with our system. You can access the FollowMyHealth Patient Portal offered by [facility] by registering at the following website: [email or web address]. By joining [employer]’s FollowMyHealth portal, you will also be able to view your health information using other applications (apps) compatible with our system.

## 2024-01-15 NOTE — ED ADULT NURSE NOTE - NSFALLUNIVINTERV_ED_ALL_ED
Bed/Stretcher in lowest position, wheels locked, appropriate side rails in place/Call bell, personal items and telephone in reach/Instruct patient to call for assistance before getting out of bed/chair/stretcher/Non-slip footwear applied when patient is off stretcher/[place] to call system/Physically safe environment - no spills, clutter or unnecessary equipment/Purposeful proactive rounding/Room/bathroom lighting operational, light cord in reach Bed/Stretcher in lowest position, wheels locked, appropriate side rails in place/Call bell, personal items and telephone in reach/Instruct patient to call for assistance before getting out of bed/chair/stretcher/Non-slip footwear applied when patient is off stretcher/[place] to call system/Physically safe environment - no spills, clutter or unnecessary equipment/Purposeful proactive rounding/Room/bathroom lighting operational, light cord in reach Bed/Stretcher in lowest position, wheels locked, appropriate side rails in place/Call bell, personal items and telephone in reach/Instruct patient to call for assistance before getting out of bed/chair/stretcher/Non-slip footwear applied when patient is off stretcher/[place] to call system/Physically safe environment - no spills, clutter or unnecessary equipment/Purposeful proactive rounding/Room/bathroom lighting operational, light cord in reach

## 2024-01-15 NOTE — ED PROVIDER NOTE - NSFOLLOWUPINSTRUCTIONS_ED_ALL_ED_FT
Please follow up with podiatry and burn clinic for wound care.    Our Emergency Department Referral Coordinators will be reaching out to you in the next 24-48 hours from 9:00am to 5:00pm with a follow up appointment. Please expect a phone call from the hospital in that time frame. If you do not receive a call or if you have any questions or concerns, you can reach them at   [phone]      Musculoskeletal Pain  Musculoskeletal pain refers to aches and pains in your bones, joints, muscles, and the tissues that surround them. This pain can occur in any part of the body. It can last for a short time (acute) or a long time (chronic).    A physical exam, lab tests, and imaging studies may be done to find the cause of your musculoskeletal pain.    Follow these instructions at home:  Lifestyle    Try to control or lower your stress levels. Stress increases muscle tension and can worsen musculoskeletal pain. It is important to recognize when you are anxious or stressed and learn ways to manage it. This may include:  Meditation or yoga.  Cognitive or behavioral therapy.  Acupuncture or massage therapy.  You may continue all activities unless the activities cause more pain. When the pain gets better, slowly resume your normal activities. Gradually increase the intensity and duration of your activities or exercise.  Managing pain, stiffness, and swelling        Treatment may include medicines for pain and inflammation that are taken by mouth or applied to the skin. Take over-the-counter and prescription medicines only as told by your health care provider.  When your pain is severe, bed rest may be helpful. Lie or sit in any position that is comfortable, but get out of bed and walk around at least every couple of hours.  If directed, apply heat to the affected area as often as told by your health care provider. Use the heat source that your health care provider recommends, such as a moist heat pack or a heating pad.  Place a towel between your skin and the heat source.  Leave the heat on for 20–30 minutes.  Remove the heat if your skin turns bright red. This is especially important if you are unable to feel pain, heat, or cold. You may have a greater risk of getting burned.  If directed, put ice on the painful area. To do this:  Put ice in a plastic bag.  Place a towel between your skin and the bag.  Leave the ice on for 20 minutes, 2–3 times a day.  Remove the ice if your skin turns bright red. This is very important. If you cannot feel pain, heat, or cold, you have a greater risk of damage to the area.  General instructions    Your health care provider may recommend that you see a physical therapist. This person can help you come up with a safe exercise program.  If told by your health care provider, do physical therapy exercises to improve movement and strength in the affected area.  Keep all follow-up visits. This is important. This includes any physical therapy visits.  Contact a health care provider if:  Your pain gets worse.  Medicines do not help ease your pain.  You cannot use the part of your body that hurts, such as your arm, leg, or neck.  You have trouble sleeping.  You have trouble doing your normal activities.  Get help right away if:  You have a new injury and your pain is worse or different.  You feel numb or you have tingling in the painful area.  Summary  Musculoskeletal pain refers to aches and pains in your bones, joints, muscles, and the tissues that surround them.  This pain can occur in any part of the body.  Your health care provider may recommend that you see a physical therapist. This person can help you come up with a safe exercise program. Do any exercises as told by your physical therapist.  Lower your stress level. Stress can worsen musculoskeletal pain. Ways to lower stress may include meditation, yoga, cognitive or behavioral therapy, acupuncture, and massage therapy.  This information is not intended to replace advice given to you by your health care provider. Make sure you discuss any questions you have with your health care provider. Please follow up with podiatry and burn clinic for wound care.    Our Emergency Department Referral Coordinators will be reaching out to you in the next 24-48 hours from 9:00am to 5:00pm with a follow up appointment. Please expect a phone call from the hospital in that time frame. If you do not receive a call or if you have any questions or concerns, you can reach them at   [phone]      Musculoskeletal Pain  Musculoskeletal pain refers to aches and pains in your bones, joints, muscles, and the tissues that surround them. This pain can occur in any part of the body. It can last for a short time (acute) or a long time (chronic).    A physical exam, lab tests, and imaging studies may be done to find the cause of your musculoskeletal pain.    Follow these instructions at home:  Lifestyle    Try to control or lower your stress levels. Stress increases muscle tension and can worsen musculoskeletal pain. It is important to recognize when you are anxious or stressed and learn ways to manage it. This may include:  Meditation or yoga.  Cognitive or behavioral therapy.  Acupuncture or massage therapy.  You may continue all activities unless the activities cause more pain. When the pain gets better, slowly resume your normal activities. Gradually increase the intensity and duration of your activities or exercise.  Managing pain, stiffness, and swelling        Treatment may include medicines for pain and inflammation that are taken by mouth or applied to the skin. Take over-the-counter and prescription medicines only as told by your health care provider.  When your pain is severe, bed rest may be helpful. Lie or sit in any position that is comfortable, but get out of bed and walk around at least every couple of hours.  If directed, apply heat to the affected area as often as told by your health care provider. Use the heat source that your health care provider recommends, such as a moist heat pack or a heating pad.  Place a towel between your skin and the heat source.  Leave the heat on for 20–30 minutes.  Remove the heat if your skin turns bright red. This is especially important if you are unable to feel pain, heat, or cold. You may have a greater risk of getting burned.  If directed, put ice on the painful area. To do this:  Put ice in a plastic bag.  Place a towel between your skin and the bag.  Leave the ice on for 20 minutes, 2–3 times a day.  Remove the ice if your skin turns bright red. This is very important. If you cannot feel pain, heat, or cold, you have a greater risk of damage to the area.  General instructions    Your health care provider may recommend that you see a physical therapist. This person can help you come up with a safe exercise program.  If told by your health care provider, do physical therapy exercises to improve movement and strength in the affected area.  Keep all follow-up visits. This is important. This includes any physical therapy visits.  Contact a health care provider if:  Your pain gets worse.  Medicines do not help ease your pain.  You cannot use the part of your body that hurts, such as your arm, leg, or neck.  You have trouble sleeping.  You have trouble doing your normal activities.  Get help right away if:  You have a new injury and your pain is worse or different.  You feel numb or you have tingling in the painful area.  Summary  Musculoskeletal pain refers to aches and pains in your bones, joints, muscles, and the tissues that surround them.  This pain can occur in any part of the body.  Your health care provider may recommend that you see a physical therapist. This person can help you come up with a safe exercise program. Do any exercises as told by your physical therapist.  Lower your stress level. Stress can worsen musculoskeletal pain. Ways to lower stress may include meditation, yoga, cognitive or behavioral therapy, acupuncture, and massage therapy.  This information is not intended to replace advice given to you by your health care provider. Make sure you discuss any questions you have with your health care provider. Please follow up with podiatry and burn clinic for wound care.    Our Emergency Department Referral Coordinators will be reaching out to you in the next 24-48 hours from 9:00am to 5:00pm with a follow up appointment. Please expect a phone call from the hospital in that time frame. If you do not receive a call or if you have any questions or concerns, you can reach them at   [phone]      Musculoskeletal Pain  Musculoskeletal pain refers to aches and pains in your bones, joints, muscles, and the tissues that surround them. This pain can occur in any part of the body. It can last for a short time (acute) or a long time (chronic).    A physical exam, lab tests, and imaging studies may be done to find the cause of your musculoskeletal pain.    Follow these instructions at home:  Lifestyle    Try to control or lower your stress levels. Stress increases muscle tension and can worsen musculoskeletal pain. It is important to recognize when you are anxious or stressed and learn ways to manage it. This may include:  Meditation or yoga.  Cognitive or behavioral therapy.  Acupuncture or massage therapy.  You may continue all activities unless the activities cause more pain. When the pain gets better, slowly resume your normal activities. Gradually increase the intensity and duration of your activities or exercise.  Managing pain, stiffness, and swelling        Treatment may include medicines for pain and inflammation that are taken by mouth or applied to the skin. Take over-the-counter and prescription medicines only as told by your health care provider.  When your pain is severe, bed rest may be helpful. Lie or sit in any position that is comfortable, but get out of bed and walk around at least every couple of hours.  If directed, apply heat to the affected area as often as told by your health care provider. Use the heat source that your health care provider recommends, such as a moist heat pack or a heating pad.  Place a towel between your skin and the heat source.  Leave the heat on for 20–30 minutes.  Remove the heat if your skin turns bright red. This is especially important if you are unable to feel pain, heat, or cold. You may have a greater risk of getting burned.  If directed, put ice on the painful area. To do this:  Put ice in a plastic bag.  Place a towel between your skin and the bag.  Leave the ice on for 20 minutes, 2–3 times a day.  Remove the ice if your skin turns bright red. This is very important. If you cannot feel pain, heat, or cold, you have a greater risk of damage to the area.  General instructions    Your health care provider may recommend that you see a physical therapist. This person can help you come up with a safe exercise program.  If told by your health care provider, do physical therapy exercises to improve movement and strength in the affected area.  Keep all follow-up visits. This is important. This includes any physical therapy visits.  Contact a health care provider if:  Your pain gets worse.  Medicines do not help ease your pain.  You cannot use the part of your body that hurts, such as your arm, leg, or neck.  You have trouble sleeping.  You have trouble doing your normal activities.  Get help right away if:  You have a new injury and your pain is worse or different.  You feel numb or you have tingling in the painful area.  Summary  Musculoskeletal pain refers to aches and pains in your bones, joints, muscles, and the tissues that surround them.  This pain can occur in any part of the body.  Your health care provider may recommend that you see a physical therapist. This person can help you come up with a safe exercise program. Do any exercises as told by your physical therapist.  Lower your stress level. Stress can worsen musculoskeletal pain. Ways to lower stress may include meditation, yoga, cognitive or behavioral therapy, acupuncture, and massage therapy.  This information is not intended to replace advice given to you by your health care provider. Make sure you discuss any questions you have with your health care provider. Please follow up with podiatry and burn clinic for wound care.    Our Emergency Department Referral Coordinators will be reaching out to you in the next 24-48 hours from 9:00am to 5:00pm with a follow up appointment. Please expect a phone call from the hospital in that time frame. If you do not receive a call or if you have any questions or concerns, you can reach them at   [phone]      Musculoskeletal Pain  Musculoskeletal pain refers to aches and pains in your bones, joints, muscles, and the tissues that surround them. This pain can occur in any part of the body. It can last for a short time (acute) or a long time (chronic).    A physical exam, lab tests, and imaging studies may be done to find the cause of your musculoskeletal pain.    Follow these instructions at home:  Lifestyle    Try to control or lower your stress levels. Stress increases muscle tension and can worsen musculoskeletal pain. It is important to recognize when you are anxious or stressed and learn ways to manage it. This may include:  Meditation or yoga.  Cognitive or behavioral therapy.  Acupuncture or massage therapy.  You may continue all activities unless the activities cause more pain. When the pain gets better, slowly resume your normal activities. Gradually increase the intensity and duration of your activities or exercise.  Managing pain, stiffness, and swelling        Treatment may include medicines for pain and inflammation that are taken by mouth or applied to the skin. Take over-the-counter and prescription medicines only as told by your health care provider.  When your pain is severe, bed rest may be helpful. Lie or sit in any position that is comfortable, but get out of bed and walk around at least every couple of hours.  If directed, apply heat to the affected area as often as told by your health care provider. Use the heat source that your health care provider recommends, such as a moist heat pack or a heating pad.  Place a towel between your skin and the heat source.  Leave the heat on for 20–30 minutes.  Remove the heat if your skin turns bright red. This is especially important if you are unable to feel pain, heat, or cold. You may have a greater risk of getting burned.  If directed, put ice on the painful area. To do this:  Put ice in a plastic bag.  Place a towel between your skin and the bag.  Leave the ice on for 20 minutes, 2–3 times a day.  Remove the ice if your skin turns bright red. This is very important. If you cannot feel pain, heat, or cold, you have a greater risk of damage to the area.  General instructions    Your health care provider may recommend that you see a physical therapist. This person can help you come up with a safe exercise program.  If told by your health care provider, do physical therapy exercises to improve movement and strength in the affected area.  Keep all follow-up visits. This is important. This includes any physical therapy visits.  Contact a health care provider if:  Your pain gets worse.  Medicines do not help ease your pain.  You cannot use the part of your body that hurts, such as your arm, leg, or neck.  You have trouble sleeping.  You have trouble doing your normal activities.  Get help right away if:  You have a new injury and your pain is worse or different.  You feel numb or you have tingling in the painful area.  Summary  Musculoskeletal pain refers to aches and pains in your bones, joints, muscles, and the tissues that surround them.  This pain can occur in any part of the body.  Your health care provider may recommend that you see a physical therapist. This person can help you come up with a safe exercise program. Do any exercises as told by your physical therapist.  Lower your stress level. Stress can worsen musculoskeletal pain. Ways to lower stress may include meditation, yoga, cognitive or behavioral therapy, acupuncture, and massage therapy.  This information is not intended to replace advice given to you by your health care provider. Make sure you discuss any questions you have with your health care provider.

## 2024-01-15 NOTE — ED PROVIDER NOTE - OBJECTIVE STATEMENT
patient is a 69yo female PMH CVA, htn, lupus, hypothyroid complaining of right low leg pain. pt reports she has had chronic intermittent pain and swelling to R lower leg x5 months, but pain is worse over the last week. pt has cellulitis of the R foot that has been treated with antibiotics, she states her foot has looked the same for the last 5 months also. pain worse with palpation and walking. denies fever, fall/ trauma, chest pain, shortness of breath, numbness/ paresthesias, upper leg pain. patient is a 67yo female PMH CVA, htn, lupus, hypothyroid complaining of right low leg pain. pt reports she has had chronic intermittent pain and swelling to R lower leg x5 months, but pain is worse over the last week. pt has cellulitis of the R foot that has been treated with antibiotics, she states her foot has looked the same for the last 5 months also. pain worse with palpation and walking. denies fever, fall/ trauma, chest pain, shortness of breath, numbness/ paresthesias, upper leg pain.

## 2024-01-15 NOTE — ED PROVIDER NOTE - NSFOLLOWUPCLINICSTOKEN_GEN_ALL_ED_FT
006768:1-3 Days|| ||00\01||False;812092:1-3 Days|| ||00\01||False; 160466:1-3 Days|| ||00\01||False;922328:1-3 Days|| ||00\01||False; 866919:1-3 Days|| ||00\01||False;667544:1-3 Days|| ||00\01||False; 039730:1-3 Days|| ||00\01||False;501793:1-3 Days|| ||00\01||False;

## 2024-01-15 NOTE — ED PROVIDER NOTE - WR ORDER DATE AND TIME 2
[facility]PORT[facility] DE RADIOGRAFIA /MRI/MEDICO     [address] [place] [address] 15-Samir-2024 13:08

## 2024-01-15 NOTE — ED PROVIDER NOTE - CLINICAL SUMMARY MEDICAL DECISION MAKING FREE TEXT BOX
68-year-old female, past medical history of CVA, hypertension, hypothyroidism, lupus, presenting with 6 months of right lower extremity pain, intermittent, no alleviating or aggravating factors, no associated symptoms.  Of note she was treated for cellulitis of the foot but completed a course of antibiotics.  She states that she has scabbing and dry skin over the foot but that is normal for her over the last couple of months. Ambulates independently.  Imaging was ordered and reviewed by me.  Discussed preliminary results with patient. Appropriate medications for patient's presenting complaints were ordered and effects were reassessed.  Patient's records (nursing home notes) were reviewed. Patient feels much better and is comfortable with discharge.  Appropriate follow-up was arranged. Given return precautions. Patient comfortable with plan.

## 2024-01-17 NOTE — CHART NOTE - NSCHARTNOTEFT_GEN_A_CORE
[facility] MRN [id number] left message with case mangement 1/16 LW // left message again with case management if they need help scheduling follow ups 1/17 LW

## 2024-01-25 ENCOUNTER — OUTPATIENT (OUTPATIENT)
Dept: OUTPATIENT SERVICES | Facility: HOSPITAL | Age: 69
LOS: 1 days | End: 2024-01-25
Payer: MEDICARE

## 2024-01-25 ENCOUNTER — APPOINTMENT (OUTPATIENT)
Dept: BURN CARE | Facility: CLINIC | Age: 69
End: 2024-01-25
Payer: MEDICARE

## 2024-01-25 VITALS — HEART RATE: 76 BPM | SYSTOLIC BLOOD PRESSURE: 107 MMHG | TEMPERATURE: 98.1 F | DIASTOLIC BLOOD PRESSURE: 77 MMHG

## 2024-01-25 DIAGNOSIS — Z90.710 ACQUIRED ABSENCE OF BOTH CERVIX AND UTERUS: Chronic | ICD-10-CM

## 2024-01-25 DIAGNOSIS — Z82.69 FAMILY HISTORY OF OTHER DISEASES OF THE MUSCULOSKELETAL SYSTEM AND CONNECTIVE TISSUE: Chronic | ICD-10-CM

## 2024-01-25 DIAGNOSIS — Z96.659 PRESENCE OF UNSPECIFIED ARTIFICIAL KNEE JOINT: Chronic | ICD-10-CM

## 2024-01-25 DIAGNOSIS — Z00.8 ENCOUNTER FOR OTHER GENERAL EXAMINATION: ICD-10-CM

## 2024-01-25 PROCEDURE — 99202 OFFICE O/P NEW SF 15 MIN: CPT

## 2024-01-25 PROCEDURE — 99212 OFFICE O/P EST SF 10 MIN: CPT

## 2024-01-25 RX ORDER — CHLORHEXIDINE GLUCONATE 213 G/1000ML
4 SOLUTION TOPICAL
Qty: 1 | Refills: 3 | Status: ACTIVE | COMMUNITY
Start: 2024-01-25 | End: 1900-01-01

## 2024-01-25 RX ORDER — MUPIROCIN 20 MG/G
2 OINTMENT TOPICAL
Qty: 1 | Refills: 2 | Status: ACTIVE | COMMUNITY
Start: 2024-01-25 | End: 1900-01-01

## 2024-01-26 DIAGNOSIS — L98.8 OTHER SPECIFIED DISORDERS OF THE SKIN AND SUBCUTANEOUS TISSUE: ICD-10-CM

## 2024-01-26 DIAGNOSIS — S91.301D UNSPECIFIED OPEN WOUND, RIGHT FOOT, SUBSEQUENT ENCOUNTER: ICD-10-CM

## 2024-02-22 ENCOUNTER — OUTPATIENT (OUTPATIENT)
Dept: OUTPATIENT SERVICES | Facility: HOSPITAL | Age: 69
LOS: 1 days | End: 2024-02-22
Payer: MEDICARE

## 2024-02-22 ENCOUNTER — APPOINTMENT (OUTPATIENT)
Dept: BURN CARE | Facility: CLINIC | Age: 69
End: 2024-02-22
Payer: MEDICARE

## 2024-02-22 VITALS — SYSTOLIC BLOOD PRESSURE: 116 MMHG | DIASTOLIC BLOOD PRESSURE: 69 MMHG | TEMPERATURE: 97.3 F | HEART RATE: 80 BPM

## 2024-02-22 DIAGNOSIS — Z00.8 ENCOUNTER FOR OTHER GENERAL EXAMINATION: ICD-10-CM

## 2024-02-22 DIAGNOSIS — Z96.659 PRESENCE OF UNSPECIFIED ARTIFICIAL KNEE JOINT: Chronic | ICD-10-CM

## 2024-02-22 DIAGNOSIS — Z82.69 FAMILY HISTORY OF OTHER DISEASES OF THE MUSCULOSKELETAL SYSTEM AND CONNECTIVE TISSUE: Chronic | ICD-10-CM

## 2024-02-22 DIAGNOSIS — Z90.710 ACQUIRED ABSENCE OF BOTH CERVIX AND UTERUS: Chronic | ICD-10-CM

## 2024-02-22 PROCEDURE — 97597 DBRDMT OPN WND 1ST 20 CM/<: CPT

## 2024-02-22 PROCEDURE — 97598 DBRDMT OPN WND ADDL 20CM/<: CPT

## 2024-02-22 NOTE — HISTORY OF PRESENT ILLNESS
[Did you have an operation on your burn/wound injury?] : Did you have an operation on your burn/wound injury? No [Did this injury occur on the job?] : Did this injury occur on the job? No [de-identified] : healing  [de-identified] : right leg edema and wound dorsum right foot

## 2024-02-22 NOTE — REASON FOR VISIT
[Revisit] : revisit [Were you seen in the Emergency Room?] : not seen in the emergency room [Were you admitted to the burn center at [facility]?] : not admitted to the burn center at [facility] [Formal Caregiver] : formal caregiver

## 2024-02-22 NOTE — ASSESSMENT
[Wound Care] : wound care [FreeTextEntry1] : The right foot wound measures [id number] and is edematous and healing and erythematous.   The patient was instructed to clean  the wound with soap and water.  ABD pads--> ointments ' burning'.  Adherent adherent devitalized tissue Excisional debridement with scissors was performed on  devitalized tissue down  to and including  dermis.  .   Follow up 2 - 4  weeks.

## 2024-02-22 NOTE — PHYSICAL EXAM
[Healing] : healing [Infected?] : Infected: No [Size%: ______] : Size: [unfilled]% [3] : 3 out of 10 [Abnormal] : abnormal [Medium] : medium [] : no [de-identified] : The right foot wound measures [id number] and is edematous and healing and erythematous.   The patient was instructed to clean  the wound with soap and water.  ABD pads--> ointments ' burning'.  Adherent adherent devitalized tissue Excisional debridement with scissors was performed on  devitalized tissue down  to and including  dermis.  .   Follow up 2 - 4  weeks.  [TWNoteComboBox1] : ABD pad

## 2024-02-23 DIAGNOSIS — S91.301D UNSPECIFIED OPEN WOUND, RIGHT FOOT, SUBSEQUENT ENCOUNTER: ICD-10-CM

## 2024-03-21 ENCOUNTER — APPOINTMENT (OUTPATIENT)
Dept: BURN CARE | Facility: CLINIC | Age: 69
End: 2024-03-21
Payer: MEDICARE

## 2024-03-21 ENCOUNTER — OUTPATIENT (OUTPATIENT)
Dept: OUTPATIENT SERVICES | Facility: HOSPITAL | Age: 69
LOS: 1 days | End: 2024-03-21
Payer: MEDICARE

## 2024-03-21 VITALS — SYSTOLIC BLOOD PRESSURE: 98 MMHG | HEART RATE: 77 BPM | TEMPERATURE: 96.9 F | DIASTOLIC BLOOD PRESSURE: 56 MMHG

## 2024-03-21 DIAGNOSIS — Z82.69 FAMILY HISTORY OF OTHER DISEASES OF THE MUSCULOSKELETAL SYSTEM AND CONNECTIVE TISSUE: Chronic | ICD-10-CM

## 2024-03-21 DIAGNOSIS — Z96.659 PRESENCE OF UNSPECIFIED ARTIFICIAL KNEE JOINT: Chronic | ICD-10-CM

## 2024-03-21 DIAGNOSIS — Z00.8 ENCOUNTER FOR OTHER GENERAL EXAMINATION: ICD-10-CM

## 2024-03-21 DIAGNOSIS — R60.0 LOCALIZED EDEMA: ICD-10-CM

## 2024-03-21 DIAGNOSIS — S81.801A UNSPECIFIED OPEN WOUND, RIGHT LOWER LEG, INITIAL ENCOUNTER: ICD-10-CM

## 2024-03-21 DIAGNOSIS — Z90.710 ACQUIRED ABSENCE OF BOTH CERVIX AND UTERUS: Chronic | ICD-10-CM

## 2024-03-21 PROCEDURE — 99212 OFFICE O/P EST SF 10 MIN: CPT

## 2024-03-21 NOTE — PHYSICAL EXAM
[Healing] : healing [Size%: ______] : Size: [unfilled]% [Infected?] : Infected: No [Abnormal] : abnormal [3] : 3 out of 10 [Medium] : medium [] : no [de-identified] : Right leg -mild erythema edema ; 4 x 3 cm lateral wound ; 3 x 2 cm anterior wound  Right foot -thin pale scaly plaques; no open wounds  [TWNoteComboBox1] : ABD pad

## 2024-03-21 NOTE — HISTORY OF PRESENT ILLNESS
[Did you have an operation on your burn/wound injury?] : Did you have an operation on your burn/wound injury? No [Did this injury occur on the job?] : Did this injury occur on the job? No [de-identified] : right leg edema and wound dorsum right foot [de-identified] : Patient here for follow-up for wound of right lower extremity.  Reports that she is not applying any specific wound care Reports drainage

## 2024-03-21 NOTE — REASON FOR VISIT
[Initial] : initial visit [Were you seen in the Emergency Room?] : not seen in the emergency room [Were you admitted to the burn center at [facility]?] : not admitted to the burn center at [facility] [Formal Caregiver] : formal caregiver

## 2024-03-21 NOTE — ASSESSMENT
[FreeTextEntry1] : Right leg wounds -with edema erythema .   Site cleansed dressing of bacitracin ointment and nonadherent dressing placed Recommend continued wound care-keeping site covered Recommend patient see vascular surgeon for compression therapy-Catrina's boot -patient states she is unable to wear compression socks  Follow-up as needed [Wound Care] : wound care

## 2024-03-22 DIAGNOSIS — L98.8 OTHER SPECIFIED DISORDERS OF THE SKIN AND SUBCUTANEOUS TISSUE: ICD-10-CM

## 2024-03-22 DIAGNOSIS — S81.801D UNSPECIFIED OPEN WOUND, RIGHT LOWER LEG, SUBSEQUENT ENCOUNTER: ICD-10-CM

## 2024-03-28 ENCOUNTER — INPATIENT (INPATIENT)
Facility: HOSPITAL | Age: 69
LOS: 4 days | Discharge: HOME | DRG: 607 | End: 2024-04-02
Attending: STUDENT IN AN ORGANIZED HEALTH CARE EDUCATION/TRAINING PROGRAM | Admitting: HOSPITALIST
Payer: MEDICARE

## 2024-03-28 VITALS
TEMPERATURE: 98 F | HEART RATE: 77 BPM | OXYGEN SATURATION: 96 % | DIASTOLIC BLOOD PRESSURE: 53 MMHG | SYSTOLIC BLOOD PRESSURE: 93 MMHG

## 2024-03-28 DIAGNOSIS — Z96.659 PRESENCE OF UNSPECIFIED ARTIFICIAL KNEE JOINT: Chronic | ICD-10-CM

## 2024-03-28 DIAGNOSIS — Z82.69 FAMILY HISTORY OF OTHER DISEASES OF THE MUSCULOSKELETAL SYSTEM AND CONNECTIVE TISSUE: Chronic | ICD-10-CM

## 2024-03-28 DIAGNOSIS — Z90.710 ACQUIRED ABSENCE OF BOTH CERVIX AND UTERUS: Chronic | ICD-10-CM

## 2024-03-28 DIAGNOSIS — L03.90 CELLULITIS, UNSPECIFIED: ICD-10-CM

## 2024-03-28 LAB
ALBUMIN SERPL ELPH-MCNC: 3.8 G/DL — SIGNIFICANT CHANGE UP (ref 3.5–5.2)
ALP SERPL-CCNC: 70 U/L — SIGNIFICANT CHANGE UP (ref 30–115)
ALT FLD-CCNC: 7 U/L — SIGNIFICANT CHANGE UP (ref 0–41)
ANION GAP SERPL CALC-SCNC: 13 MMOL/L — SIGNIFICANT CHANGE UP (ref 7–14)
AST SERPL-CCNC: 14 U/L — SIGNIFICANT CHANGE UP (ref 0–41)
BASOPHILS # BLD AUTO: 0.02 K/UL — SIGNIFICANT CHANGE UP (ref 0–0.2)
BASOPHILS NFR BLD AUTO: 0.2 % — SIGNIFICANT CHANGE UP (ref 0–1)
BILIRUB SERPL-MCNC: 0.3 MG/DL — SIGNIFICANT CHANGE UP (ref 0.2–1.2)
BUN SERPL-MCNC: 26 MG/DL — HIGH (ref 10–20)
CALCIUM SERPL-MCNC: 8.9 MG/DL — SIGNIFICANT CHANGE UP (ref 8.4–10.5)
CHLORIDE SERPL-SCNC: 105 MMOL/L — SIGNIFICANT CHANGE UP (ref 98–110)
CO2 SERPL-SCNC: 24 MMOL/L — SIGNIFICANT CHANGE UP (ref 17–32)
CREAT SERPL-MCNC: 1.4 MG/DL — SIGNIFICANT CHANGE UP (ref 0.7–1.5)
EGFR: 41 ML/MIN/1.73M2 — LOW
EOSINOPHIL # BLD AUTO: 0.37 K/UL — SIGNIFICANT CHANGE UP (ref 0–0.7)
EOSINOPHIL NFR BLD AUTO: 4.5 % — SIGNIFICANT CHANGE UP (ref 0–8)
GLUCOSE SERPL-MCNC: 80 MG/DL — SIGNIFICANT CHANGE UP (ref 70–99)
HCT VFR BLD CALC: 33.5 % — LOW (ref 37–47)
HGB BLD-MCNC: 11 G/DL — LOW (ref 12–16)
IMM GRANULOCYTES NFR BLD AUTO: 0.5 % — HIGH (ref 0.1–0.3)
LACTATE SERPL-SCNC: 1.2 MMOL/L — SIGNIFICANT CHANGE UP (ref 0.7–2)
LYMPHOCYTES # BLD AUTO: 1.5 K/UL — SIGNIFICANT CHANGE UP (ref 1.2–3.4)
LYMPHOCYTES # BLD AUTO: 18.1 % — LOW (ref 20.5–51.1)
MCHC RBC-ENTMCNC: 28.7 PG — SIGNIFICANT CHANGE UP (ref 27–31)
MCHC RBC-ENTMCNC: 32.8 G/DL — SIGNIFICANT CHANGE UP (ref 32–37)
MCV RBC AUTO: 87.5 FL — SIGNIFICANT CHANGE UP (ref 81–99)
MONOCYTES # BLD AUTO: 0.7 K/UL — HIGH (ref 0.1–0.6)
MONOCYTES NFR BLD AUTO: 8.4 % — SIGNIFICANT CHANGE UP (ref 1.7–9.3)
NEUTROPHILS # BLD AUTO: 5.68 K/UL — SIGNIFICANT CHANGE UP (ref 1.4–6.5)
NEUTROPHILS NFR BLD AUTO: 68.3 % — SIGNIFICANT CHANGE UP (ref 42.2–75.2)
NRBC # BLD: 0 /100 WBCS — SIGNIFICANT CHANGE UP (ref 0–0)
PLATELET # BLD AUTO: 300 K/UL — SIGNIFICANT CHANGE UP (ref 130–400)
PMV BLD: 9.2 FL — SIGNIFICANT CHANGE UP (ref 7.4–10.4)
POTASSIUM SERPL-MCNC: 3.8 MMOL/L — SIGNIFICANT CHANGE UP (ref 3.5–5)
POTASSIUM SERPL-SCNC: 3.8 MMOL/L — SIGNIFICANT CHANGE UP (ref 3.5–5)
PROT SERPL-MCNC: 7.2 G/DL — SIGNIFICANT CHANGE UP (ref 6–8)
RBC # BLD: 3.83 M/UL — LOW (ref 4.2–5.4)
RBC # FLD: 14.6 % — HIGH (ref 11.5–14.5)
SODIUM SERPL-SCNC: 142 MMOL/L — SIGNIFICANT CHANGE UP (ref 135–146)
WBC # BLD: 8.31 K/UL — SIGNIFICANT CHANGE UP (ref 4.8–10.8)
WBC # FLD AUTO: 8.31 K/UL — SIGNIFICANT CHANGE UP (ref 4.8–10.8)

## 2024-03-28 PROCEDURE — 76775 US EXAM ABDO BACK WALL LIM: CPT

## 2024-03-28 PROCEDURE — 97530 THERAPEUTIC ACTIVITIES: CPT | Mod: GP

## 2024-03-28 PROCEDURE — 84436 ASSAY OF TOTAL THYROXINE: CPT

## 2024-03-28 PROCEDURE — 86160 COMPLEMENT ANTIGEN: CPT

## 2024-03-28 PROCEDURE — 86038 ANTINUCLEAR ANTIBODIES: CPT

## 2024-03-28 PROCEDURE — 80053 COMPREHEN METABOLIC PANEL: CPT

## 2024-03-28 PROCEDURE — 97161 PT EVAL LOW COMPLEX 20 MIN: CPT | Mod: GP

## 2024-03-28 PROCEDURE — 80061 LIPID PANEL: CPT

## 2024-03-28 PROCEDURE — 87635 SARS-COV-2 COVID-19 AMP PRB: CPT

## 2024-03-28 PROCEDURE — 85652 RBC SED RATE AUTOMATED: CPT

## 2024-03-28 PROCEDURE — 93970 EXTREMITY STUDY: CPT | Mod: 26

## 2024-03-28 PROCEDURE — 85025 COMPLETE CBC W/AUTO DIFF WBC: CPT

## 2024-03-28 PROCEDURE — 36415 COLL VENOUS BLD VENIPUNCTURE: CPT

## 2024-03-28 PROCEDURE — 86146 BETA-2 GLYCOPROTEIN ANTIBODY: CPT

## 2024-03-28 PROCEDURE — 85732 THROMBOPLASTIN TIME PARTIAL: CPT

## 2024-03-28 PROCEDURE — 99223 1ST HOSP IP/OBS HIGH 75: CPT

## 2024-03-28 PROCEDURE — 85027 COMPLETE CBC AUTOMATED: CPT

## 2024-03-28 PROCEDURE — 84480 ASSAY TRIIODOTHYRONINE (T3): CPT

## 2024-03-28 PROCEDURE — 97116 GAIT TRAINING THERAPY: CPT | Mod: GP

## 2024-03-28 PROCEDURE — 85613 RUSSELL VIPER VENOM DILUTED: CPT

## 2024-03-28 PROCEDURE — 86225 DNA ANTIBODY NATIVE: CPT

## 2024-03-28 PROCEDURE — 83735 ASSAY OF MAGNESIUM: CPT

## 2024-03-28 PROCEDURE — 84443 ASSAY THYROID STIM HORMONE: CPT

## 2024-03-28 PROCEDURE — 80048 BASIC METABOLIC PNL TOTAL CA: CPT

## 2024-03-28 PROCEDURE — 86147 CARDIOLIPIN ANTIBODY EA IG: CPT

## 2024-03-28 PROCEDURE — 99285 EMERGENCY DEPT VISIT HI MDM: CPT | Mod: FS

## 2024-03-28 PROCEDURE — 85730 THROMBOPLASTIN TIME PARTIAL: CPT

## 2024-03-28 PROCEDURE — 86140 C-REACTIVE PROTEIN: CPT

## 2024-03-28 RX ORDER — SODIUM CHLORIDE 9 MG/ML
1000 INJECTION INTRAMUSCULAR; INTRAVENOUS; SUBCUTANEOUS ONCE
Refills: 0 | Status: COMPLETED | OUTPATIENT
Start: 2024-03-28 | End: 2024-03-28

## 2024-03-28 RX ADMIN — SODIUM CHLORIDE 1000 MILLILITER(S): 9 INJECTION INTRAMUSCULAR; INTRAVENOUS; SUBCUTANEOUS at 13:43

## 2024-03-28 RX ADMIN — Medication 100 MILLIGRAM(S): at 13:43

## 2024-03-28 NOTE — ED PROVIDER NOTE - CLINICAL SUMMARY MEDICAL DECISION MAKING FREE TEXT BOX
Patient presetns with diffuse rash and RLE swelling and pain. Found to have erythema and edema to RLE. Duplex negative. Concern for cellulitis. ABx given. labs done. Patient admitted for further management. Labs were ordered and reviewed.  Imaging was ordered and reviewed by me.  Appropriate medications for patient's presenting complaints were ordered and effects were reassessed.  Patient's records (prior hospital, ED visit, and/or nursing home notes if available) were reviewed.  Additional history was obtained from EMS, family, and/or PCP (where available).  Escalation to admission/observation was considered.  Patient requires inpatient hospitalization - monitored setting.

## 2024-03-28 NOTE — ED PROVIDER NOTE - NSCAREINITIATED _GEN_ER
Kizzy Mcelroy(PA) O-Z Plasty Text: The defect edges were debeveled with a #15 scalpel blade.  Given the location of the defect, shape of the defect and the proximity to free margins an O-Z plasty (double transposition flap) was deemed most appropriate.  Using a sterile surgical marker, the appropriate transposition flaps were drawn incorporating the defect and placing the expected incisions within the relaxed skin tension lines where possible.    The area thus outlined was incised deep to adipose tissue with a #15 scalpel blade.  The skin margins were undermined to an appropriate distance in all directions utilizing iris scissors.  Hemostasis was achieved with electrocautery.  The flaps were then transposed into place, one clockwise and the other counterclockwise, and anchored with interrupted buried subcutaneous sutures.

## 2024-03-28 NOTE — ED ADULT TRIAGE NOTE - CHIEF COMPLAINT QUOTE
Pt presents to the ED BIBEMS from [facility] w/ c/o of rash x2 weeks and c/o cellulitis to the legs.

## 2024-03-28 NOTE — H&P ADULT - ASSESSMENT
**THIS NOTE IS STILL IN PROGRESS**    Patient is a 68-year-old female w/ HTN, HLD, SLE, CVA (1987) w/o residual deficit, hypothyroidism, hx sepsis secondary to LE cellulitis that presented to the ED on 3/28 with chief complaint of a RLE rash for 2 weeks. Reports burning/itching to rash and has been experiencing chills. States that she was put on a antifungal cream by her PCP with no relief. Denies any other symptoms including headache, recent illness/travel, cough, abdominal pain, chest pain, or SOB.    Patient admitted to medicine for management of RLE cellulitis.     #Recurrent RLE cellulitis  - Vitally stable on admission  - WBC 8k on admission  - s/p doxycycline x1 dose in the ED  - f/u blood culture    #EUGENIO  - Cr 1.4 on admission, baseline Cr was 0.7 in Feb 2023  - GFR 41 (Feb 2023 was 95)    #HTN    #HLD  - f/u lipid panel    #SLE    #Hx CVA in 1987 w/o residual deficits      MISC    #DVT prophylaxis: Heparin  #GI prophylaxis: PPI  #Diet: DASH/TLC  #Activity: AAT  #Code status: Full Code  #Disposition: Med/Surg   this note is still in progress  **PLEASE CONFIRM MED REC WITH PHARMACY IN THE AM**    Patient is a 68-year-old female w/ HTN, HLD, SLE, CVA (1987) w/o residual deficit, hypothyroidism, hx sepsis secondary to LE cellulitis that presented to the ED on 3/28 with chief complaint of a a diffuse rash located on her b/l arms, chest, and RLE that has been ongoing for a few months but acutely worsened over the past 2 weeks. Reports burning/itching to rash and has been experiencing chills. States that she was put on clotrimazole by her PCP with no relief. Denies any other symptoms including fevers, headache, recent illness/travel, cough, abdominal pain, chest pain, or SOB.    #Cellulitis present to chest, b/l upper extremities, RLE  #Hx of LE cellulitis  - PMHx significant for sepsis 2/2 LE cellulitis  - current rash ongoing for a few months, acutely worsened in the past two weeks  - maculopapular rash with excoriations noted to torso, b/l arms, and RLE; R foot noted to have erythema with deep red color, possibly resembling ecchymosis?  - PCP prescribed clotrimazole with no relief  - Vitally stable on admission  - WBC 8k on admission  - s/p doxycycline x1 dose in the ED  - f/u blood culture  - f/u LE venous duplex  - consider ID consult if no improvement    #EUGENIO  - Cr 1.4 on admission, baseline Cr was 0.7 in Feb 2023  - GFR 41 (Feb 2023 was 95)  - c/w gentle hydration NS @ 50 ccs/hr for 12 hours  - US Renal    #HTN  - home med losartan 25 mg qd --> holding in setting of EUGENIO  - c/w atenolol 25 mg qd  - c/w Lasix 20 qd    #Hypothyroidism  - c/w Levothyroxine 175 mcg qd  - f/u thyroid function tests    #HLD  - no statin listed in med rec from previous admission; f/u lipid panel and determine if initiating statin is necessary    #SLE  - follows with rheumatologist Dr. Donaldo Dukes at [facility]  - c/w Plaquenil 200 mg qd    #Hx CVA in 1987 w/o residual deficits  - outpatient follow up    MISC  #DVT prophylaxis: Heparin  #GI prophylaxis: PPI  #Diet: DASH/TLC  #Activity: AAT  #Code status: Full Code  #Disposition: Med/Surg   this note is still in progress  **PLEASE CONFIRM MED REC WITH PHARMACY IN THE AM**    Patient is a 68-year-old female w/ HTN, HLD, SLE, CVA (1987) w/o residual deficit, hypothyroidism, hx sepsis secondary to LE cellulitis that presented to the ED on 3/28 with chief complaint of a a diffuse rash located on her b/l arms, chest, and RLE that has been ongoing for a few months but acutely worsened over the past 2 weeks. Reports burning/itching to rash and has been experiencing chills. States that she was put on clotrimazole by her PCP with no relief. Denies any other symptoms including fevers, headache, recent illness/travel, cough, abdominal pain, chest pain, or SOB.    #Maculopapular rash present to chest, b/l upper extremities, RLE 2/2 possible SLE flare up?  #Hx of LE cellulitis  - PMHx significant for sepsis 2/2 LE cellulitis  - current rash ongoing for a few months, acutely worsened in the past two weeks  - maculopapular rash with excoriations noted to torso, b/l arms, and RLE; R foot noted to have erythema with deep red color, possibly resembling ecchymosis?  - PCP prescribed clotrimazole with no relief  - Vitally stable on admission  - WBC 8k on admission  - s/p doxycycline x1 dose in the ED, monitor off of Abx for now   - f/u blood culture  - f/u LE venous duplex  - consider ID consult if no improvement  - rheum consult placed for possible SLE flare up    #EUGENIO  - Cr 1.4 on admission, baseline Cr was 0.7 in Feb 2023  - GFR 41 (Feb 2023 was 95)  - c/w gentle hydration NS @ 50 ccs/hr for 12 hours  - US Renal    #HTN  - home med losartan 25 mg qd --> holding in setting of EUGENIO  - c/w atenolol 25 mg qd  - c/w Lasix 20 qd    #Hypothyroidism  - c/w Levothyroxine 175 mcg qd  - f/u thyroid function tests    #HLD  - no statin listed in med rec from previous admission; f/u lipid panel and determine if initiating statin is necessary    #SLE  - follows with rheumatologist Dr. Donaldo Dukes at [facility]  - c/w Plaquenil 200 mg qd  - f/u rheum consult for possible SLE flare up    #Hx CVA in 1987 w/o residual deficits  - outpatient follow up    MISC  #DVT prophylaxis: Heparin  #GI prophylaxis: PPI  #Diet: DASH/TLC  #Activity: AAT  #Code status: Full Code  #Disposition: Med/Surg   Patient is a 68-year-old female w/ HTN, HLD, SLE, CVA (1987) w/o residual deficit, hypothyroidism, hx sepsis secondary to LE cellulitis that presented to the ED on 3/28 with chief complaint of a diffuse rash located on her b/l arms, chest, and RLE that has been ongoing for a few months but acutely worsened over the past 2 weeks. Reports burning/itching to rash and has been experiencing chills. States that she was put on clotrimazole by her PCP with no relief. Denies any other symptoms including fevers, headache, recent illness/travel, cough, abdominal pain, chest pain, or SOB.    #Maculopapular rash present to chest, b/l upper extremities, RLE 2/2 possible SLE flare up?  #RLE cellulitis  #Hx of LE cellulitis  - PMHx significant for sepsis 2/2 LE cellulitis  - current rash ongoing for a few months, acutely worsened in the past two weeks  - maculopapular rash with excoriations noted to torso, b/l arms, and RLE; R foot noted to have erythema with deep red color, possibly resembling ecchymosis?  - of note, patient lived in assisted living facility, [facility]  - PCP prescribed clotrimazole with no relief  - Vitally stable on admission  - WBC 8k on admission  - s/p doxycycline x1 dose in the ED  - cefazolin 1 g q8h  - f/u blood culture  - f/u LE venous duplex  - consider ID consult if no improvement  - rheum consult placed for possible SLE flare up  - derm consult placed for rash    #EUGENIO  - Cr 1.4 on admission, baseline Cr was 0.7 in Feb 2023  - GFR 41 (Feb 2023 was 95)  - c/w gentle hydration NS @ 50 ccs/hr for 12 hours  - US Renal    #HTN  - c/w atenolol 25 mg qd  - c/w Lasix 40 qd    #Hypothyroidism  - c/w Levothyroxine 175 mcg qd  - c/w Levothyroxine 25 mcg qweekly  - f/u thyroid function tests    #HLD  - no statin listed in med rec from previous admission; f/u lipid panel and determine if initiating statin is necessary    #SLE  - follows with rheumatologist Dr. Donaldo Dukes at [facility]  - c/w Plaquenil 200 mg qd  - f/u rheum consult for possible SLE flare up    #Hx CVA in 1987 w/o residual deficits  - outpatient follow up    MISC  #DVT prophylaxis: Heparin  #GI prophylaxis: Pepcid (home med)  #Diet: DASH/TLC  #Activity: AAT  #Code status: Full Code  #Disposition: Med/Surg   Patient is a 68-year-old female w/ HTN, HLD, SLE, CVA (1987) w/o residual deficit, hypothyroidism, hx sepsis secondary to LE cellulitis that presented to the ED on 3/28 with chief complaint of a diffuse rash located on her b/l arms, chest, and RLE that has been ongoing for a few months but acutely worsened over the past 2 weeks. Reports burning/itching to rash and has been experiencing chills. States that she was put on clotrimazole by her PCP with no relief. Denies any other symptoms including fevers, headache, recent illness/travel, cough, abdominal pain, chest pain, or SOB.    #Maculopapular rash present to chest, b/l upper extremities, RLE 2/2 possible SLE flare up?  #RLE cellulitis  #Hx of LE cellulitis  - PMHx significant for sepsis 2/2 LE cellulitis  - current rash ongoing for a few months, acutely worsened in the past two weeks  - maculopapular rash with excoriations noted to torso, b/l arms, and RLE; R foot noted to have erythema with deep red color, possibly resembling ecchymosis?  - of note, patient lives in assisted living facility, [facility]  - PCP prescribed clotrimazole with no relief  - Vitally stable on admission  - WBC 8k on admission  - s/p doxycycline x1 dose in the ED  - cefazolin 1 g q8h  - f/u blood culture  - f/u LE venous duplex  - consider ID consult if no improvement  - rheum consult placed for possible SLE flare up  - derm consult placed for rash  - wound care consult placed for RLE ulcer noted to lateral side of R calf    #EUGENIO  - Cr 1.4 on admission, baseline Cr was 0.7 in Feb 2023  - GFR 41 (Feb 2023 was 95)  - c/w gentle hydration NS @ 50 ccs/hr for 12 hours  - US Renal    #HTN  - c/w atenolol 25 mg qd  - c/w Lasix 40 qd    #Hypothyroidism  - c/w Levothyroxine 175 mcg qd  - c/w Levothyroxine 25 mcg qweekly  - f/u thyroid function tests    #HLD  - no statin listed in med rec from previous admission; f/u lipid panel and determine if initiating statin is necessary    #SLE  - follows with rheumatologist Dr. Donaldo Dukes at [facility]  - c/w Plaquenil 200 mg qd  - f/u rheum consult for possible SLE flare up  - f/u dsDNA, DANIS, C3, C4, ESR, CRP    #Hx CVA in 1987 w/o residual deficits  - outpatient follow up    MISC  #DVT prophylaxis: Heparin  #GI prophylaxis: Pepcid (home med)  #Diet: DASH/TLC  #Activity: AAT  #Code status: Full Code  #Disposition: Med/Surg

## 2024-03-28 NOTE — ED ADULT TRIAGE NOTE - CCCP TRG CHIEF CMPLNT
Jerel Bethel discharged to home accompanied by wife.   Patient provided with the following educational materials upon discharge:  AVS, pneumonia and antibiotic treatment.   Valuables and belongings sent with patient.   discharge summary, discharge instructions, medications, and follow up appointments reviewed with patient and wife.  Patient and wife verbalized understanding   multiple medical complaints

## 2024-03-28 NOTE — ED ADULT NURSE NOTE - NSFALLRISKINTERV_ED_ALL_ED

## 2024-03-28 NOTE — PATIENT PROFILE ADULT - LIVING ENVIRONMENT
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  Dad indicates an the intake questionnaire from “I want to keep this one “.  The child has the typical excess redundant ventral prepuce after a [place] type circumcision and a mild adhesion on the right side, he also has a small right hydrocele.    I recommended Vaseline mixed with olive oil to the adhesion, and returning in a year for re-evaluation of the hydrocele.  Procedure today in office: None.  PLAN:   1. Information provided today: Pediatric Urology Information and Contact Numbers, Treatment of Post-Circumcision Penile Adhesions and Watch For Development of Inguinal Hernia Bulge, and seek medical attention if non-reducible bulge develops.  2. No orders of the defined types were placed in this encounter.    3. No follow-ups on file.     Instructions provided as documented in the after visit summary.  The  mother and fatherindicated understanding of the diagnosis, and agreed with the plan of care.  CHIEF COMPLAINT: Post-operative examination.  SUBJECTIVE: Seth Arroyo is a 2 month old male , here with patient's mother and father. Regarding Post-operative examination.  Mom and dad have no particular concerns and think that his penis is coming along well.  ALLERGIES:  No Known Allergies  OBJECTIVE: Vital Signs: There were no vitals taken for this visit.  PHYSICAL EXAM: General: The patient is well developed, well-nourished , in no acute distress, appears to be appropriate for his stated age.    Neurologic: Alert. Normal mood and affect.  Skin: Warm and dry, normal turgor.  Head: Normocephalic w/o obvious lesions.  HEENT: Oropharynx mucus membranes are moist, nares patent bilaterally, no obvious lesions.   Neck: Swelling: not appreciated by visualization.   Back: no Right costo-vertebral angle tenderness to fist percussion.     no Left costo-vertebral angle tenderness to fist percussion.   Abdomen: soft.  are no abdominal masses palpated. .  Tenderness to palpation: not appreciated by palpation.  Abdominal Tympany: not appreciated by palpation.  Rebound tenderness: not appreciated by palpation.  Right Kidney: non-palpable, normal.      Left Kidney: non-palpable, normal.  Extremities: Swelling: not appreciated by visualization.  Genitourinary: The patient is wearing The child is wearing a diaper, which is wet.  He has the typical “excess “prepuce after  [place] type circumcision especially ventrally, and a small adhesion of the coronal sulcus on the right side.  Both testes are fully descended.       IMAGING STUDIES: No results found.   Laboratory Studies:   Admission on 2022, Discharged on 2022   Component Date Value Ref Range Status   • Rapid SARS-COV-2 by PCR 2022 Not Detected  Not Detected / Detected / Presumptive Positive / Inhibitors present Final   • Influenza A by PCR 2022 Not Detected  Not Detected Final   • Influenza B by PCR 2022 Not Detected  Not Detected Final   • RSV BY PCR 2022 Not Detected  Not Detected Final   • Isolation Guidelines 2022    Final   • Procedural Comment 2022    Final      Signed: Hamilton Rios MD  2022  3:56 PM   no

## 2024-03-28 NOTE — ED PROVIDER NOTE - OBJECTIVE STATEMENT
69 yo female with a pmh of htn and sle presents c/o a rash for 2 weeks. 67 yo female with a pmh of htn, hld, and sle presents c/o a rash for 2 weeks. pt notes burning/itching to rash and has been experiencing chills. pt states she was put on a antifungal cream by her pcp with no relief. pt denies any other symptoms including headache, recent illness/travel, cough, abdominal pain, chest pain, or SOB.

## 2024-03-28 NOTE — H&P ADULT - ATTENDING COMMENTS
67-year-old female w/ HTN, SLE, CVA (1987) w/o residual deficit, hypothyroidism, hx sepsis secondary to LE,  GERD, Pancreatic head cystic lesion cellulitis presenting erythematous scaly rash her  back and extremities  likely Secondary to SLE Flair.    Agree  with assessment  except for changes below.   Vital Signs Last 24 Hrs  T(C): 36.7 (28 Mar 2024 22:08), Max: 36.7 (28 Mar 2024 11:58)  T(F): 98.1 (28 Mar 2024 22:08), Max: 98.1 (28 Mar 2024 22:08)  HR: 63 (28 Mar 2024 22:08) (52 - 77)  BP: 118/59 (28 Mar 2024 22:08) (93/53 - 118/59)  BP(mean): 83 (28 Mar 2024 22:08) (83 - 83)  RR: 17 (28 Mar 2024 22:08) (17 - 17)  SpO2: 93% (28 Mar 2024 22:08) (93% - 96%)    Parameters below as of 28 Mar 2024 22:08  Patient On (Oxygen Delivery Method): room air      PHYSICAL EXAM  GENERAL: NAD,  HEAD:  NCAT, EOMI, MM  NECK: Supple, Nontender  NERVOUS SYSTEM:  AAOx3, NFD  CHEST/LUNG: +bs b/l, No wheezing   HEART: +s1s2 RRR  ABDOMEN: soft, NT/ND  EXTREMITIES:  pp, no edema  SKIN: Scaly erythematous rash  back trunk and extremities     IMPRESSION   Suspected  SLE Flair  Associate with  Rash   Hx  SLE and Hypercoagulable Stable?  Send DsDNA, DANIS C3 and C4  Levels   Send  ESR CRP   c/w Hydroxychlorquine   Start  on IV steroids   Consult Rheumatology   monitor of abx for now   Wound  Care   DVt ppx     Chronic Anemia- f/u Iron studies     Hx  coronary calcifications  Hx  HTN  Hx CVA (1987) w/o residual deficit  Hx  PVD  Hx  Pre-DM  - monitor Fs  while on  Steroids   - c/w losartan, Lasix, atenolol    Hx hypothyroidism  check TSH   - c/w levothyroxine      Hx  Pancreatic head cystic lesion   & CEA within normal limits on last admission   - OP GI f/u  needs EUS/EGD     Hx  diverticulosis  - high fiber diet    Hx osteopenia, b/l hip replacement  Hx total knee replacement  Hx of Steroid use   - outpatient f/u  - check Vit D  levels     Hx  GERD  - c/w Protonix    Hx  Insomnia  - c/w Melatonin    Seen on 03/28

## 2024-03-28 NOTE — ED PROVIDER NOTE - ATTENDING APP SHARED VISIT CONTRIBUTION OF CARE
68-year-old female past med history of lupus, osteoporosis, hypertension, hyperlipidemia presents with diffuse rash.  Patient states that she has been having rash since earlier this month.  Rash is pruritic.  Saw her primary care doctor who prescribed her an antifungal cream which has not been helping.  States that the rash continues to spread.  Separately patient reports worsening swelling and redness to her right lower extremity.  Denies any fevers or chills.  No fall or trauma.  No chest pain shortness of breath or palpitations.  No nausea vomiting or abdominal pain.    CONSTITUTIONAL: Well-developed; well-nourished; in no acute distress.   SKIN: warm, dry. + diffuse rash to torso, extremities and face, maculopapular rash with excoriations. no vesicles.   HEAD: Normocephalic; atraumatic.  EYES: PERRL, EOMI, no conjunctival erythema  ENT: No nasal discharge; airway clear.  NECK: Supple; non tender.  CARD: S1, S2 normal;  Regular rate and rhythm.   RESP: No wheezes, rales or rhonchi.  ABD: soft non tender, non distended, no rebound or guarding  EXT: Normal ROM.  5/5 strength in all 4 extremities. + edema and erythema to RLE. neurovascularly intact.    LYMPH: No acute cervical adenopathy.  NEURO: Alert, oriented, grossly unremarkable. neurovascularly intact  PSYCH: Cooperative, appropriate.

## 2024-03-28 NOTE — H&P ADULT - NSHPPHYSICALEXAM_GEN_ALL_CORE
GENERAL: NAD, lying in bed comfortably  HEAD:  Atraumatic, normocephalic  EYES: EOMI, PERRLA, conjunctiva and sclera clear  ENT: Moist mucous membranes  NECK: Supple, no JVD  HEART: Regular rate and rhythm, no murmurs, rubs, or gallops  LUNGS: Unlabored respirations.  Clear to auscultation bilaterally, no crackles, wheezing, or rhonchi  ABDOMEN: Soft, nontender, nondistended, +BS  EXTREMITIES: 2+ peripheral pulses bilaterally. No clubbing, cyanosis, or edema  NERVOUS SYSTEM:  A&Ox3, no focal deficits   SKIN: No rashes or lesions GENERAL: NAD, lying in bed comfortably  HEAD:  Atraumatic, normocephalic  EYES: EOMI, PERRLA, conjunctiva and sclera clear  ENT: Moist mucous membranes  NECK: Supple, no JVD  HEART: Regular rate and rhythm, no murmurs, rubs, or gallops  LUNGS: Unlabored respirations.  Clear to auscultation bilaterally, no crackles, wheezing, or rhonchi  ABDOMEN: Soft, nontender, nondistended, +BS  EXTREMITIES: 2+ peripheral pulses bilaterally. No clubbing, cyanosis, or edema  NERVOUS SYSTEM:  A&Ox3, no focal deficits   SKIN:  diffuse rash present to torso and RLE extremity; maculopapular rash with excoriations. R foot significant for redness resembling possible ecchymosis GENERAL: NAD, lying in bed comfortably  HEAD:  Atraumatic, normocephalic  EYES: EOMI, PERRLA, conjunctiva and sclera clear  ENT: Moist mucous membranes  NECK: Supple, no JVD  HEART: Regular rate and rhythm, no murmurs, rubs, or gallops  LUNGS: Unlabored respirations.  Clear to auscultation bilaterally, no crackles, wheezing, or rhonchi  ABDOMEN: Soft, nontender, nondistended, +BS  EXTREMITIES: 2+ peripheral pulses bilaterally. No clubbing, cyanosis, or edema  NERVOUS SYSTEM:  A&Ox3, no focal deficits   SKIN:  diffuse rash present to torso and RLE extremity; maculopapular rash with excoriations. RLE skin resembling cellulitis. R foot significant for redness resembling possible ecchymosis GENERAL: NAD, lying in bed comfortably  HEAD:  Atraumatic, normocephalic  EYES: EOMI, PERRLA, conjunctiva and sclera clear  ENT: Moist mucous membranes  NECK: Supple, no JVD  HEART: Regular rate and rhythm, no murmurs, rubs, or gallops  LUNGS: Unlabored respirations.  Clear to auscultation bilaterally, no crackles, wheezing, or rhonchi  ABDOMEN: Soft, nontender, nondistended, +BS  EXTREMITIES: 2+ peripheral pulses bilaterally. No clubbing, cyanosis, or edema  NERVOUS SYSTEM:  A&Ox3, no focal deficits   SKIN:  diffuse rash present to torso and RLE extremity; maculopapular rash with excoriations. RLE skin resembling cellulitis. R lateral calf notable for ulcer. R foot significant for redness resembling possible ecchymosis

## 2024-03-28 NOTE — ED PROVIDER NOTE - PHYSICAL EXAMINATION
Gen: NAD, AOx3  Head: NCAT  HEENT: PERRL, oral mucosa moist, normal conjunctiva, oropharynx clear without exudate or erythema  Lung: CTAB, no respiratory distress, no wheezing, rales, rhonchi  CV: normal s1/s2, rrr, Normal perfusion, pulses 2+ throughout  Abd: soft, NTND, no CVA tenderness  Genitourinary: no pelvic tenderness  MSK: No edema, no visible deformities, full range of motion in all 4 extremities  Neuro: No focal neurologic deficits  Skin: warm, dry. + diffuse rash to torso, extremities and face, maculopapular rash with excoriations. no vesicles  Psych: normal affect

## 2024-03-28 NOTE — H&P ADULT - HISTORY OF PRESENT ILLNESS
Patient is a 68-year-old female w/ HTN, HLD, SLE, CVA (1987) w/o residual deficit, hypothyroidism, hx sepsis secondary to LE cellulitis that presented to the ED on 3/28 with chief complaint of a RLE rash for 2 weeks. Reports burning/itching to rash and has been experiencing chills. States that she was put on a antifungal cream by her PCP with no relief. Denies any other symptoms including headache, recent illness/travel, cough, abdominal pain, chest pain, or SOB.    Vitals on admission: T 97.5, HR 70, /59, RR 18, SpO2 95%  Labs on admission: Hb 11 (baseline ~12.5), Cr 1.4 (baseline Cr was 0.7 in Feb 2023), GFR 41 (Feb 2023 was 95)    s/p 1 dose doxycycline in the ED.   Patient admitted to medicine for management of RLE cellulitis.      Patient is a 68-year-old female w/ HTN, HLD, SLE, CVA (1987) w/o residual deficit, hypothyroidism, hx sepsis secondary to LE cellulitis that presented to the ED on 3/28 with chief complaint of a a diffuse rash located on her b/l arms, chest, and RLE that has been ongoing for a few months but acutely worsened over the past 2 weeks. Reports burning/itching to rash and has been experiencing chills. States that she was put on clotrimazole by her PCP with no relief. Denies any other symptoms including fevers, headache, recent illness/travel, cough, abdominal pain, chest pain, or SOB    Vitals on admission: T 97.5, HR 70, /59, RR 18, SpO2 95%  Labs on admission: Hb 11 (baseline ~12.5), Cr 1.4 (baseline Cr was 0.7 in Feb 2023), GFR 41 (Feb 2023 was 95)    s/p 1 dose doxycycline in the ED.   Patient admitted to medicine for management of cellulitis.      Patient is a 68-year-old female w/ HTN, HLD, SLE, CVA (1987) w/o residual deficit, hypothyroidism, hx sepsis secondary to LE cellulitis that presented to the ED on 3/28 with chief complaint of a a diffuse rash located on her b/l arms, chest, and RLE that has been ongoing for a few months but acutely worsened over the past 2 weeks. Reports burning/itching to rash and has been experiencing chills. States that she was put on clotrimazole by her PCP with no relief. Denies any other symptoms including fevers, headache, recent illness/travel, cough, abdominal pain, chest pain, or SOB    Vitals on admission: T 97.5, HR 70, /59, RR 18, SpO2 95%  Labs on admission: Hb 11 (baseline ~12.5), Cr 1.4 (baseline Cr was 0.7 in Feb 2023), GFR 41 (Feb 2023 was 95)    s/p 1 dose doxycycline in the ED.   Patient admitted to medicine for management of rash.

## 2024-03-29 LAB
ALBUMIN SERPL ELPH-MCNC: 3.5 G/DL — SIGNIFICANT CHANGE UP (ref 3.5–5.2)
ALP SERPL-CCNC: 66 U/L — SIGNIFICANT CHANGE UP (ref 30–115)
ALT FLD-CCNC: 6 U/L — SIGNIFICANT CHANGE UP (ref 0–41)
ANION GAP SERPL CALC-SCNC: 13 MMOL/L — SIGNIFICANT CHANGE UP (ref 7–14)
APTT BLD: 26.7 SEC — LOW (ref 27–39.2)
AST SERPL-CCNC: 16 U/L — SIGNIFICANT CHANGE UP (ref 0–41)
BASOPHILS # BLD AUTO: 0.02 K/UL — SIGNIFICANT CHANGE UP (ref 0–0.2)
BASOPHILS NFR BLD AUTO: 0.2 % — SIGNIFICANT CHANGE UP (ref 0–1)
BILIRUB SERPL-MCNC: <0.2 MG/DL — SIGNIFICANT CHANGE UP (ref 0.2–1.2)
BUN SERPL-MCNC: 19 MG/DL — SIGNIFICANT CHANGE UP (ref 10–20)
CALCIUM SERPL-MCNC: 8.4 MG/DL — SIGNIFICANT CHANGE UP (ref 8.4–10.4)
CHLORIDE SERPL-SCNC: 109 MMOL/L — SIGNIFICANT CHANGE UP (ref 98–110)
CHOLEST SERPL-MCNC: 156 MG/DL — SIGNIFICANT CHANGE UP
CO2 SERPL-SCNC: 22 MMOL/L — SIGNIFICANT CHANGE UP (ref 17–32)
CREAT SERPL-MCNC: 1 MG/DL — SIGNIFICANT CHANGE UP (ref 0.7–1.5)
CRP SERPL-MCNC: 59.5 MG/L — HIGH
EGFR: 62 ML/MIN/1.73M2 — SIGNIFICANT CHANGE UP
EOSINOPHIL # BLD AUTO: 0.29 K/UL — SIGNIFICANT CHANGE UP (ref 0–0.7)
EOSINOPHIL NFR BLD AUTO: 3.3 % — SIGNIFICANT CHANGE UP (ref 0–8)
ERYTHROCYTE [SEDIMENTATION RATE] IN BLOOD: 48 MM/HR — HIGH (ref 0–20)
GLUCOSE SERPL-MCNC: 88 MG/DL — SIGNIFICANT CHANGE UP (ref 70–99)
HCT VFR BLD CALC: 34.1 % — LOW (ref 37–47)
HCT VFR BLD CALC: 35 % — LOW (ref 37–47)
HDLC SERPL-MCNC: 39 MG/DL — LOW
HGB BLD-MCNC: 10.8 G/DL — LOW (ref 12–16)
HGB BLD-MCNC: 11 G/DL — LOW (ref 12–16)
IMM GRANULOCYTES NFR BLD AUTO: 0.6 % — HIGH (ref 0.1–0.3)
LIPID PNL WITH DIRECT LDL SERPL: 93 MG/DL — SIGNIFICANT CHANGE UP
LYMPHOCYTES # BLD AUTO: 1.18 K/UL — LOW (ref 1.2–3.4)
LYMPHOCYTES # BLD AUTO: 13.4 % — LOW (ref 20.5–51.1)
MAGNESIUM SERPL-MCNC: 2.1 MG/DL — SIGNIFICANT CHANGE UP (ref 1.8–2.4)
MCHC RBC-ENTMCNC: 28.1 PG — SIGNIFICANT CHANGE UP (ref 27–31)
MCHC RBC-ENTMCNC: 28.2 PG — SIGNIFICANT CHANGE UP (ref 27–31)
MCHC RBC-ENTMCNC: 31.4 G/DL — LOW (ref 32–37)
MCHC RBC-ENTMCNC: 31.7 G/DL — LOW (ref 32–37)
MCV RBC AUTO: 88.8 FL — SIGNIFICANT CHANGE UP (ref 81–99)
MCV RBC AUTO: 89.7 FL — SIGNIFICANT CHANGE UP (ref 81–99)
MONOCYTES # BLD AUTO: 0.52 K/UL — SIGNIFICANT CHANGE UP (ref 0.1–0.6)
MONOCYTES NFR BLD AUTO: 5.9 % — SIGNIFICANT CHANGE UP (ref 1.7–9.3)
NEUTROPHILS # BLD AUTO: 6.74 K/UL — HIGH (ref 1.4–6.5)
NEUTROPHILS NFR BLD AUTO: 76.6 % — HIGH (ref 42.2–75.2)
NON HDL CHOLESTEROL: 117 MG/DL — SIGNIFICANT CHANGE UP
NRBC # BLD: 0 /100 WBCS — SIGNIFICANT CHANGE UP (ref 0–0)
NRBC # BLD: 0 /100 WBCS — SIGNIFICANT CHANGE UP (ref 0–0)
PLATELET # BLD AUTO: 300 K/UL — SIGNIFICANT CHANGE UP (ref 130–400)
PLATELET # BLD AUTO: 323 K/UL — SIGNIFICANT CHANGE UP (ref 130–400)
PMV BLD: 9.2 FL — SIGNIFICANT CHANGE UP (ref 7.4–10.4)
PMV BLD: 9.4 FL — SIGNIFICANT CHANGE UP (ref 7.4–10.4)
POTASSIUM SERPL-MCNC: 3.8 MMOL/L — SIGNIFICANT CHANGE UP (ref 3.5–5)
POTASSIUM SERPL-SCNC: 3.8 MMOL/L — SIGNIFICANT CHANGE UP (ref 3.5–5)
PROT SERPL-MCNC: 6.4 G/DL — SIGNIFICANT CHANGE UP (ref 6–8)
RBC # BLD: 3.84 M/UL — LOW (ref 4.2–5.4)
RBC # BLD: 3.9 M/UL — LOW (ref 4.2–5.4)
RBC # FLD: 14.7 % — HIGH (ref 11.5–14.5)
RBC # FLD: 14.8 % — HIGH (ref 11.5–14.5)
SODIUM SERPL-SCNC: 144 MMOL/L — SIGNIFICANT CHANGE UP (ref 135–146)
T3 SERPL-MCNC: 88 NG/DL — SIGNIFICANT CHANGE UP (ref 80–200)
T4 AB SER-ACNC: 10.5 UG/DL — SIGNIFICANT CHANGE UP (ref 4.6–12)
TRIGL SERPL-MCNC: 116 MG/DL — SIGNIFICANT CHANGE UP
TSH SERPL-MCNC: 2.14 UIU/ML — SIGNIFICANT CHANGE UP (ref 0.27–4.2)
WBC # BLD: 8.8 K/UL — SIGNIFICANT CHANGE UP (ref 4.8–10.8)
WBC # BLD: 9.48 K/UL — SIGNIFICANT CHANGE UP (ref 4.8–10.8)
WBC # FLD AUTO: 8.8 K/UL — SIGNIFICANT CHANGE UP (ref 4.8–10.8)
WBC # FLD AUTO: 9.48 K/UL — SIGNIFICANT CHANGE UP (ref 4.8–10.8)

## 2024-03-29 PROCEDURE — 99222 1ST HOSP IP/OBS MODERATE 55: CPT

## 2024-03-29 PROCEDURE — 76775 US EXAM ABDO BACK WALL LIM: CPT | Mod: 26

## 2024-03-29 PROCEDURE — 99232 SBSQ HOSP IP/OBS MODERATE 35: CPT

## 2024-03-29 RX ORDER — LEVOTHYROXINE SODIUM 125 MCG
175 TABLET ORAL DAILY
Refills: 0 | Status: DISCONTINUED | OUTPATIENT
Start: 2024-03-29 | End: 2024-03-29

## 2024-03-29 RX ORDER — CEFAZOLIN SODIUM 1 G
1000 VIAL (EA) INJECTION EVERY 8 HOURS
Refills: 0 | Status: DISCONTINUED | OUTPATIENT
Start: 2024-03-29 | End: 2024-03-29

## 2024-03-29 RX ORDER — DIPHENHYDRAMINE HCL 50 MG
25 CAPSULE ORAL AT BEDTIME
Refills: 0 | Status: DISCONTINUED | OUTPATIENT
Start: 2024-03-29 | End: 2024-04-02

## 2024-03-29 RX ORDER — LOSARTAN POTASSIUM 100 MG/1
1 TABLET, FILM COATED ORAL
Qty: 0 | Refills: 0 | DISCHARGE

## 2024-03-29 RX ORDER — CEFAZOLIN SODIUM 1 G
1000 VIAL (EA) INJECTION ONCE
Refills: 0 | Status: COMPLETED | OUTPATIENT
Start: 2024-03-29 | End: 2024-03-29

## 2024-03-29 RX ORDER — FLUTICASONE PROPIONATE 50 MCG
1 SPRAY, SUSPENSION NASAL
Qty: 0 | Refills: 0 | DISCHARGE

## 2024-03-29 RX ORDER — TRAZODONE HCL 50 MG
50 TABLET ORAL AT BEDTIME
Refills: 0 | Status: DISCONTINUED | OUTPATIENT
Start: 2024-03-29 | End: 2024-04-02

## 2024-03-29 RX ORDER — LEVOTHYROXINE SODIUM 125 MCG
25 TABLET ORAL
Refills: 0 | Status: DISCONTINUED | OUTPATIENT
Start: 2024-03-29 | End: 2024-04-02

## 2024-03-29 RX ORDER — TRAMADOL HYDROCHLORIDE 50 MG/1
25 TABLET ORAL EVERY 6 HOURS
Refills: 0 | Status: DISCONTINUED | OUTPATIENT
Start: 2024-03-29 | End: 2024-04-02

## 2024-03-29 RX ORDER — CHOLECALCIFEROL (VITAMIN D3) 125 MCG
1000 CAPSULE ORAL DAILY
Refills: 0 | Status: DISCONTINUED | OUTPATIENT
Start: 2024-03-29 | End: 2024-04-02

## 2024-03-29 RX ORDER — ATENOLOL 25 MG/1
25 TABLET ORAL DAILY
Refills: 0 | Status: DISCONTINUED | OUTPATIENT
Start: 2024-03-29 | End: 2024-03-29

## 2024-03-29 RX ORDER — FAMOTIDINE 10 MG/ML
20 INJECTION INTRAVENOUS DAILY
Refills: 0 | Status: DISCONTINUED | OUTPATIENT
Start: 2024-03-29 | End: 2024-04-02

## 2024-03-29 RX ORDER — CEFAZOLIN SODIUM 1 G
VIAL (EA) INJECTION
Refills: 0 | Status: DISCONTINUED | OUTPATIENT
Start: 2024-03-29 | End: 2024-03-29

## 2024-03-29 RX ORDER — ACETAMINOPHEN 500 MG
650 TABLET ORAL EVERY 12 HOURS
Refills: 0 | Status: DISCONTINUED | OUTPATIENT
Start: 2024-03-29 | End: 2024-04-02

## 2024-03-29 RX ORDER — TRAZODONE HCL 50 MG
1 TABLET ORAL
Refills: 0 | DISCHARGE

## 2024-03-29 RX ORDER — PANTOPRAZOLE SODIUM 20 MG/1
1 TABLET, DELAYED RELEASE ORAL
Qty: 0 | Refills: 0 | DISCHARGE

## 2024-03-29 RX ORDER — FAMOTIDINE 10 MG/ML
1 INJECTION INTRAVENOUS
Refills: 0 | DISCHARGE

## 2024-03-29 RX ORDER — LOSARTAN POTASSIUM 100 MG/1
25 TABLET, FILM COATED ORAL DAILY
Refills: 0 | Status: DISCONTINUED | OUTPATIENT
Start: 2024-03-29 | End: 2024-03-29

## 2024-03-29 RX ORDER — LEVOTHYROXINE SODIUM 125 MCG
1 TABLET ORAL
Refills: 0 | DISCHARGE

## 2024-03-29 RX ORDER — FUROSEMIDE 40 MG
20 TABLET ORAL DAILY
Refills: 0 | Status: DISCONTINUED | OUTPATIENT
Start: 2024-03-29 | End: 2024-03-29

## 2024-03-29 RX ORDER — DIPHENHYDRAMINE HCL 50 MG
1 CAPSULE ORAL
Refills: 0 | DISCHARGE

## 2024-03-29 RX ORDER — FUROSEMIDE 40 MG
1 TABLET ORAL
Refills: 0 | DISCHARGE

## 2024-03-29 RX ORDER — HEPARIN SODIUM 5000 [USP'U]/ML
5000 INJECTION INTRAVENOUS; SUBCUTANEOUS EVERY 12 HOURS
Refills: 0 | Status: DISCONTINUED | OUTPATIENT
Start: 2024-03-29 | End: 2024-04-02

## 2024-03-29 RX ORDER — HYDROXYCHLOROQUINE SULFATE 200 MG
200 TABLET ORAL DAILY
Refills: 0 | Status: DISCONTINUED | OUTPATIENT
Start: 2024-03-29 | End: 2024-04-02

## 2024-03-29 RX ORDER — FUROSEMIDE 40 MG
1 TABLET ORAL
Qty: 0 | Refills: 0 | DISCHARGE

## 2024-03-29 RX ORDER — ACETAMINOPHEN WITH CODEINE 300MG-30MG
1 TABLET ORAL EVERY 8 HOURS
Refills: 0 | Status: DISCONTINUED | OUTPATIENT
Start: 2024-03-29 | End: 2024-04-02

## 2024-03-29 RX ORDER — FLUTICASONE PROPIONATE 50 MCG
1 SPRAY, SUSPENSION NASAL
Refills: 0 | Status: DISCONTINUED | OUTPATIENT
Start: 2024-03-29 | End: 2024-03-29

## 2024-03-29 RX ORDER — SODIUM CHLORIDE 9 MG/ML
1000 INJECTION INTRAMUSCULAR; INTRAVENOUS; SUBCUTANEOUS
Refills: 0 | Status: DISCONTINUED | OUTPATIENT
Start: 2024-03-29 | End: 2024-03-30

## 2024-03-29 RX ORDER — LANOLIN ALCOHOL/MO/W.PET/CERES
3 CREAM (GRAM) TOPICAL AT BEDTIME
Refills: 0 | Status: DISCONTINUED | OUTPATIENT
Start: 2024-03-29 | End: 2024-04-02

## 2024-03-29 RX ORDER — ACETAMINOPHEN 500 MG
650 TABLET ORAL EVERY 6 HOURS
Refills: 0 | Status: DISCONTINUED | OUTPATIENT
Start: 2024-03-29 | End: 2024-03-29

## 2024-03-29 RX ORDER — FUROSEMIDE 40 MG
40 TABLET ORAL DAILY
Refills: 0 | Status: DISCONTINUED | OUTPATIENT
Start: 2024-03-29 | End: 2024-03-29

## 2024-03-29 RX ORDER — LEVOTHYROXINE SODIUM 125 MCG
175 TABLET ORAL DAILY
Refills: 0 | Status: DISCONTINUED | OUTPATIENT
Start: 2024-03-29 | End: 2024-04-02

## 2024-03-29 RX ADMIN — HEPARIN SODIUM 5000 UNIT(S): 5000 INJECTION INTRAVENOUS; SUBCUTANEOUS at 05:17

## 2024-03-29 RX ADMIN — Medication 100 MILLIGRAM(S): at 05:18

## 2024-03-29 RX ADMIN — Medication 175 MICROGRAM(S): at 05:16

## 2024-03-29 RX ADMIN — SODIUM CHLORIDE 50 MILLILITER(S): 9 INJECTION INTRAMUSCULAR; INTRAVENOUS; SUBCUTANEOUS at 04:40

## 2024-03-29 RX ADMIN — Medication 200 MILLIGRAM(S): at 11:34

## 2024-03-29 RX ADMIN — Medication 650 MILLIGRAM(S): at 17:30

## 2024-03-29 RX ADMIN — Medication 650 MILLIGRAM(S): at 05:17

## 2024-03-29 RX ADMIN — FAMOTIDINE 20 MILLIGRAM(S): 10 INJECTION INTRAVENOUS at 11:32

## 2024-03-29 RX ADMIN — Medication 650 MILLIGRAM(S): at 17:04

## 2024-03-29 RX ADMIN — Medication 40 MILLIGRAM(S): at 05:19

## 2024-03-29 RX ADMIN — Medication 50 MILLIGRAM(S): at 21:06

## 2024-03-29 RX ADMIN — Medication 60 MILLIGRAM(S): at 13:13

## 2024-03-29 RX ADMIN — TRAMADOL HYDROCHLORIDE 25 MILLIGRAM(S): 50 TABLET ORAL at 19:46

## 2024-03-29 RX ADMIN — Medication 1000 UNIT(S): at 11:34

## 2024-03-29 RX ADMIN — ATENOLOL 25 MILLIGRAM(S): 25 TABLET ORAL at 05:16

## 2024-03-29 RX ADMIN — Medication 3 MILLIGRAM(S): at 21:05

## 2024-03-29 RX ADMIN — Medication 25 MILLIGRAM(S): at 21:06

## 2024-03-29 RX ADMIN — HEPARIN SODIUM 5000 UNIT(S): 5000 INJECTION INTRAVENOUS; SUBCUTANEOUS at 17:03

## 2024-03-29 RX ADMIN — Medication 100 MILLIGRAM(S): at 13:12

## 2024-03-29 NOTE — CONSULT NOTE ADULT - SUBJECTIVE AND OBJECTIVE BOX
HPI:  Patient is a 68-year-old female w/ HTN, HLD, SLE, CVA (1987) w/o residual deficit, hypothyroidism, hx sepsis secondary to LE cellulitis that presented to the ED on 3/28 with chief complaint of a a diffuse rash located on her b/l arms, chest, and RLE that has been ongoing for a few months but acutely worsened over the past 2 weeks. Reports burning/itching to rash and has been experiencing chills. States that she was put on clotrimazole by her PCP with no relief. Denies any other symptoms including fevers, headache, recent illness/travel, cough, abdominal pain, chest pain, or SOB    Vitals on admission: T 97.5, HR 70, /59, RR 18, SpO2 95%  Labs on admission: Hb 11 (baseline ~12.5), Cr 1.4 (baseline Cr was 0.7 in Feb 2023), GFR 41 (Feb 2023 was 95)    s/p 1 dose doxycycline in the ED.   Patient admitted to medicine for management of rash.      (28 Mar 2024 22:53)      PAST MEDICAL & SURGICAL HISTORY:  Lupus      Essential hypertension      CVA (cerebral vascular accident)  1987      Hypothyroidism      FH: bilateral hip replacements      H/O total knee replacement      H/O abdominal hysterectomy            MEDICATIONS  (STANDING):  acetaminophen     Tablet .. 650 milliGRAM(s) Oral every 12 hours  atenolol  Tablet 25 milliGRAM(s) Oral daily  ceFAZolin   IVPB 1000 milliGRAM(s) IV Intermittent every 8 hours  ceFAZolin   IVPB      cholecalciferol 1000 Unit(s) Oral daily  diphenhydrAMINE 25 milliGRAM(s) Oral at bedtime  famotidine    Tablet 20 milliGRAM(s) Oral daily  furosemide    Tablet 40 milliGRAM(s) Oral daily  heparin   Injectable 5000 Unit(s) SubCutaneous every 12 hours  hydroxychloroquine 200 milliGRAM(s) Oral daily  levothyroxine 175 MICROGram(s) Oral daily  levothyroxine 25 MICROGram(s) Oral <User Schedule>  sodium chloride 0.9%. 1000 milliLiter(s) (50 mL/Hr) IV Continuous <Continuous>  traZODone 50 milliGRAM(s) Oral at bedtime    MEDICATIONS  (PRN):  acetaminophen  300 mG/codeine 30 mG 1 Tablet(s) Oral every 8 hours PRN Moderate Pain (4 - 6)  melatonin 3 milliGRAM(s) Oral at bedtime PRN Insomnia      Allergies    aspirin (Other)  penicillins (Other)  Levaquin (Other)  Compazine (Other)  morphine (Other)    Intolerances        SOCIAL HISTORY:    FAMILY HISTORY:      Vital Signs Last 24 Hrs  T(C): 36.6 (29 Mar 2024 08:17), Max: 36.9 (29 Mar 2024 05:14)  T(F): 97.8 (29 Mar 2024 08:17), Max: 98.5 (29 Mar 2024 05:14)  HR: 57 (29 Mar 2024 08:17) (52 - 77)  BP: 91/53 (29 Mar 2024 08:17) (91/53 - 132/59)  BP(mean): 85 (29 Mar 2024 00:44) (83 - 85)  RR: 18 (29 Mar 2024 08:17) (17 - 18)  SpO2: 95% (29 Mar 2024 08:17) (93% - 96%)    Parameters below as of 29 Mar 2024 05:14  Patient On (Oxygen Delivery Method): room air          LABS:                        11.0   8.80  )-----------( 300      ( 29 Mar 2024 07:13 )             35.0     03-29    144  |  109  |  19  ----------------------------<  88  3.8   |  22  |  1.0    Ca    8.4      29 Mar 2024 07:13  Mg     2.1     03-29    TPro  6.4  /  Alb  3.5  /  TBili  <0.2  /  DBili  x   /  AST  16  /  ALT  6   /  AlkPhos  66  03-29      Urinalysis Basic - ( 29 Mar 2024 07:13 )    Color: x / Appearance: x / SG: x / pH: x  Gluc: 88 mg/dL / Ketone: x  / Bili: x / Urobili: x   Blood: x / Protein: x / Nitrite: x   Leuk Esterase: x / RBC: x / WBC x   Sq Epi: x / Non Sq Epi: x / Bacteria: x        RADIOLOGY & ADDITIONAL STUDIES: HPI:  Patient is a 68-year-old female w/ HTN, HLD, SLE, CVA (1987) w/o residual deficit, hypothyroidism, hx sepsis secondary to LE cellulitis that presented to the ED on 3/28 with chief complaint of a a diffuse rash located on her b/l arms, chest, and RLE that has been ongoing for a few months but acutely worsened over the past 2 weeks. Reports burning/itching to rash and has been experiencing chills. States that she was put on clotrimazole by her PCP with no relief. Denies any other symptoms including fevers, headache, recent illness/travel, cough, abdominal pain, chest pain, or SOB    Vitals on admission: T 97.5, HR 70, /59, RR 18, SpO2 95%  Labs on admission: Hb 11 (baseline ~12.5), Cr 1.4 (baseline Cr was 0.7 in Feb 2023), GFR 41 (Feb 2023 was 95)    s/p 1 dose doxycycline in the ED.   Patient admitted to medicine for management of rash.      (28 Mar 2024 22:53)    Dermatologist consulted for maculopapular rash. Patient states she switched body wash since 3/4/24 to something called [place], began experiencing burning, pruritic maculopapular rash on her chest, bilateral arms, and back. Patient states she continued to use body wash because she had no other alternatives. Her rash continued to worsen, so she came to the ED.     PAST MEDICAL & SURGICAL HISTORY:  Lupus      Essential hypertension      CVA (cerebral vascular accident)  1987      Hypothyroidism      FH: bilateral hip replacements      H/O total knee replacement      H/O abdominal hysterectomy            MEDICATIONS  (STANDING):  acetaminophen     Tablet .. 650 milliGRAM(s) Oral every 12 hours  atenolol  Tablet 25 milliGRAM(s) Oral daily  ceFAZolin   IVPB 1000 milliGRAM(s) IV Intermittent every 8 hours  ceFAZolin   IVPB      cholecalciferol 1000 Unit(s) Oral daily  diphenhydrAMINE 25 milliGRAM(s) Oral at bedtime  famotidine    Tablet 20 milliGRAM(s) Oral daily  furosemide    Tablet 40 milliGRAM(s) Oral daily  heparin   Injectable 5000 Unit(s) SubCutaneous every 12 hours  hydroxychloroquine 200 milliGRAM(s) Oral daily  levothyroxine 175 MICROGram(s) Oral daily  levothyroxine 25 MICROGram(s) Oral <User Schedule>  sodium chloride 0.9%. 1000 milliLiter(s) (50 mL/Hr) IV Continuous <Continuous>  traZODone 50 milliGRAM(s) Oral at bedtime    MEDICATIONS  (PRN):  acetaminophen  300 mG/codeine 30 mG 1 Tablet(s) Oral every 8 hours PRN Moderate Pain (4 - 6)  melatonin 3 milliGRAM(s) Oral at bedtime PRN Insomnia      Allergies    aspirin (Other)  penicillins (Other)  Levaquin (Other)  Compazine (Other)  morphine (Other)    Intolerances        SOCIAL HISTORY:    FAMILY HISTORY:      Vital Signs Last 24 Hrs  T(C): 36.6 (29 Mar 2024 08:17), Max: 36.9 (29 Mar 2024 05:14)  T(F): 97.8 (29 Mar 2024 08:17), Max: 98.5 (29 Mar 2024 05:14)  HR: 57 (29 Mar 2024 08:17) (52 - 77)  BP: 91/53 (29 Mar 2024 08:17) (91/53 - 132/59)  BP(mean): 85 (29 Mar 2024 00:44) (83 - 85)  RR: 18 (29 Mar 2024 08:17) (17 - 18)  SpO2: 95% (29 Mar 2024 08:17) (93% - 96%)    Parameters below as of 29 Mar 2024 05:14  Patient On (Oxygen Delivery Method): room air          LABS:                        11.0   8.80  )-----------( 300      ( 29 Mar 2024 07:13 )             35.0     03-29    144  |  109  |  19  ----------------------------<  88  3.8   |  22  |  1.0    Ca    8.4      29 Mar 2024 07:13  Mg     2.1     03-29    TPro  6.4  /  Alb  3.5  /  TBili  <0.2  /  DBili  x   /  AST  16  /  ALT  6   /  AlkPhos  66  03-29      Urinalysis Basic - ( 29 Mar 2024 07:13 )    Color: x / Appearance: x / SG: x / pH: x  Gluc: 88 mg/dL / Ketone: x  / Bili: x / Urobili: x   Blood: x / Protein: x / Nitrite: x   Leuk Esterase: x / RBC: x / WBC x   Sq Epi: x / Non Sq Epi: x / Bacteria: x        RADIOLOGY & ADDITIONAL STUDIES:    Physical Exam:  SKIN: erythematous maculopapular rash on chest and left arm, right arm with thickening of skin and weeping with redness. RLE erythematous rash with thickening of skin HPI:  Patient is a 68-year-old female w/ HTN, HLD, SLE, CVA (1987) w/o residual deficit, hypothyroidism, hx sepsis secondary to LE cellulitis that presented to the ED on 3/28 with chief complaint of a a diffuse rash located on her b/l arms, chest, and RLE that has been ongoing for a few months but acutely worsened over the past 2 weeks which developed after using a new body wash. Reports burning/itching to rash and has been experiencing chills. States that she was put on clotrimazole by her PCP with no relief. Denies any other symptoms including fevers, headache, recent illness/travel, cough, abdominal pain, chest pain, or SOB    Vitals on admission: T 97.5, HR 70, /59, RR 18, SpO2 95%  Labs on admission: Hb 11 (baseline ~12.5), Cr 1.4 (baseline Cr was 0.7 in Feb 2023), GFR 41 (Feb 2023 was 95)    s/p 1 dose doxycycline in the ED.   Patient admitted to medicine for management of rash.      (28 Mar 2024 22:53)    Dermatologist consulted for maculopapular rash. Patient states she switched body wash since 3/4/24 to something called [place], began experiencing burning, pruritic maculopapular rash on her chest, bilateral arms, and back. Patient states she continued to use body wash because she had no other alternatives. Her rash continued to worsen, so she came to the ED.     PAST MEDICAL & SURGICAL HISTORY:  Lupus      Essential hypertension      CVA (cerebral vascular accident)  1987      Hypothyroidism      FH: bilateral hip replacements      H/O total knee replacement      H/O abdominal hysterectomy            MEDICATIONS  (STANDING):  acetaminophen     Tablet .. 650 milliGRAM(s) Oral every 12 hours  atenolol  Tablet 25 milliGRAM(s) Oral daily  ceFAZolin   IVPB 1000 milliGRAM(s) IV Intermittent every 8 hours  ceFAZolin   IVPB      cholecalciferol 1000 Unit(s) Oral daily  diphenhydrAMINE 25 milliGRAM(s) Oral at bedtime  famotidine    Tablet 20 milliGRAM(s) Oral daily  furosemide    Tablet 40 milliGRAM(s) Oral daily  heparin   Injectable 5000 Unit(s) SubCutaneous every 12 hours  hydroxychloroquine 200 milliGRAM(s) Oral daily  levothyroxine 175 MICROGram(s) Oral daily  levothyroxine 25 MICROGram(s) Oral <User Schedule>  sodium chloride 0.9%. 1000 milliLiter(s) (50 mL/Hr) IV Continuous <Continuous>  traZODone 50 milliGRAM(s) Oral at bedtime    MEDICATIONS  (PRN):  acetaminophen  300 mG/codeine 30 mG 1 Tablet(s) Oral every 8 hours PRN Moderate Pain (4 - 6)  melatonin 3 milliGRAM(s) Oral at bedtime PRN Insomnia      Allergies    aspirin (Other)  penicillins (Other)  Levaquin (Other)  Compazine (Other)  morphine (Other)    Intolerances        SOCIAL HISTORY:    FAMILY HISTORY:      Vital Signs Last 24 Hrs  T(C): 36.6 (29 Mar 2024 08:17), Max: 36.9 (29 Mar 2024 05:14)  T(F): 97.8 (29 Mar 2024 08:17), Max: 98.5 (29 Mar 2024 05:14)  HR: 57 (29 Mar 2024 08:17) (52 - 77)  BP: 91/53 (29 Mar 2024 08:17) (91/53 - 132/59)  BP(mean): 85 (29 Mar 2024 00:44) (83 - 85)  RR: 18 (29 Mar 2024 08:17) (17 - 18)  SpO2: 95% (29 Mar 2024 08:17) (93% - 96%)    Parameters below as of 29 Mar 2024 05:14  Patient On (Oxygen Delivery Method): room air          LABS:                        11.0   8.80  )-----------( 300      ( 29 Mar 2024 07:13 )             35.0     03-29    144  |  109  |  19  ----------------------------<  88  3.8   |  22  |  1.0    Ca    8.4      29 Mar 2024 07:13  Mg     2.1     03-29    TPro  6.4  /  Alb  3.5  /  TBili  <0.2  /  DBili  x   /  AST  16  /  ALT  6   /  AlkPhos  66  03-29      Urinalysis Basic - ( 29 Mar 2024 07:13 )    Color: x / Appearance: x / SG: x / pH: x  Gluc: 88 mg/dL / Ketone: x  / Bili: x / Urobili: x   Blood: x / Protein: x / Nitrite: x   Leuk Esterase: x / RBC: x / WBC x   Sq Epi: x / Non Sq Epi: x / Bacteria: x        RADIOLOGY & ADDITIONAL STUDIES:    Physical Exam:  SKIN: (photos reviewed) eczematous eruption on the chest and extemities

## 2024-03-29 NOTE — PROGRESS NOTE ADULT - ASSESSMENT
67-year-old female w/ HTN, SLE, CVA (1987) w/o residual deficit, hypothyroidism, hx sepsis secondary to LE,  GERD, Pancreatic head cystic lesion cellulitis presenting erythematous scaly rash her  back and extremities     #Eczematous rash possible contact dermatitis due to recent body wash use . less likely scabies. Doubt cellulitis   #SLE not in flare   Hypercoagulable state?  Chronic Anemia  Hx  coronary calcifications  Hx  HTN  Hx CVA (1987) w/o residual deficit  Hx  PVD  Hx  Pre-DM  Hx  Pancreatic head cystic lesion  Hx hypothyroidism  Hx  diverticulosis  Hx osteopenia, b/l hip replacement  Hx total knee replacement  Hx of Steroid use     Plan   spoke with ID doesn't believe it to be scabies. no signs of cellulitis.   dc cefazolin   start prednisone taper   Please send double stranded DNA, complements C3 and C4, urinalysis, urine protein/creatinine ratio, beta 2 GP 1 IgG and IgM, anticardiolipin IgG and IgM, lupus anticoagulant  wound care consult   c/w Hydroxychlorquine   hold bp meds   Rheumatology appreciated   Derm Appreciated   OP GI f/u  needs EUS/EGD     Dispo: anticipate dc in 24 to 48 hrs

## 2024-03-29 NOTE — CONSULT NOTE ADULT - ASSESSMENT
67-year-old female w/ HTN, SLE, CVA (1987) w/o residual deficit, hypothyroidism, hx sepsis secondary to LE,  GERD, Pancreatic head cystic lesion cellulitis presenting erythematous scaly rash her  back and extremities  likely Secondary to SLE Flair.    Impression:  #Maculopapular Rash    Recommendations:  -  67-year-old female w/ HTN, SLE, CVA (1987) w/o residual deficit, hypothyroidism, hx sepsis secondary to LE,  GERD, Pancreatic head cystic lesion cellulitis presenting erythematous scaly rash her  back and extremities  likely Secondary to SLE Flair.    Impression:  #Contact Dermatitis/Eczema    Recommendations:  - Rec course of oral prednisone taper dose: 60mg x2d, 40mg x3d, 30mg x3d, 20mg x3d, 10mg x3d 67-year-old female w/ HTN, SLE, CVA (1987) w/o residual deficit, hypothyroidism, hx sepsis secondary to LE,  GERD, Pancreatic head cystic lesion cellulitis presenting erythematous scaly rash her  back and extremities  likely Secondary to SLE Flair.    Impression:  #Contact Dermatitis    Recommendations:  -prednisone taper dose: 60mg x2d, 40mg x3d, 30mg x3d, 20mg x3d, 10mg x3d

## 2024-03-29 NOTE — CONSULT NOTE ADULT - SUBJECTIVE AND OBJECTIVE BOX
Rheumatology CONSULT     Patient is a 68y old  Female who presents with a chief complaint of     HPI:  Patient is a 68-year-old female w/ HTN, HLD, SLE, CVA (1987) w/o residual deficit, hypothyroidism, hx sepsis secondary to LE cellulitis that presented to the ED on 3/28 with chief complaint of a a diffuse rash located on her b/l arms, chest, and RLE that has been ongoing for a few months but acutely worsened over the past 2 weeks. Reports burning/itching to rash and has been experiencing chills. States that she was put on clotrimazole by her PCP with no relief. Denies any other symptoms including fevers, headache, recent illness/travel, cough, abdominal pain, chest pain, or SOB    Vitals on admission: T 97.5, HR 70, /59, RR 18, SpO2 95%  Labs on admission: Hb 11 (baseline ~12.5), Cr 1.4 (baseline Cr was 0.7 in Feb 2023), GFR 41 (Feb 2023 was 95)    s/p 1 dose doxycycline in the ED.   Patient admitted to medicine for management of rash.      (28 Mar 2024 22:53)       ROS:  Negative except for:    PAST MEDICAL & SURGICAL HISTORY:  Lupus      Essential hypertension      CVA (cerebral vascular accident)  1987      Hypothyroidism      FH: bilateral hip replacements      H/O total knee replacement      H/O abdominal hysterectomy          SOCIAL HISTORY:    FAMILY HISTORY:      MEDICATIONS  (STANDING):  acetaminophen     Tablet .. 650 milliGRAM(s) Oral every 12 hours  atenolol  Tablet 25 milliGRAM(s) Oral daily  ceFAZolin   IVPB      ceFAZolin   IVPB 1000 milliGRAM(s) IV Intermittent every 8 hours  cholecalciferol 1000 Unit(s) Oral daily  diphenhydrAMINE 25 milliGRAM(s) Oral at bedtime  famotidine    Tablet 20 milliGRAM(s) Oral daily  furosemide    Tablet 40 milliGRAM(s) Oral daily  heparin   Injectable 5000 Unit(s) SubCutaneous every 12 hours  hydroxychloroquine 200 milliGRAM(s) Oral daily  levothyroxine 25 MICROGram(s) Oral <User Schedule>  levothyroxine 175 MICROGram(s) Oral daily  sodium chloride 0.9%. 1000 milliLiter(s) (50 mL/Hr) IV Continuous <Continuous>  traZODone 50 milliGRAM(s) Oral at bedtime    MEDICATIONS  (PRN):  acetaminophen  300 mG/codeine 30 mG 1 Tablet(s) Oral every 8 hours PRN Moderate Pain (4 - 6)  melatonin 3 milliGRAM(s) Oral at bedtime PRN Insomnia      Allergies    aspirin (Other)  penicillins (Other)  Levaquin (Other)  Compazine (Other)  morphine (Other)    Intolerances        Vital Signs Last 24 Hrs  T(C): 36.6 (29 Mar 2024 08:17), Max: 36.9 (29 Mar 2024 05:14)  T(F): 97.8 (29 Mar 2024 08:17), Max: 98.5 (29 Mar 2024 05:14)  HR: 57 (29 Mar 2024 08:17) (52 - 77)  BP: 91/53 (29 Mar 2024 08:17) (91/53 - 132/59)  BP(mean): 85 (29 Mar 2024 00:44) (83 - 85)  RR: 18 (29 Mar 2024 08:17) (17 - 18)  SpO2: 95% (29 Mar 2024 08:17) (93% - 96%)    Parameters below as of 29 Mar 2024 05:14  Patient On (Oxygen Delivery Method): room air        PHYSICAL EXAM  General: adult in NAD  HEENT: clear oropharynx, anicteric sclera, pink conjunctiva  Neck: supple  CV: normal S1/S2 with no murmur rubs or gallops  Lungs: positive air movement b/l ant lungs,clear to auscultation, no wheezes, no rales  Abdomen: soft non-tender non-distended, no hepatosplenomegaly  Ext: no clubbing cyanosis or edema  Skin: no rashes and no petechiae  Neuro: alert and oriented X 4, no focal deficits      LABS:                          11.0   8.80  )-----------( 300      ( 29 Mar 2024 07:13 )             35.0         Mean Cell Volume : 89.7 fL  Mean Cell Hemoglobin : 28.2 pg  Mean Cell Hemoglobin Concentration : 31.4 g/dL  Auto Neutrophil # : 6.74 K/uL  Auto Lymphocyte # : 1.18 K/uL  Auto Monocyte # : 0.52 K/uL  Auto Eosinophil # : 0.29 K/uL  Auto Basophil # : 0.02 K/uL  Auto Neutrophil % : 76.6 %  Auto Lymphocyte % : 13.4 %  Auto Monocyte % : 5.9 %  Auto Eosinophil % : 3.3 %  Auto Basophil % : 0.2 %      03-29    144  |  109  |  19  ----------------------------<  88  3.8   |  22  |  1.0    Ca    8.4      29 Mar 2024 07:13  Mg     2.1     03-29    TPro  6.4  /  Alb  3.5  /  TBili  <0.2  /  DBili  x   /  AST  16  /  ALT  6   /  AlkPhos  66  03-29                      BLOOD SMEAR INTERPRETATION:       RADIOLOGY & ADDITIONAL STUDIES:       Rheumatology CONSULT     Patient is a 68y old  Female who presents with a chief complaint of rash.    HPI:  Patient is a 68-year-old female w/ HTN, HLD, SLE, CVA (1987) w/o residual deficit, hypothyroidism, hx sepsis secondary to LE cellulitis that presented to the ED on 3/28 with chief complaint of a a diffuse rash located on her b/l arms, chest, and RLE that has been ongoing for a few months but acutely worsened over the past 2 weeks. Reports burning/itching to rash and has been experiencing chills. States that she was put on clotrimazole by her PCP with no relief. Denies any other symptoms including fevers, headache, recent illness/travel, cough, abdominal pain, chest pain, or SOB    Vitals on admission: T 97.5, HR 70, /59, RR 18, SpO2 95%  Labs on admission: Hb 11 (baseline ~12.5), Cr 1.4 (baseline Cr was 0.7 in Feb 2023), GFR 41 (Feb 2023 was 95)    s/p 1 dose doxycycline in the ED.   Patient admitted to medicine for management of rash.      (28 Mar 2024 22:53)    Rheumatology consulted for SLE flare. Patient follows Dr. Donaldo Dukes at [facility] for rheumatology. No recent SLE flares. Patient states typical symptoms of lupus are usually rash. Presents this admission for maculopapular rash of the chest and bilateral upper extremities, started after using a new body wash, likely contact dermatitis. Takes hydroxychloroquine at home.     ROS:  Negative except for:  + pruritic maculopapular rash of bilateral upper extremities and RLE and inner thigh    PAST MEDICAL & SURGICAL HISTORY:  Lupus      Essential hypertension      CVA (cerebral vascular accident)  1987      Hypothyroidism      FH: bilateral hip replacements      H/O total knee replacement      H/O abdominal hysterectomy          SOCIAL HISTORY:  smokes 1ppd for the last 50 years    FAMILY HISTORY:      MEDICATIONS  (STANDING):  acetaminophen     Tablet .. 650 milliGRAM(s) Oral every 12 hours  atenolol  Tablet 25 milliGRAM(s) Oral daily  ceFAZolin   IVPB      ceFAZolin   IVPB 1000 milliGRAM(s) IV Intermittent every 8 hours  cholecalciferol 1000 Unit(s) Oral daily  diphenhydrAMINE 25 milliGRAM(s) Oral at bedtime  famotidine    Tablet 20 milliGRAM(s) Oral daily  furosemide    Tablet 40 milliGRAM(s) Oral daily  heparin   Injectable 5000 Unit(s) SubCutaneous every 12 hours  hydroxychloroquine 200 milliGRAM(s) Oral daily  levothyroxine 25 MICROGram(s) Oral <User Schedule>  levothyroxine 175 MICROGram(s) Oral daily  sodium chloride 0.9%. 1000 milliLiter(s) (50 mL/Hr) IV Continuous <Continuous>  traZODone 50 milliGRAM(s) Oral at bedtime    MEDICATIONS  (PRN):  acetaminophen  300 mG/codeine 30 mG 1 Tablet(s) Oral every 8 hours PRN Moderate Pain (4 - 6)  melatonin 3 milliGRAM(s) Oral at bedtime PRN Insomnia      Allergies    aspirin (Other)  penicillins (Other)  Levaquin (Other)  Compazine (Other)  morphine (Other)    Intolerances        Vital Signs Last 24 Hrs  T(C): 36.6 (29 Mar 2024 08:17), Max: 36.9 (29 Mar 2024 05:14)  T(F): 97.8 (29 Mar 2024 08:17), Max: 98.5 (29 Mar 2024 05:14)  HR: 57 (29 Mar 2024 08:17) (52 - 77)  BP: 91/53 (29 Mar 2024 08:17) (91/53 - 132/59)  BP(mean): 85 (29 Mar 2024 00:44) (83 - 85)  RR: 18 (29 Mar 2024 08:17) (17 - 18)  SpO2: 95% (29 Mar 2024 08:17) (93% - 96%)    Parameters below as of 29 Mar 2024 05:14  Patient On (Oxygen Delivery Method): room air        PHYSICAL EXAM  General: adult in NAD  HEENT: clear oropharynx, anicteric sclera, pink conjunctiva  Neck: supple  CV: normal S1/S2 with no murmur rubs or gallops  Lungs: positive air movement b/l ant lungs,clear to auscultation, no wheezes, no rales  Abdomen: soft non-tender non-distended, no hepatosplenomegaly  Ext: no clubbing cyanosis or edema  Skin: maculopapular rash on chest and bilateral arms, RLE with erythematous rash and inner thigh  Neuro: alert and oriented X 4, no focal deficits      LABS:                          11.0   8.80  )-----------( 300      ( 29 Mar 2024 07:13 )             35.0         Mean Cell Volume : 89.7 fL  Mean Cell Hemoglobin : 28.2 pg  Mean Cell Hemoglobin Concentration : 31.4 g/dL  Auto Neutrophil # : 6.74 K/uL  Auto Lymphocyte # : 1.18 K/uL  Auto Monocyte # : 0.52 K/uL  Auto Eosinophil # : 0.29 K/uL  Auto Basophil # : 0.02 K/uL  Auto Neutrophil % : 76.6 %  Auto Lymphocyte % : 13.4 %  Auto Monocyte % : 5.9 %  Auto Eosinophil % : 3.3 %  Auto Basophil % : 0.2 %      03-29    144  |  109  |  19  ----------------------------<  88  3.8   |  22  |  1.0    Ca    8.4      29 Mar 2024 07:13  Mg     2.1     03-29    TPro  6.4  /  Alb  3.5  /  TBili  <0.2  /  DBili  x   /  AST  16  /  ALT  6   /  AlkPhos  66  03-29                      BLOOD SMEAR INTERPRETATION:       RADIOLOGY & ADDITIONAL STUDIES:

## 2024-03-29 NOTE — PROGRESS NOTE ADULT - SUBJECTIVE AND OBJECTIVE BOX
SUBJECTIVE:    Patient is a 68y old Female who presents with a chief complaint of   Currently admitted to medicine with the primary diagnosis of:    Today is hospital day 1d.     Overnight Events:     complaining of pain and itching    PAST MEDICAL & SURGICAL HISTORY  Lupus    Essential hypertension    CVA (cerebral vascular accident)  1987    Hypothyroidism    FH: bilateral hip replacements    H/O total knee replacement    H/O abdominal hysterectomy        SOCIAL HISTORY:  Smoking history:  Alcohol Use;  Illicit Drug Use:    ALLERGIES:  aspirin (Other)  penicillins (Other)  Levaquin (Other)  Compazine (Other)  morphine (Other)    MEDICATIONS:  STANDING MEDICATIONS  acetaminophen     Tablet .. 650 milliGRAM(s) Oral every 12 hours  atenolol  Tablet 25 milliGRAM(s) Oral daily  ceFAZolin   IVPB 1000 milliGRAM(s) IV Intermittent every 8 hours  ceFAZolin   IVPB      cholecalciferol 1000 Unit(s) Oral daily  diphenhydrAMINE 25 milliGRAM(s) Oral at bedtime  famotidine    Tablet 20 milliGRAM(s) Oral daily  furosemide    Tablet 40 milliGRAM(s) Oral daily  heparin   Injectable 5000 Unit(s) SubCutaneous every 12 hours  hydroxychloroquine 200 milliGRAM(s) Oral daily  levothyroxine 175 MICROGram(s) Oral daily  levothyroxine 25 MICROGram(s) Oral <User Schedule>  sodium chloride 0.9%. 1000 milliLiter(s) IV Continuous <Continuous>  traZODone 50 milliGRAM(s) Oral at bedtime    PRN MEDICATIONS  acetaminophen  300 mG/codeine 30 mG 1 Tablet(s) Oral every 8 hours PRN  melatonin 3 milliGRAM(s) Oral at bedtime PRN  traMADol 25 milliGRAM(s) Oral every 6 hours PRN    VITALS:   ICU Vital Signs Last 24 Hrs  T(C): 36.4 (29 Mar 2024 15:47), Max: 36.9 (29 Mar 2024 05:14)  T(F): 97.6 (29 Mar 2024 15:47), Max: 98.5 (29 Mar 2024 05:14)  HR: 76 (29 Mar 2024 15:47) (57 - 76)  BP: 113/53 (29 Mar 2024 15:47) (91/53 - 132/59)  BP(mean): 85 (29 Mar 2024 00:44) (83 - 85)  ABP: --  ABP(mean): --  RR: 18 (29 Mar 2024 15:47) (17 - 18)  SpO2: 98% (29 Mar 2024 15:47) (93% - 98%)    O2 Parameters below as of 29 Mar 2024 15:47  Patient On (Oxygen Delivery Method): room air            LABS:                        11.0   8.80  )-----------( 300      ( 29 Mar 2024 07:13 )             35.0     03-29    144  |  109  |  19  ----------------------------<  88  3.8   |  22  |  1.0    Ca    8.4      29 Mar 2024 07:13  Mg     2.1     03-29    TPro  6.4  /  Alb  3.5  /  TBili  <0.2  /  DBili  x   /  AST  16  /  ALT  6   /  AlkPhos  66  03-29      Urinalysis Basic - ( 29 Mar 2024 07:13 )    Color: x / Appearance: x / SG: x / pH: x  Gluc: 88 mg/dL / Ketone: x  / Bili: x / Urobili: x   Blood: x / Protein: x / Nitrite: x   Leuk Esterase: x / RBC: x / WBC x   Sq Epi: x / Non Sq Epi: x / Bacteria: x        Sedimentation Rate, Erythrocyte: 48 mm/Hr *H* (03-29-24 @ 11:16)            RADIOLOGY:    PHYSICAL EXAM    GENERAL: NAD, lying in bed comfortably  CHEST/LUNG: Clear to auscultation bilaterally; No rales, rhonchi, wheezing, or rubs. Unlabored respirations  HEART: Regular rate and rhythm; No murmurs, rubs, or gallops  ABDOMEN: Bowel sounds present; Soft, Nontender, Nondistended.  EXTREMITIES:  no edema  NERVOUS SYSTEM:  Alert & Oriented X3, speech clear. No deficits   MSK: FROM all 4 extremities   SKIN: diffuse eczematous rash on torso and extremities. various excoriations.   :

## 2024-03-29 NOTE — CONSULT NOTE ADULT - ATTENDING COMMENTS
69 y/o woman with h/o SLE admitted with rash x several months worse over the past 2 weeks, rheumatology consulted for possible association with pt's SLE history. Pt says she was diagnosed with SLE at age 17, when she developed a rash. Past [facility] labs demonstrated DANIS >1:2560 homogeneous, dsDNA 121, +LAC, and normal C3 and C4. She has been treated with hydroxychloroquine and intermittent prednisone, denies receiving other treatment for SLE in the past. She has had intermittent flares of her SLE-related rash, but says that the appearance is different from her current rash. With regards to her current rash, it is non-pruritic. Pt says that she has started using a new bodywash a few weeks ago at her assisted living facility.  Pt's exam is notable for white scaling and crusting over pt's b/l arms, chest, L inner thigh, also with cracking between the fingers, and erythematous flaking lesions on her R shin (per pt the R shin lesions have been present for several years). Per dermatology pt's rash has the appearance of dermatitis. Low suspicion for SLE at this point.    - Please send double stranded DNA, complements C3 and C4, urinalysis, urine protein/creatinine ratio, beta 2 GP 1 IgG and IgM, anticardiolipin IgG and IgM, lupus anticoagulant  - Continue hydroxychloroquine 200 mg q day

## 2024-03-29 NOTE — CONSULT NOTE ADULT - ASSESSMENT
67-year-old female w/ HTN, SLE, CVA (1987) w/o residual deficit, hypothyroidism, hx sepsis secondary to LE,  GERD, Pancreatic head cystic lesion cellulitis presenting erythematous scaly rash her  back and extremities  likely Secondary to SLE Flair.    Impression:  #SLE flare    Recommendations:  -  67-year-old female w/ HTN, SLE, CVA (1987) w/o residual deficit, hypothyroidism, hx sepsis secondary to LE,  GERD, Pancreatic head cystic lesion cellulitis presenting erythematous scaly rash her  back and extremities  likely s    Impression:  #SLE, unlikely flare  - ESR 48, CRP 59.5  - 2/11/22: DANIS >1:2560, dsDNA 121, C4 and C4 wnl  - Lupus Anticoagulant positive (2/21/23)  - follows rheumatology at [facility] Dr. Donaldo Dukes      Recommendations:  - Obtain complement C3 and C4, dsDNA, APLS labs (lupus anticoagulant, beta-2 glycoprotein, anticardiolipin)  - continue Plaquinel  67-year-old female w/ HTN, SLE, CVA (1987) w/o residual deficit, hypothyroidism, hx sepsis secondary to LE,  GERD, Pancreatic head cystic lesion cellulitis presenting erythematous scaly rash her  back and extremities     Impression:  #SLE, unlikely flare  - ESR 48, CRP 59.5  - 2/11/22: DANIS >1:2560, dsDNA 121, C4 and C4 wnl  - Lupus Anticoagulant positive (2/21/23)  - follows rheumatology at [facility] Dr. Donaldo Dukes      Recommendations:  - Obtain complement C3 and C4, dsDNA, APLS labs (lupus anticoagulant, beta-2 glycoprotein, anticardiolipin)  - continue Plaquinel

## 2024-03-30 ENCOUNTER — TRANSCRIPTION ENCOUNTER (OUTPATIENT)
Age: 69
End: 2024-03-30

## 2024-03-30 LAB
ANION GAP SERPL CALC-SCNC: 11 MMOL/L — SIGNIFICANT CHANGE UP (ref 7–14)
APTT 50/50 2HOUR INCUB: SIGNIFICANT CHANGE UP (ref 24.5–36.6)
APTT BLD: 27.8 SEC — SIGNIFICANT CHANGE UP (ref 24.5–35.6)
APTT BLD: SIGNIFICANT CHANGE UP (ref 24.5–36.6)
BUN SERPL-MCNC: 25 MG/DL — HIGH (ref 10–20)
CALCIUM SERPL-MCNC: 7.6 MG/DL — LOW (ref 8.4–10.5)
CHLORIDE SERPL-SCNC: 110 MMOL/L — SIGNIFICANT CHANGE UP (ref 98–110)
CO2 SERPL-SCNC: 25 MMOL/L — SIGNIFICANT CHANGE UP (ref 17–32)
CREAT SERPL-MCNC: 0.8 MG/DL — SIGNIFICANT CHANGE UP (ref 0.7–1.5)
EGFR: 81 ML/MIN/1.73M2 — SIGNIFICANT CHANGE UP
GLUCOSE SERPL-MCNC: 126 MG/DL — HIGH (ref 70–99)
HCT VFR BLD CALC: 32.3 % — LOW (ref 37–47)
HGB BLD-MCNC: 10.2 G/DL — LOW (ref 12–16)
MAGNESIUM SERPL-MCNC: 2 MG/DL — SIGNIFICANT CHANGE UP (ref 1.8–2.4)
MCHC RBC-ENTMCNC: 28.3 PG — SIGNIFICANT CHANGE UP (ref 27–31)
MCHC RBC-ENTMCNC: 31.6 G/DL — LOW (ref 32–37)
MCV RBC AUTO: 89.5 FL — SIGNIFICANT CHANGE UP (ref 81–99)
NRBC # BLD: 0 /100 WBCS — SIGNIFICANT CHANGE UP (ref 0–0)
PAT CTL 2H: SIGNIFICANT CHANGE UP (ref 24.5–36.6)
PLATELET # BLD AUTO: 294 K/UL — SIGNIFICANT CHANGE UP (ref 130–400)
PMV BLD: 9.5 FL — SIGNIFICANT CHANGE UP (ref 7.4–10.4)
POTASSIUM SERPL-MCNC: 3.5 MMOL/L — SIGNIFICANT CHANGE UP (ref 3.5–5)
POTASSIUM SERPL-SCNC: 3.5 MMOL/L — SIGNIFICANT CHANGE UP (ref 3.5–5)
RBC # BLD: 3.61 M/UL — LOW (ref 4.2–5.4)
RBC # FLD: 14.7 % — HIGH (ref 11.5–14.5)
SODIUM SERPL-SCNC: 146 MMOL/L — SIGNIFICANT CHANGE UP (ref 135–146)
WBC # BLD: 8.77 K/UL — SIGNIFICANT CHANGE UP (ref 4.8–10.8)
WBC # FLD AUTO: 8.77 K/UL — SIGNIFICANT CHANGE UP (ref 4.8–10.8)

## 2024-03-30 PROCEDURE — 99232 SBSQ HOSP IP/OBS MODERATE 35: CPT

## 2024-03-30 RX ORDER — PETROLATUM,WHITE
1 JELLY (GRAM) TOPICAL
Refills: 0 | Status: DISCONTINUED | OUTPATIENT
Start: 2024-03-30 | End: 2024-04-02

## 2024-03-30 RX ADMIN — Medication 650 MILLIGRAM(S): at 05:14

## 2024-03-30 RX ADMIN — Medication 1000 UNIT(S): at 12:31

## 2024-03-30 RX ADMIN — Medication 650 MILLIGRAM(S): at 17:26

## 2024-03-30 RX ADMIN — Medication 60 MILLIGRAM(S): at 05:14

## 2024-03-30 RX ADMIN — Medication 25 MILLIGRAM(S): at 21:57

## 2024-03-30 RX ADMIN — TRAMADOL HYDROCHLORIDE 25 MILLIGRAM(S): 50 TABLET ORAL at 12:30

## 2024-03-30 RX ADMIN — HEPARIN SODIUM 5000 UNIT(S): 5000 INJECTION INTRAVENOUS; SUBCUTANEOUS at 17:27

## 2024-03-30 RX ADMIN — Medication 200 MILLIGRAM(S): at 12:31

## 2024-03-30 RX ADMIN — Medication 1 APPLICATION(S): at 21:00

## 2024-03-30 RX ADMIN — HEPARIN SODIUM 5000 UNIT(S): 5000 INJECTION INTRAVENOUS; SUBCUTANEOUS at 05:14

## 2024-03-30 RX ADMIN — Medication 50 MILLIGRAM(S): at 21:57

## 2024-03-30 RX ADMIN — Medication 3 MILLIGRAM(S): at 22:14

## 2024-03-30 RX ADMIN — Medication 175 MICROGRAM(S): at 05:14

## 2024-03-30 RX ADMIN — TRAMADOL HYDROCHLORIDE 25 MILLIGRAM(S): 50 TABLET ORAL at 22:14

## 2024-03-30 RX ADMIN — Medication 650 MILLIGRAM(S): at 07:01

## 2024-03-30 RX ADMIN — FAMOTIDINE 20 MILLIGRAM(S): 10 INJECTION INTRAVENOUS at 12:30

## 2024-03-30 RX ADMIN — Medication 1 APPLICATION(S): at 17:26

## 2024-03-30 NOTE — DISCHARGE NOTE PROVIDER - ATTENDING DISCHARGE PHYSICAL EXAMINATION:
VITALS:  Vital Signs Last 24 Hrs  T(C): 36.7 (02 Apr 2024 05:09), Max: 36.7 (01 Apr 2024 14:32)  T(F): 98.1 (02 Apr 2024 05:09), Max: 98.1 (01 Apr 2024 14:32)  HR: 68 (02 Apr 2024 05:09) (61 - 81)  BP: 149/70 (02 Apr 2024 05:09) (123/60 - 151/70)  BP(mean): 100 (02 Apr 2024 05:09) (100 - 114)  RR: 18 (02 Apr 2024 05:09) (18 - 18)  SpO2: 98% (02 Apr 2024 05:09) (98% - 100%)    Parameters below as of 02 Apr 2024 05:09  Patient On (Oxygen Delivery Method): room air      PHYSICAL EXAM:  GENERAL: NAD  CHEST/LUNG: CTAB; No wheeze  HEART: RRR; S1/S2  ABDOMEN: Soft, NT/ND; BS present  EXTREMITIES:  No cyanosis, or edema  NEUROLOGY: AAOx3  SKIN: erythema of right lower leg (can be due to venous stasis), dry ulcer on right calf

## 2024-03-30 NOTE — DISCHARGE NOTE PROVIDER - NSDCMRMEDTOKEN_GEN_ALL_CORE_FT
acetaminophen 325 mg oral tablet: 2 tab(s) orally every 12 hours, As needed, Temp greater or equal to 38C (100.4F), Mild Pain (1 - 3)  alendronate 70 mg oral tablet: 1 tab(s) orally once a week  APAP/CODEINE #3 TAB: 1 tab(s) orally 3 times a day, As Needed  atenolol 25 mg oral tablet: 1 tab(s) orally once a day  Banophen 25 mg oral tablet: 1 tab(s) orally once a day (at bedtime)  cholecalciferol oral tablet: 5000 unit(s) orally once a day  clotrimazole 1% topical cream (obsolete): Apply topically to affected area once a day b/l breasts  furosemide 40 mg oral tablet: 1 tab(s) orally once a day  levothyroxine 175 mcg (0.175 mg) oral tablet: 1 tab(s) orally once a day  lidocaine 5% topical ointment: Apply topically to affected area 3 times a day  Melatonin 10 mg oral tablet: 1 tab(s) orally once a day (at bedtime)  Pepcid 20 mg oral tablet: 1 tab(s) orally once a day  Plaquenil 200 mg oral tablet: 1 tab(s) orally once a day  Synthroid 25 mcg (0.025 mg) oral tablet: 1 tab(s) orally once a week  traZODone 50 mg oral tablet: 1 tab(s) orally once a day (at bedtime)  vitamin A and D topical ointment: Apply topically to affected area 4 times a day legs   acetaminophen 325 mg oral tablet: 2 tab(s) orally every 12 hours, As needed, Temp greater or equal to 38C (100.4F), Mild Pain (1 - 3)  alendronate 70 mg oral tablet: 1 tab(s) orally once a week  APAP/CODEINE #3 TAB: 1 tab(s) orally 3 times a day, As Needed  atenolol 25 mg oral tablet: 1 tab(s) orally once a day  Banophen 25 mg oral tablet: 1 tab(s) orally once a day (at bedtime)  betamethasone valerate 0.1% topical cream: 1 Apply topically to affected area 2 times a day  cholecalciferol oral tablet: 5000 unit(s) orally once a day  clotrimazole 1% topical cream (obsolete): Apply topically to affected area once a day b/l breasts  furosemide 40 mg oral tablet: 1 tab(s) orally once a day  levothyroxine 175 mcg (0.175 mg) oral tablet: 1 tab(s) orally once a day  lidocaine 5% topical ointment: Apply topically to affected area 3 times a day  Melatonin 10 mg oral tablet: 1 tab(s) orally once a day (at bedtime)  Pepcid 20 mg oral tablet: 1 tab(s) orally once a day  petrolatum topical ointment: 1 Apply topically to affected area 4 times a day  Plaquenil 200 mg oral tablet: 1 tab(s) orally once a day  Synthroid 25 mcg (0.025 mg) oral tablet: 1 tab(s) orally once a week  traZODone 50 mg oral tablet: 1 tab(s) orally once a day (at bedtime)  vitamin A and D topical ointment: Apply topically to affected area 4 times a day legs   acetaminophen 325 mg oral tablet: 2 tab(s) orally every 12 hours, As needed, Temp greater or equal to 38C (100.4F), Mild Pain (1 - 3)  alendronate 70 mg oral tablet: 1 tab(s) orally once a week  APAP/CODEINE #3 TAB: 1 tab(s) orally 3 times a day, As Needed  atenolol 25 mg oral tablet: 1 tab(s) orally once a day  Banophen 25 mg oral tablet: 1 tab(s) orally once a day (at bedtime)  betamethasone valerate 0.1% topical cream: 1 Apply topically to affected area 2 times a day  cholecalciferol oral tablet: 5000 unit(s) orally once a day  clotrimazole 1% topical cream (obsolete): Apply topically to affected area once a day b/l breasts  furosemide 40 mg oral tablet: 1 tab(s) orally once a day  levothyroxine 175 mcg (0.175 mg) oral tablet: 1 tab(s) orally once a day  lidocaine 5% topical ointment: Apply topically to affected area 3 times a day  Melatonin 10 mg oral tablet: 1 tab(s) orally once a day (at bedtime)  Pepcid 20 mg oral tablet: 1 tab(s) orally once a day  petrolatum topical ointment: 1 Apply topically to affected area 4 times a day  Plaquenil 200 mg oral tablet: 1 tab(s) orally once a day  predniSONE 10 mg oral tablet: 1 tab(s) orally once a day 0n 4/2 and 4/3 take 3 tablets once a day  On 4/4 and 4/5 take 2 tablets once a day  On 4/5 and 4/7 take 1 tablet once a day.  Synthroid 25 mcg (0.025 mg) oral tablet: 1 tab(s) orally once a week  traZODone 50 mg oral tablet: 1 tab(s) orally once a day (at bedtime)  vitamin A and D topical ointment: Apply topically to affected area 4 times a day legs

## 2024-03-30 NOTE — DISCHARGE NOTE PROVIDER - CARE PROVIDERS DIRECT ADDRESSES
,DirectAddress_Unknown,DirectAddress_Unknown ,DirectAddress_Unknown,DirectAddress_Unknown,[email or web address]

## 2024-03-30 NOTE — PROGRESS NOTE ADULT - ASSESSMENT
67-year-old female w/ HTN, SLE, CVA (1987) w/o residual deficit, hypothyroidism, hx sepsis secondary to LE,  GERD, Pancreatic head cystic lesion cellulitis presenting erythematous scaly rash her  back and extremities     #Eczematous rash possible contact dermatitis due to recent body wash use . less likely scabies. Doubt cellulitis   #SLE not in flare   Hypercoagulable state?  Chronic Anemia  Hx  coronary calcifications  Hx  HTN  Hx CVA (1987) w/o residual deficit  Hx  PVD  Hx  Pre-DM  Hx  Pancreatic head cystic lesion  Hx hypothyroidism  Hx  diverticulosis  Hx osteopenia, b/l hip replacement  Hx total knee replacement  Hx of Steroid use     Plan   spoke with ID doesn't believe it to be scabies. no signs of cellulitis.   continue prednisone taper   Please send double stranded DNA, complements C3 and C4, urinalysis, urine protein/creatinine ratio, beta 2 GP 1 IgG and IgM, anticardiolipin IgG and IgM, lupus anticoagulant  wound care consult   start aquaphor  start topical steroids  c/w Hydroxychlorquine   hold bp meds   tramadol prn for pain  Rheumatology appreciated   Derm Appreciated   OP GI f/u  needs EUS/EGD     Dispo: from assisted living. likely dc mon

## 2024-03-30 NOTE — PROGRESS NOTE ADULT - SUBJECTIVE AND OBJECTIVE BOX
SUBJECTIVE:    Patient is a 68y old Female who presents with a chief complaint of contact Dermatitis  (30 Mar 2024 09:00)    Currently admitted to medicine with the primary diagnosis of:    Today is hospital day 2d.     Overnight Events:     no acute overngiht events. Rash is improving     PAST MEDICAL & SURGICAL HISTORY  Lupus    Essential hypertension    CVA (cerebral vascular accident)  1987    Hypothyroidism    FH: bilateral hip replacements    H/O total knee replacement    H/O abdominal hysterectomy        SOCIAL HISTORY:  Smoking history:  Alcohol Use;  Illicit Drug Use:    ALLERGIES:  aspirin (Other)  penicillins (Other)  Levaquin (Other)  Compazine (Other)  morphine (Other)    MEDICATIONS:  STANDING MEDICATIONS  acetaminophen     Tablet .. 650 milliGRAM(s) Oral every 12 hours  cholecalciferol 1000 Unit(s) Oral daily  diphenhydrAMINE 25 milliGRAM(s) Oral at bedtime  famotidine    Tablet 20 milliGRAM(s) Oral daily  heparin   Injectable 5000 Unit(s) SubCutaneous every 12 hours  hydroxychloroquine 200 milliGRAM(s) Oral daily  levothyroxine 175 MICROGram(s) Oral daily  levothyroxine 25 MICROGram(s) Oral <User Schedule>  traZODone 50 milliGRAM(s) Oral at bedtime    PRN MEDICATIONS  acetaminophen  300 mG/codeine 30 mG 1 Tablet(s) Oral every 8 hours PRN  melatonin 3 milliGRAM(s) Oral at bedtime PRN  traMADol 25 milliGRAM(s) Oral every 6 hours PRN    VITALS:   ICU Vital Signs Last 24 Hrs  T(C): 36.6 (30 Mar 2024 12:41), Max: 36.6 (30 Mar 2024 03:48)  T(F): 97.9 (30 Mar 2024 12:41), Max: 97.9 (30 Mar 2024 12:41)  HR: 62 (30 Mar 2024 12:41) (59 - 76)  BP: 111/63 (30 Mar 2024 12:41) (106/58 - 127/62)  BP(mean): 88 (30 Mar 2024 03:48) (77 - 88)  ABP: --  ABP(mean): --  RR: 18 (30 Mar 2024 12:41) (18 - 18)  SpO2: 97% (30 Mar 2024 03:48) (96% - 98%)    O2 Parameters below as of 30 Mar 2024 03:48  Patient On (Oxygen Delivery Method): room air            LABS:                        10.2   8.77  )-----------( 294      ( 30 Mar 2024 06:28 )             32.3     03-30    146  |  110  |  25<H>  ----------------------------<  126<H>  3.5   |  25  |  0.8    Ca    7.6<L>      30 Mar 2024 06:28  Mg     2.0     03-30    TPro  6.4  /  Alb  3.5  /  TBili  <0.2  /  DBili  x   /  AST  16  /  ALT  6   /  AlkPhos  66  03-29    PTT - ( 29 Mar 2024 21:39 )  PTT:26.7 sec  Urinalysis Basic - ( 30 Mar 2024 06:28 )    Color: x / Appearance: x / SG: x / pH: x  Gluc: 126 mg/dL / Ketone: x  / Bili: x / Urobili: x   Blood: x / Protein: x / Nitrite: x   Leuk Esterase: x / RBC: x / WBC x   Sq Epi: x / Non Sq Epi: x / Bacteria: x            Culture - Blood (collected 28 Mar 2024 13:05)  Source: .Blood Blood-Peripheral  Preliminary Report (29 Mar 2024 22:02):    No growth at 24 hours            RADIOLOGY:    PHYSICAL EXAM:           GENERAL: NAD, lying in bed comfortably  CHEST/LUNG: Clear to auscultation bilaterally; No rales, rhonchi, wheezing, or rubs. Unlabored respirations  HEART: Regular rate and rhythm; No murmurs, rubs, or gallops  ABDOMEN: Bowel sounds present; Soft, Nontender, Nondistended.  EXTREMITIES:  no edema  NERVOUS SYSTEM:  Alert & Oriented X3, speech clear. No deficits   MSK: FROM all 4 extremities   SKIN: improving diffuse eczematous rash on torso and extremities. various excoriations.

## 2024-03-30 NOTE — DISCHARGE NOTE PROVIDER - PROVIDER TOKENS
PROVIDER:[TOKEN:[[id number]],FOLLOWUP:[1 week]],FREE:[LAST:[WOJCIECH],FIRST:[Ernst],PHONE:[(   )    -],FAX:[(   )    -],ADDRESS:[[facility]],FOLLOWUP:[1 week]] PROVIDER:[TOKEN:[[id number]],FOLLOWUP:[1 week]],FREE:[LAST:[WOJCIECH],FIRST:[Ernst],PHONE:[(   )    -],FAX:[(   )    -],ADDRESS:[[facility]],FOLLOWUP:[1 week]],PROVIDER:[TOKEN:[[id number]]]

## 2024-03-30 NOTE — DISCHARGE NOTE PROVIDER - NSDCFUSCHEDAPPT_GEN_ALL_CORE_FT
Theresa Kennedy  [facility] Physician [facility]  VASCULAR [address]  Scheduled Appointment: 04/08/2024    Amadeo Alvarenga  [facility]  Scheduled Appointment: 04/18/2024    [facility] Physician [facility]  BURNTREAT [address]  Scheduled Appointment: 04/18/2024

## 2024-03-30 NOTE — DISCHARGE NOTE PROVIDER - CARE PROVIDER_API CALL
Yogesh Bonilla olinda  Internal Medicine  [id number] [place] [address]  Phone: ()-  Fax: ()-  Follow Up Time: 1 week    Ernst JEFFREY  [facility]  Phone: (   )    -  Fax: (   )    -  Follow Up Time: 1 week   Yogesh Bonilla  Internal Medicine  [address] [address]  Phone: ()-  Fax: ()-  Follow Up Time: 1 week    WOJCIECH [facility]  Phone: (   )    -  Fax: (   )    -  Follow Up Time: 1 week    Jamison Hernandez  Vascular Surgery  [address] [place] [address], [address]  [place] [address]  Phone: [phone]  Fax: [phone]  Follow Up Time:

## 2024-03-30 NOTE — DISCHARGE NOTE PROVIDER - HOSPITAL COURSE
Patient is a 68-year-old female w/ HTN, HLD, SLE, CVA (1987) w/o residual deficit, hypothyroidism, hx sepsis secondary to LE cellulitis that presented to the ED on 3/28 with chief complaint of a diffuse rash located on her b/l arms, chest, and RLE that has been ongoing for a few months but acutely worsened over the past 2 weeks. Reports burning/itching to rash and has been experiencing chills. States that she was put on clotrimazole by her PCP with no relief. Denies any other symptoms including fevers, headache, recent illness/travel, cough, abdominal pain, chest pain, or SOB.    #Maculopapular rash present to chest, b/l upper extremities, RLE 2/2 possible Contact Dermatitis   #RLE cellulitis  #Hx of LE cellulitis  - PMHx significant for sepsis 2/2 LE cellulitis  - current rash ongoing for a few months, acutely worsened in the past two weeks  - maculopapular rash with excoriations noted to torso, b/l arms, and RLE; R foot noted to have erythema with deep red color, possibly resembling ecchymosis?  - of note, patient lives in assisted living facility, [facility]  - PCP prescribed clotrimazole with no relief  - Vitally stable on admission  - WBC 8k on admission  - s/p doxycycline x1 dose in the ED  - cefazolin 1 g q8h  - f/u blood culture  - f/u LE venous duplex  - consider ID consult if no improvement  - rheum consult placed for possible SLE flare up  - derm consult placed for rash  - wound care consult placed for RLE ulcer noted to lateral side of R calf    #EUGENIO  - Cr 1.4 on admission, baseline Cr was 0.7 in Feb 2023  - GFR 41 (Feb 2023 was 95)  - c/w gentle hydration NS @ 50 ccs/hr for 12 hours  - US Renal    #HTN  - c/w atenolol 25 mg qd  - c/w Lasix 40 qd    #Hypothyroidism  - c/w Levothyroxine 175 mcg qd  - c/w Levothyroxine 25 mcg qweekly  - f/u thyroid function tests    #HLD  - no statin listed in med rec from previous admission; f/u lipid panel and determine if initiating statin is necessary    Pt is medically stable for discharge Patient is a 68-year-old female w/ HTN, HLD, SLE, CVA (1987) w/o residual deficit, hypothyroidism, hx sepsis secondary to LE cellulitis that presented to the ED on 3/28 with chief complaint of a diffuse rash located on her b/l arms, chest, and RLE that has been ongoing for a few months but acutely worsened over the past 2 weeks. Reports burning/itching to rash and has been experiencing chills. States that she was put on clotrimazole by her PCP with no relief. Denies any other symptoms including fevers, headache, recent illness/travel, cough, abdominal pain, chest pain, or SOB.    #Maculopapular rash present to chest, b/l upper extremities, RLE 2/2 possible Contact Dermatitis   #RLE cellulitis  #Hx of LE cellulitis  - PMHx significant for sepsis 2/2 LE cellulitis  - current rash ongoing for a few months, acutely worsened in the past two weeks  - maculopapular rash with excoriations noted to torso, b/l arms, and RLE; R foot noted to have erythema with deep red color, possibly resembling ecchymosis?  - of note, patient lives in assisted living facility, [facility]  - PCP prescribed clotrimazole with no relief  - Vitally stable on admission  - WBC 8k on admission  - s/p doxycycline x1 dose in the ED  - cefazolin 1 g q8h  - f/u blood culture  - f/u LE venous duplex  - consider ID consult if no improvement  - rheum consult placed for possible SLE flare up  - derm consult placed for rash  - wound care consult placed for RLE ulcer noted to lateral side of R calf    #EUGENIO  - Cr 1.4 on admission, baseline Cr was 0.7 in Feb 2023  - GFR 41 (Feb 2023 was 95)  - c/w gentle hydration NS @ 50 ccs/hr for 12 hours  - US Renal: No hydronephrosis    #HTN  - c/w atenolol 25 mg qd  - c/w Lasix 40 qd    #Hypothyroidism  - c/w Levothyroxine 175 mcg qd  - c/w Levothyroxine 25 mcg qweekly  - f/u thyroid function tests    #HLD  - no statin listed in med rec from previous admission; f/u lipid panel and determine if initiating statin is necessary    Pt is medically stable for discharge Patient is a 68-year-old female w/ HTN, HLD, SLE, CVA (1987) w/o residual deficit, hypothyroidism, hx sepsis secondary to LE cellulitis that presented to the ED on 3/28 with chief complaint of a diffuse rash located on her b/l arms, chest, and RLE that has been ongoing for a few months but acutely worsened over the past 2 weeks. Reports burning/itching to rash and has been experiencing chills. States that she was put on clotrimazole by her PCP with no relief. Denies any other symptoms including fevers, headache, recent illness/travel, cough, abdominal pain, chest pain, or SOB.    #Maculopapular rash present to chest, b/l upper extremities, RLE 2/2 possible Contact Dermatitis   #RLE cellulitis  #Hx of LE cellulitis  - PMHx significant for sepsis 2/2 LE cellulitis  - current rash ongoing for a few months, acutely worsened in the past two weeks  - maculopapular rash with excoriations noted to torso, b/l arms, and RLE; R foot noted to have erythema with deep red color, possibly resembling ecchymosis?  - of note, patient lives in assisted living facility, [facility]  - PCP prescribed clotrimazole with no relief  - Vitally stable on admission  - WBC 8k on admission  - s/p doxycycline x1 dose in the ED  - cefazolin 1 g q8h  - f/u blood culture  - f/u LE venous duplex  - consider ID consult if no improvement  - rheum consult placed for possible SLE flare up  - derm consult placed for rash  - wound care consult for RLE ulcer:  Pat dry apply, bacitracin, cover with dry sterile dressing twice a day and prn for soiling.   Recommend follow up with Vascular for further recommendation.      #EUGENIO  - Cr 1.4 on admission, baseline Cr was 0.7 in Feb 2023  - resolved on repeat labs  - US Renal: No hydronephrosis    #HTN  - c/w atenolol 25 mg qd  - c/w Lasix 40 qd    #Hypothyroidism  - c/w Levothyroxine 175 mcg qd  - c/w Levothyroxine 25 mcg qweekly  - TSH wnl    #HLD  - no statin listed in med rec from previous admission; f/u lipid panel and determine if initiating statin is necessary    Pt is medically stable for discharge

## 2024-03-30 NOTE — DISCHARGE NOTE PROVIDER - NSDCCPCAREPLAN_GEN_ALL_CORE_FT
PRINCIPAL DISCHARGE DIAGNOSIS  Diagnosis: Cellulitis  Assessment and Plan of Treatment: you came here with RASH.  We did all the required workup and mangement.   We consulted reumatology and dermatology .  dermatology started you on Steroids dayanna.   Kindly take your medications as prescribed .  follow up your apointments as scheduled .  if you experience any  rahs , itching , burning , fever with chills ,shortness of breathe ,nausea vomiting or anyother sign contact a Doc asap.  If you still have any question or concern dont heistate to ask .       PRINCIPAL DISCHARGE DIAGNOSIS  Diagnosis: Cellulitis  Assessment and Plan of Treatment: you came here with RASH.  We did all the required workup and mangement.   We consulted reumatology and dermatology .  dermatology started you on Steroids dayanna.   Kindly take your medications as prescribed .  follow up your apointments as scheduled .  if you experience any  rash , itching , burning , fever with chills ,shortness of breathe ,nausea vomiting or anyother sign contact a Doc asap.  If you still have any question or concern dont heistate to ask .

## 2024-03-30 NOTE — DISCHARGE NOTE PROVIDER - NSDCFUADDINST_GEN_ALL_CORE_FT
wound care consult for RLE ulcer:  Pat dry apply, bacitracin, cover with dry sterile dressing twice a day and prn for soiling.   Recommend follow up with Vascular for further recommendation.

## 2024-03-31 PROCEDURE — 99232 SBSQ HOSP IP/OBS MODERATE 35: CPT

## 2024-03-31 RX ADMIN — Medication 650 MILLIGRAM(S): at 05:51

## 2024-03-31 RX ADMIN — Medication 1000 UNIT(S): at 11:37

## 2024-03-31 RX ADMIN — Medication 650 MILLIGRAM(S): at 17:35

## 2024-03-31 RX ADMIN — HEPARIN SODIUM 5000 UNIT(S): 5000 INJECTION INTRAVENOUS; SUBCUTANEOUS at 17:35

## 2024-03-31 RX ADMIN — Medication 25 MILLIGRAM(S): at 21:28

## 2024-03-31 RX ADMIN — Medication 1 APPLICATION(S): at 17:35

## 2024-03-31 RX ADMIN — Medication 1 APPLICATION(S): at 06:00

## 2024-03-31 RX ADMIN — Medication 40 MILLIGRAM(S): at 05:51

## 2024-03-31 RX ADMIN — TRAMADOL HYDROCHLORIDE 25 MILLIGRAM(S): 50 TABLET ORAL at 10:25

## 2024-03-31 RX ADMIN — Medication 1 APPLICATION(S): at 17:37

## 2024-03-31 RX ADMIN — Medication 3 MILLIGRAM(S): at 21:41

## 2024-03-31 RX ADMIN — TRAMADOL HYDROCHLORIDE 25 MILLIGRAM(S): 50 TABLET ORAL at 11:00

## 2024-03-31 RX ADMIN — Medication 1 APPLICATION(S): at 00:00

## 2024-03-31 RX ADMIN — Medication 200 MILLIGRAM(S): at 11:37

## 2024-03-31 RX ADMIN — FAMOTIDINE 20 MILLIGRAM(S): 10 INJECTION INTRAVENOUS at 11:37

## 2024-03-31 RX ADMIN — Medication 175 MICROGRAM(S): at 05:51

## 2024-03-31 RX ADMIN — Medication 1 APPLICATION(S): at 05:51

## 2024-03-31 RX ADMIN — Medication 50 MILLIGRAM(S): at 21:28

## 2024-03-31 RX ADMIN — Medication 1 APPLICATION(S): at 11:38

## 2024-03-31 RX ADMIN — HEPARIN SODIUM 5000 UNIT(S): 5000 INJECTION INTRAVENOUS; SUBCUTANEOUS at 05:51

## 2024-03-31 NOTE — PROGRESS NOTE ADULT - ASSESSMENT
67-year-old female w/ HTN, SLE, CVA (1987) w/o residual deficit, hypothyroidism, hx sepsis secondary to LE,  GERD, Pancreatic head cystic lesion cellulitis presenting erythematous scaly rash her  back and extremities     # Eczematous rash possible contact dermatitis due to recent body wash use . less likely scabies. Doubt cellulitis   # SLE not in flare   Hypercoagulable state?  Chronic Anemia  Hx  coronary calcifications  Hx  HTN  Hx CVA (1987) w/o residual deficit  Hx  PVD  Hx  Pre-DM  Hx  Pancreatic head cystic lesion  Hx hypothyroidism  Hx  diverticulosis  Hx osteopenia, b/l hip replacement  Hx total knee replacement  Hx of Steroid use     Plan   spoke with ID doesn't believe it to be scabies. no signs of cellulitis.   dc cefazolin   start prednisone taper   Please send double stranded DNA, complements C3 and C4, urinalysis, urine protein/creatinine ratio, beta 2 GP 1 IgG and IgM, anticardiolipin IgG and IgM, lupus anticoagulant  wound care consult   c/w Hydroxychlorquine   hold bp meds   Rheumatology appreciated   Derm Appreciated   OP GI f/u  needs EUS/EGD        67-year-old female w/ HTN, SLE, CVA (1987) w/o residual deficit, hypothyroidism, hx sepsis secondary to LE,  GERD, Pancreatic head cystic lesion cellulitis presenting erythematous scaly rash her  back and extremities     # Eczematous rash possible contact dermatitis due to recent body wash use, less likely scabies. Doubt cellulitis   # SLE not in flare   - improved with prednisone course  - c/w hydroxychloroquine  - continue pain and pruitus control  - Pending double stranded DNA, complements C3 and C4, urinalysis, urine protein/creatinine ratio, beta 2 GP 1 IgG and IgM, anticardiolipin IgG and IgM, lupus anticoagulant  - c/w wound care    # Hypercoagulable state?  # Chronic Anemia  - stable Hb    # Hypothyroidism  - c/w levothyroxine    # h/o CVA (1987) w/o residual deficit  # PVD  - not on medical therapy from home, recommend to reassess outpatient therapy    # Pancreatic head cystic lesion  - outpatient follow up     # DVT prophylaxis - on heparin subcut  # GI prophylaxis - on protonix    Dispo: D/C Planning, PT evaluation, from Assisted Living Facility   67-year-old female w/ HTN, SLE, CVA (1987) w/o residual deficit, hypothyroidism, hx sepsis secondary to LE,  GERD, Pancreatic head cystic lesion cellulitis presenting erythematous scaly rash her  back and extremities     # Eczematous rash possible contact dermatitis due to recent body wash use, less likely scabies. Doubt cellulitis   # SLE not in flare   - improved with prednisone course  - c/w hydroxychloroquine  - continue pain and pruitus control  - Pending double stranded DNA, complements C3 and C4, urinalysis, urine protein/creatinine ratio, beta 2 GP 1 IgG and IgM, anticardiolipin IgG and IgM, lupus anticoagulant  - c/w wound care    # Hypercoagulable state?  # Chronic Anemia  - stable Hb    # Hypothyroidism  - c/w levothyroxine    # h/o CVA (1987) w/o residual deficit  # PVD  - not on medical therapy from home, recommend to reassess outpatient therapy    # Pancreatic head cystic lesion  - outpatient follow up     # DVT prophylaxis - on heparin subcut  # GI prophylaxis - on famotidine    Dispo: D/C Planning, PT evaluation, from Assisted Living Facility

## 2024-03-31 NOTE — PROGRESS NOTE ADULT - SUBJECTIVE AND OBJECTIVE BOX
TESSJOVANLATOYA  68y Female    Patient is a 68y old  Female who presents with a chief complaint of     INTERVAL HPI/OVERNIGHT EVENTS:    ROS: Pt denies fever, chills, SOB, palpitations, nausea, vomiting, abdominal pain, dysuria, diarrhea, constipation, rash, muscle or joint pain.    Overnight events: No acute events overnight.    Vital Signs Last 24 Hrs  T(C): 36.2 (31 Mar 2024 04:48), Max: 36.8 (30 Mar 2024 17:38)  T(F): 97.1 (31 Mar 2024 04:48), Max: 98.2 (30 Mar 2024 17:38)  HR: 56 (31 Mar 2024 04:48) (56 - 69)  BP: 98/52 (31 Mar 2024 04:48) (98/52 - 117/59)  BP(mean): 72 (31 Mar 2024 04:48) (72 - 72)  RR: 18 (31 Mar 2024 04:48) (18 - 18)  SpO2: 97% (31 Mar 2024 04:48) (97% - 97%)    Parameters below as of 31 Mar 2024 04:48  Patient On (Oxygen Delivery Method): room air    aspirin (Other)  penicillins (Other)  Levaquin (Other)  Compazine (Other)  morphine (Other)    MEDICATIONS  (STANDING):  acetaminophen     Tablet .. 650 milliGRAM(s) Oral every 12 hours  AQUAPHOR (petrolatum Ointment) 1 Application(s) Topical four times a day  betamethasone valerate 0.1% Cream 1 Application(s) Topical two times a day  cholecalciferol 1000 Unit(s) Oral daily  diphenhydrAMINE 25 milliGRAM(s) Oral at bedtime  famotidine    Tablet 20 milliGRAM(s) Oral daily  heparin   Injectable 5000 Unit(s) SubCutaneous every 12 hours  hydroxychloroquine 200 milliGRAM(s) Oral daily  levothyroxine 175 MICROGram(s) Oral daily  levothyroxine 25 MICROGram(s) Oral <User Schedule>  predniSONE   Tablet 40 milliGRAM(s) Oral daily  traZODone 50 milliGRAM(s) Oral at bedtime    MEDICATIONS  (PRN):  acetaminophen  300 mG/codeine 30 mG 1 Tablet(s) Oral every 8 hours PRN Moderate Pain (4 - 6)  melatonin 3 milliGRAM(s) Oral at bedtime PRN Insomnia  traMADol 25 milliGRAM(s) Oral every 6 hours PRN Severe Pain (7 - 10)    PHYSICAL EXAM:  General Appearance: NAD, normal for age and gender. 	  Neck: Normal JVP, no bruit.   Eyes: Conjunctiva clear, Extra Ocular muscles intact. No scleral icterus.  ENMT: Moist oral mucosa. No oral lesion.  Cardiovascular: Regular rate and rhythm S1 S2, No JVD, No murmurs.  Respiratory: Bilateral air entry. No wheezes, rales or rhonchi.  Psychiatry: Alert and oriented x 3, Mood & affect appropriate.  Gastrointestinal:  Soft, Non-tender, Non-distended.  Neurologic: Non-focal deficits.  Musculoskeletal/ extremities: Normal range of motion, No clubbing, cyanosis or edema.  Vascular: Peripheral pulses palpable bilaterally.  Skin/Integumen: No ecchymoses, No cyanosis. Erythematous based, raised rash over bilateral UE, LE and trunk.    LABS:                        10.2   8.77  )-----------( 294      ( 30 Mar 2024 06:28 )             32.3     03-30    146  |  110  |  25<H>  ----------------------------<  126<H>  3.5   |  25  |  0.8    Ca    7.6<L>      30 Mar 2024 06:28  Mg     2.0     03-30      PTT - ( 29 Mar 2024 21:39 )  PTT:26.7 sec    Culture - Blood (collected 28 Mar 2024 13:05)  Source: .Blood Blood-Peripheral  Preliminary Report (30 Mar 2024 22:02):    No growth at 48 Hours        RADIOLOGY & ADDITIONAL TESTS:               TESSJOVANLATOYA  68y Female    68y old  Female who presents with a chief complaint of rash    INTERVAL HPI/OVERNIGHT EVENTS:    ROS: Pt reports that her rash is improving and there is no more pain or significant pruritus as before. The patient denies fever, chills, SOB, chest pian, palpitations, nausea, vomiting, abdominal pain.    Overnight events: No acute events overnight.    Vital Signs Last 24 Hrs  T(C): 36.2 (31 Mar 2024 04:48), Max: 36.8 (30 Mar 2024 17:38)  T(F): 97.1 (31 Mar 2024 04:48), Max: 98.2 (30 Mar 2024 17:38)  HR: 56 (31 Mar 2024 04:48) (56 - 69)  BP: 98/52 (31 Mar 2024 04:48) (98/52 - 117/59)  BP(mean): 72 (31 Mar 2024 04:48) (72 - 72)  RR: 18 (31 Mar 2024 04:48) (18 - 18)  SpO2: 97% (31 Mar 2024 04:48) (97% - 97%)    Parameters below as of 31 Mar 2024 04:48  Patient On (Oxygen Delivery Method): room air    aspirin (Other)  penicillins (Other)  Levaquin (Other)  Compazine (Other)  morphine (Other)    MEDICATIONS  (STANDING):  acetaminophen     Tablet .. 650 milliGRAM(s) Oral every 12 hours  AQUAPHOR (petrolatum Ointment) 1 Application(s) Topical four times a day  betamethasone valerate 0.1% Cream 1 Application(s) Topical two times a day  cholecalciferol 1000 Unit(s) Oral daily  diphenhydrAMINE 25 milliGRAM(s) Oral at bedtime  famotidine    Tablet 20 milliGRAM(s) Oral daily  heparin   Injectable 5000 Unit(s) SubCutaneous every 12 hours  hydroxychloroquine 200 milliGRAM(s) Oral daily  levothyroxine 175 MICROGram(s) Oral daily  levothyroxine 25 MICROGram(s) Oral <User Schedule>  predniSONE   Tablet 40 milliGRAM(s) Oral daily  traZODone 50 milliGRAM(s) Oral at bedtime    MEDICATIONS  (PRN):  acetaminophen  300 mG/codeine 30 mG 1 Tablet(s) Oral every 8 hours PRN Moderate Pain (4 - 6)  melatonin 3 milliGRAM(s) Oral at bedtime PRN Insomnia  traMADol 25 milliGRAM(s) Oral every 6 hours PRN Severe Pain (7 - 10)    PHYSICAL EXAM:  General Appearance: NAD, normal for age and gender. 	  Neck: Normal JVP, no bruit.   Eyes: Conjunctiva clear, Extra Ocular muscles intact. No scleral icterus.  ENMT: Moist oral mucosa. No oral lesion.  Cardiovascular: Regular rate and rhythm S1 S2, No JVD, No murmurs.  Respiratory: Bilateral air entry. No wheezes, rales or rhonchi.  Psychiatry: Alert and oriented x 3, Mood & affect appropriate.  Gastrointestinal:  Soft, Non-tender, Non-distended.  Neurologic: Non-focal deficits.  Musculoskeletal/ extremities: No clubbing, cyanosis or edema. Mild swelling over right leg at the site of erythema and rash.  Vascular: Peripheral pulses palpable bilaterally.  Skin/Integumen: No ecchymoses, No cyanosis. Erythematous based, raised rash over bilateral UE, LE and trunk.    LABS:                        10.2   8.77  )-----------( 294      ( 30 Mar 2024 06:28 )             32.3     03-30    146  |  110  |  25<H>  ----------------------------<  126<H>  3.5   |  25  |  0.8    Ca    7.6<L>      30 Mar 2024 06:28  Mg     2.0     03-30      PTT - ( 29 Mar 2024 21:39 )  PTT:26.7 sec    Culture - Blood (collected 28 Mar 2024 13:05)  Source: .Blood Blood-Peripheral  Preliminary Report (30 Mar 2024 22:02):    No growth at 48 Hours        RADIOLOGY & ADDITIONAL TESTS:

## 2024-04-01 LAB
ALBUMIN SERPL ELPH-MCNC: 3.3 G/DL — LOW (ref 3.5–5.2)
ALP SERPL-CCNC: 61 U/L — SIGNIFICANT CHANGE UP (ref 30–115)
ALT FLD-CCNC: 10 U/L — SIGNIFICANT CHANGE UP (ref 0–41)
ANA PAT FLD IF-IMP: ABNORMAL
ANA TITR SER: ABNORMAL
ANION GAP SERPL CALC-SCNC: 12 MMOL/L — SIGNIFICANT CHANGE UP (ref 7–14)
AST SERPL-CCNC: 16 U/L — SIGNIFICANT CHANGE UP (ref 0–41)
BASOPHILS # BLD AUTO: 0.03 K/UL — SIGNIFICANT CHANGE UP (ref 0–0.2)
BASOPHILS NFR BLD AUTO: 0.3 % — SIGNIFICANT CHANGE UP (ref 0–1)
BILIRUB SERPL-MCNC: <0.2 MG/DL — SIGNIFICANT CHANGE UP (ref 0.2–1.2)
BUN SERPL-MCNC: 26 MG/DL — HIGH (ref 10–20)
C3 SERPL-MCNC: 112 MG/DL — SIGNIFICANT CHANGE UP (ref 81–157)
C3 SERPL-MCNC: 114 MG/DL — SIGNIFICANT CHANGE UP (ref 81–157)
C4 SERPL-MCNC: 18 MG/DL — SIGNIFICANT CHANGE UP (ref 13–39)
C4 SERPL-MCNC: 18 MG/DL — SIGNIFICANT CHANGE UP (ref 13–39)
CALCIUM SERPL-MCNC: 8.1 MG/DL — LOW (ref 8.4–10.5)
CHLORIDE SERPL-SCNC: 108 MMOL/L — SIGNIFICANT CHANGE UP (ref 98–110)
CO2 SERPL-SCNC: 24 MMOL/L — SIGNIFICANT CHANGE UP (ref 17–32)
CREAT SERPL-MCNC: 0.7 MG/DL — SIGNIFICANT CHANGE UP (ref 0.7–1.5)
DRVVT RATIO: 1.16 RATIO — SIGNIFICANT CHANGE UP (ref 0–1.21)
DRVVT SCREEN TO CONFIRM RATIO: SIGNIFICANT CHANGE UP
EGFR: 95 ML/MIN/1.73M2 — SIGNIFICANT CHANGE UP
EOSINOPHIL # BLD AUTO: 0.02 K/UL — SIGNIFICANT CHANGE UP (ref 0–0.7)
EOSINOPHIL NFR BLD AUTO: 0.2 % — SIGNIFICANT CHANGE UP (ref 0–8)
GLUCOSE SERPL-MCNC: 81 MG/DL — SIGNIFICANT CHANGE UP (ref 70–99)
HCT VFR BLD CALC: 33.2 % — LOW (ref 37–47)
HGB BLD-MCNC: 10.5 G/DL — LOW (ref 12–16)
IMM GRANULOCYTES NFR BLD AUTO: 1.6 % — HIGH (ref 0.1–0.3)
LYMPHOCYTES # BLD AUTO: 1.9 K/UL — SIGNIFICANT CHANGE UP (ref 1.2–3.4)
LYMPHOCYTES # BLD AUTO: 21.8 % — SIGNIFICANT CHANGE UP (ref 20.5–51.1)
MAGNESIUM SERPL-MCNC: 2.2 MG/DL — SIGNIFICANT CHANGE UP (ref 1.8–2.4)
MCHC RBC-ENTMCNC: 28.5 PG — SIGNIFICANT CHANGE UP (ref 27–31)
MCHC RBC-ENTMCNC: 31.6 G/DL — LOW (ref 32–37)
MCV RBC AUTO: 90 FL — SIGNIFICANT CHANGE UP (ref 81–99)
MONOCYTES # BLD AUTO: 0.78 K/UL — HIGH (ref 0.1–0.6)
MONOCYTES NFR BLD AUTO: 8.9 % — SIGNIFICANT CHANGE UP (ref 1.7–9.3)
NEUTROPHILS # BLD AUTO: 5.86 K/UL — SIGNIFICANT CHANGE UP (ref 1.4–6.5)
NEUTROPHILS NFR BLD AUTO: 67.2 % — SIGNIFICANT CHANGE UP (ref 42.2–75.2)
NORMALIZED SCT PPP-RTO: 1.21 RATIO — HIGH (ref 0–1.16)
NORMALIZED SCT PPP-RTO: ABNORMAL
NRBC # BLD: 0 /100 WBCS — SIGNIFICANT CHANGE UP (ref 0–0)
PLATELET # BLD AUTO: 280 K/UL — SIGNIFICANT CHANGE UP (ref 130–400)
PMV BLD: 10.2 FL — SIGNIFICANT CHANGE UP (ref 7.4–10.4)
POTASSIUM SERPL-MCNC: 3.6 MMOL/L — SIGNIFICANT CHANGE UP (ref 3.5–5)
POTASSIUM SERPL-SCNC: 3.6 MMOL/L — SIGNIFICANT CHANGE UP (ref 3.5–5)
PROT SERPL-MCNC: 6.2 G/DL — SIGNIFICANT CHANGE UP (ref 6–8)
RBC # BLD: 3.69 M/UL — LOW (ref 4.2–5.4)
RBC # FLD: 15 % — HIGH (ref 11.5–14.5)
SARS-COV-2 RNA SPEC QL NAA+PROBE: SIGNIFICANT CHANGE UP
SODIUM SERPL-SCNC: 144 MMOL/L — SIGNIFICANT CHANGE UP (ref 135–146)
WBC # BLD: 8.73 K/UL — SIGNIFICANT CHANGE UP (ref 4.8–10.8)
WBC # FLD AUTO: 8.73 K/UL — SIGNIFICANT CHANGE UP (ref 4.8–10.8)

## 2024-04-01 PROCEDURE — 99232 SBSQ HOSP IP/OBS MODERATE 35: CPT

## 2024-04-01 RX ORDER — PETROLATUM,WHITE
1 JELLY (GRAM) TOPICAL
Qty: 0 | Refills: 0 | DISCHARGE
Start: 2024-04-01

## 2024-04-01 RX ORDER — PETROLATUM,WHITE
1 JELLY (GRAM) TOPICAL
Qty: 4 | Refills: 0
Start: 2024-04-01 | End: 2024-04-30

## 2024-04-01 RX ADMIN — Medication 25 MILLIGRAM(S): at 21:07

## 2024-04-01 RX ADMIN — Medication 1 APPLICATION(S): at 12:51

## 2024-04-01 RX ADMIN — Medication 200 MILLIGRAM(S): at 11:49

## 2024-04-01 RX ADMIN — Medication 650 MILLIGRAM(S): at 05:23

## 2024-04-01 RX ADMIN — Medication 1 APPLICATION(S): at 05:34

## 2024-04-01 RX ADMIN — HEPARIN SODIUM 5000 UNIT(S): 5000 INJECTION INTRAVENOUS; SUBCUTANEOUS at 17:04

## 2024-04-01 RX ADMIN — Medication 1 APPLICATION(S): at 18:43

## 2024-04-01 RX ADMIN — Medication 25 MICROGRAM(S): at 05:23

## 2024-04-01 RX ADMIN — Medication 1 APPLICATION(S): at 05:24

## 2024-04-01 RX ADMIN — Medication 1000 UNIT(S): at 11:48

## 2024-04-01 RX ADMIN — Medication 650 MILLIGRAM(S): at 17:04

## 2024-04-01 RX ADMIN — Medication 50 MILLIGRAM(S): at 21:08

## 2024-04-01 RX ADMIN — FAMOTIDINE 20 MILLIGRAM(S): 10 INJECTION INTRAVENOUS at 11:49

## 2024-04-01 RX ADMIN — Medication 1 APPLICATION(S): at 00:15

## 2024-04-01 RX ADMIN — Medication 1 APPLICATION(S): at 17:03

## 2024-04-01 RX ADMIN — HEPARIN SODIUM 5000 UNIT(S): 5000 INJECTION INTRAVENOUS; SUBCUTANEOUS at 05:23

## 2024-04-01 RX ADMIN — Medication 40 MILLIGRAM(S): at 05:23

## 2024-04-01 RX ADMIN — TRAMADOL HYDROCHLORIDE 25 MILLIGRAM(S): 50 TABLET ORAL at 11:48

## 2024-04-01 RX ADMIN — Medication 1 APPLICATION(S): at 23:05

## 2024-04-01 RX ADMIN — Medication 175 MICROGRAM(S): at 05:23

## 2024-04-01 NOTE — PROGRESS NOTE ADULT - ATTENDING COMMENTS
67 year old female with PMH of HTN, SLE, CVA (1987) w/o residual deficit, hypothyroidism, h/o sepsis 2/2 to LE, GERD, Pancreatic head cystic lesion, cellulitis presenting with erythematous scaly rash of back & extremities.     #Eczematous Rash 2/2 Contact Dermatitis   #SLE not in flare  - improving with prednisone course  - c/w hydroxychloroquine  -c/w pain & pruritis control  - C3 114 (WNL)  - C4 18 (WNL)  - pending dsDNA, anticardiolipin IgG & IgM, Lupus Anticoagulant, Urine protein/creatinine ratio, Beta 2 GP 1 IgG & IgM  - c/w wound care    #Chronic Anemia  - H&H stable    #Hypothyroidism  - c/w levothyroxine     # Loose bowel movements:   afebrile, no leukocytosis  - improving  - monitor BM

## 2024-04-01 NOTE — PROGRESS NOTE ADULT - SUBJECTIVE AND OBJECTIVE BOX
24H events:    Patient is a 68y old Female who presents with a chief complaint of contact Dermatitis  (30 Mar 2024 09:00)    Primary diagnosis of Cellulitis        Today is hospital day 4d. This morning patient was seen and examined at bedside, resting comfortably in bed.    No acute or major events overnight.     Code Status:    Family communication:  Contact date:  Name of person contacted:  Relationship to patient:  Communication details:  What matters most:    PAST MEDICAL & SURGICAL HISTORY  Lupus    Essential hypertension    CVA (cerebral vascular accident)  1987    Hypothyroidism    FH: bilateral hip replacements    H/O total knee replacement    H/O abdominal hysterectomy      SOCIAL HISTORY:  Social History:      ALLERGIES:  aspirin (Other)  penicillins (Other)  Levaquin (Other)  Compazine (Other)  morphine (Other)    MEDICATIONS:  STANDING MEDICATIONS  acetaminophen     Tablet .. 650 milliGRAM(s) Oral every 12 hours  AQUAPHOR (petrolatum Ointment) 1 Application(s) Topical four times a day  betamethasone valerate 0.1% Cream 1 Application(s) Topical two times a day  cholecalciferol 1000 Unit(s) Oral daily  diphenhydrAMINE 25 milliGRAM(s) Oral at bedtime  famotidine    Tablet 20 milliGRAM(s) Oral daily  heparin   Injectable 5000 Unit(s) SubCutaneous every 12 hours  hydroxychloroquine 200 milliGRAM(s) Oral daily  levothyroxine 25 MICROGram(s) Oral <User Schedule>  levothyroxine 175 MICROGram(s) Oral daily  predniSONE   Tablet 40 milliGRAM(s) Oral daily  traZODone 50 milliGRAM(s) Oral at bedtime    PRN MEDICATIONS  acetaminophen  300 mG/codeine 30 mG 1 Tablet(s) Oral every 8 hours PRN  melatonin 3 milliGRAM(s) Oral at bedtime PRN  traMADol 25 milliGRAM(s) Oral every 6 hours PRN    VITALS:   T(F): 97.8  HR: 62  BP: 131/68  RR: 18  SpO2: 99%    PHYSICAL EXAM:  GENERAL:   ( x ) NAD, lying in bed comfortably     (  ) obtunded     (  ) lethargic     (  ) somnolent    HEAD:   ( x ) Atraumatic     (  ) hematoma     (  ) laceration (specify location:       )     NECK:  (x ) Supple     (  ) neck stiffness     (  ) nuchal rigidity     (  )  no JVD     (  ) JVD present ( -- cm)    HEART:  Rate -->     (x  ) normal rate     (  ) bradycardic     (  ) tachycardic  Rhythm -->     ( x ) regular     (  ) regularly irregular     (  ) irregularly irregular  Murmurs -->     (  ) normal s1s2     (  ) systolic murmur     (  ) diastolic murmur     (  ) continuous murmur      (  ) S3 present     (  ) S4 present    LUNGS:   (  )Unlabored respirations     (  ) tachypnea  ( x ) B/L air entry     (  ) decreased breath sounds in:  (location     )    (  ) no adventitious sound     (  ) crackles     (  ) wheezing      (  ) rhonchi      (specify location:       )  (  ) chest wall tenderness (specify location:       )    ABDOMEN:   ( x ) Soft     (  ) tense   |   ( x ) nondistended     (  ) distended   |   (  ) +BS     (  ) hypoactive bowel sounds     (  ) hyperactive bowel sounds  (x  ) nontender     (  ) RUQ tenderness     (  ) RLQ tenderness     (  ) LLQ tenderness     (  ) epigastric tenderness     (  ) diffuse tenderness  (  ) Splenomegaly      (  ) Hepatomegaly      (  ) Jaundice     (  ) ecchymosis     EXTREMITIES: B/l scaly, leathery skin with minimal erythema and minimal edema  (  ) Normal     (  ) Rash     (  ) ecchymosis     (  ) varicose veins      (  ) pitting edema     (  ) non-pitting edema   (  ) ulceration     (  ) gangrene:     (location:     )    NERVOUS SYSTEM:    (  ) A&Ox3     (  ) confused     (  ) lethargic  CN II-XII:     (  ) Intact     (  ) deficits found     (Specify:     )   Upper extremities:     (  ) no sensorimotor deficits     (  ) weakness     (  ) loss of proprioception/vibration     (  ) loss of touch/temperature (specify:    )  Lower extremities:     (  ) no sensorimotor deficits     (  ) weakness     (  ) loss of proprioception/vibration     (  ) loss of touch/temperature (specify:    )    SKIN:   (  ) No rashes or lesions     (  ) maculopapular rash     (  ) pustules     (  ) vesicles     (  ) ulcer     (  ) ecchymosis     (specify location:     )    AMPAC score:    (  ) Indwelling Wilkes Catheter:   Date insterted:    Reason (  ) Critical illness     (  ) urinary retention    (  ) Accurate Ins/Outs Monitoring     (  ) CMO patient    (  ) Central Line:   Date inserted:  Location: (  ) Right IJ     (  ) Left IJ     (  ) Right Fem     (  ) Left Fem    (  ) SPC        (  ) pigtail       (  ) PEG tube       (  ) colostomy       (  ) jejunostomy  (  ) U-Dall    LABS:                        10.5   8.73  )-----------( 280      ( 01 Apr 2024 06:14 )             33.2                         RADIOLOGY:      < from: [facility] Duplex Lower Ext Vein Scan, Bilat (03.28.24 @ 14:36) >  IMPRESSION:  No evidence of DVT in visualized veins of bilateral lower extremities.  Bilateral calf veins are not visualized.    Incidental finding of right groin lymph node measuring 5.4 x 4.3 x 1.3 cm.  Incidental finding of left groin lymph node measuring 3.2 x 2.5 x0.8 cm.    --- End of Report ---    < end of copied text >  < from: US Renal (03.29.24 @ 09:27) >  IMPRESSION:    No hydronephrosis    --- End of Report ---    < end of copied text >       24H events:    Patient is a 68y old Female who presents with a chief complaint of contact Dermatitis  (30 Mar 2024 09:00)    Primary diagnosis of Cellulitis        Today is hospital day 4d. This morning patient was seen and examined at bedside, resting comfortably in bed.    No acute or major events overnight. Patient reports having loose bowel movements since yesterday, though slightly improved today compared to yesterday.    Code Status:    Family communication:  Contact date:  Name of person contacted:  Relationship to patient:  Communication details:  What matters most:    PAST MEDICAL & SURGICAL HISTORY  Lupus    Essential hypertension    CVA (cerebral vascular accident)  1987    Hypothyroidism    FH: bilateral hip replacements    H/O total knee replacement    H/O abdominal hysterectomy      SOCIAL HISTORY:  Social History:      ALLERGIES:  aspirin (Other)  penicillins (Other)  Levaquin (Other)  Compazine (Other)  morphine (Other)    MEDICATIONS:  STANDING MEDICATIONS  acetaminophen     Tablet .. 650 milliGRAM(s) Oral every 12 hours  AQUAPHOR (petrolatum Ointment) 1 Application(s) Topical four times a day  betamethasone valerate 0.1% Cream 1 Application(s) Topical two times a day  cholecalciferol 1000 Unit(s) Oral daily  diphenhydrAMINE 25 milliGRAM(s) Oral at bedtime  famotidine    Tablet 20 milliGRAM(s) Oral daily  heparin   Injectable 5000 Unit(s) SubCutaneous every 12 hours  hydroxychloroquine 200 milliGRAM(s) Oral daily  levothyroxine 25 MICROGram(s) Oral <User Schedule>  levothyroxine 175 MICROGram(s) Oral daily  predniSONE   Tablet 40 milliGRAM(s) Oral daily  traZODone 50 milliGRAM(s) Oral at bedtime    PRN MEDICATIONS  acetaminophen  300 mG/codeine 30 mG 1 Tablet(s) Oral every 8 hours PRN  melatonin 3 milliGRAM(s) Oral at bedtime PRN  traMADol 25 milliGRAM(s) Oral every 6 hours PRN    VITALS:   T(F): 97.8  HR: 62  BP: 131/68  RR: 18  SpO2: 99%    PHYSICAL EXAM:  GENERAL:   ( x ) NAD, lying in bed comfortably     (  ) obtunded     (  ) lethargic     (  ) somnolent    HEAD:   ( x ) Atraumatic     (  ) hematoma     (  ) laceration (specify location:       )     NECK:  (x ) Supple     (  ) neck stiffness     (  ) nuchal rigidity     (  )  no JVD     (  ) JVD present ( -- cm)    HEART:  Rate -->     (x  ) normal rate     (  ) bradycardic     (  ) tachycardic  Rhythm -->     ( x ) regular     (  ) regularly irregular     (  ) irregularly irregular  Murmurs -->     (  ) normal s1s2     (  ) systolic murmur     (  ) diastolic murmur     (  ) continuous murmur      (  ) S3 present     (  ) S4 present    LUNGS:   (  )Unlabored respirations     (  ) tachypnea  ( x ) B/L air entry     (  ) decreased breath sounds in:  (location     )    (  ) no adventitious sound     (  ) crackles     (  ) wheezing      (  ) rhonchi      (specify location:       )  (  ) chest wall tenderness (specify location:       )    ABDOMEN:   ( x ) Soft     (  ) tense   |   ( x ) nondistended     (  ) distended   |   (  ) +BS     (  ) hypoactive bowel sounds     (  ) hyperactive bowel sounds  (x  ) nontender     (  ) RUQ tenderness     (  ) RLQ tenderness     (  ) LLQ tenderness     (  ) epigastric tenderness     (  ) diffuse tenderness  (  ) Splenomegaly      (  ) Hepatomegaly      (  ) Jaundice     (  ) ecchymosis     EXTREMITIES: B/l scaly, leathery skin with minimal erythema and minimal edema  (  ) Normal     (  ) Rash     (  ) ecchymosis     (  ) varicose veins      (  ) pitting edema     (  ) non-pitting edema   (  ) ulceration     (  ) gangrene:     (location:     )    NERVOUS SYSTEM:    (  ) A&Ox3     (  ) confused     (  ) lethargic  CN II-XII:     (  ) Intact     (  ) deficits found     (Specify:     )   Upper extremities:     (  ) no sensorimotor deficits     (  ) weakness     (  ) loss of proprioception/vibration     (  ) loss of touch/temperature (specify:    )  Lower extremities:     (  ) no sensorimotor deficits     (  ) weakness     (  ) loss of proprioception/vibration     (  ) loss of touch/temperature (specify:    )    SKIN:   (  ) No rashes or lesions     (  ) maculopapular rash     (  ) pustules     (  ) vesicles     (  ) ulcer     (  ) ecchymosis     (specify location:     )    AMPAC score:    (  ) Indwelling Wilkes Catheter:   Date insterted:    Reason (  ) Critical illness     (  ) urinary retention    (  ) Accurate Ins/Outs Monitoring     (  ) CMO patient    (  ) Central Line:   Date inserted:  Location: (  ) Right IJ     (  ) Left IJ     (  ) Right Fem     (  ) Left Fem    (  ) SPC        (  ) pigtail       (  ) PEG tube       (  ) colostomy       (  ) jejunostomy  (  ) U-Dall    LABS:                        10.5   8.73  )-----------( 280      ( 01 Apr 2024 06:14 )             33.2     WBC trend: 8.73 <--, 8.77 <--, 9.48 <--, 8.80 <--, 8.31 <--  Hgb: 10.5 [04-01-24 @ 06:14]<--, 10.2 [03-30-24 @ 06:28]<--, 10.8 [03-29-24 @ 21:39]<--    04-01    144  |  108  |  26<H>  ----------------------------<  81  3.6   |  24  |  0.7    Ca    8.1<L>      01 Apr 2024 06:14  Mg     2.2     04-01    TPro  6.2  /  Alb  3.3<L>  /  TBili  <0.2  /  DBili  x   /  AST  16  /  ALT  10  /  AlkPhos  61  04-01    Creatinine trend: Creatinine Trend: 0.7<--, 0.8<--, 1.0<--, 1.4<--  SODIUM TREND: Sodium 144 [04-01 @ 06:14]<--, Sodium 146 [03-30 @ 06:28]<--, Sodium 144 [03-29 @ 07:13]<--, Sodium 142 [03-28 @ 13:05]<--  0.7 [04-01-24 @ 06:14]<--    POC Glucose:    Culture - Blood  Preliminary Report (03-31-24 @ 22:01):    No growth at 72 Hours      C-Reactive Protein, Serum: 59.5 mg/L (03-29-24 @ 11:16)      Sedimentation Rate, Erythrocyte: 48 mm/Hr (03-29-24 @ 11:16)        Urinalysis Basic - ( 01 Apr 2024 06:14 )    Color: x / Appearance: x / SG: x / pH: x  Gluc: 81 mg/dL / Ketone: x  / Bili: x / Urobili: x   Blood: x / Protein: x / Nitrite: x   Leuk Esterase: x / RBC: x / WBC x   Sq Epi: x / Non Sq Epi: x / Bacteria: x                                             -------------------------------------------------    RADIOLOGY:      < from: [facility] Duplex Lower Ext Vein Scan, Bilat (03.28.24 @ 14:36) >  IMPRESSION:  No evidence of DVT in visualized veins of bilateral lower extremities.  Bilateral calf veins are not visualized.    Incidental finding of right groin lymph node measuring 5.4 x 4.3 x 1.3 cm.  Incidental finding of left groin lymph node measuring 3.2 x 2.5 x0.8 cm.    --- End of Report ---    < end of copied text >  < from:  Renal (03.29.24 @ 09:27) >  IMPRESSION:    No hydronephrosis    --- End of Report ---    < end of copied text >

## 2024-04-01 NOTE — PROGRESS NOTE ADULT - TIME BILLING
Patient seen at bedside, time spent evaluating and treating the patient's acute illness as well as time spent reviewing labs, radiology, discussing with patient and/or patient's family and discussing the case with a multidisciplinary team.
Patient seen at bedside, time spent evaluating and treating the patient's illness as well as time spent reviewing labs, radiology, discussing with patient and/or patient's family and discussing the case with a multidisciplinary team.

## 2024-04-01 NOTE — PHYSICAL THERAPY INITIAL EVALUATION ADULT - ADDITIONAL COMMENTS
Pt is resident of [facility], was independent with rollator prior to this admission.

## 2024-04-01 NOTE — PROGRESS NOTE ADULT - ASSESSMENT
67 year old female with PMH of HTN, SLE, CVA (1987) w/o residual deficit, hypothyroidism, h/o sepsis 2/2 to LE, GERD, Pancreatic head cystic lesion, cellulitis presenting with erythematous scaly rash of back & extremities.     #Eczematous Rash 2/2 Contact Dermatitis   #SLE not in flare  - improving with prednisone course  - c/w hydroxychloroquine  -c/w pain & pruritis control  - C3 114 (WNL)  - C4 18 (WNL)  - pending dsDNA, anticardiolipin IgG & IgM, Lupus Anticoagulant, Urine protein/creatinine ratio, Beta 2 GP 1 IgG & IgM  - c/w wound care    #Chronic Anemia  - H&H stable    #Hypothyroidism  - c/w levothyroxine     #H/o CVA w/o residual deficit  #PVD  - not on medical therapy  - OP f/u    #Pancreatic Head Cystic Lesion  - OP f/u                                           --------------------------------------------------------------    # DVT prophylaxis: Lovenox  # GI prophylaxis: Famotidine  # Diet: DASH/TLC  # Activity: AAT  # Code status: Full Code  # Disposition: Assisted Living Facility    Pending:  67 year old female with PMH of HTN, SLE, CVA (1987) w/o residual deficit, hypothyroidism, h/o sepsis 2/2 to LE, GERD, Pancreatic head cystic lesion, cellulitis presenting with erythematous scaly rash of back & extremities.     #Eczematous Rash 2/2 Contact Dermatitis   #SLE not in flare  - improving with prednisone course  - c/w hydroxychloroquine  -c/w pain & pruritis control  - C3 114 (WNL)  - C4 18 (WNL)  - pending dsDNA, anticardiolipin IgG & IgM, Lupus Anticoagulant, Urine protein/creatinine ratio, Beta 2 GP 1 IgG & IgM  - c/w wound care    #Chronic Anemia  - H&H stable    #Hypothyroidism  - c/w levothyroxine     # Loose bowel movements:   afebrile, no leukocytosis  - improving  - monitor BM    #H/o CVA w/o residual deficit  #PVD  - not on medical therapy  - OP f/u    #Pancreatic Head Cystic Lesion  - OP f/u                                           --------------------------------------------------------------    # DVT prophylaxis: Lovenox  # GI prophylaxis: Famotidine  # Diet: DASH/TLC  # Activity: AAT  # Code status: Full Code  # Disposition: Assisted Living Facility, anticipate for tomorrow.    Pending: COVID PCR (requested by facility)

## 2024-04-01 NOTE — PHYSICAL THERAPY INITIAL EVALUATION ADULT - PERTINENT HX OF CURRENT PROBLEM, REHAB EVAL
Patient is a 68-year-old female w/ HTN, HLD, SLE, CVA (1987) w/o residual deficit, hypothyroidism, hx sepsis secondary to LE cellulitis that presented to the ED on 3/28 with chief complaint of a a diffuse rash located on her b/l arms, chest, and RLE that has been ongoing for a few months but acutely worsened over the past 2 weeks. Reports burning/itching to rash and has been experiencing chills. States that she was put on clotrimazole by her PCP with no relief. Denies any other symptoms including fevers, headache, recent illness/travel, cough, abdominal pain, chest pain, or SOB

## 2024-04-02 ENCOUNTER — TRANSCRIPTION ENCOUNTER (OUTPATIENT)
Age: 69
End: 2024-04-02

## 2024-04-02 VITALS
DIASTOLIC BLOOD PRESSURE: 69 MMHG | TEMPERATURE: 99 F | RESPIRATION RATE: 18 BRPM | HEART RATE: 61 BPM | SYSTOLIC BLOOD PRESSURE: 156 MMHG

## 2024-04-02 LAB
ALBUMIN SERPL ELPH-MCNC: 3.3 G/DL — LOW (ref 3.5–5.2)
ALP SERPL-CCNC: 57 U/L — SIGNIFICANT CHANGE UP (ref 30–115)
ALT FLD-CCNC: 10 U/L — SIGNIFICANT CHANGE UP (ref 0–41)
ANION GAP SERPL CALC-SCNC: 13 MMOL/L — SIGNIFICANT CHANGE UP (ref 7–14)
AST SERPL-CCNC: 16 U/L — SIGNIFICANT CHANGE UP (ref 0–41)
B2 GLYCOPROT1 AB SER QL: NEGATIVE — SIGNIFICANT CHANGE UP
BASOPHILS # BLD AUTO: 0.03 K/UL — SIGNIFICANT CHANGE UP (ref 0–0.2)
BASOPHILS NFR BLD AUTO: 0.3 % — SIGNIFICANT CHANGE UP (ref 0–1)
BILIRUB SERPL-MCNC: <0.2 MG/DL — SIGNIFICANT CHANGE UP (ref 0.2–1.2)
BUN SERPL-MCNC: 18 MG/DL — SIGNIFICANT CHANGE UP (ref 10–20)
CALCIUM SERPL-MCNC: 8.3 MG/DL — LOW (ref 8.4–10.5)
CARDIOLIPIN AB SER-ACNC: NEGATIVE — SIGNIFICANT CHANGE UP
CHLORIDE SERPL-SCNC: 109 MMOL/L — SIGNIFICANT CHANGE UP (ref 98–110)
CO2 SERPL-SCNC: 19 MMOL/L — SIGNIFICANT CHANGE UP (ref 17–32)
CREAT SERPL-MCNC: 0.6 MG/DL — LOW (ref 0.7–1.5)
CULTURE RESULTS: SIGNIFICANT CHANGE UP
DSDNA AB SER-ACNC: 1 IU/ML — SIGNIFICANT CHANGE UP
DSDNA AB SER-ACNC: 1 IU/ML — SIGNIFICANT CHANGE UP
EGFR: 98 ML/MIN/1.73M2 — SIGNIFICANT CHANGE UP
EOSINOPHIL # BLD AUTO: 0.03 K/UL — SIGNIFICANT CHANGE UP (ref 0–0.7)
EOSINOPHIL NFR BLD AUTO: 0.3 % — SIGNIFICANT CHANGE UP (ref 0–8)
GLUCOSE SERPL-MCNC: 91 MG/DL — SIGNIFICANT CHANGE UP (ref 70–99)
HCT VFR BLD CALC: 32 % — LOW (ref 37–47)
HGB BLD-MCNC: 10.1 G/DL — LOW (ref 12–16)
IMM GRANULOCYTES NFR BLD AUTO: 1.4 % — HIGH (ref 0.1–0.3)
LYMPHOCYTES # BLD AUTO: 2.09 K/UL — SIGNIFICANT CHANGE UP (ref 1.2–3.4)
LYMPHOCYTES # BLD AUTO: 21.1 % — SIGNIFICANT CHANGE UP (ref 20.5–51.1)
MCHC RBC-ENTMCNC: 28.1 PG — SIGNIFICANT CHANGE UP (ref 27–31)
MCHC RBC-ENTMCNC: 31.6 G/DL — LOW (ref 32–37)
MCV RBC AUTO: 88.9 FL — SIGNIFICANT CHANGE UP (ref 81–99)
MONOCYTES # BLD AUTO: 0.69 K/UL — HIGH (ref 0.1–0.6)
MONOCYTES NFR BLD AUTO: 7 % — SIGNIFICANT CHANGE UP (ref 1.7–9.3)
NEUTROPHILS # BLD AUTO: 6.94 K/UL — HIGH (ref 1.4–6.5)
NEUTROPHILS NFR BLD AUTO: 69.9 % — SIGNIFICANT CHANGE UP (ref 42.2–75.2)
NRBC # BLD: 0 /100 WBCS — SIGNIFICANT CHANGE UP (ref 0–0)
PLATELET # BLD AUTO: 256 K/UL — SIGNIFICANT CHANGE UP (ref 130–400)
PMV BLD: 10.7 FL — HIGH (ref 7.4–10.4)
POTASSIUM SERPL-MCNC: 3.8 MMOL/L — SIGNIFICANT CHANGE UP (ref 3.5–5)
POTASSIUM SERPL-SCNC: 3.8 MMOL/L — SIGNIFICANT CHANGE UP (ref 3.5–5)
PROT SERPL-MCNC: 6.1 G/DL — SIGNIFICANT CHANGE UP (ref 6–8)
RBC # BLD: 3.6 M/UL — LOW (ref 4.2–5.4)
RBC # FLD: 15.1 % — HIGH (ref 11.5–14.5)
SODIUM SERPL-SCNC: 141 MMOL/L — SIGNIFICANT CHANGE UP (ref 135–146)
SPECIMEN SOURCE: SIGNIFICANT CHANGE UP
WBC # BLD: 9.92 K/UL — SIGNIFICANT CHANGE UP (ref 4.8–10.8)
WBC # FLD AUTO: 9.92 K/UL — SIGNIFICANT CHANGE UP (ref 4.8–10.8)

## 2024-04-02 PROCEDURE — 99239 HOSP IP/OBS DSCHRG MGMT >30: CPT

## 2024-04-02 RX ADMIN — Medication 1 APPLICATION(S): at 05:26

## 2024-04-02 RX ADMIN — Medication 650 MILLIGRAM(S): at 17:40

## 2024-04-02 RX ADMIN — TRAMADOL HYDROCHLORIDE 25 MILLIGRAM(S): 50 TABLET ORAL at 11:32

## 2024-04-02 RX ADMIN — Medication 1 APPLICATION(S): at 17:41

## 2024-04-02 RX ADMIN — Medication 200 MILLIGRAM(S): at 11:24

## 2024-04-02 RX ADMIN — FAMOTIDINE 20 MILLIGRAM(S): 10 INJECTION INTRAVENOUS at 11:24

## 2024-04-02 RX ADMIN — Medication 175 MICROGRAM(S): at 05:23

## 2024-04-02 RX ADMIN — Medication 40 MILLIGRAM(S): at 05:23

## 2024-04-02 RX ADMIN — Medication 1000 UNIT(S): at 11:24

## 2024-04-02 RX ADMIN — Medication 650 MILLIGRAM(S): at 06:27

## 2024-04-02 RX ADMIN — HEPARIN SODIUM 5000 UNIT(S): 5000 INJECTION INTRAVENOUS; SUBCUTANEOUS at 17:40

## 2024-04-02 RX ADMIN — TRAMADOL HYDROCHLORIDE 25 MILLIGRAM(S): 50 TABLET ORAL at 10:32

## 2024-04-02 RX ADMIN — HEPARIN SODIUM 5000 UNIT(S): 5000 INJECTION INTRAVENOUS; SUBCUTANEOUS at 05:24

## 2024-04-02 RX ADMIN — Medication 650 MILLIGRAM(S): at 05:24

## 2024-04-02 RX ADMIN — Medication 1 APPLICATION(S): at 11:25

## 2024-04-02 NOTE — PROGRESS NOTE ADULT - SUBJECTIVE AND OBJECTIVE BOX
24H events:    Patient is a 68y old Female who presents with a chief complaint of contact Dermatitis  (30 Mar 2024 09:00)    Primary diagnosis of Cellulitis        Today is hospital day 5d. This morning patient was seen and examined at bedside, resting comfortably in bed.    No acute or major events overnight.    Code Status:    Family communication:  Contact date:  Name of person contacted:  Relationship to patient:  Communication details:  What matters most:    PAST MEDICAL & SURGICAL HISTORY  Lupus    Essential hypertension    CVA (cerebral vascular accident)  1987    Hypothyroidism    FH: bilateral hip replacements    H/O total knee replacement    H/O abdominal hysterectomy      SOCIAL HISTORY:  Social History:      ALLERGIES:  aspirin (Other)  penicillins (Other)  Levaquin (Other)  Compazine (Other)  morphine (Other)    MEDICATIONS:  STANDING MEDICATIONS  acetaminophen     Tablet .. 650 milliGRAM(s) Oral every 12 hours  AQUAPHOR (petrolatum Ointment) 1 Application(s) Topical four times a day  betamethasone valerate 0.1% Cream 1 Application(s) Topical two times a day  cholecalciferol 1000 Unit(s) Oral daily  diphenhydrAMINE 25 milliGRAM(s) Oral at bedtime  famotidine    Tablet 20 milliGRAM(s) Oral daily  heparin   Injectable 5000 Unit(s) SubCutaneous every 12 hours  hydroxychloroquine 200 milliGRAM(s) Oral daily  levothyroxine 25 MICROGram(s) Oral <User Schedule>  levothyroxine 175 MICROGram(s) Oral daily  traZODone 50 milliGRAM(s) Oral at bedtime    PRN MEDICATIONS  acetaminophen  300 mG/codeine 30 mG 1 Tablet(s) Oral every 8 hours PRN  melatonin 3 milliGRAM(s) Oral at bedtime PRN  traMADol 25 milliGRAM(s) Oral every 6 hours PRN    VITALS:   T(F): 98.1  HR: 68  BP: 149/70  RR: 18  SpO2: 98%    PHYSICAL EXAM:  GENERAL:   ( x ) NAD, lying in bed comfortably     (  ) obtunded     (  ) lethargic     (  ) somnolent    HEAD:   ( x ) Atraumatic     (  ) hematoma     (  ) laceration (specify location:       )     NECK:  ( x ) Supple     (  ) neck stiffness     (  ) nuchal rigidity     (  )  no JVD     (  ) JVD present ( -- cm)    HEART:  Rate -->     (  ) normal rate     (  ) bradycardic     (  ) tachycardic  Rhythm -->     ( x ) regular     (  ) regularly irregular     (  ) irregularly irregular  Murmurs -->     (  ) normal s1s2     (  ) systolic murmur     (  ) diastolic murmur     (  ) continuous murmur      (  ) S3 present     (  ) S4 present    LUNGS:   (x  )Unlabored respirations     (  ) tachypnea  ( x ) B/L air entry     (  ) decreased breath sounds in:  (location     )    (  ) no adventitious sound     (  ) crackles     (  ) wheezing      (  ) rhonchi      (specify location:       )  (  ) chest wall tenderness (specify location:       )    ABDOMEN:   ( x ) Soft     (  ) tense   |   (  x) nondistended     (  ) distended   |   (  ) +BS     (  ) hypoactive bowel sounds     (  ) hyperactive bowel sounds  ( x ) nontender     (  ) RUQ tenderness     (  ) RLQ tenderness     (  ) LLQ tenderness     (  ) epigastric tenderness     (  ) diffuse tenderness  (  ) Splenomegaly      (  ) Hepatomegaly      (  ) Jaundice     (  ) ecchymosis     EXTREMITIES:  (  ) Normal     (  ) Rash     (  ) ecchymosis     (  ) varicose veins      (  ) pitting edema     (  ) non-pitting edema   (  ) ulceration     (  ) gangrene:     (location:     )    NERVOUS SYSTEM:    (  ) A&Ox3     (  ) confused     (  ) lethargic  CN II-XII:     (  ) Intact     (  ) deficits found     (Specify:     )   Upper extremities:     (  ) no sensorimotor deficits     (  ) weakness     (  ) loss of proprioception/vibration     (  ) loss of touch/temperature (specify:    )  Lower extremities:     (  ) no sensorimotor deficits     (  ) weakness     (  ) loss of proprioception/vibration     (  ) loss of touch/temperature (specify:    )    SKIN:   (  ) No rashes or lesions     (  ) maculopapular rash     (  ) pustules     (  ) vesicles     (  ) ulcer     (  ) ecchymosis     (specify location:     )    AMPAC score:    (  ) Indwelling Wilkes Catheter:   Date insterted:    Reason (  ) Critical illness     (  ) urinary retention    (  ) Accurate Ins/Outs Monitoring     (  ) CMO patient    (  ) Central Line:   Date inserted:  Location: (  ) Right IJ     (  ) Left IJ     (  ) Right Fem     (  ) Left Fem    (  ) SPC        (  ) pigtail       (  ) PEG tube       (  ) colostomy       (  ) jejunostomy  (  ) U-Dall    LABS:                        10.5   8.73  )-----------( 280      ( 01 Apr 2024 06:14 )             33.2     04-01    144  |  108  |  26<H>  ----------------------------<  81  3.6   |  24  |  0.7    Ca    8.1<L>      01 Apr 2024 06:14  Mg     2.2     04-01    TPro  6.2  /  Alb  3.3<L>  /  TBili  <0.2  /  DBili  x   /  AST  16  /  ALT  10  /  AlkPhos  61  04-01      Urinalysis Basic - ( 01 Apr 2024 06:14 )    Color: x / Appearance: x / SG: x / pH: x  Gluc: 81 mg/dL / Ketone: x  / Bili: x / Urobili: x   Blood: x / Protein: x / Nitrite: x   Leuk Esterase: x / RBC: x / WBC x   Sq Epi: x / Non Sq Epi: x / Bacteria: x                RADIOLOGY:      < from: [facility] Duplex Lower Ext Vein Scan, Bilat (03.28.24 @ 14:36) >  IMPRESSION:  No evidence of DVT in visualized veins of bilateral lower extremities.  Bilateral calf veins are not visualized.    Incidental finding of right groin lymph node measuring 5.4 x 4.3 x 1.3 cm.  Incidental finding of left groin lymph node measuring 3.2 x 2.5 x0.8 cm.    --- End of Report ---    < end of copied text >  < from:  Renal (03.29.24 @ 09:27) >  IMPRESSION:    No hydronephrosis    --- End of Report ---    < end of copied text >

## 2024-04-02 NOTE — DISCHARGE NOTE NURSING/CASE MANAGEMENT/SOCIAL WORK - NSDCPEFALRISK_GEN_ALL_CORE
For information on Fall & Injury Prevention, visit: [email or web address] OR  [email or web address] OR  https://www.cdc.gov/steadi/patient.html

## 2024-04-02 NOTE — ADVANCED PRACTICE NURSE CONSULT - RECOMMEDATIONS
Cleanse wound to right lower leg with soap and water.   Pat dry apply, bacitracin, cover with dry sterile dressing twice a day and prn for soiling.   Recommend follow up with Vascular for further recommendation    Maintain pressure injury prevention.   Keep skin clean.   Maintain incontinence care.   Monitor wound for changes and notify provider   Case discussed with primary RN

## 2024-04-02 NOTE — PROGRESS NOTE ADULT - ASSESSMENT
67 year old female with PMH of HTN, SLE, CVA (1987) w/o residual deficit, hypothyroidism, h/o sepsis 2/2 to LE, GERD, Pancreatic head cystic lesion, cellulitis presenting with erythematous scaly rash of back & extremities.     #Eczematous Rash 2/2 Contact Dermatitis   #SLE not in flare  - improving with prednisone course  - c/w hydroxychloroquine  -c/w pain & pruritis control  - C3 114 (WNL)  - C4 18 (WNL)  - (+) Lupus Anticoagulant,   - Pending Urine protein/creatinine ratio, Beta 2 GP 1 IgG & IgM, dsDNA, anticardiolipin IgG & IgM  - c/w wound care    #Chronic Anemia  - H&H stable    #Hypothyroidism  - c/w levothyroxine     # Loose bowel movements:   afebrile, no leukocytosis  - improving  - monitor BM    #H/o CVA w/o residual deficit  #PVD  - not on medical therapy  - OP f/u    #Pancreatic Head Cystic Lesion  - OP f/u                                           --------------------------------------------------------------    # DVT prophylaxis: Lovenox  # GI prophylaxis: Famotidine  # Diet: DASH/TLC  # Activity: AAT  # Code status: Full Code  # Disposition: Assisted Living Facility, anticipate for today    Pending: Placement 67 year old female with PMH of HTN, SLE, CVA (1987) w/o residual deficit, hypothyroidism, h/o sepsis 2/2 to LE, GERD, Pancreatic head cystic lesion, cellulitis presenting with erythematous scaly rash of back & extremities.     #Eczematous Rash 2/2 Contact Dermatitis   #SLE not in flare  - improving with prednisone course  - c/w hydroxychloroquine  -c/w pain & pruritis control  - C3 114 (WNL)  - C4 18 (WNL)  - (-) Anticardiolipin  - Pending Urine protein/creatinine ratio, Beta 2 GP 1 IgG & IgM, dsDNA  - c/w wound care    #Chronic Anemia  - H&H stable    #Hypothyroidism  - c/w levothyroxine     # Loose bowel movements:   afebrile, no leukocytosis  - improving  - monitor BM    #H/o CVA w/o residual deficit  #PVD  - not on medical therapy  - OP f/u    #Pancreatic Head Cystic Lesion  - OP f/u                                           --------------------------------------------------------------    # DVT prophylaxis: Lovenox  # GI prophylaxis: Famotidine  # Diet: DASH/TLC  # Activity: AAT  # Code status: Full Code  # Disposition: Assisted Living Facility, anticipate for today    Pending: Placement

## 2024-04-02 NOTE — ADVANCED PRACTICE NURSE CONSULT - ASSESSMENT
History of Present Illness:   Patient is a 68-year-old female w/ HTN, HLD, SLE, CVA (1987) w/o residual deficit, hypothyroidism, hx sepsis secondary to LE cellulitis that presented to the ED on 3/28 with chief complaint of a a diffuse rash located on her b/l arms, chest, and RLE that has been ongoing for a few months but acutely worsened over the past 2 weeks. Reports burning/itching to rash and has been experiencing chills. States that she was put on clotrimazole by her PCP with no relief. Denies any other symptoms including fevers, headache, recent illness/travel, cough, abdominal pain, chest pain, or SOB. Patient admitted to medicine for management of rash.     Allergies and Intolerances:        Allergies:  	Compazine: Drug, Other, eye problems  	aspirin: Drug, Other, GI bleed  	Levaquin: Drug, Other, lethargy  	morphine: Drug, Other, eye problems  	penicillins: Drug Category, Other, GI bleed    Home Medications:   * Patient Currently Takes Medications as of 24-Feb-2023 14:42 documented in Structured Notes  · 	traMADol 50 mg oral tablet: 1 tab(s) orally every 8 hours, As needed, Severe Pain (7 - 10)  · 	cholecalciferol oral tablet: 5000 unit(s) orally once a day  · 	acetaminophen 325 mg oral tablet: 2 tab(s) orally every 12 hours, As needed, Temp greater or equal to 38C (100.4F), Mild Pain (1 - 3)  · 	levothyroxine 175 mcg (0.175 mg) oral tablet: 1 tab(s) orally once a day  · 	Protonix 40 mg oral delayed release tablet: 1 tab(s) orally once a day  · 	fluticasone 50 mcg/inh nasal spray: 1 spray(s) nasal once a day  · 	Melatonin 10 mg oral tablet: 1 tab(s) orally once a day (at bedtime)  · 	alendronate 70 mg oral tablet: 1 tab(s) orally once a week  · 	Cozaar 25 mg oral tablet: 1 tab(s) orally once a day  · 	APAP/CODEINE #3 TAB: 1 tab(s) orally 3 times a day, As Needed  · 	vitamin A and D topical ointment: Apply topically to affected area 4 times a day legs  · 	clotrimazole 1% topical cream (obsolete): Apply topically to affected area once a day b/l breasts  · 	Plaquenil 200 mg oral tablet: 1 tab(s) orally once a day  · 	atenolol 25 mg oral tablet: 1 tab(s) orally once a day  · 	Lasix 20 mg oral tablet: 1 tab(s) orally once a day    Patient received OOB to chair. Alert and oriented. Limited mobility. Incontinent of urine. High risk for pressure injury.    Type of Wound: Ulceration  Location: Right lower leg  Tunneling/ Undermining: No  Wound bed: Yellow, mostly dry. Small area of moistened yellow tissue  Wound edges: Irregular  Periwound: Hyperpigmentation  Wound exudate: None  Wound odor: No  Induration, erythema, warmth: No  Wound pain: Tenderness to palpation    Patient states has had wound for 1 year and follows vascular out patient.

## 2024-04-02 NOTE — DISCHARGE NOTE NURSING/CASE MANAGEMENT/SOCIAL WORK - PATIENT PORTAL LINK FT
You can access the FollowMyHealth Patient Portal offered by [facility] by registering at the following website: [email or web address]. By joining [employer]’s FollowMyHealth portal, you will also be able to view your health information using other applications (apps) compatible with our system.

## 2024-04-08 ENCOUNTER — APPOINTMENT (OUTPATIENT)
Dept: VASCULAR SURGERY | Facility: CLINIC | Age: 69
End: 2024-04-08
Payer: MEDICARE

## 2024-04-08 VITALS — BODY MASS INDEX: 26.87 KG/M2 | WEIGHT: 146 LBS | HEIGHT: 62 IN

## 2024-04-08 VITALS — HEART RATE: 62 BPM | SYSTOLIC BLOOD PRESSURE: 163 MMHG | DIASTOLIC BLOOD PRESSURE: 94 MMHG

## 2024-04-08 DIAGNOSIS — S91.309A UNSPECIFIED OPEN WOUND, UNSPECIFIED FOOT, INITIAL ENCOUNTER: ICD-10-CM

## 2024-04-08 PROCEDURE — 29580 STRAPPING UNNA BOOT: CPT | Mod: RT

## 2024-04-08 PROCEDURE — 99203 OFFICE O/P NEW LOW 30 MIN: CPT | Mod: 25

## 2024-04-08 NOTE — DATA REVIEWED
[FreeTextEntry1] : Venous duplex reviewed from the hospital showed no evidence of DVT Arterial duplex also reviewed from the hospital in April no evidence of arterial insufficiency

## 2024-04-08 NOTE — PHYSICAL EXAM
[2+] : left 2+ [Ankle Swelling (On Exam)] : present [Varicose Veins Of Lower Extremities] : not present [] : of the right leg [Ankle Swelling On The Right] : mild [FreeTextEntry1] : Right leg lateral venous stasis ulcer 1 x 1.5 cm in size

## 2024-04-08 NOTE — ASSESSMENT
[FreeTextEntry1] : The patient is a 68-year-old female who presents with a right leg venous stasis ulcer.  The patient states the wound has been present for the past year.  She had a history of multiple orthopedic procedures in her right lower extremity including a right total hip replacement and a right total knee replacement.  The patient has been dealing with lower extremity edema since.  The patient has a right leg venous stasis ulcer present that has been present for the past year.  There is actively no infection.  I would like to start an Unna boot therapy to her right lower extremity and see her back in my office in 1 week's time or sooner if any new symptoms develop

## 2024-04-09 DIAGNOSIS — K86.2 CYST OF PANCREAS: ICD-10-CM

## 2024-04-09 DIAGNOSIS — K57.90 DIVERTICULOSIS OF INTESTINE, PART UNSPECIFIED, WITHOUT PERFORATION OR ABSCESS WITHOUT BLEEDING: ICD-10-CM

## 2024-04-09 DIAGNOSIS — E78.5 HYPERLIPIDEMIA, UNSPECIFIED: ICD-10-CM

## 2024-04-09 DIAGNOSIS — K21.9 GASTRO-ESOPHAGEAL REFLUX DISEASE WITHOUT ESOPHAGITIS: ICD-10-CM

## 2024-04-09 DIAGNOSIS — D64.9 ANEMIA, UNSPECIFIED: ICD-10-CM

## 2024-04-09 DIAGNOSIS — E03.9 HYPOTHYROIDISM, UNSPECIFIED: ICD-10-CM

## 2024-04-09 DIAGNOSIS — Z79.890 HORMONE REPLACEMENT THERAPY: ICD-10-CM

## 2024-04-09 DIAGNOSIS — Z79.69 LONG TERM (CURRENT) USE OF OTHER IMMUNOMODULATORS AND IMMUNOSUPPRESSANTS: ICD-10-CM

## 2024-04-09 DIAGNOSIS — Z88.5 ALLERGY STATUS TO NARCOTIC AGENT: ICD-10-CM

## 2024-04-09 DIAGNOSIS — Z88.8 ALLERGY STATUS TO OTHER DRUGS, MEDICAMENTS AND BIOLOGICAL SUBSTANCES: ICD-10-CM

## 2024-04-09 DIAGNOSIS — F17.210 NICOTINE DEPENDENCE, CIGARETTES, UNCOMPLICATED: ICD-10-CM

## 2024-04-09 DIAGNOSIS — D68.59 OTHER PRIMARY THROMBOPHILIA: ICD-10-CM

## 2024-04-09 DIAGNOSIS — Z79.52 LONG TERM (CURRENT) USE OF SYSTEMIC STEROIDS: ICD-10-CM

## 2024-04-09 DIAGNOSIS — G47.00 INSOMNIA, UNSPECIFIED: ICD-10-CM

## 2024-04-09 DIAGNOSIS — M81.0 AGE-RELATED OSTEOPOROSIS WITHOUT CURRENT PATHOLOGICAL FRACTURE: ICD-10-CM

## 2024-04-09 DIAGNOSIS — Z96.643 PRESENCE OF ARTIFICIAL HIP JOINT, BILATERAL: ICD-10-CM

## 2024-04-09 DIAGNOSIS — Z88.6 ALLERGY STATUS TO ANALGESIC AGENT: ICD-10-CM

## 2024-04-09 DIAGNOSIS — M32.9 SYSTEMIC LUPUS ERYTHEMATOSUS, UNSPECIFIED: ICD-10-CM

## 2024-04-09 DIAGNOSIS — N17.9 ACUTE KIDNEY FAILURE, UNSPECIFIED: ICD-10-CM

## 2024-04-09 DIAGNOSIS — Z96.659 PRESENCE OF UNSPECIFIED ARTIFICIAL KNEE JOINT: ICD-10-CM

## 2024-04-09 DIAGNOSIS — I73.9 PERIPHERAL VASCULAR DISEASE, UNSPECIFIED: ICD-10-CM

## 2024-04-09 DIAGNOSIS — Z90.710 ACQUIRED ABSENCE OF BOTH CERVIX AND UTERUS: ICD-10-CM

## 2024-04-09 DIAGNOSIS — Z86.73 PERSONAL HISTORY OF TRANSIENT ISCHEMIC ATTACK (TIA), AND CEREBRAL INFARCTION WITHOUT RESIDUAL DEFICITS: ICD-10-CM

## 2024-04-09 DIAGNOSIS — Z88.0 ALLERGY STATUS TO PENICILLIN: ICD-10-CM

## 2024-04-09 DIAGNOSIS — L25.8 UNSPECIFIED CONTACT DERMATITIS DUE TO OTHER AGENTS: ICD-10-CM

## 2024-04-09 DIAGNOSIS — I10 ESSENTIAL (PRIMARY) HYPERTENSION: ICD-10-CM

## 2024-04-15 ENCOUNTER — APPOINTMENT (OUTPATIENT)
Dept: VASCULAR SURGERY | Facility: CLINIC | Age: 69
End: 2024-04-15
Payer: MEDICARE

## 2024-04-15 PROCEDURE — 29580 STRAPPING UNNA BOOT: CPT | Mod: RT

## 2024-04-15 NOTE — HISTORY OF PRESENT ILLNESS
[FreeTextEntry1] : The patient is a 68-year-old female who presents for follow-up evaluation with a right leg venous stasis ulcer.  Last week the patient had an Unna boot placed.  Today she presents for follow-up evaluation.

## 2024-04-15 NOTE — ASSESSMENT
[FreeTextEntry1] : The patient is a 68-year-old female who presents with a right leg venous stasis ulcer.  The patient's right leg wound is healing well.  The wound size is decreased.  From my standpoint I would like to see her back in my office in 1 week's time and I recommend continuing with Unna boot therapy.

## 2024-04-15 NOTE — PHYSICAL EXAM
[2+] : left 2+ [Ankle Swelling (On Exam)] : present [Varicose Veins Of Lower Extremities] : not present [] : of the right leg [Ankle Swelling On The Right] : mild [FreeTextEntry1] : Right leg lateral venous stasis ulcer 1 x 1. cm in size

## 2024-04-15 NOTE — REASON FOR VISIT
[Follow-Up: _____] : a [unfilled] follow-up visit [FreeTextEntry1] : Status post right leg venous stasis ulcer and placement of a right lower extremity Unna boot.

## 2024-04-18 ENCOUNTER — APPOINTMENT (OUTPATIENT)
Dept: BURN CARE | Facility: CLINIC | Age: 69
End: 2024-04-18

## 2024-04-22 ENCOUNTER — APPOINTMENT (OUTPATIENT)
Dept: VASCULAR SURGERY | Facility: CLINIC | Age: 69
End: 2024-04-22
Payer: MEDICARE

## 2024-04-22 PROCEDURE — 29580 STRAPPING UNNA BOOT: CPT | Mod: RT

## 2024-04-22 NOTE — ASSESSMENT
[FreeTextEntry1] : The patient is a 68-year-old female who presents with a right leg venous stasis ulcer.  The patient's right leg wound is healing well.  The wound size is decreased.  From my standpoint I would like to see her back in my office in 1 week's time and I recommend continuing with Unna boot therapy.  I recommend the patient follow-up with dermatology for a consult regarding her body rash and her pruritus.

## 2024-04-22 NOTE — PHYSICAL EXAM
[2+] : left 2+ [Ankle Swelling (On Exam)] : present [Varicose Veins Of Lower Extremities] : not present [] : of the right leg [Ankle Swelling On The Right] : mild [FreeTextEntry1] : Right leg lateral venous stasis ulcer .5 x 1. cm in size the patient has diffuse rash from her upper extremities abdomen down to her lower extremities

## 2024-04-29 ENCOUNTER — APPOINTMENT (OUTPATIENT)
Dept: VASCULAR SURGERY | Facility: CLINIC | Age: 69
End: 2024-04-29
Payer: MEDICARE

## 2024-04-29 PROCEDURE — 99212 OFFICE O/P EST SF 10 MIN: CPT

## 2024-04-29 NOTE — ASSESSMENT
[FreeTextEntry1] : I recommend she continue with her course of oral steroids I am discontinuing Unna boot therapy and converting her to daily wound care with steroid cream to her topical dermatitis and Silvadene to her wound in the anterior tibial portion of her leg with daily Ace wrap compression.  I will see her back in my office in 2 weeks time or sooner if any new symptoms develop.

## 2024-04-29 NOTE — PHYSICAL EXAM
[FreeTextEntry1] : Right anterior tibial punctate venous stasis ulcer half centimeter in size right leg superficial skin sloughing secondary to dermatitis.

## 2024-04-29 NOTE — HISTORY OF PRESENT ILLNESS
[FreeTextEntry1] : The patient  68-year-old female with a history of right leg anterior tibial venous stasis ulcer.  The patient has total body dermatitis which is pretty extensive.  She is currently on oral steroids.  The patient has significant pruritus and cutaneous dermatitis.  Her right lower extremity is developing wounds being covered with this dermatitis.

## 2024-05-04 ENCOUNTER — INPATIENT (INPATIENT)
Facility: HOSPITAL | Age: 69
LOS: 3 days | Discharge: HOME | DRG: 935 | End: 2024-05-08
Attending: PLASTIC SURGERY | Admitting: INTERNAL MEDICINE
Payer: MEDICARE

## 2024-05-04 VITALS
TEMPERATURE: 97 F | DIASTOLIC BLOOD PRESSURE: 72 MMHG | RESPIRATION RATE: 19 BRPM | HEIGHT: 62 IN | WEIGHT: 147.93 LBS | SYSTOLIC BLOOD PRESSURE: 122 MMHG | HEART RATE: 80 BPM | OXYGEN SATURATION: 98 %

## 2024-05-04 DIAGNOSIS — Z82.69 FAMILY HISTORY OF OTHER DISEASES OF THE MUSCULOSKELETAL SYSTEM AND CONNECTIVE TISSUE: Chronic | ICD-10-CM

## 2024-05-04 DIAGNOSIS — L03.90 CELLULITIS, UNSPECIFIED: ICD-10-CM

## 2024-05-04 DIAGNOSIS — Z96.659 PRESENCE OF UNSPECIFIED ARTIFICIAL KNEE JOINT: Chronic | ICD-10-CM

## 2024-05-04 DIAGNOSIS — Z90.710 ACQUIRED ABSENCE OF BOTH CERVIX AND UTERUS: Chronic | ICD-10-CM

## 2024-05-04 LAB
ALBUMIN SERPL ELPH-MCNC: 3.8 G/DL — SIGNIFICANT CHANGE UP (ref 3.5–5.2)
ALP SERPL-CCNC: 78 U/L — SIGNIFICANT CHANGE UP (ref 30–115)
ALT FLD-CCNC: 9 U/L — SIGNIFICANT CHANGE UP (ref 0–41)
ANION GAP SERPL CALC-SCNC: 11 MMOL/L — SIGNIFICANT CHANGE UP (ref 7–14)
AST SERPL-CCNC: 20 U/L — SIGNIFICANT CHANGE UP (ref 0–41)
BASOPHILS # BLD AUTO: 0.03 K/UL — SIGNIFICANT CHANGE UP (ref 0–0.2)
BASOPHILS NFR BLD AUTO: 0.3 % — SIGNIFICANT CHANGE UP (ref 0–1)
BILIRUB SERPL-MCNC: <0.2 MG/DL — SIGNIFICANT CHANGE UP (ref 0.2–1.2)
BUN SERPL-MCNC: 20 MG/DL — SIGNIFICANT CHANGE UP (ref 10–20)
CALCIUM SERPL-MCNC: 9 MG/DL — SIGNIFICANT CHANGE UP (ref 8.4–10.5)
CHLORIDE SERPL-SCNC: 104 MMOL/L — SIGNIFICANT CHANGE UP (ref 98–110)
CO2 SERPL-SCNC: 24 MMOL/L — SIGNIFICANT CHANGE UP (ref 17–32)
CREAT SERPL-MCNC: 0.9 MG/DL — SIGNIFICANT CHANGE UP (ref 0.7–1.5)
EGFR: 70 ML/MIN/1.73M2 — SIGNIFICANT CHANGE UP
EOSINOPHIL # BLD AUTO: 0.13 K/UL — SIGNIFICANT CHANGE UP (ref 0–0.7)
EOSINOPHIL NFR BLD AUTO: 1.1 % — SIGNIFICANT CHANGE UP (ref 0–8)
GLUCOSE SERPL-MCNC: 101 MG/DL — HIGH (ref 70–99)
HCT VFR BLD CALC: 34.9 % — LOW (ref 37–47)
HGB BLD-MCNC: 11.1 G/DL — LOW (ref 12–16)
IMM GRANULOCYTES NFR BLD AUTO: 1 % — HIGH (ref 0.1–0.3)
LYMPHOCYTES # BLD AUTO: 1.34 K/UL — SIGNIFICANT CHANGE UP (ref 1.2–3.4)
LYMPHOCYTES # BLD AUTO: 11.6 % — LOW (ref 20.5–51.1)
MCHC RBC-ENTMCNC: 28 PG — SIGNIFICANT CHANGE UP (ref 27–31)
MCHC RBC-ENTMCNC: 31.8 G/DL — LOW (ref 32–37)
MCV RBC AUTO: 88.1 FL — SIGNIFICANT CHANGE UP (ref 81–99)
MONOCYTES # BLD AUTO: 0.73 K/UL — HIGH (ref 0.1–0.6)
MONOCYTES NFR BLD AUTO: 6.3 % — SIGNIFICANT CHANGE UP (ref 1.7–9.3)
NEUTROPHILS # BLD AUTO: 9.22 K/UL — HIGH (ref 1.4–6.5)
NEUTROPHILS NFR BLD AUTO: 79.7 % — HIGH (ref 42.2–75.2)
NRBC # BLD: 0 /100 WBCS — SIGNIFICANT CHANGE UP (ref 0–0)
PLATELET # BLD AUTO: 334 K/UL — SIGNIFICANT CHANGE UP (ref 130–400)
PMV BLD: 8.7 FL — SIGNIFICANT CHANGE UP (ref 7.4–10.4)
POTASSIUM SERPL-MCNC: 4.1 MMOL/L — SIGNIFICANT CHANGE UP (ref 3.5–5)
POTASSIUM SERPL-SCNC: 4.1 MMOL/L — SIGNIFICANT CHANGE UP (ref 3.5–5)
PROT SERPL-MCNC: 7 G/DL — SIGNIFICANT CHANGE UP (ref 6–8)
RBC # BLD: 3.96 M/UL — LOW (ref 4.2–5.4)
RBC # FLD: 14.6 % — HIGH (ref 11.5–14.5)
SODIUM SERPL-SCNC: 139 MMOL/L — SIGNIFICANT CHANGE UP (ref 135–146)
WBC # BLD: 11.56 K/UL — HIGH (ref 4.8–10.8)
WBC # FLD AUTO: 11.56 K/UL — HIGH (ref 4.8–10.8)

## 2024-05-04 PROCEDURE — 85652 RBC SED RATE AUTOMATED: CPT

## 2024-05-04 PROCEDURE — 85025 COMPLETE CBC W/AUTO DIFF WBC: CPT

## 2024-05-04 PROCEDURE — 84145 PROCALCITONIN (PCT): CPT

## 2024-05-04 PROCEDURE — 85027 COMPLETE CBC AUTOMATED: CPT

## 2024-05-04 PROCEDURE — 99222 1ST HOSP IP/OBS MODERATE 55: CPT

## 2024-05-04 PROCEDURE — 97530 THERAPEUTIC ACTIVITIES: CPT | Mod: GP

## 2024-05-04 PROCEDURE — 36415 COLL VENOUS BLD VENIPUNCTURE: CPT

## 2024-05-04 PROCEDURE — 93970 EXTREMITY STUDY: CPT | Mod: 26

## 2024-05-04 PROCEDURE — 86140 C-REACTIVE PROTEIN: CPT

## 2024-05-04 PROCEDURE — 97110 THERAPEUTIC EXERCISES: CPT | Mod: GO

## 2024-05-04 PROCEDURE — 97116 GAIT TRAINING THERAPY: CPT | Mod: GP

## 2024-05-04 PROCEDURE — 83735 ASSAY OF MAGNESIUM: CPT

## 2024-05-04 PROCEDURE — 87635 SARS-COV-2 COVID-19 AMP PRB: CPT

## 2024-05-04 PROCEDURE — 80048 BASIC METABOLIC PNL TOTAL CA: CPT

## 2024-05-04 PROCEDURE — 97161 PT EVAL LOW COMPLEX 20 MIN: CPT | Mod: GP

## 2024-05-04 PROCEDURE — 99285 EMERGENCY DEPT VISIT HI MDM: CPT | Mod: GC

## 2024-05-04 PROCEDURE — 84100 ASSAY OF PHOSPHORUS: CPT

## 2024-05-04 PROCEDURE — 97166 OT EVAL MOD COMPLEX 45 MIN: CPT | Mod: GO

## 2024-05-04 PROCEDURE — 80053 COMPREHEN METABOLIC PANEL: CPT

## 2024-05-04 RX ORDER — LEVOTHYROXINE SODIUM 125 MCG
175 TABLET ORAL DAILY
Refills: 0 | Status: DISCONTINUED | OUTPATIENT
Start: 2024-05-04 | End: 2024-05-08

## 2024-05-04 RX ORDER — KETOROLAC TROMETHAMINE 30 MG/ML
15 SYRINGE (ML) INJECTION ONCE
Refills: 0 | Status: DISCONTINUED | OUTPATIENT
Start: 2024-05-04 | End: 2024-05-04

## 2024-05-04 RX ORDER — CEFAZOLIN SODIUM 1 G
VIAL (EA) INJECTION
Refills: 0 | Status: DISCONTINUED | OUTPATIENT
Start: 2024-05-04 | End: 2024-05-06

## 2024-05-04 RX ORDER — ENOXAPARIN SODIUM 100 MG/ML
40 INJECTION SUBCUTANEOUS EVERY 24 HOURS
Refills: 0 | Status: DISCONTINUED | OUTPATIENT
Start: 2024-05-04 | End: 2024-05-08

## 2024-05-04 RX ORDER — VANCOMYCIN HCL 1 G
1000 VIAL (EA) INTRAVENOUS EVERY 12 HOURS
Refills: 0 | Status: DISCONTINUED | OUTPATIENT
Start: 2024-05-04 | End: 2024-05-04

## 2024-05-04 RX ORDER — CEFAZOLIN SODIUM 1 G
2000 VIAL (EA) INJECTION EVERY 8 HOURS
Refills: 0 | Status: DISCONTINUED | OUTPATIENT
Start: 2024-05-05 | End: 2024-05-06

## 2024-05-04 RX ORDER — ATENOLOL 25 MG/1
25 TABLET ORAL DAILY
Refills: 0 | Status: DISCONTINUED | OUTPATIENT
Start: 2024-05-04 | End: 2024-05-08

## 2024-05-04 RX ORDER — OXYCODONE HYDROCHLORIDE 5 MG/1
5 TABLET ORAL EVERY 6 HOURS
Refills: 0 | Status: DISCONTINUED | OUTPATIENT
Start: 2024-05-04 | End: 2024-05-08

## 2024-05-04 RX ORDER — TRAZODONE HCL 50 MG
50 TABLET ORAL AT BEDTIME
Refills: 0 | Status: DISCONTINUED | OUTPATIENT
Start: 2024-05-04 | End: 2024-05-08

## 2024-05-04 RX ORDER — CEFAZOLIN SODIUM 1 G
2000 VIAL (EA) INJECTION EVERY 8 HOURS
Refills: 0 | Status: DISCONTINUED | OUTPATIENT
Start: 2024-05-04 | End: 2024-05-04

## 2024-05-04 RX ORDER — ACETAMINOPHEN WITH CODEINE 300MG-30MG
1 TABLET ORAL
Qty: 0 | Refills: 0 | DISCHARGE

## 2024-05-04 RX ORDER — DIPHENHYDRAMINE HCL 50 MG
25 CAPSULE ORAL AT BEDTIME
Refills: 0 | Status: DISCONTINUED | OUTPATIENT
Start: 2024-05-04 | End: 2024-05-08

## 2024-05-04 RX ORDER — FUROSEMIDE 40 MG
40 TABLET ORAL DAILY
Refills: 0 | Status: DISCONTINUED | OUTPATIENT
Start: 2024-05-04 | End: 2024-05-08

## 2024-05-04 RX ORDER — PANTOPRAZOLE SODIUM 20 MG/1
40 TABLET, DELAYED RELEASE ORAL
Refills: 0 | Status: DISCONTINUED | OUTPATIENT
Start: 2024-05-04 | End: 2024-05-08

## 2024-05-04 RX ORDER — LEVOTHYROXINE SODIUM 125 MCG
25 TABLET ORAL
Refills: 0 | Status: DISCONTINUED | OUTPATIENT
Start: 2024-05-04 | End: 2024-05-08

## 2024-05-04 RX ORDER — ACETAMINOPHEN 500 MG
650 TABLET ORAL EVERY 6 HOURS
Refills: 0 | Status: DISCONTINUED | OUTPATIENT
Start: 2024-05-04 | End: 2024-05-08

## 2024-05-04 RX ORDER — ACETAMINOPHEN 500 MG
650 TABLET ORAL ONCE
Refills: 0 | Status: DISCONTINUED | OUTPATIENT
Start: 2024-05-04 | End: 2024-05-04

## 2024-05-04 RX ORDER — CEFAZOLIN SODIUM 1 G
2000 VIAL (EA) INJECTION ONCE
Refills: 0 | Status: COMPLETED | OUTPATIENT
Start: 2024-05-04 | End: 2024-05-04

## 2024-05-04 RX ORDER — LIDOCAINE 4 G/100G
1 CREAM TOPICAL
Refills: 0 | DISCHARGE

## 2024-05-04 RX ORDER — SODIUM CHLORIDE 9 MG/ML
1000 INJECTION, SOLUTION INTRAVENOUS ONCE
Refills: 0 | Status: COMPLETED | OUTPATIENT
Start: 2024-05-04 | End: 2024-05-04

## 2024-05-04 RX ORDER — HYDROXYCHLOROQUINE SULFATE 200 MG
200 TABLET ORAL DAILY
Refills: 0 | Status: DISCONTINUED | OUTPATIENT
Start: 2024-05-04 | End: 2024-05-08

## 2024-05-04 RX ADMIN — Medication 15 MILLIGRAM(S): at 13:49

## 2024-05-04 RX ADMIN — Medication 100 MILLIGRAM(S): at 22:57

## 2024-05-04 RX ADMIN — Medication 50 MILLIGRAM(S): at 22:57

## 2024-05-04 RX ADMIN — SODIUM CHLORIDE 1000 MILLILITER(S): 9 INJECTION, SOLUTION INTRAVENOUS at 13:49

## 2024-05-04 RX ADMIN — Medication 100 MILLIGRAM(S): at 10:57

## 2024-05-04 NOTE — PATIENT PROFILE ADULT - NSPRONUTRITIONRISK_GEN_A_NUR
Thank you so much for coming in to see me today.  Please do not hesitate to call with any questions or concerns or if you feel that you need to be seen.      Please ask PT about a TENS unit trial for your pain.     No indicators present

## 2024-05-04 NOTE — H&P ADULT - NSHPLABSRESULTS_GEN_ALL_CORE
11.1   11.56 )-----------( 334      ( 04 May 2024 11:02 )             34.9     05-04    139  |  104  |  20  ----------------------------<  101<H>  4.1   |  24  |  0.9    Ca    9.0      04 May 2024 11:02    TPro  7.0  /  Alb  3.8  /  TBili  <0.2  /  DBili  x   /  AST  20  /  ALT  9   /  AlkPhos  78  05-04          Urinalysis Basic - ( 04 May 2024 11:02 )    Color: x / Appearance: x / SG: x / pH: x  Gluc: 101 mg/dL / Ketone: x  / Bili: x / Urobili: x   Blood: x / Protein: x / Nitrite: x   Leuk Esterase: x / RBC: x / WBC x   Sq Epi: x / Non Sq Epi: x / Bacteria: x        Lactate Trend        CAPILLARY BLOOD GLUCOSE

## 2024-05-04 NOTE — ED ADULT NURSE NOTE - NSFALLDEVICES_ED_ALL_ED
Abilify.     State that she notices an improvement in her overall mood with Abilify however was unable to tolerate the side effects.   Continues to use Xanax and it was discussed that we should consider tapering her off or start her on a long-acting benzodiazepine. Patient is reluctant.   She takes Xanax every 4 hours but not more than 4 times a day.     Concentration is better.  Energy is better. Sleeping 7 hours. Appetite is stable.  Feelings of hopelessness and helplessness are denied by the patient. Suicidal ideation is denied by the patient. Homicidal or violent ideation are denied by the patient.     In terms of psychotic symptoms auditory hallucinations are denied by the patient. Visual Hallucinations are denied by the patient. Paranoia is denied by the patient.     Patient denies any manic symptoms such as racing thoughts, pressured speech, grandiosity, euphoria, impulsivity, distractibility, decreased sleep and increased energy.     Memory problems are noted by the patient. Some misplacing things but feels that her mid is so full of things that she loses track easily.     FAMILY HISTORY:  Reviewed. All pertinent information remains up-to-date.    SOCIAL HISTORY:  Reviewed. All pertinent information remains up-to-date.      PAST MEDICAL HISTORY:  Reviewed as noted below.  Past Medical History:   Diagnosis Date   • Anxiety    • Cataract    • Depressive disorder    • DJD (degenerative joint disease)     right knee   • Failed moderate sedation during procedure     Patient reports IV nurse sedation meds did not help with previous colonoscopies   • Multiple lipomas 8/13/2015   • Osteopenia    • Osteoporosis 7/18/2016   • Sinusitis, chronic    • Status post bilateral breast implants    • Urinary incontinence          PAST SURGICAL HISTORY:  Reviewed as noted below.  Past Surgical History:   Procedure Laterality Date   • Appendectomy     • Breast biopsy      sandra mastectomy with implants later   • Breast surgery       sandra mastectomy with implants, age 31   • None

## 2024-05-04 NOTE — H&P ADULT - ATTENDING COMMENTS
67 yo female PMH HTN, SLE, CVA (1987) w/o residual deficit, hypothyroidism, hx sepsis secondary to LE cellulitis, presenting with RLE pain and "seeping" for the past week, gradually worsening since yesterday,  with out any associated fevers.   Patient was recently discharged from the hospital with the diagnosis if dermatitis/diffuse rash that was involving bl UE, chest and RLE, she completed course of doxycyline with reported significant improvement of rash post discharge    Today, on exam, patient is NAD  RLE very erythematous, red patches, edematous and weeping, LLE unremarkable    Agree with vancomycin for now, ,pending ID and burn eval  fu blood cultures    continue with other home meds    Pending: ID, burn, labs, cx, abx      Patient seen at bedside, total time spent to evaluate and treat the patient's acute illness and chronic medical conditions as well as time spent reviewing prior records, labs, radiology, documenting in electronic medical records,  discussing medical plan with   medical team was more than 57 minutes with >50% of time spent face to face with patient, discussing with patient/family as well as coordination of care

## 2024-05-04 NOTE — ED ADULT NURSE NOTE - NSFALLHARMRISKINTERV_ED_ALL_ED
Assistance OOB with selected safe patient handling equipment if applicable/Assistance with ambulation/Communicate risk of Fall with Harm to all staff, patient, and family/Monitor gait and stability/Provide visual cue: red socks, yellow wristband, yellow gown, etc/Reinforce activity limits and safety measures with patient and family/Bed in lowest position, wheels locked, appropriate side rails in place/Call bell, personal items and telephone in reach/Instruct patient to call for assistance before getting out of bed/chair/stretcher/Non-slip footwear applied when patient is off stretcher/[place] to call system/Physically safe environment - no spills, clutter or unnecessary equipment/Purposeful Proactive Rounding/Room/bathroom lighting operational, light cord in reach

## 2024-05-04 NOTE — H&P ADULT - NSHPREVIEWOFSYSTEMS_GEN_ALL_CORE
CONSTITUTIONAL: No weakness, fevers or chills  EYES/ENT: No visual changes;  No vertigo or throat pain   NECK: No pain or stiffness  RESPIRATORY: No cough, wheezing, hemoptysis; No shortness of breath  CARDIOVASCULAR: No chest pain or palpitations  GASTROINTESTINAL: No abdominal or epigastric pain. No nausea, vomiting, or hematemesis; No diarrhea or constipation. No melena or hematochezia.  GENITOURINARY: No dysuria, frequency or hematuria  NEUROLOGICAL: No numbness or weakness  SKIN: No itching, rashes CONSTITUTIONAL: No weakness, fevers or chills  EYES/ENT: No visual changes;  No vertigo or throat pain   NECK: No pain or stiffness  RESPIRATORY: No cough, wheezing, hemoptysis; No shortness of breath  CARDIOVASCULAR: No chest pain or palpitations  GASTROINTESTINAL: No abdominal or epigastric pain. No nausea, vomiting, or hematemesis; No diarrhea or constipation. No melena or hematochezia.  GENITOURINARY: No dysuria, frequency or hematuria  NEUROLOGICAL: No numbness or weakness  SKIN: b/l LE swelling, erythema, swelling, with drainage of the pus from the woud

## 2024-05-04 NOTE — ED PROVIDER NOTE - PHYSICAL EXAMINATION
VITAL SIGNS: I have reviewed nursing notes and confirm.  CONSTITUTIONAL: Well-appearing, non-toxic, in NAD  SKIN: Warm dry, normal skin turgor  HEAD: NCAT  EYES: No conjunctival injection, scleral anicteric  ENT: MMM  NECK: Supple; FROM.   CARD: RRR  RESP: CTAB  ABD: Soft, NDNT  EXT: +RLE cellulitis with seeping, no calf tenderness, soft compartments, DP/PT intact, no weakness/numbness.   NEURO: Grossly normal examination, CN grossly intact  PSYCH: Cooperative, appropriate

## 2024-05-04 NOTE — ED PROVIDER NOTE - CLINICAL SUMMARY MEDICAL DECISION MAKING FREE TEXT BOX
.    68-year-old female, PMH SLE, HTN, CVA, thyroid disorder, prior lower extremity cellulitis, sent from assisted living for right lower extremity erythema and discharge x 1 week.  No fever, vomiting, chest pain, shortness of breath.  No cough or urinary symptoms.    Exam as noted below.    Additional information obtained from chart review.    Differential diagnosis includes but not limited to cellulitis, abscess, venous stasis, DVT    All available lab tests, imaging tests, and EKGs independently reviewed and interpreted by [facility].  Ultrasound shows no DVT.    Patient given broad-spectrum antibiotics, IVF    Impression cellulitis.  Patient admitted to medicine for further workup and management, specialist consultation, closer assessments for sponsor therapy.    .

## 2024-05-04 NOTE — H&P ADULT - IN ACCORDANCE WITH [place] STATE LAW, WE OFFER EVERY PATIENT A HEPATITIS C TEST. WOULD YOU LIKE TO BE TESTED TODAY?
Physical Therapy Daily Treatment Note     Name: Hero Salmeron  Clinic Number: [id number]    Therapy Diagnosis:   Encounter Diagnoses   Name Primary?    Chronic midline low back pain without sciatica     Debility      Physician: Devon Amor MD    Visit Date: 8/4/2020  Physician Orders: PT Eval and Treat   Medical Diagnosis from Referral: Chronic midline low back pain without sciatica  Evaluation Date: 6/23/2020  Authorization Period Expiration: 12/31/2020  Plan of Care Expiration: 8/14/2020  Visit # / Visits authorized: 10/20 (11 total)      Time In: 514 p  Time Out: 558 p  Total Billable Time: 44 minutes    Precautions: Standard    Subjective     Pt reports: did not feel much different after DN  He was compliant with home exercise program.  Response to previous treatment: no complaints   Functional change: no change    Pain: 0/10  Location: bilateral LB region      Objective     Hero received therapeutic exercises to develop strength, flexibility, posture and core stabilization for 44 minutes including:    Supine HSS 10 x 15 sec B  LTR with PB x 30   DKC with PB  Supine PB crunch x 30   Supine PB obliques x 30 B   Dying bug stage 2 x 30 B  Hip abd BTB x 30  +SL hip abd BTB x 15 B  Bridges w/BTB x 30  Hip add x 30  Prone alt UE/LE x 15 B  +Prone press up x 10  +Prone hip ext x 15 B    PB: shoulder flexion, abd 2# x 20 each B  +PB marches x 20 B    DID NOT PERFORM:  Dry needling was performed to the patient's left lumbar paraspinals L3-L5 with 50 mm needles for 0 minutes. Written consent was obtained prior to the procedure and no increase in pain was the result    Not performed today  Supine piriformis stretch 3 x 30 sec B NP  Slant board gastroc stretch 3 x 30 sec B  NP  Seated hamstring stretch 3 x 30 sec B NP    Lumbar traction: 60#/25#, 30/10 hold/release x 15 minutes    Home Exercises Provided and Patient Education Provided     Education provided:   - cont HEP    Written Home Exercises  Provided: Patient instructed to cont prior HEP.  Exercises were reviewed and Hero was able to demonstrate them prior to the end of the session.  Hero demonstrated good  understanding of the education provided.     See EMR under Patient Instructions for exercises provided prior visit.    Assessment     No increase in s/s reported with exercises.  Good tolerance for additional exercises.    Hero is progressing well towards his goals.   Pt prognosis is Good.     Pt will continue to benefit from skilled outpatient physical therapy to address the deficits listed in the problem list box on initial evaluation, provide pt/family education and to maximize pt's level of independence in the home and community environment.     Pt's spiritual, cultural and educational needs considered and pt agreeable to plan of care and goals.    Anticipated barriers to physical therapy: none    Goals:  Short Term Goals (STG) # weeks Goal Review Date Reviewed Date Met   1. The patient will begin a written HEP 1 met 6/30/2020 6/30/2020    2. Increase hamstring flexibility to 55 3 progressing 8/4/2020       3. Decrease soft tissue tenderness to mild 3 progressing 8/4/2020          Long Term Goals (LTG) # weeks Goal Review Date Reviewed Date Met   1. The patient will be independent with his HEP for maintenance 6 progressing 8/4/2020       2. Increase hamstring flexibility to 70* 6 progressing 8/4/2020       3. Decrease FOTO score to 31% 6 progressing 8/4/2020       4. The patient will tolerate standing for 30 minutes without onset of symptoms 6 progressing 8/4/2020             Plan     Cont per POC, strengthening, core    Ashtyn Tang, PTA       Opt out

## 2024-05-04 NOTE — H&P ADULT - HISTORY OF PRESENT ILLNESS
69yo female PMHx HTN, SLE, CVA (1987) w/o residual deficit, hypothyroidism, hx sepsis secondary to LE cellulitis, presenting with RLE pain and "seeping" for the past week, gradually worsening since yesterday, w/o any asoscaited f/c/cp/sob/ap/n/v/d/us. Was admitted for atopic dermatitis last month and d/c on Doxy, which she completed with improvement. No hx DVT or immobilizations    ICU Vital Signs Last 24 Hrs  T(C): 36.9 (04 May 2024 15:03), Max: 36.9 (04 May 2024 15:03)  T(F): 98.4 (04 May 2024 15:03), Max: 98.4 (04 May 2024 15:03)  HR: 65 (04 May 2024 15:03) (65 - 80)  BP: 110/62 (04 May 2024 15:03) (110/62 - 122/72)  RR: 18 (04 May 2024 15:03) (18 - 19)  SpO2: 95% (04 May 2024 15:03) (95% - 98%)  O2 Parameters below as of 04 May 2024 10:03  Patient On (Oxygen Delivery Method): room air          67yo female PMHx HTN, SLE, CVA (1987) w/o residual deficit, hypothyroidism, hx sepsis secondary to LE cellulitis, presenting with RLE pain and "seeping" for the past week, gradually worsening since yesterday,  with out any associated fevers. As per patient she has been having cellulitis for the right leg for the past 12 years and follows with Dr. Alvarenga. The patient was recently admitted to the hospital for the dermatitis and d/lori on doxycycline. Her wound on the right leg is erythematous, swollen, tender to touch. As per patient wound has been draining pus, yellow in color.    Denies any fevers, chills, N/V/D, abdominal pain ,headaches,    ICU Vital Signs Last 24 Hrs  T(C): 36.9 (04 May 2024 15:03), Max: 36.9 (04 May 2024 15:03)  T(F): 98.4 (04 May 2024 15:03), Max: 98.4 (04 May 2024 15:03)  HR: 65 (04 May 2024 15:03) (65 - 80)  BP: 110/62 (04 May 2024 15:03) (110/62 - 122/72)  RR: 18 (04 May 2024 15:03) (18 - 19)  SpO2: 95% (04 May 2024 15:03) (95% - 98%)  O2 Parameters below as of 04 May 2024 10:03  Patient On (Oxygen Delivery Method): room air

## 2024-05-04 NOTE — ED PROVIDER NOTE - OBJECTIVE STATEMENT
67yo female PMHx HTN, SLE, CVA (1987) w/o residual deficit, hypothyroidism, hx sepsis secondary to LE cellulitis, presenting with RLE pain and "seeping" for the past week, gradually worsening since yesterday, w/o any asoscaited f/c/cp/sob/ap/n/v/d/us. Was admitted for atopic dermatitis last month and d/c on Doxy, which she completed with improvement. No hx DVT or immobilizations.

## 2024-05-04 NOTE — H&P ADULT - ASSESSMENT
#DVT PPx-   #GI PPx-  #Diet-   #CHG  #Activity-   #Dispo-   #Code-    69 yo female PMH HTN, SLE, CVA (1987) w/o residual deficit, hypothyroidism, hx sepsis secondary to LE cellulitis, presenting with RLE pain and "seeping" for the past week, gradually worsening since yesterday,  with out any associated fevers. As per patient she has been having cellulitis for the right leg for the past 12 years and follows with Dr. Alvarenga. The patient was recently admitted to the hospital for the dermatitis and d/lori on doxycycline. Her wound on the right leg is erythematous, swollen, tender to touch. As per patient wound has been draining pus, yellow in color.      # RLE cellulitis  # Sepsis not present on admission  # SLE  # CVA  - WBC 11k on admission  - PMH significant for sepsis 2/2 LE cellulitis  - patient lives in assisted living facility, the [facility]  - f/u blood culture  - f/u ID consult, burn consult   - c/w with vancomycin for now  - follow up wound cultures  - wound care consult placed for RLE wound associated with cellulitis  - previous BC negative     # HTN  - c/w atenolol 25 mg qd  - c/w Lasix 40 qd    #Hypothyroidism  - c/w Levothyroxine 175 mcg qd  - c/w Levothyroxine 25 mcg q weekly      #SLE  - follows with rheumatologist Dr. Donaldo Dukes at [facility]  - c/w Plaquenil 200 mg qd  - f/u rheum OP    #Hx CVA in 1987 w/o residual deficits  - outpatient follow up      #DVT prophylaxis: Heparin  #GI prophylaxis: Pepcid  #Diet: DASH/TLC  #Activity: AAT  #Code status: Full Code  #Disposition: Med/Surg

## 2024-05-04 NOTE — H&P ADULT - NSHPPHYSICALEXAM_GEN_ALL_CORE
GENERAL: NAD, lying in bed comfortably  HEAD:  Atraumatic, Normocephalic  EYES: conjunctiva and sclera clear  ENT: Moist mucous membranes  NECK: Supple, No JVD  CHEST/LUNG: Clear to auscultation bilaterally; No rales, rhonchi, wheezing, or rubs. Unlabored respirations  HEART: Regular rate and rhythm; No murmurs, rubs, or gallops  ABDOMEN: Soft, nontender, nondistended  EXTREMITIES:  No clubbing, cyanosis, or edema  NERVOUS SYSTEM:  A&Ox3 GENERAL: NAD, lying in bed comfortably  HEAD:  Atraumatic, Normocephalic  EYES: conjunctiva and sclera clear  ENT: Moist mucous membranes  NECK: Supple, No JVD  CHEST/LUNG: Clear to auscultation bilaterally; No rales, rhonchi, wheezing, or rubs. Unlabored respirations  HEART: Regular rate and rhythm; No murmurs, rubs, or gallops  ABDOMEN: Soft, nontender, nondistended  EXTREMITIES: b/l LE edema, cellulitis of the lower extremity  NERVOUS SYSTEM:  A&Ox3 GENERAL: NAD, lying in bed comfortably  HEAD:  Atraumatic, Normocephalic  EYES: conjunctiva and sclera clear  ENT: Moist mucous membranes  NECK: Supple, No JVD  CHEST/LUNG: Clear to auscultation bilaterally; No rales, rhonchi, wheezing, or rubs. Unlabored respirations  HEART: Regular rate and rhythm; No murmurs, rubs, or gallops  ABDOMEN: Soft, nontender, nondistended  EXTREMITIES: b/l LE edema, RLE erythematous, diffusely red, weeping  NERVOUS SYSTEM:  A&Ox3

## 2024-05-05 LAB
ALBUMIN SERPL ELPH-MCNC: 3.5 G/DL — SIGNIFICANT CHANGE UP (ref 3.5–5.2)
ALP SERPL-CCNC: 68 U/L — SIGNIFICANT CHANGE UP (ref 30–115)
ALT FLD-CCNC: 6 U/L — SIGNIFICANT CHANGE UP (ref 0–41)
ANION GAP SERPL CALC-SCNC: 12 MMOL/L — SIGNIFICANT CHANGE UP (ref 7–14)
AST SERPL-CCNC: 17 U/L — SIGNIFICANT CHANGE UP (ref 0–41)
BASOPHILS # BLD AUTO: 0.03 K/UL — SIGNIFICANT CHANGE UP (ref 0–0.2)
BASOPHILS NFR BLD AUTO: 0.3 % — SIGNIFICANT CHANGE UP (ref 0–1)
BILIRUB SERPL-MCNC: <0.2 MG/DL — SIGNIFICANT CHANGE UP (ref 0.2–1.2)
BUN SERPL-MCNC: 15 MG/DL — SIGNIFICANT CHANGE UP (ref 10–20)
CALCIUM SERPL-MCNC: 8.5 MG/DL — SIGNIFICANT CHANGE UP (ref 8.4–10.5)
CHLORIDE SERPL-SCNC: 109 MMOL/L — SIGNIFICANT CHANGE UP (ref 98–110)
CO2 SERPL-SCNC: 24 MMOL/L — SIGNIFICANT CHANGE UP (ref 17–32)
CREAT SERPL-MCNC: 0.8 MG/DL — SIGNIFICANT CHANGE UP (ref 0.7–1.5)
CRP SERPL-MCNC: 42.5 MG/L — HIGH
EGFR: 81 ML/MIN/1.73M2 — SIGNIFICANT CHANGE UP
EOSINOPHIL # BLD AUTO: 0.14 K/UL — SIGNIFICANT CHANGE UP (ref 0–0.7)
EOSINOPHIL NFR BLD AUTO: 1.4 % — SIGNIFICANT CHANGE UP (ref 0–8)
ERYTHROCYTE [SEDIMENTATION RATE] IN BLOOD: 56 MM/HR — HIGH (ref 0–20)
GLUCOSE SERPL-MCNC: 90 MG/DL — SIGNIFICANT CHANGE UP (ref 70–99)
HCT VFR BLD CALC: 32.9 % — LOW (ref 37–47)
HGB BLD-MCNC: 10.3 G/DL — LOW (ref 12–16)
IMM GRANULOCYTES NFR BLD AUTO: 0.6 % — HIGH (ref 0.1–0.3)
LYMPHOCYTES # BLD AUTO: 1.22 K/UL — SIGNIFICANT CHANGE UP (ref 1.2–3.4)
LYMPHOCYTES # BLD AUTO: 12 % — LOW (ref 20.5–51.1)
MAGNESIUM SERPL-MCNC: 2.1 MG/DL — SIGNIFICANT CHANGE UP (ref 1.8–2.4)
MCHC RBC-ENTMCNC: 28 PG — SIGNIFICANT CHANGE UP (ref 27–31)
MCHC RBC-ENTMCNC: 31.3 G/DL — LOW (ref 32–37)
MCV RBC AUTO: 89.4 FL — SIGNIFICANT CHANGE UP (ref 81–99)
MONOCYTES # BLD AUTO: 0.69 K/UL — HIGH (ref 0.1–0.6)
MONOCYTES NFR BLD AUTO: 6.8 % — SIGNIFICANT CHANGE UP (ref 1.7–9.3)
NEUTROPHILS # BLD AUTO: 8.06 K/UL — HIGH (ref 1.4–6.5)
NEUTROPHILS NFR BLD AUTO: 78.9 % — HIGH (ref 42.2–75.2)
NRBC # BLD: 0 /100 WBCS — SIGNIFICANT CHANGE UP (ref 0–0)
PLATELET # BLD AUTO: 303 K/UL — SIGNIFICANT CHANGE UP (ref 130–400)
PMV BLD: 8.9 FL — SIGNIFICANT CHANGE UP (ref 7.4–10.4)
POTASSIUM SERPL-MCNC: 4.2 MMOL/L — SIGNIFICANT CHANGE UP (ref 3.5–5)
POTASSIUM SERPL-SCNC: 4.2 MMOL/L — SIGNIFICANT CHANGE UP (ref 3.5–5)
PROT SERPL-MCNC: 5.9 G/DL — LOW (ref 6–8)
RBC # BLD: 3.68 M/UL — LOW (ref 4.2–5.4)
RBC # FLD: 14.7 % — HIGH (ref 11.5–14.5)
SODIUM SERPL-SCNC: 145 MMOL/L — SIGNIFICANT CHANGE UP (ref 135–146)
WBC # BLD: 10.2 K/UL — SIGNIFICANT CHANGE UP (ref 4.8–10.8)
WBC # FLD AUTO: 10.2 K/UL — SIGNIFICANT CHANGE UP (ref 4.8–10.8)

## 2024-05-05 PROCEDURE — 99222 1ST HOSP IP/OBS MODERATE 55: CPT | Mod: GC

## 2024-05-05 RX ADMIN — Medication 25 MILLIGRAM(S): at 20:31

## 2024-05-05 RX ADMIN — Medication 40 MILLIGRAM(S): at 05:23

## 2024-05-05 RX ADMIN — Medication 175 MICROGRAM(S): at 05:23

## 2024-05-05 RX ADMIN — Medication 1 APPLICATION(S): at 21:40

## 2024-05-05 RX ADMIN — Medication 100 MILLIGRAM(S): at 05:25

## 2024-05-05 RX ADMIN — PANTOPRAZOLE SODIUM 40 MILLIGRAM(S): 20 TABLET, DELAYED RELEASE ORAL at 05:25

## 2024-05-05 RX ADMIN — OXYCODONE HYDROCHLORIDE 5 MILLIGRAM(S): 5 TABLET ORAL at 21:00

## 2024-05-05 RX ADMIN — Medication 50 MILLIGRAM(S): at 21:40

## 2024-05-05 RX ADMIN — ATENOLOL 25 MILLIGRAM(S): 25 TABLET ORAL at 05:22

## 2024-05-05 RX ADMIN — ENOXAPARIN SODIUM 40 MILLIGRAM(S): 100 INJECTION SUBCUTANEOUS at 05:22

## 2024-05-05 RX ADMIN — Medication 100 MILLIGRAM(S): at 21:41

## 2024-05-05 RX ADMIN — OXYCODONE HYDROCHLORIDE 5 MILLIGRAM(S): 5 TABLET ORAL at 20:30

## 2024-05-05 RX ADMIN — Medication 200 MILLIGRAM(S): at 12:01

## 2024-05-05 RX ADMIN — OXYCODONE HYDROCHLORIDE 5 MILLIGRAM(S): 5 TABLET ORAL at 10:00

## 2024-05-05 RX ADMIN — Medication 100 MILLIGRAM(S): at 13:41

## 2024-05-05 NOTE — CHART NOTE - NSCHARTNOTEFT_GEN_A_CORE
Transfer from: 3A  Transfer to: Burn    HPI:  69yo female PMHx HTN, SLE, CVA (1987) w/o residual deficit, hypothyroidism, hx sepsis secondary to LE cellulitis, presenting with RLE pain and "seeping" for the past week, gradually worsening since yesterday,  with out any associated fevers. As per patient she has been having cellulitis for the right leg for the past 12 years and follows with Dr. Alvarenga. The patient was recently admitted to the hospital for the dermatitis and d/lori on doxycycline. Her wound on the right leg is erythematous, swollen, tender to touch. As per patient wound has been draining pus, yellow in color.    Denies any fevers, chills, N/V/D, abdominal pain ,headaches,    ICU Vital Signs Last 24 Hrs  T(C): 36.9 (04 May 2024 15:03), Max: 36.9 (04 May 2024 15:03)  T(F): 98.4 (04 May 2024 15:03), Max: 98.4 (04 May 2024 15:03)  HR: 65 (04 May 2024 15:03) (65 - 80)  BP: 110/62 (04 May 2024 15:03) (110/62 - 122/72)  RR: 18 (04 May 2024 15:03) (18 - 19)  SpO2: 95% (04 May 2024 15:03) (95% - 98%)  O2 Parameters below as of 04 May 2024 10:03  Patient On (Oxygen Delivery Method): room air    3A Course:  69 yo female PMH HTN, SLE, CVA (1987) w/o residual deficit, hypothyroidism, hx sepsis secondary to LE cellulitis, presenting with RLE pain and "seeping" for the past week, gradually worsening since yesterday,  with out any associated fevers. As per patient she has been having cellulitis for the right leg for the past 12 years and follows with Dr. Alvarenga. The patient was recently admitted to the hospital for the dermatitis and d/lori on doxycycline. Her wound on the right leg is erythematous, swollen, tender to touch. As per patient wound has been draining pus, yellow in color. Burn consulted for RLE cellulitis, recs are to continue with antibiotics. BID dressing changes with adaptic, silvadene, kerlix, and ACE. Patient to be transferred to Burn's service.       # RLE cellulitis  # Sepsis not present on admission  # SLE  # CVA  - WBC 11k on admission  - PMH significant for sepsis 2/2 LE cellulitis  - patient lives in assisted living facility, the [facility]  - f/u blood culture  - f/u ID consult, burn consult   - c/w with vancomycin for now  - follow up wound cultures  - wound care consult placed for RLE wound associated with cellulitis  - previous BC negative     # HTN  - c/w atenolol 25 mg qd  - c/w Lasix 40 qd    #Hypothyroidism  - c/w Levothyroxine 175 mcg qd  - c/w Levothyroxine 25 mcg q weekly      #SLE  - follows with rheumatologist Dr. Donaldo Dukes at [facility]  - c/w Plaquenil 200 mg qd  - f/u rheum OP    #Hx CVA in 1987 w/o residual deficits  - outpatient follow up

## 2024-05-05 NOTE — PHYSICAL THERAPY INITIAL EVALUATION ADULT - ADDITIONAL COMMENTS
Pt. reports she is a resident of [facility]. Pt. reports she was ambulating using a [employer] PTA and did not require assistance with ADLs.

## 2024-05-05 NOTE — PHYSICAL THERAPY INITIAL EVALUATION ADULT - GENERAL OBSERVATIONS, REHAB EVAL
Pt. encountered alert and NAD, supine in bed, (+)primafit, agreeable to PT IE. Pt. left as found, semifowlers in bed (+)primafit (+)call bell in reach, NAD

## 2024-05-06 LAB
ANION GAP SERPL CALC-SCNC: 11 MMOL/L — SIGNIFICANT CHANGE UP (ref 7–14)
BUN SERPL-MCNC: 19 MG/DL — SIGNIFICANT CHANGE UP (ref 10–20)
CALCIUM SERPL-MCNC: 8 MG/DL — LOW (ref 8.4–10.5)
CHLORIDE SERPL-SCNC: 103 MMOL/L — SIGNIFICANT CHANGE UP (ref 98–110)
CO2 SERPL-SCNC: 26 MMOL/L — SIGNIFICANT CHANGE UP (ref 17–32)
CREAT SERPL-MCNC: 1 MG/DL — SIGNIFICANT CHANGE UP (ref 0.7–1.5)
EGFR: 62 ML/MIN/1.73M2 — SIGNIFICANT CHANGE UP
GLUCOSE SERPL-MCNC: 86 MG/DL — SIGNIFICANT CHANGE UP (ref 70–99)
HCT VFR BLD CALC: 32.3 % — LOW (ref 37–47)
HGB BLD-MCNC: 10.2 G/DL — LOW (ref 12–16)
MAGNESIUM SERPL-MCNC: 1.8 MG/DL — SIGNIFICANT CHANGE UP (ref 1.8–2.4)
MCHC RBC-ENTMCNC: 28.2 PG — SIGNIFICANT CHANGE UP (ref 27–31)
MCHC RBC-ENTMCNC: 31.6 G/DL — LOW (ref 32–37)
MCV RBC AUTO: 89.2 FL — SIGNIFICANT CHANGE UP (ref 81–99)
NRBC # BLD: 0 /100 WBCS — SIGNIFICANT CHANGE UP (ref 0–0)
PHOSPHATE SERPL-MCNC: 3.7 MG/DL — SIGNIFICANT CHANGE UP (ref 2.1–4.9)
PLATELET # BLD AUTO: 241 K/UL — SIGNIFICANT CHANGE UP (ref 130–400)
PMV BLD: 9.2 FL — SIGNIFICANT CHANGE UP (ref 7.4–10.4)
POTASSIUM SERPL-MCNC: 3.7 MMOL/L — SIGNIFICANT CHANGE UP (ref 3.5–5)
POTASSIUM SERPL-SCNC: 3.7 MMOL/L — SIGNIFICANT CHANGE UP (ref 3.5–5)
PROCALCITONIN SERPL-MCNC: 0.03 NG/ML — SIGNIFICANT CHANGE UP (ref 0.02–0.1)
RBC # BLD: 3.62 M/UL — LOW (ref 4.2–5.4)
RBC # FLD: 14.9 % — HIGH (ref 11.5–14.5)
SODIUM SERPL-SCNC: 140 MMOL/L — SIGNIFICANT CHANGE UP (ref 135–146)
WBC # BLD: 8.72 K/UL — SIGNIFICANT CHANGE UP (ref 4.8–10.8)
WBC # FLD AUTO: 8.72 K/UL — SIGNIFICANT CHANGE UP (ref 4.8–10.8)

## 2024-05-06 PROCEDURE — 99232 SBSQ HOSP IP/OBS MODERATE 35: CPT | Mod: FS

## 2024-05-06 RX ORDER — CHLORHEXIDINE GLUCONATE 213 G/1000ML
1 SOLUTION TOPICAL DAILY
Refills: 0 | Status: DISCONTINUED | OUTPATIENT
Start: 2024-05-06 | End: 2024-05-08

## 2024-05-06 RX ORDER — MAGNESIUM SULFATE 500 MG/ML
2 VIAL (ML) INJECTION ONCE
Refills: 0 | Status: COMPLETED | OUTPATIENT
Start: 2024-05-06 | End: 2024-05-06

## 2024-05-06 RX ADMIN — Medication 1 APPLICATION(S): at 09:27

## 2024-05-06 RX ADMIN — Medication 650 MILLIGRAM(S): at 06:33

## 2024-05-06 RX ADMIN — Medication 25 MILLIGRAM(S): at 21:21

## 2024-05-06 RX ADMIN — Medication 175 MICROGRAM(S): at 05:06

## 2024-05-06 RX ADMIN — Medication 200 MILLIGRAM(S): at 09:26

## 2024-05-06 RX ADMIN — Medication 25 MICROGRAM(S): at 09:26

## 2024-05-06 RX ADMIN — Medication 100 MILLIGRAM(S): at 05:05

## 2024-05-06 RX ADMIN — ENOXAPARIN SODIUM 40 MILLIGRAM(S): 100 INJECTION SUBCUTANEOUS at 05:06

## 2024-05-06 RX ADMIN — Medication 25 GRAM(S): at 21:21

## 2024-05-06 RX ADMIN — Medication 1 APPLICATION(S): at 21:19

## 2024-05-06 RX ADMIN — Medication 650 MILLIGRAM(S): at 06:31

## 2024-05-06 RX ADMIN — Medication 50 MILLIGRAM(S): at 21:19

## 2024-05-06 RX ADMIN — Medication 100 MILLIGRAM(S): at 18:59

## 2024-05-06 RX ADMIN — OXYCODONE HYDROCHLORIDE 5 MILLIGRAM(S): 5 TABLET ORAL at 21:00

## 2024-05-06 RX ADMIN — ATENOLOL 25 MILLIGRAM(S): 25 TABLET ORAL at 05:07

## 2024-05-06 RX ADMIN — OXYCODONE HYDROCHLORIDE 5 MILLIGRAM(S): 5 TABLET ORAL at 09:26

## 2024-05-06 RX ADMIN — PANTOPRAZOLE SODIUM 40 MILLIGRAM(S): 20 TABLET, DELAYED RELEASE ORAL at 06:03

## 2024-05-06 RX ADMIN — Medication 40 MILLIGRAM(S): at 05:06

## 2024-05-06 RX ADMIN — OXYCODONE HYDROCHLORIDE 5 MILLIGRAM(S): 5 TABLET ORAL at 20:25

## 2024-05-06 NOTE — CONSULT NOTE ADULT - ASSESSMENT
IMPRESSION:  68 year-old female with RLE cellulitis, doing well.    PLAN:  - Continue with antibiotics  - BID dressing changes with adaptic, silvadene, kerlix, and ACE  - Rest of care as per primary team  - Burn team to follow    Seen and examined with attending, Dr. Alvarenga.
 67yo female PMHx HTN, SLE, CVA (1987) w/o residual deficit, hypothyroidism, hx sepsis secondary to LE cellulitis, presenting with RLE pain and "seeping" for the past week, gradually worsening since yesterday,  with out any associated fevers.     IMPRESSION/RECOMMENDATIONS  No active cellulitis/abscess RLE  Erythematous but doubt active infection  Will recommend a short course in case of an underlying infectious etiology of the erythema  5/4 BCx NG  WBC 8.7    -Doxycycline 100 mg iv q12h and change to po 100 mg 12h on discharge till 5/13  -local care as per Burn    Please do not hesitate to recall ID if any questions arise either through [id number] or through [employer]

## 2024-05-06 NOTE — PROGRESS NOTE ADULT - ASSESSMENT
69 yo female PMH HTN, SLE, CVA (1987) w/o residual deficit, hypothyroidism, hx sepsis secondary to LE cellulitis, presenting with RLE pain and "seeping" for the past week, gradually worsening since yesterday,  with out any associated fevers. As per patient she has been having cellulitis for the right leg for the past 12 years and follows with Dr. Alvarenga. The patient was recently admitted to the hospital for the dermatitis and d/lori on doxycycline. Her wound on the right leg is erythematous, swollen, tender to touch. As per patient wound has been draining pus, yellow in color. Burn consulted for RLE cellulitis, recs are to continue with antibiotics. BID dressing changes with adaptic, silvadene, kerlix, and ACE. Patient transferred to Burn's service for further wound management.       # RLE cellulitis  # Sepsis not present on admission  # SLE  # CVA  - WBC 11k on admission  - patient lives in assisted living facility, the [facility]  - f/u blood culture 5/4 prelim NG   - f/u ID consult  - on cefazolin (transferred on from medicine)   - follow up wound cultures  - pt follows w/ vascular Dr Kennedy , last seen in april has f/u appointment 5/13 - follows for venous stasis - last rec's were to stop unna boot & start steroid for dermatitis & silvadene for open wounds  - PT following - walked w/ RW (baseline at home)     # HTN  - c/w atenolol 25 mg qd  - c/w Lasix 40 qd    #Hypothyroidism  - c/w Levothyroxine 175 mcg qd  - c/w Levothyroxine 25 mcg q weekly      #SLE  - follows with rheumatologist Dr. Donaldo Dukes at [facility]  - c/w Plaquenil 200 mg qd  - f/u rheum OP    #Hx CVA in 1987 w/o residual deficits  - outpatient follow up.

## 2024-05-06 NOTE — CONSULT NOTE ADULT - SUBJECTIVE AND OBJECTIVE BOX
SUBJECTIVE:  68 year-old female consulted to burn for RLE cellulitis. Seen and examined at bedside this AM.     Vital Signs Last 24 Hrs  T(C): 35.6 (05 May 2024 05:14), Max: 36.9 (04 May 2024 15:03)  T(F): 96.1 (05 May 2024 05:14), Max: 98.4 (04 May 2024 15:03)  HR: 69 (05 May 2024 05:14) (63 - 69)  BP: 138/82 (05 May 2024 05:14) (103/60 - 138/82)  BP(mean): 85 (04 May 2024 22:44) (85 - 85)  RR: 19 (05 May 2024 05:14) (18 - 19)  SpO2: 99% (05 May 2024 05:14) (94% - 99%)    Parameters below as of 05 May 2024 05:14  Patient On (Oxygen Delivery Method): room air    MEDICATIONS  (STANDING):  atenolol  Tablet 25 milliGRAM(s) Oral daily  ceFAZolin   IVPB      ceFAZolin   IVPB 2000 milliGRAM(s) IV Intermittent every 8 hours  enoxaparin Injectable 40 milliGRAM(s) SubCutaneous every 24 hours  furosemide    Tablet 40 milliGRAM(s) Oral daily  hydroxychloroquine 200 milliGRAM(s) Oral daily  levothyroxine 175 MICROGram(s) Oral daily  levothyroxine 25 MICROGram(s) Oral <User Schedule>  pantoprazole    Tablet 40 milliGRAM(s) Oral before breakfast  traZODone 50 milliGRAM(s) Oral at bedtime    MEDICATIONS  (PRN):  acetaminophen     Tablet .. 650 milliGRAM(s) Oral every 6 hours PRN Mild Pain (1 - 3)  diphenhydrAMINE 25 milliGRAM(s) Oral at bedtime PRN Rash and/or Itching  oxyCODONE    IR 5 milliGRAM(s) Oral every 6 hours PRN Severe Pain (7 - 10)    Allergies    morphine (Other)  Compazine (Other)  penicillins (Other)  Levaquin (Other)  aspirin (Other)    Intolerances    Lab Results:                        10.3   10.20 )-----------( 303      ( 05 May 2024 07:30 )             32.9     05-05    145  |  109  |  15  ----------------------------<  90  4.2   |  24  |  0.8    Ca    8.5      05 May 2024 07:30  Mg     2.1     05-05    TPro  5.9<L>  /  Alb  3.5  /  TBili  <0.2  /  DBili  x   /  AST  17  /  ALT  6   /  AlkPhos  68  05-05    LIVER FUNCTIONS - ( 05 May 2024 07:30 )  Alb: 3.5 g/dL / Pro: 5.9 g/dL / ALK PHOS: 68 U/L / ALT: 6 U/L / AST: 17 U/L / GGT: x           EXAM:  Gen: AOx3, NAD  SKIN: RLE with patchy erythematous areas from below the knee down to the ankle, warmth, no clear margins, no underlying fluctuance
  LATOYA FULTON  68y, Female  Allergy: morphine (Other)  Compazine (Other)  penicillins (Other)  Levaquin (Other)  aspirin (Other)      All historical available data reviewed.    HPI:   69yo female PMHx HTN, SLE, CVA (1987) w/o residual deficit, hypothyroidism, hx sepsis secondary to LE cellulitis, presenting with RLE pain and "seeping" for the past week, gradually worsening since yesterday,  with out any associated fevers. As per patient she has been having cellulitis for the right leg for the past 12 years and follows with Dr. Alvarenga. The patient was recently admitted to the hospital for the dermatitis and d/lori on doxycycline. Her wound on the right leg is erythematous, swollen, tender to touch. As per patient wound has been draining pus, yellow in color.    Denies any fevers, chills, N/V/D, abdominal pain ,headaches,    ICU Vital Signs Last 24 Hrs  T(C): 36.9 (04 May 2024 15:03), Max: 36.9 (04 May 2024 15:03)  T(F): 98.4 (04 May 2024 15:03), Max: 98.4 (04 May 2024 15:03)  HR: 65 (04 May 2024 15:03) (65 - 80)  BP: 110/62 (04 May 2024 15:03) (110/62 - 122/72)  RR: 18 (04 May 2024 15:03) (18 - 19)  SpO2: 95% (04 May 2024 15:03) (95% - 98%)  O2 Parameters below as of 04 May 2024 10:03  Patient On (Oxygen Delivery Method): room air         (04 May 2024 16:22)    FAMILY HISTORY:    PAST MEDICAL & SURGICAL HISTORY:  Lupus      Essential hypertension      CVA (cerebral vascular accident)  1987      Hypothyroidism      FH: bilateral hip replacements      H/O total knee replacement      H/O abdominal hysterectomy            VITALS:  T(F): 97.7, Max: 98.9 (05-05-24 @ 16:45)  HR: 70  BP: 110/55  RR: 18Vital Signs Last 24 Hrs  T(C): 36.5 (06 May 2024 08:45), Max: 37.2 (05 May 2024 16:45)  T(F): 97.7 (06 May 2024 08:45), Max: 98.9 (05 May 2024 16:45)  HR: 70 (06 May 2024 08:45) (63 - 76)  BP: 110/55 (06 May 2024 08:45) (110/55 - 135/63)  BP(mean): 90 (06 May 2024 00:14) (90 - 90)  RR: 18 (06 May 2024 08:45) (18 - 18)  SpO2: 100% (06 May 2024 00:14) (97% - 100%)    Parameters below as of 06 May 2024 00:14  Patient On (Oxygen Delivery Method): room air        TESTS & MEASUREMENTS:                        10.2   8.72  )-----------( 241      ( 06 May 2024 12:30 )             32.3     05-05    145  |  109  |  15  ----------------------------<  90  4.2   |  24  |  0.8    Ca    8.5      05 May 2024 07:30  Mg     2.1     05-05    TPro  5.9<L>  /  Alb  3.5  /  TBili  <0.2  /  DBili  x   /  AST  17  /  ALT  6   /  AlkPhos  68  05-05    LIVER FUNCTIONS - ( 05 May 2024 07:30 )  Alb: 3.5 g/dL / Pro: 5.9 g/dL / ALK PHOS: 68 U/L / ALT: 6 U/L / AST: 17 U/L / GGT: x             Culture - Blood (collected 05-04-24 @ 11:02)  Source: .Blood Blood  Preliminary Report (05-05-24 @ 22:02):    No growth at 24 hours      Urinalysis Basic - ( 05 May 2024 07:30 )    Color: x / Appearance: x / SG: x / pH: x  Gluc: 90 mg/dL / Ketone: x  / Bili: x / Urobili: x   Blood: x / Protein: x / Nitrite: x   Leuk Esterase: x / RBC: x / WBC x   Sq Epi: x / Non Sq Epi: x / Bacteria: x          RADIOLOGY & ADDITIONAL TESTS:  Personal review of radiological diagnostics performed  Echo and EKG results noted when applicable.     MEDICATIONS:  acetaminophen     Tablet .. 650 milliGRAM(s) Oral every 6 hours PRN  atenolol  Tablet 25 milliGRAM(s) Oral daily  ceFAZolin   IVPB      ceFAZolin   IVPB 2000 milliGRAM(s) IV Intermittent every 8 hours  chlorhexidine 4% Liquid 1 Application(s) Topical daily  diphenhydrAMINE 25 milliGRAM(s) Oral at bedtime PRN  enoxaparin Injectable 40 milliGRAM(s) SubCutaneous every 24 hours  furosemide    Tablet 40 milliGRAM(s) Oral daily  hydroxychloroquine 200 milliGRAM(s) Oral daily  levothyroxine 175 MICROGram(s) Oral daily  levothyroxine 25 MICROGram(s) Oral <User Schedule>  oxyCODONE    IR 5 milliGRAM(s) Oral every 6 hours PRN  pantoprazole    Tablet 40 milliGRAM(s) Oral before breakfast  silver sulfADIAZINE 1% Cream 1 Application(s) Topical two times a day  traZODone 50 milliGRAM(s) Oral at bedtime      ANTIBIOTICS:  ceFAZolin   IVPB      ceFAZolin   IVPB 2000 milliGRAM(s) IV Intermittent every 8 hours  hydroxychloroquine 200 milliGRAM(s) Oral daily

## 2024-05-07 LAB
ANION GAP SERPL CALC-SCNC: 9 MMOL/L — SIGNIFICANT CHANGE UP (ref 7–14)
BUN SERPL-MCNC: 19 MG/DL — SIGNIFICANT CHANGE UP (ref 10–20)
CALCIUM SERPL-MCNC: 8.3 MG/DL — LOW (ref 8.4–10.5)
CHLORIDE SERPL-SCNC: 103 MMOL/L — SIGNIFICANT CHANGE UP (ref 98–110)
CO2 SERPL-SCNC: 30 MMOL/L — SIGNIFICANT CHANGE UP (ref 17–32)
CREAT SERPL-MCNC: 0.8 MG/DL — SIGNIFICANT CHANGE UP (ref 0.7–1.5)
EGFR: 81 ML/MIN/1.73M2 — SIGNIFICANT CHANGE UP
GLUCOSE SERPL-MCNC: 83 MG/DL — SIGNIFICANT CHANGE UP (ref 70–99)
HCT VFR BLD CALC: 36.5 % — LOW (ref 37–47)
HGB BLD-MCNC: 11.4 G/DL — LOW (ref 12–16)
MAGNESIUM SERPL-MCNC: 2.1 MG/DL — SIGNIFICANT CHANGE UP (ref 1.8–2.4)
MCHC RBC-ENTMCNC: 27.8 PG — SIGNIFICANT CHANGE UP (ref 27–31)
MCHC RBC-ENTMCNC: 31.2 G/DL — LOW (ref 32–37)
MCV RBC AUTO: 89 FL — SIGNIFICANT CHANGE UP (ref 81–99)
NRBC # BLD: 0 /100 WBCS — SIGNIFICANT CHANGE UP (ref 0–0)
PHOSPHATE SERPL-MCNC: 3.1 MG/DL — SIGNIFICANT CHANGE UP (ref 2.1–4.9)
PLATELET # BLD AUTO: 310 K/UL — SIGNIFICANT CHANGE UP (ref 130–400)
PMV BLD: 8.6 FL — SIGNIFICANT CHANGE UP (ref 7.4–10.4)
POTASSIUM SERPL-MCNC: 3.9 MMOL/L — SIGNIFICANT CHANGE UP (ref 3.5–5)
POTASSIUM SERPL-SCNC: 3.9 MMOL/L — SIGNIFICANT CHANGE UP (ref 3.5–5)
RBC # BLD: 4.1 M/UL — LOW (ref 4.2–5.4)
RBC # FLD: 14.8 % — HIGH (ref 11.5–14.5)
SODIUM SERPL-SCNC: 142 MMOL/L — SIGNIFICANT CHANGE UP (ref 135–146)
WBC # BLD: 10.73 K/UL — SIGNIFICANT CHANGE UP (ref 4.8–10.8)
WBC # FLD AUTO: 10.73 K/UL — SIGNIFICANT CHANGE UP (ref 4.8–10.8)

## 2024-05-07 PROCEDURE — 99232 SBSQ HOSP IP/OBS MODERATE 35: CPT | Mod: FS

## 2024-05-07 RX ORDER — SENNA PLUS 8.6 MG/1
2 TABLET ORAL AT BEDTIME
Refills: 0 | Status: DISCONTINUED | OUTPATIENT
Start: 2024-05-07 | End: 2024-05-08

## 2024-05-07 RX ORDER — OXYCODONE HYDROCHLORIDE 5 MG/1
5 TABLET ORAL ONCE
Refills: 0 | Status: DISCONTINUED | OUTPATIENT
Start: 2024-05-07 | End: 2024-05-07

## 2024-05-07 RX ORDER — POLYETHYLENE GLYCOL 3350 17 G/17G
17 POWDER, FOR SOLUTION ORAL DAILY
Refills: 0 | Status: DISCONTINUED | OUTPATIENT
Start: 2024-05-07 | End: 2024-05-08

## 2024-05-07 RX ADMIN — SENNA PLUS 2 TABLET(S): 8.6 TABLET ORAL at 21:57

## 2024-05-07 RX ADMIN — OXYCODONE HYDROCHLORIDE 5 MILLIGRAM(S): 5 TABLET ORAL at 20:15

## 2024-05-07 RX ADMIN — Medication 650 MILLIGRAM(S): at 02:08

## 2024-05-07 RX ADMIN — Medication 25 MILLIGRAM(S): at 21:53

## 2024-05-07 RX ADMIN — Medication 100 MILLIGRAM(S): at 05:28

## 2024-05-07 RX ADMIN — OXYCODONE HYDROCHLORIDE 5 MILLIGRAM(S): 5 TABLET ORAL at 06:13

## 2024-05-07 RX ADMIN — Medication 40 MILLIGRAM(S): at 05:28

## 2024-05-07 RX ADMIN — ATENOLOL 25 MILLIGRAM(S): 25 TABLET ORAL at 05:28

## 2024-05-07 RX ADMIN — OXYCODONE HYDROCHLORIDE 5 MILLIGRAM(S): 5 TABLET ORAL at 16:36

## 2024-05-07 RX ADMIN — PANTOPRAZOLE SODIUM 40 MILLIGRAM(S): 20 TABLET, DELAYED RELEASE ORAL at 05:28

## 2024-05-07 RX ADMIN — Medication 650 MILLIGRAM(S): at 01:38

## 2024-05-07 RX ADMIN — CHLORHEXIDINE GLUCONATE 1 APPLICATION(S): 213 SOLUTION TOPICAL at 13:02

## 2024-05-07 RX ADMIN — OXYCODONE HYDROCHLORIDE 5 MILLIGRAM(S): 5 TABLET ORAL at 21:00

## 2024-05-07 RX ADMIN — Medication 100 MILLIGRAM(S): at 17:11

## 2024-05-07 RX ADMIN — Medication 50 MILLIGRAM(S): at 21:53

## 2024-05-07 RX ADMIN — OXYCODONE HYDROCHLORIDE 5 MILLIGRAM(S): 5 TABLET ORAL at 06:49

## 2024-05-07 RX ADMIN — OXYCODONE HYDROCHLORIDE 5 MILLIGRAM(S): 5 TABLET ORAL at 15:36

## 2024-05-07 RX ADMIN — Medication 200 MILLIGRAM(S): at 13:02

## 2024-05-07 RX ADMIN — Medication 175 MICROGRAM(S): at 05:28

## 2024-05-07 RX ADMIN — ENOXAPARIN SODIUM 40 MILLIGRAM(S): 100 INJECTION SUBCUTANEOUS at 05:28

## 2024-05-07 NOTE — DIETITIAN INITIAL EVALUATION ADULT - EDUCATION DIETARY MODIFICATIONS
discussed diet order and importance of adequate kcal and protein intake/(1) partially meets; needs review/practice/verbalization

## 2024-05-07 NOTE — DIETITIAN INITIAL EVALUATION ADULT - PERTINENT LABORATORY DATA
05-07    142  |  103  |  19  ----------------------------<  83  3.9   |  30  |  0.8    Ca    8.3<L>      07 May 2024 12:12  Phos  3.1     05-07  Mg     2.1     05-07

## 2024-05-07 NOTE — DIETITIAN INITIAL EVALUATION ADULT - ORAL INTAKE PTA/DIET HISTORY
Patient reports poor PO intake PTA due to dislike of food being served at assisted living facility. She was taking vitamin D3 for supplementation. No oral nutrition supplements. No chewing/swallowing difficulties. UBW: 143 - 148 lbs (patient endorses frequent fluctuations in weight) NKFA, no food intolerances.

## 2024-05-07 NOTE — PROGRESS NOTE ADULT - ASSESSMENT
69 yo female PMH HTN, SLE, CVA (1987) w/o residual deficit, hypothyroidism, hx sepsis secondary to LE cellulitis, presenting with RLE pain and "seeping" for the past week, gradually worsening since yesterday,  with out any associated fevers. As per patient she has been having cellulitis for the right leg for the past 12 years and follows with Dr. Alvarenga. The patient was recently admitted to the hospital for the dermatitis and d/lori on doxycycline. Her wound on the right leg is erythematous, swollen, tender to touch. As per patient wound has been draining pus, yellow in color. Burn consulted for RLE cellulitis, recs are to continue with antibiotics. BID dressing changes with adaptic, silvadene, kerlix, and ACE. Patient transferred to Burn's service for further wound management.       # RLE cellulitis  # Sepsis not present on admission  # SLE  # CVA  - WBC 11k on admission  - patient lives in assisted living facility, [facility]  - f/u blood culture 5/4 prelim NG   - f/u ID consult- 5/6 Doxycycline 100 mg iv q12h and change to po 100 mg 12h on discharge till 5/13  - follow up wound cultures  - pt follows w/ vascular Dr Kennedy , last seen in april has f/u appointment 5/13 - follows for venous stasis - last rec's were to stop unna boot & start steroid for dermatitis & silvadene for open wounds  - PT following - walked w/ RW (baseline at home)     # HTN  - c/w atenolol 25 mg qd  - c/w Lasix 40 qd    #Hypothyroidism  - c/w Levothyroxine 175 mcg qd  - c/w Levothyroxine 25 mcg q weekly      #SLE  - follows with rheumatologist Dr. Donaldo Dukes at [facility]  - c/w Plaquenil 200 mg qd  - f/u rheum OP    #Hx CVA in 1987 w/o residual deficits  - outpatient follow up.    plan discussed with patient and patient is in agreement 67 yo female PMH HTN, SLE, CVA (1987) w/o residual deficit, hypothyroidism, hx sepsis secondary to LE cellulitis, presenting with RLE pain and "seeping" for the past week, gradually worsening since yesterday,  with out any associated fevers. As per patient she has been having cellulitis for the right leg for the past 12 years and follows with Dr. Alvarenga. The patient was recently admitted to the hospital for the dermatitis and d/lori on doxycycline. Her wound on the right leg is erythematous, swollen, tender to touch. As per patient wound has been draining pus, yellow in color. Burn consulted for RLE cellulitis, recs are to continue with antibiotics. BID dressing changes with adaptic, silvadene, kerlix, and ACE. Patient transferred to Burn's service for further wound management.       # RLE cellulitis  # Sepsis not present on admission  # SLE  # CVA  - LWC- xeroform/ kerlix/ ace   - WBC 11k on admission  - patient lives in assisted living facility, [facility]  - f/u blood culture 5/4 prelim NG   - f/u ID consult- 5/6 Doxycycline 100 mg iv q12h and change to po 100 mg 12h on discharge till 5/13  - follow up wound cultures  - pt follows w/ vascular Dr Kennedy , last seen in april has f/u appointment 5/13 - follows for venous stasis - last rec's were to stop unna boot & start steroid for dermatitis & silvadene for open wounds  - PT following - walked w/ RW (baseline at home)     # HTN  - c/w atenolol 25 mg qd  - c/w Lasix 40 qd    #Hypothyroidism  - c/w Levothyroxine 175 mcg qd  - c/w Levothyroxine 25 mcg q weekly      #SLE  - follows with rheumatologist Dr. Donaldo Dukes at [facility]  - c/w Plaquenil 200 mg qd  - f/u rheum OP    #Hx CVA in 1987 w/o residual deficits  - outpatient follow up.    plan discussed with patient and patient is in agreement. discharge planning for tomorrow

## 2024-05-07 NOTE — DIETITIAN INITIAL EVALUATION ADULT - PERTINENT MEDS FT
MEDICATIONS  (STANDING):  atenolol  Tablet 25 milliGRAM(s) Oral daily  chlorhexidine 4% Liquid 1 Application(s) Topical daily  doxycycline IVPB 100 milliGRAM(s) IV Intermittent every 12 hours  enoxaparin Injectable 40 milliGRAM(s) SubCutaneous every 24 hours  furosemide    Tablet 40 milliGRAM(s) Oral daily  hydroxychloroquine 200 milliGRAM(s) Oral daily  levothyroxine 175 MICROGram(s) Oral daily  levothyroxine 25 MICROGram(s) Oral <User Schedule>  pantoprazole    Tablet 40 milliGRAM(s) Oral before breakfast  senna 2 Tablet(s) Oral at bedtime  silver sulfADIAZINE 1% Cream 1 Application(s) Topical two times a day  traZODone 50 milliGRAM(s) Oral at bedtime    MEDICATIONS  (PRN):  acetaminophen     Tablet .. 650 milliGRAM(s) Oral every 6 hours PRN Mild Pain (1 - 3)  diphenhydrAMINE 25 milliGRAM(s) Oral at bedtime PRN Rash and/or Itching  oxyCODONE    IR 5 milliGRAM(s) Oral every 6 hours PRN Severe Pain (7 - 10)  polyethylene glycol 3350 17 Gram(s) Oral daily PRN Constipation

## 2024-05-07 NOTE — DIETITIAN INITIAL EVALUATION ADULT - OTHER INFO
67 yo female PMH HTN, SLE, CVA (1987) w/o residual deficit, hypothyroidism, hx sepsis secondary to LE cellulitis, presenting with RLE pain and "seeping" for the past week, gradually worsening since yesterday,  with out any associated fevers. As per patient she has been having cellulitis for the right leg for the past 12 years and follows with Dr. Alvarenga. The patient was recently admitted to the hospital for the dermatitis and d/lori on doxycycline. Her wound on the right leg is erythematous, swollen, tender to touch. As per patient wound has been draining pus, yellow in color. Burn consulted for RLE cellulitis, recs are to continue with antibiotics. BID dressing changes with adaptic, silvadene, kerlix, and ACE. Patient transferred to Burn's service for further wound management.     RLE cellulitis; Sepsis not present on admission; SLE; CVA; HTN; Hypothyroidism;    Patient reports good appetite and PO intake of >/=75% of meals

## 2024-05-07 NOTE — DIETITIAN INITIAL EVALUATION ADULT - NS FNS REASON FOR WEIGHT CHANG
weight hx per EMR:    59 kg - 129.8 lbs (1/15/24)    uncertain etiology for weight gain  -possibly related to 2+ edema/fluid retention/other (specify)

## 2024-05-07 NOTE — DIETITIAN INITIAL EVALUATION ADULT - NAME AND PHONE
Swathi Holland, RD [phone] or via Teams    Patient is at low nutrition risk, RD to f/u in 7-10 days or PRN

## 2024-05-08 ENCOUNTER — TRANSCRIPTION ENCOUNTER (OUTPATIENT)
Age: 69
End: 2024-05-08

## 2024-05-08 VITALS
HEART RATE: 83 BPM | OXYGEN SATURATION: 97 % | SYSTOLIC BLOOD PRESSURE: 115 MMHG | TEMPERATURE: 98 F | RESPIRATION RATE: 18 BRPM | DIASTOLIC BLOOD PRESSURE: 56 MMHG

## 2024-05-08 LAB
ANION GAP SERPL CALC-SCNC: 10 MMOL/L — SIGNIFICANT CHANGE UP (ref 7–14)
BUN SERPL-MCNC: 20 MG/DL — SIGNIFICANT CHANGE UP (ref 10–20)
CALCIUM SERPL-MCNC: 8.9 MG/DL — SIGNIFICANT CHANGE UP (ref 8.4–10.5)
CHLORIDE SERPL-SCNC: 100 MMOL/L — SIGNIFICANT CHANGE UP (ref 98–110)
CO2 SERPL-SCNC: 30 MMOL/L — SIGNIFICANT CHANGE UP (ref 17–32)
CREAT SERPL-MCNC: 0.8 MG/DL — SIGNIFICANT CHANGE UP (ref 0.7–1.5)
EGFR: 81 ML/MIN/1.73M2 — SIGNIFICANT CHANGE UP
GLUCOSE SERPL-MCNC: 104 MG/DL — HIGH (ref 70–99)
HCT VFR BLD CALC: 40.4 % — SIGNIFICANT CHANGE UP (ref 37–47)
HGB BLD-MCNC: 12.5 G/DL — SIGNIFICANT CHANGE UP (ref 12–16)
MAGNESIUM SERPL-MCNC: 1.9 MG/DL — SIGNIFICANT CHANGE UP (ref 1.8–2.4)
MCHC RBC-ENTMCNC: 27.7 PG — SIGNIFICANT CHANGE UP (ref 27–31)
MCHC RBC-ENTMCNC: 30.9 G/DL — LOW (ref 32–37)
MCV RBC AUTO: 89.6 FL — SIGNIFICANT CHANGE UP (ref 81–99)
NRBC # BLD: 0 /100 WBCS — SIGNIFICANT CHANGE UP (ref 0–0)
PHOSPHATE SERPL-MCNC: 3.6 MG/DL — SIGNIFICANT CHANGE UP (ref 2.1–4.9)
PLATELET # BLD AUTO: 356 K/UL — SIGNIFICANT CHANGE UP (ref 130–400)
PMV BLD: 8.8 FL — SIGNIFICANT CHANGE UP (ref 7.4–10.4)
POTASSIUM SERPL-MCNC: 3.9 MMOL/L — SIGNIFICANT CHANGE UP (ref 3.5–5)
POTASSIUM SERPL-SCNC: 3.9 MMOL/L — SIGNIFICANT CHANGE UP (ref 3.5–5)
RBC # BLD: 4.51 M/UL — SIGNIFICANT CHANGE UP (ref 4.2–5.4)
RBC # FLD: 14.8 % — HIGH (ref 11.5–14.5)
SARS-COV-2 RNA SPEC QL NAA+PROBE: SIGNIFICANT CHANGE UP
SODIUM SERPL-SCNC: 140 MMOL/L — SIGNIFICANT CHANGE UP (ref 135–146)
WBC # BLD: 12.53 K/UL — HIGH (ref 4.8–10.8)
WBC # FLD AUTO: 12.53 K/UL — HIGH (ref 4.8–10.8)

## 2024-05-08 PROCEDURE — 99238 HOSP IP/OBS DSCHRG MGMT 30/<: CPT

## 2024-05-08 RX ADMIN — PANTOPRAZOLE SODIUM 40 MILLIGRAM(S): 20 TABLET, DELAYED RELEASE ORAL at 05:55

## 2024-05-08 RX ADMIN — Medication 175 MICROGRAM(S): at 05:32

## 2024-05-08 RX ADMIN — OXYCODONE HYDROCHLORIDE 5 MILLIGRAM(S): 5 TABLET ORAL at 05:30

## 2024-05-08 RX ADMIN — Medication 100 MILLIGRAM(S): at 05:32

## 2024-05-08 RX ADMIN — ATENOLOL 25 MILLIGRAM(S): 25 TABLET ORAL at 05:32

## 2024-05-08 RX ADMIN — Medication 40 MILLIGRAM(S): at 05:32

## 2024-05-08 RX ADMIN — Medication 650 MILLIGRAM(S): at 13:45

## 2024-05-08 RX ADMIN — ENOXAPARIN SODIUM 40 MILLIGRAM(S): 100 INJECTION SUBCUTANEOUS at 05:32

## 2024-05-08 RX ADMIN — OXYCODONE HYDROCHLORIDE 5 MILLIGRAM(S): 5 TABLET ORAL at 04:58

## 2024-05-08 RX ADMIN — Medication 200 MILLIGRAM(S): at 13:46

## 2024-05-08 NOTE — PROGRESS NOTE ADULT - SUBJECTIVE AND OBJECTIVE BOX
Patient is a 68y old  Female who presents with a chief complaint of Cellulitis (05 May 2024 11:19)      AM rounds     Pt: no complaints  No acute events o/n  Midline placed overnight     Vital Signs Last 24 Hrs  T(C): 36.5 (06 May 2024 08:45), Max: 37.2 (05 May 2024 16:45)  T(F): 97.7 (06 May 2024 08:45), Max: 98.9 (05 May 2024 16:45)  HR: 70 (06 May 2024 08:45) (63 - 76)  BP: 110/55 (06 May 2024 08:45) (110/55 - 135/63)  BP(mean): 90 (06 May 2024 00:14) (90 - 90)  RR: 18 (06 May 2024 08:45) (18 - 18)  SpO2: 100% (06 May 2024 00:14) (97% - 100%)    Parameters below as of 06 May 2024 00:14  Patient On (Oxygen Delivery Method): room air          05-05    145  |  109  |  15  ----------------------------<  90  4.2   |  24  |  0.8    Ca    8.5      05 May 2024 07:30  Mg     2.1     05-05    TPro  5.9<L>  /  Alb  3.5  /  TBili  <0.2  /  DBili  x   /  AST  17  /  ALT  6   /  AlkPhos  68  05-05                          10.3   10.20 )-----------( 303      ( 05 May 2024 07:30 )             32.9     CAPILLARY BLOOD GLUCOSE            EXAM:   Gen: AOx3, NAD  SKIN: RLE with patchy erythematous areas from below the knee down to the ankle, warmth, no clear margins, no underlying fluctuance        
Patient is a 68y old  Female who presents with a chief complaint of Cellulitis.     AM Rounds   INTERVAL HISTORY:  No acute events overnight. Afebrile   Patient seen at bedside. Dressing change performed. Patient tolerated well.     Vital Signs Last 24 Hrs  T(C): 36.2 (08 May 2024 07:30), Max: 37.4 (07 May 2024 20:00)  T(F): 97.2 (08 May 2024 07:30), Max: 99.3 (07 May 2024 20:00)  HR: 80 (08 May 2024 07:30) (68 - 85)  BP: 153/69 (08 May 2024 07:30) (108/56 - 153/69)  BP(mean): 99 (08 May 2024 07:30) (76 - 99)  RR: 18 (08 May 2024 07:30) (18 - 18)  SpO2: 98% (08 May 2024 07:30) (98% - 98%)    Parameters below as of 08 May 2024 07:30  Patient On (Oxygen Delivery Method): room air      I&O's Detail    07 May 2024 07:01  -  08 May 2024 07:00  --------------------------------------------------------  IN:    IV PiggyBack: 100 mL  Total IN: 100 mL    OUT:    Voided (mL): 1700 mL  Total OUT: 1700 mL    Total NET: -1600 mL            MEDICATIONS  (STANDING):  atenolol  Tablet 25 milliGRAM(s) Oral daily  chlorhexidine 4% Liquid 1 Application(s) Topical daily  doxycycline IVPB 100 milliGRAM(s) IV Intermittent every 12 hours  enoxaparin Injectable 40 milliGRAM(s) SubCutaneous every 24 hours  furosemide    Tablet 40 milliGRAM(s) Oral daily  hydroxychloroquine 200 milliGRAM(s) Oral daily  levothyroxine 175 MICROGram(s) Oral daily  levothyroxine 25 MICROGram(s) Oral <User Schedule>  pantoprazole    Tablet 40 milliGRAM(s) Oral before breakfast  senna 2 Tablet(s) Oral at bedtime  silver sulfADIAZINE 1% Cream 1 Application(s) Topical two times a day  traZODone 50 milliGRAM(s) Oral at bedtime    MEDICATIONS  (PRN):  acetaminophen     Tablet .. 650 milliGRAM(s) Oral every 6 hours PRN Mild Pain (1 - 3)  diphenhydrAMINE 25 milliGRAM(s) Oral at bedtime PRN Rash and/or Itching  oxyCODONE    IR 5 milliGRAM(s) Oral every 6 hours PRN Severe Pain (7 - 10)  polyethylene glycol 3350 17 Gram(s) Oral daily PRN Constipation    Allergies    morphine (Other)  Compazine (Other)  penicillins (Other)  Levaquin (Other)  aspirin (Other)    Intolerances        Lab Results:                        11.4   10.73 )-----------( 310      ( 07 May 2024 12:12 )             36.5     05-07    142  |  103  |  19  ----------------------------<  83  3.9   |  30  |  0.8    Ca    8.3<L>      07 May 2024 12:12  Phos  3.1     05-07  Mg     2.1     05-07        Urinalysis Basic - ( 07 May 2024 12:12 )    Color: x / Appearance: x / SG: x / pH: x  Gluc: 83 mg/dL / Ketone: x  / Bili: x / Urobili: x   Blood: x / Protein: x / Nitrite: x   Leuk Esterase: x / RBC: x / WBC x   Sq Epi: x / Non Sq Epi: x / Bacteria: x    IMAGING STUDIES:   < from: [facility] Duplex Lower Ext Vein Scan, Bilat (05.04.24 @ 11:29) >    IMPRESSION:  No evidence of deep venous thrombosis in either lower extremity. Right   thigh lymphadenopathy as described above.            --- End of Report ---    < end of copied text >      EXAM:  GENERAL: NAD  CHEST/LUNG: no acute cardiopulmonary distress   PSYCH: Cooperative; appropriate.  NEUROLOGY: AAO x 3.  SKIN: RLE with patchy erythematous areas from below the knee down to the ankle, warmth, slight edema  and tenderness;  patchy discolored peeling/adherent skin;  generalized  flaking dry skin;  no significant drainage.  no clear margins, no underlying fluctuance. palpable pedal pulses   lateral approximately 1 cm healing open wound;  scattered more superficial wounds anterior and lateral leg. No purulent drainage, malodor or active bleeding evident.         Dressing change performed. Patient tolerated well.           
am rounds with attending     INTERVAL HISTORY:  afebrile. no acute events overnight. no complaints    Vital Signs Last 24 Hrs  T(C): 35.5 (07 May 2024 08:06), Max: 36.9 (06 May 2024 20:00)  T(F): 95.9 (07 May 2024 08:06), Max: 98.5 (06 May 2024 20:00)  HR: 78 (07 May 2024 08:06) (61 - 78)  BP: 122/56 (07 May 2024 08:06) (106/54 - 132/64)  BP(mean): 88 (07 May 2024 05:35) (73 - 88)  RR: 18 (07 May 2024 08:06) (18 - 20)  SpO2: 98% (07 May 2024 05:35) (98% - 98%)    Parameters below as of 07 May 2024 05:35  Patient On (Oxygen Delivery Method): room air      I&O's Detail    06 May 2024 07:01  -  07 May 2024 07:00  --------------------------------------------------------  IN:  Total IN: 0 mL    OUT:    Voided (mL): 2200 mL  Total OUT: 2200 mL    Total NET: -2200 mL            MEDICATIONS  (STANDING):  atenolol  Tablet 25 milliGRAM(s) Oral daily  chlorhexidine 4% Liquid 1 Application(s) Topical daily  doxycycline IVPB 100 milliGRAM(s) IV Intermittent every 12 hours  enoxaparin Injectable 40 milliGRAM(s) SubCutaneous every 24 hours  furosemide    Tablet 40 milliGRAM(s) Oral daily  hydroxychloroquine 200 milliGRAM(s) Oral daily  levothyroxine 175 MICROGram(s) Oral daily  levothyroxine 25 MICROGram(s) Oral <User Schedule>  pantoprazole    Tablet 40 milliGRAM(s) Oral before breakfast  senna 2 Tablet(s) Oral at bedtime  silver sulfADIAZINE 1% Cream 1 Application(s) Topical two times a day  traZODone 50 milliGRAM(s) Oral at bedtime    MEDICATIONS  (PRN):  acetaminophen     Tablet .. 650 milliGRAM(s) Oral every 6 hours PRN Mild Pain (1 - 3)  diphenhydrAMINE 25 milliGRAM(s) Oral at bedtime PRN Rash and/or Itching  oxyCODONE    IR 5 milliGRAM(s) Oral every 6 hours PRN Severe Pain (7 - 10)  polyethylene glycol 3350 17 Gram(s) Oral daily PRN Constipation    Allergies    morphine (Other)  Compazine (Other)  penicillins (Other)  Levaquin (Other)  aspirin (Other)    Intolerances        Lab Results:                        10.2   8.72  )-----------( 241      ( 06 May 2024 12:30 )             32.3     05-06    140  |  103  |  19  ----------------------------<  86  3.7   |  26  |  1.0    Ca    8.0<L>      06 May 2024 12:30  Phos  3.7     05-06  Mg     1.8     05-06        Urinalysis Basic - ( 06 May 2024 12:30 )    Color: x / Appearance: x / SG: x / pH: x  Gluc: 86 mg/dL / Ketone: x  / Bili: x / Urobili: x   Blood: x / Protein: x / Nitrite: x   Leuk Esterase: x / RBC: x / WBC x   Sq Epi: x / Non Sq Epi: x / Bacteria: x      EXAM:  PHYSICAL EXAM: I have reviewed current vital signs.  GENERAL: NAD  CHEST/LUNG: no acute cardiopulmonary distress   PSYCH: Cooperative; appropriate.  NEUROLOGY: AAO x 3.  SKIN: RLE with patchy erythematous areas from below the knee down to the ankle, warmth, no clear margins, no underlying fluctuance
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
## 2024-05-08 NOTE — DISCHARGE NOTE PROVIDER - NSFOLLOWUPCLINICS_GEN_ALL_ED_FT
[facility]  Burn  [address], [address]  [place] [address]  Phone: [phone]  Fax:

## 2024-05-08 NOTE — DISCHARGE NOTE PROVIDER - CARE PROVIDER_API CALL
Amadeo Alvarenga  Plastic Surgery  [address] [place] [id number]  Phone: [phone]  Fax: [phone]  Follow Up Time: 1 week    Tanvir Bo)  Surgery  [address] [place] [id number]  Phone: [phone]  Fax: [phone]  Follow Up Time: 1 week

## 2024-05-08 NOTE — DISCHARGE NOTE PROVIDER - PROVIDER TOKENS
PROVIDER:[TOKEN:[[id number]],FOLLOWUP:[1 week]],PROVIDER:[TOKEN:[[id number]],FOLLOWUP:[1 week]]

## 2024-05-08 NOTE — DISCHARGE NOTE PROVIDER - NSDCMRMEDTOKEN_GEN_ALL_CORE_FT
ACE Wrap 6 inch: Daily for dressing changes  acetaminophen 325 mg oral tablet: 2 tab(s) orally every 12 hours, As needed, Temp greater or equal to 38C (100.4F), Mild Pain (1 - 3)  Adaptic 3x16in Nonstick dressing: Daily for dressing changes  alendronate 70 mg oral tablet: 1 tab(s) orally once a week  atenolol 25 mg oral tablet: 1 tab(s) orally once a day  Banophen 25 mg oral tablet: 1 tab(s) orally once a day (at bedtime)  cholecalciferol oral tablet: 5000 unit(s) orally once a day  clotrimazole 1% topical cream (obsolete): Apply topically to affected area once a day b/l breasts  Diprolene AF 0.05% topical cream: Apply topically to affected area 2 times a day  doxycycline hyclate 100 mg oral tablet: 1 tab(s) orally every 12 hours  furosemide 40 mg oral tablet: 1 tab(s) orally once a day  levothyroxine 175 mcg (0.175 mg) oral tablet: 1 tab(s) orally once a day  Melatonin 10 mg oral tablet: 1 tab(s) orally once a day (at bedtime)  Pepcid 20 mg oral tablet: 1 tab(s) orally once a day  petrolatum topical ointment: 1 Apply topically to affected area 4 times a day  Plaquenil 200 mg oral tablet: 1 tab(s) orally once a day  silver sulfADIAZINE 1% topical cream: Apply topically to affected area 2 times a day  Synthroid 25 mcg (0.025 mg) oral tablet: 1 tab(s) orally once a week  traZODone 50 mg oral tablet: 1 tab(s) orally once a day (at bedtime)  vitamin A and D topical ointment: Apply topically to affected area 4 times a day legs

## 2024-05-08 NOTE — OCCUPATIONAL THERAPY INITIAL EVALUATION ADULT - PHYSICAL ASSIST/NONPHYSICAL ASSIST:DRESS LOWER BODY, OT EVAL
Pantoprazole      Last Written Prescription Date: 10/4/16  Last Fill Quantity: 90,  # refills: 1   Last Office Visit with [facility], [facility] or [facility] prescribing provider: 11/28/16                                               
Prescription approved per [facility] Refill Protocol.    
verbal cues/1 person assist

## 2024-05-08 NOTE — DISCHARGE NOTE PROVIDER - NSDCFUSCHEDAPPT_GEN_ALL_CORE_FT
Theresa Kennedy  [facility]  VASCULAR [address]  Scheduled Appointment: 05/13/2024
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
## 2024-05-08 NOTE — DISCHARGE NOTE PROVIDER - CARE PROVIDERS DIRECT ADDRESSES
[email or web address]
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
## 2024-05-08 NOTE — DISCHARGE NOTE NURSING/CASE MANAGEMENT/SOCIAL WORK - PATIENT PORTAL LINK FT
You can access the FollowMyHealth Patient Portal offered by [facility] by registering at the following website: [email or web address]. By joining [facility]’s FollowMyHealth portal, you will also be able to view your health information using other applications (apps) compatible with our system.

## 2024-05-08 NOTE — OCCUPATIONAL THERAPY INITIAL EVALUATION ADULT - PERTINENT HX OF CURRENT PROBLEM, REHAB EVAL
69yo female PMHx HTN, SLE, CVA (1987) w/o residual deficit, hypothyroidism, hx sepsis secondary to LE cellulitis, presenting with RLE pain and "seeping" for the past week, gradually worsening since yesterday,  with out any associated fevers. As per patient she has been having cellulitis for the right leg for the past 12 years and follows with Dr. Alvarenga. The patient was recently admitted to the hospital for the dermatitis and d/lori on doxycycline. Her wound on the right leg is erythematous, swollen, tender to touch. As per patient wound has been draining pus, yellow in color.    Denies any fevers, chills, N/V/D, abdominal pain ,headaches,
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
## 2024-05-08 NOTE — DISCHARGE NOTE NURSING/CASE MANAGEMENT/SOCIAL WORK - NSDCPEFALRISK_GEN_ALL_CORE
For information on Fall & Injury Prevention, visit: [email or web address] OR  [email or web address] OR  https://www.cdc.gov/steadi/patient.html

## 2024-05-09 ENCOUNTER — NON-APPOINTMENT (OUTPATIENT)
Age: 69
End: 2024-05-09

## 2024-05-09 LAB
CULTURE RESULTS: SIGNIFICANT CHANGE UP
SPECIMEN SOURCE: SIGNIFICANT CHANGE UP

## 2024-05-13 ENCOUNTER — APPOINTMENT (OUTPATIENT)
Dept: VASCULAR SURGERY | Facility: CLINIC | Age: 69
End: 2024-05-13
Payer: MEDICARE

## 2024-05-13 PROCEDURE — 99212 OFFICE O/P EST SF 10 MIN: CPT

## 2024-05-13 NOTE — ASSESSMENT
[FreeTextEntry1] : I recommend continuing with daily wound care.  Topical hydrocortisone cream to her right lower extremity dermatitis and Silvadene to her open wounds.  The patient will continue with the daily Ace wrap compression.  I will see the patient back in my office in 1 month's time or sooner if any new symptoms develop.  I recommend she follow-up with dermatology  for her dermatitis.

## 2024-05-13 NOTE — HISTORY OF PRESENT ILLNESS
[FreeTextEntry1] : The patient  68-year-old female with a history of right leg anterior tibial venous stasis ulcer.  The patient was seen last week with  total body dermatitis which was pretty extensive.  She was treated with oral steroids.  The patient has significant pruritus and cutaneous dermatitis.  Her right lower extremity is developing wounds being covered with this dermatitis.  The patient's right leg wound is healing with local wound care.

## 2024-05-13 NOTE — PHYSICAL EXAM
[FreeTextEntry1] : Right anterior tibial punctate venous stasis ulcer half centimeter in size right leg topical dermatitis still present overall improved
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
## 2024-05-22 DIAGNOSIS — L30.9 DERMATITIS, UNSPECIFIED: ICD-10-CM

## 2024-05-22 DIAGNOSIS — E03.9 HYPOTHYROIDISM, UNSPECIFIED: ICD-10-CM

## 2024-05-22 DIAGNOSIS — T31.0 BURNS INVOLVING LESS THAN 10% OF BODY SURFACE: ICD-10-CM

## 2024-05-22 DIAGNOSIS — Z96.659 PRESENCE OF UNSPECIFIED ARTIFICIAL KNEE JOINT: ICD-10-CM
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
## 2024-05-22 DIAGNOSIS — I10 ESSENTIAL (PRIMARY) HYPERTENSION: ICD-10-CM

## 2024-05-22 DIAGNOSIS — T24.231A BURN OF SECOND DEGREE OF RIGHT LOWER LEG, INITIAL ENCOUNTER: ICD-10-CM

## 2024-05-22 DIAGNOSIS — Z90.710 ACQUIRED ABSENCE OF BOTH CERVIX AND UTERUS: ICD-10-CM

## 2024-05-22 DIAGNOSIS — Z88.5 ALLERGY STATUS TO NARCOTIC AGENT: ICD-10-CM

## 2024-05-22 DIAGNOSIS — Z88.0 ALLERGY STATUS TO PENICILLIN: ICD-10-CM

## 2024-05-22 DIAGNOSIS — Z96.643 PRESENCE OF ARTIFICIAL HIP JOINT, BILATERAL: ICD-10-CM

## 2024-05-22 DIAGNOSIS — Z79.52 LONG TERM (CURRENT) USE OF SYSTEMIC STEROIDS: ICD-10-CM

## 2024-05-22 DIAGNOSIS — Z79.890 HORMONE REPLACEMENT THERAPY: ICD-10-CM

## 2024-05-22 DIAGNOSIS — Z86.73 PERSONAL HISTORY OF TRANSIENT ISCHEMIC ATTACK (TIA), AND CEREBRAL INFARCTION WITHOUT RESIDUAL DEFICITS: ICD-10-CM

## 2024-05-22 DIAGNOSIS — Z88.1 ALLERGY STATUS TO OTHER ANTIBIOTIC AGENTS STATUS: ICD-10-CM

## 2024-05-22 DIAGNOSIS — M32.9 SYSTEMIC LUPUS ERYTHEMATOSUS, UNSPECIFIED: ICD-10-CM

## 2024-05-22 DIAGNOSIS — L03.115 CELLULITIS OF RIGHT LOWER LIMB: ICD-10-CM

## 2024-05-22 DIAGNOSIS — Z88.6 ALLERGY STATUS TO ANALGESIC AGENT: ICD-10-CM
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
## 2024-06-10 ENCOUNTER — APPOINTMENT (OUTPATIENT)
Dept: VASCULAR SURGERY | Facility: CLINIC | Age: 69
End: 2024-06-10
Payer: MEDICARE

## 2024-06-10 DIAGNOSIS — L97.318 VARICOSE VEINS OF RIGHT LOWER EXTREMITY WITH ULCER OF ANKLE: ICD-10-CM

## 2024-06-10 DIAGNOSIS — I83.013 VARICOSE VEINS OF RIGHT LOWER EXTREMITY WITH ULCER OF ANKLE: ICD-10-CM

## 2024-06-10 PROCEDURE — 99212 OFFICE O/P EST SF 10 MIN: CPT

## 2024-06-10 NOTE — PHYSICAL EXAM
[FreeTextEntry1] : Right leg 1 x 1 cm circumferential ulcer with a area of excoriation 6 x 3 cm in size.

## 2024-06-10 NOTE — HISTORY OF PRESENT ILLNESS
[FreeTextEntry1] : The patient  68-year-old female with a history of right leg anterior tibial venous stasis ulcer.  The patient was seen last week with  total body dermatitis which was pretty extensive.  She was treated with oral steroids.  The patient has significant pruritus and cutaneous dermatitis.  Today she presents with right lower extremity venous stasis ulcer.  Her dermatitis is completely resolved.  She has weeping edema from this area.  She has a superficial excoriated skin 6 x 3 cm and a punctate lesion at 1 x 1 cm in size.  The patient states she has some drainage present from this area.

## 2024-06-10 NOTE — ASSESSMENT
[FreeTextEntry1] : I recommend continuing with daily wound care.  Silvadene to the right leg wound with gauze over the area and then Ace wrap from the foot to the knee.  I will see the patient back in my office in 2 weeks time at that time I will consider restarting Unna boot therapy.

## 2024-06-24 ENCOUNTER — APPOINTMENT (OUTPATIENT)
Dept: VASCULAR SURGERY | Facility: CLINIC | Age: 69
End: 2024-06-24
Payer: MEDICARE

## 2024-06-24 PROCEDURE — 29580 STRAPPING UNNA BOOT: CPT | Mod: RT

## 2024-07-01 ENCOUNTER — APPOINTMENT (OUTPATIENT)
Dept: VASCULAR SURGERY | Facility: CLINIC | Age: 69
End: 2024-07-01
Payer: MEDICARE

## 2024-07-01 PROBLEM — I83.019 VENOUS ULCER OF RIGHT LEG: Status: ACTIVE | Noted: 2024-06-24

## 2024-07-01 PROCEDURE — 29580 STRAPPING UNNA BOOT: CPT | Mod: RT

## 2024-07-08 ENCOUNTER — APPOINTMENT (OUTPATIENT)
Dept: VASCULAR SURGERY | Facility: CLINIC | Age: 69
End: 2024-07-08
Payer: MEDICARE

## 2024-07-08 PROCEDURE — 29580 STRAPPING UNNA BOOT: CPT | Mod: RT

## 2024-07-15 ENCOUNTER — APPOINTMENT (OUTPATIENT)
Dept: VASCULAR SURGERY | Facility: CLINIC | Age: 69
End: 2024-07-15
Payer: MEDICARE

## 2024-07-15 PROCEDURE — 29580 STRAPPING UNNA BOOT: CPT | Mod: RT

## 2024-07-16 ENCOUNTER — EMERGENCY (EMERGENCY)
Facility: HOSPITAL | Age: 69
LOS: 0 days | Discharge: HOME | End: 2024-07-16
Attending: EMERGENCY MEDICINE
Payer: MEDICARE

## 2024-07-16 VITALS
TEMPERATURE: 99 F | OXYGEN SATURATION: 95 % | HEIGHT: 62 IN | RESPIRATION RATE: 18 BRPM | SYSTOLIC BLOOD PRESSURE: 99 MMHG | HEART RATE: 71 BPM | WEIGHT: 141.98 LBS | DIASTOLIC BLOOD PRESSURE: 57 MMHG

## 2024-07-16 DIAGNOSIS — Z90.710 ACQUIRED ABSENCE OF BOTH CERVIX AND UTERUS: Chronic | ICD-10-CM

## 2024-07-16 LAB
ALBUMIN SERPL ELPH-MCNC: 4.1 G/DL — SIGNIFICANT CHANGE UP (ref 3.5–5.2)
ALP SERPL-CCNC: 68 U/L — SIGNIFICANT CHANGE UP (ref 30–115)
ALT FLD-CCNC: 8 U/L — SIGNIFICANT CHANGE UP (ref 0–41)
ANION GAP SERPL CALC-SCNC: 14 MMOL/L — SIGNIFICANT CHANGE UP (ref 7–14)
AST SERPL-CCNC: 19 U/L — SIGNIFICANT CHANGE UP (ref 0–41)
BASOPHILS # BLD AUTO: 0.01 K/UL — SIGNIFICANT CHANGE UP (ref 0–0.2)
BASOPHILS NFR BLD AUTO: 0.1 % — SIGNIFICANT CHANGE UP (ref 0–1)
BILIRUB SERPL-MCNC: <0.2 MG/DL — SIGNIFICANT CHANGE UP (ref 0.2–1.2)
BUN SERPL-MCNC: 27 MG/DL — HIGH (ref 10–20)
CALCIUM SERPL-MCNC: 9.6 MG/DL — SIGNIFICANT CHANGE UP (ref 8.4–10.4)
CHLORIDE SERPL-SCNC: 107 MMOL/L — SIGNIFICANT CHANGE UP (ref 98–110)
CO2 SERPL-SCNC: 23 MMOL/L — SIGNIFICANT CHANGE UP (ref 17–32)
CREAT SERPL-MCNC: 1 MG/DL — SIGNIFICANT CHANGE UP (ref 0.7–1.5)
EGFR: 62 ML/MIN/1.73M2 — SIGNIFICANT CHANGE UP
EOSINOPHIL # BLD AUTO: 0.21 K/UL — SIGNIFICANT CHANGE UP (ref 0–0.7)
EOSINOPHIL NFR BLD AUTO: 2.9 % — SIGNIFICANT CHANGE UP (ref 0–8)
GLUCOSE SERPL-MCNC: 89 MG/DL — SIGNIFICANT CHANGE UP (ref 70–99)
HCT VFR BLD CALC: 34.5 % — LOW (ref 37–47)
HGB BLD-MCNC: 10.8 G/DL — LOW (ref 12–16)
IMM GRANULOCYTES NFR BLD AUTO: 0.6 % — HIGH (ref 0.1–0.3)
LACTATE SERPL-SCNC: 1.8 MMOL/L — SIGNIFICANT CHANGE UP (ref 0.7–2)
LYMPHOCYTES # BLD AUTO: 1.62 K/UL — SIGNIFICANT CHANGE UP (ref 1.2–3.4)
LYMPHOCYTES # BLD AUTO: 22.5 % — SIGNIFICANT CHANGE UP (ref 20.5–51.1)
MCHC RBC-ENTMCNC: 26.9 PG — LOW (ref 27–31)
MCHC RBC-ENTMCNC: 31.3 G/DL — LOW (ref 32–37)
MCV RBC AUTO: 86 FL — SIGNIFICANT CHANGE UP (ref 81–99)
MONOCYTES # BLD AUTO: 0.6 K/UL — SIGNIFICANT CHANGE UP (ref 0.1–0.6)
MONOCYTES NFR BLD AUTO: 8.3 % — SIGNIFICANT CHANGE UP (ref 1.7–9.3)
NEUTROPHILS # BLD AUTO: 4.71 K/UL — SIGNIFICANT CHANGE UP (ref 1.4–6.5)
NEUTROPHILS NFR BLD AUTO: 65.6 % — SIGNIFICANT CHANGE UP (ref 42.2–75.2)
NRBC # BLD: 0 /100 WBCS — SIGNIFICANT CHANGE UP (ref 0–0)
PLATELET # BLD AUTO: 272 K/UL — SIGNIFICANT CHANGE UP (ref 130–400)
PMV BLD: 10.2 FL — SIGNIFICANT CHANGE UP (ref 7.4–10.4)
POTASSIUM SERPL-MCNC: 4.5 MMOL/L — SIGNIFICANT CHANGE UP (ref 3.5–5)
POTASSIUM SERPL-SCNC: 4.5 MMOL/L — SIGNIFICANT CHANGE UP (ref 3.5–5)
PROT SERPL-MCNC: 6.9 G/DL — SIGNIFICANT CHANGE UP (ref 6–8)
RBC # BLD: 4.01 M/UL — LOW (ref 4.2–5.4)
RBC # FLD: 15.1 % — HIGH (ref 11.5–14.5)
SODIUM SERPL-SCNC: 144 MMOL/L — SIGNIFICANT CHANGE UP (ref 135–146)
WBC # BLD: 7.19 K/UL — SIGNIFICANT CHANGE UP (ref 4.8–10.8)
WBC # FLD AUTO: 7.19 K/UL — SIGNIFICANT CHANGE UP (ref 4.8–10.8)

## 2024-07-16 PROCEDURE — 87040 BLOOD CULTURE FOR BACTERIA: CPT | Mod: 59

## 2024-07-16 PROCEDURE — 85025 COMPLETE CBC W/AUTO DIFF WBC: CPT

## 2024-07-16 PROCEDURE — 99285 EMERGENCY DEPT VISIT HI MDM: CPT | Mod: FS

## 2024-07-16 PROCEDURE — 36415 COLL VENOUS BLD VENIPUNCTURE: CPT

## 2024-07-16 PROCEDURE — 36000 PLACE NEEDLE IN VEIN: CPT

## 2024-07-16 PROCEDURE — 80053 COMPREHEN METABOLIC PANEL: CPT

## 2024-07-16 PROCEDURE — 83605 ASSAY OF LACTIC ACID: CPT

## 2024-07-16 PROCEDURE — 99284 EMERGENCY DEPT VISIT MOD MDM: CPT | Mod: 25

## 2024-07-16 RX ORDER — SODIUM CHLORIDE 0.9 % (FLUSH) 0.9 %
1000 SYRINGE (ML) INJECTION ONCE
Refills: 0 | Status: COMPLETED | OUTPATIENT
Start: 2024-07-16 | End: 2024-07-16

## 2024-07-16 RX ADMIN — Medication 1000 MILLILITER(S): at 18:50

## 2024-07-21 ENCOUNTER — EMERGENCY (EMERGENCY)
Facility: HOSPITAL | Age: 69
LOS: 0 days | Discharge: HOME | End: 2024-07-21
Attending: EMERGENCY MEDICINE
Payer: MEDICARE

## 2024-07-21 VITALS
SYSTOLIC BLOOD PRESSURE: 109 MMHG | TEMPERATURE: 98 F | HEART RATE: 50 BPM | HEIGHT: 62 IN | OXYGEN SATURATION: 99 % | DIASTOLIC BLOOD PRESSURE: 68 MMHG | RESPIRATION RATE: 19 BRPM

## 2024-07-21 VITALS
TEMPERATURE: 98 F | OXYGEN SATURATION: 99 % | HEART RATE: 62 BPM | DIASTOLIC BLOOD PRESSURE: 78 MMHG | SYSTOLIC BLOOD PRESSURE: 137 MMHG | RESPIRATION RATE: 18 BRPM

## 2024-07-21 DIAGNOSIS — Z90.710 ACQUIRED ABSENCE OF BOTH CERVIX AND UTERUS: Chronic | ICD-10-CM

## 2024-07-21 DIAGNOSIS — Z82.69 FAMILY HISTORY OF OTHER DISEASES OF THE MUSCULOSKELETAL SYSTEM AND CONNECTIVE TISSUE: Chronic | ICD-10-CM

## 2024-07-21 DIAGNOSIS — I87.2 VENOUS INSUFFICIENCY (CHRONIC) (PERIPHERAL): ICD-10-CM

## 2024-07-21 DIAGNOSIS — Z88.0 ALLERGY STATUS TO PENICILLIN: ICD-10-CM

## 2024-07-21 DIAGNOSIS — Z96.659 PRESENCE OF UNSPECIFIED ARTIFICIAL KNEE JOINT: Chronic | ICD-10-CM

## 2024-07-21 DIAGNOSIS — Z88.1 ALLERGY STATUS TO OTHER ANTIBIOTIC AGENTS: ICD-10-CM

## 2024-07-21 DIAGNOSIS — Z88.6 ALLERGY STATUS TO ANALGESIC AGENT: ICD-10-CM

## 2024-07-21 DIAGNOSIS — M79.604 PAIN IN RIGHT LEG: ICD-10-CM

## 2024-07-21 DIAGNOSIS — Z88.5 ALLERGY STATUS TO NARCOTIC AGENT: ICD-10-CM

## 2024-07-21 DIAGNOSIS — L03.115 CELLULITIS OF RIGHT LOWER LIMB: ICD-10-CM

## 2024-07-21 PROCEDURE — 96372 THER/PROPH/DIAG INJ SC/IM: CPT

## 2024-07-21 PROCEDURE — 99283 EMERGENCY DEPT VISIT LOW MDM: CPT

## 2024-07-21 PROCEDURE — 99283 EMERGENCY DEPT VISIT LOW MDM: CPT | Mod: FS

## 2024-07-21 RX ORDER — KETOROLAC TROMETHAMINE 30 MG/ML
30 INJECTION, SOLUTION INTRAMUSCULAR ONCE
Refills: 0 | Status: DISCONTINUED | OUTPATIENT
Start: 2024-07-21 | End: 2024-07-21

## 2024-07-21 RX ADMIN — KETOROLAC TROMETHAMINE 30 MILLIGRAM(S): 30 INJECTION, SOLUTION INTRAMUSCULAR at 13:15

## 2024-07-21 NOTE — ED PROVIDER NOTE - PLAN OF CARE
stable, in nad, nontoxic appearing  no other complaints  strict return precautions and pt verbalizes understanding and agrees to plan     will redress wound and she has appt with vascular tomorrow, as she has recurring complaint and chronic sx's of her right leg pain and overlying skin changes 2ndry to chronic venous insufficiency.

## 2024-07-21 NOTE — ED PROVIDER NOTE - CARE PLAN
Principal Discharge DX:	Right leg pain  Assessment and plan of treatment:	stable, in nad, nontoxic appearing  no other complaints  strict return precautions and pt verbalizes understanding and agrees to plan     will redress wound and she has appt with vascular tomorrow, as she has recurring complaint and chronic sx's of her right leg pain and overlying skin changes 2ndry to chronic venous insufficiency.  Secondary Diagnosis:	Chronic venous insufficiency   1

## 2024-07-21 NOTE — ED PROVIDER NOTE - PATIENT PORTAL LINK FT
You can access the FollowMyHealth Patient Portal offered by [facility] by registering at the following website: [email or web address]. By joining [facility]’s FollowMyHealth portal, you will also be able to view your health information using other applications (apps) compatible with our system.

## 2024-07-21 NOTE — ED PROVIDER NOTE - CLINICAL SUMMARY MEDICAL DECISION MAKING FREE TEXT BOX
Patient presented with right leg pain and for wound check for ongoing wound to the right lower extremity.  Patient was seen in the ED for the same at which point vascular had cleared her for outpatient follow-up.  Patient has upcoming follow-up scheduled for tomorrow with vascular but want to make sure that there was no intervention to be done today.  Otherwise afebrile, hemodynamically stable, neurovascularly intact, wound is clean without evidence of infection, no fluctuance.  Given the above no emergent intervention recommended at this time and will discharge home with scheduled outpatient vascular follow-up.  Patient agreeable with plan. Agrees to return to ED for any new or worsening symptoms.

## 2024-07-21 NOTE — ED PROVIDER NOTE - OBJECTIVE STATEMENT
67 y/o female with a chronic venous insufficiency and cellulitis presents to the ED for similar complaint of right leg pain and wound check. She was seen on 07/16 for the same complaint and was d/lori after vascular consult. She reports she has vascular appt tomorrow, but is here for just pain surrounding her shin.     Denies f/c/n/v/d, ha, dizziness, cp, sob, difficulty breathing, abd pain, urinary/bowel complaints, ams, confusion or any other sx's

## 2024-07-21 NOTE — ED PROVIDER NOTE - SKIN, MLM
right LE / leg shows sign of chronic venous insufficiency with mild excoriation and sloughing and localized erythema. chronic wound changes. no abscess or ulceration formation. no overt pitting edema or calf pain in b/l LE

## 2024-07-22 ENCOUNTER — APPOINTMENT (OUTPATIENT)
Dept: VASCULAR SURGERY | Facility: CLINIC | Age: 69
End: 2024-07-22
Payer: MEDICARE

## 2024-07-22 LAB
CULTURE RESULTS: SIGNIFICANT CHANGE UP
CULTURE RESULTS: SIGNIFICANT CHANGE UP
SPECIMEN SOURCE: SIGNIFICANT CHANGE UP
SPECIMEN SOURCE: SIGNIFICANT CHANGE UP

## 2024-07-22 PROCEDURE — 99212 OFFICE O/P EST SF 10 MIN: CPT

## 2024-07-22 NOTE — ASSESSMENT
[FreeTextEntry1] : 67 y/o F presents for follow up of RLE venous ulcer, was on unna boot therapy but now has skin breakdown.  Advised daily wound care with Xeroform dressing, kerlix and ace bandage wrap. Also advised to see Dermatology for eczema on left foot.  Follow up in one week.

## 2024-07-22 NOTE — HISTORY OF PRESENT ILLNESS
[FreeTextEntry1] : 69 y/o F presents for follow up of RLE venous ulcer, developed skin breakdown from unna boot, was in ED yesterday.

## 2024-07-25 ENCOUNTER — APPOINTMENT (OUTPATIENT)
Dept: BURN CARE | Facility: CLINIC | Age: 69
End: 2024-07-25

## 2024-07-26 ENCOUNTER — NON-APPOINTMENT (OUTPATIENT)
Age: 69
End: 2024-07-26

## 2024-07-29 ENCOUNTER — APPOINTMENT (OUTPATIENT)
Dept: VASCULAR SURGERY | Facility: CLINIC | Age: 69
End: 2024-07-29
Payer: MEDICARE

## 2024-07-29 DIAGNOSIS — L97.919 VARICOSE VEINS OF RIGHT LOWER EXTREMITY WITH ULCER OF UNSPECIFIED SITE: ICD-10-CM

## 2024-07-29 DIAGNOSIS — I83.019 VARICOSE VEINS OF RIGHT LOWER EXTREMITY WITH ULCER OF UNSPECIFIED SITE: ICD-10-CM

## 2024-07-29 PROCEDURE — 99212 OFFICE O/P EST SF 10 MIN: CPT

## 2024-07-29 NOTE — ASSESSMENT
[FreeTextEntry1] : 69 y/o F presents for follow up of RLE venous ulcer, developed skin breakdown from unna boot, and switched wound care from unna boot to daily wound care with Xeroform and compression wrap.  Advised to continue daily wound care with Xeroform dressing, kerlix and ace bandage wrap.  Follow up in 2 weeks.

## 2024-07-29 NOTE — HISTORY OF PRESENT ILLNESS
[FreeTextEntry1] : 69 y/o F presents for follow up of RLE venous ulcer, developed skin breakdown from unna boot, and switched wound care from unna boot to daily wound care with Xeroform and compression wrap.

## 2024-08-03 ENCOUNTER — EMERGENCY (EMERGENCY)
Facility: HOSPITAL | Age: 69
LOS: 0 days | Discharge: HOME | End: 2024-08-03
Attending: STUDENT IN AN ORGANIZED HEALTH CARE EDUCATION/TRAINING PROGRAM
Payer: MEDICARE

## 2024-08-03 VITALS
SYSTOLIC BLOOD PRESSURE: 122 MMHG | DIASTOLIC BLOOD PRESSURE: 66 MMHG | OXYGEN SATURATION: 96 % | TEMPERATURE: 99 F | HEART RATE: 66 BPM | RESPIRATION RATE: 18 BRPM | WEIGHT: 139.99 LBS | HEIGHT: 62 IN

## 2024-08-03 VITALS
DIASTOLIC BLOOD PRESSURE: 68 MMHG | RESPIRATION RATE: 18 BRPM | SYSTOLIC BLOOD PRESSURE: 107 MMHG | HEART RATE: 66 BPM | TEMPERATURE: 98 F | OXYGEN SATURATION: 97 %

## 2024-08-03 DIAGNOSIS — Z88.1 ALLERGY STATUS TO OTHER ANTIBIOTIC AGENTS: ICD-10-CM

## 2024-08-03 DIAGNOSIS — Z90.710 ACQUIRED ABSENCE OF BOTH CERVIX AND UTERUS: Chronic | ICD-10-CM

## 2024-08-03 DIAGNOSIS — Z82.69 FAMILY HISTORY OF OTHER DISEASES OF THE MUSCULOSKELETAL SYSTEM AND CONNECTIVE TISSUE: Chronic | ICD-10-CM

## 2024-08-03 DIAGNOSIS — Z88.0 ALLERGY STATUS TO PENICILLIN: ICD-10-CM

## 2024-08-03 DIAGNOSIS — Z96.659 PRESENCE OF UNSPECIFIED ARTIFICIAL KNEE JOINT: Chronic | ICD-10-CM

## 2024-08-03 DIAGNOSIS — Z88.6 ALLERGY STATUS TO ANALGESIC AGENT: ICD-10-CM

## 2024-08-03 DIAGNOSIS — Z88.5 ALLERGY STATUS TO NARCOTIC AGENT: ICD-10-CM

## 2024-08-03 DIAGNOSIS — I10 ESSENTIAL (PRIMARY) HYPERTENSION: ICD-10-CM

## 2024-08-03 DIAGNOSIS — L03.115 CELLULITIS OF RIGHT LOWER LIMB: ICD-10-CM

## 2024-08-03 DIAGNOSIS — Z86.73 PERSONAL HISTORY OF TRANSIENT ISCHEMIC ATTACK (TIA), AND CEREBRAL INFARCTION WITHOUT RESIDUAL DEFICITS: ICD-10-CM

## 2024-08-03 DIAGNOSIS — M79.89 OTHER SPECIFIED SOFT TISSUE DISORDERS: ICD-10-CM

## 2024-08-03 DIAGNOSIS — I87.8 OTHER SPECIFIED DISORDERS OF VEINS: ICD-10-CM

## 2024-08-03 DIAGNOSIS — M32.9 SYSTEMIC LUPUS ERYTHEMATOSUS, UNSPECIFIED: ICD-10-CM

## 2024-08-03 DIAGNOSIS — E03.9 HYPOTHYROIDISM, UNSPECIFIED: ICD-10-CM

## 2024-08-03 LAB
ALBUMIN SERPL ELPH-MCNC: 3.5 G/DL — SIGNIFICANT CHANGE UP (ref 3.5–5.2)
ALP SERPL-CCNC: 72 U/L — SIGNIFICANT CHANGE UP (ref 30–115)
ALT FLD-CCNC: 8 U/L — SIGNIFICANT CHANGE UP (ref 0–41)
ANION GAP SERPL CALC-SCNC: 12 MMOL/L — SIGNIFICANT CHANGE UP (ref 7–14)
AST SERPL-CCNC: 15 U/L — SIGNIFICANT CHANGE UP (ref 0–41)
BASOPHILS # BLD AUTO: 0.01 K/UL — SIGNIFICANT CHANGE UP (ref 0–0.2)
BASOPHILS NFR BLD AUTO: 0.1 % — SIGNIFICANT CHANGE UP (ref 0–1)
BILIRUB SERPL-MCNC: 0.3 MG/DL — SIGNIFICANT CHANGE UP (ref 0.2–1.2)
BUN SERPL-MCNC: 16 MG/DL — SIGNIFICANT CHANGE UP (ref 10–20)
CALCIUM SERPL-MCNC: 8.7 MG/DL — SIGNIFICANT CHANGE UP (ref 8.4–10.5)
CHLORIDE SERPL-SCNC: 107 MMOL/L — SIGNIFICANT CHANGE UP (ref 98–110)
CO2 SERPL-SCNC: 23 MMOL/L — SIGNIFICANT CHANGE UP (ref 17–32)
CREAT SERPL-MCNC: 0.8 MG/DL — SIGNIFICANT CHANGE UP (ref 0.7–1.5)
EGFR: 81 ML/MIN/1.73M2 — SIGNIFICANT CHANGE UP
EGFR: 81 ML/MIN/1.73M2 — SIGNIFICANT CHANGE UP
EOSINOPHIL # BLD AUTO: 0.09 K/UL — SIGNIFICANT CHANGE UP (ref 0–0.7)
EOSINOPHIL NFR BLD AUTO: 0.9 % — SIGNIFICANT CHANGE UP (ref 0–8)
GLUCOSE SERPL-MCNC: 93 MG/DL — SIGNIFICANT CHANGE UP (ref 70–99)
HCT VFR BLD CALC: 33.6 % — LOW (ref 37–47)
HGB BLD-MCNC: 10.5 G/DL — LOW (ref 12–16)
IMM GRANULOCYTES NFR BLD AUTO: 0.4 % — HIGH (ref 0.1–0.3)
LACTATE SERPL-SCNC: 0.9 MMOL/L — SIGNIFICANT CHANGE UP (ref 0.7–2)
LYMPHOCYTES # BLD AUTO: 1.17 K/UL — LOW (ref 1.2–3.4)
LYMPHOCYTES # BLD AUTO: 12 % — LOW (ref 20.5–51.1)
MCHC RBC-ENTMCNC: 26.3 PG — LOW (ref 27–31)
MCHC RBC-ENTMCNC: 31.3 G/DL — LOW (ref 32–37)
MCV RBC AUTO: 84.2 FL — SIGNIFICANT CHANGE UP (ref 81–99)
MONOCYTES # BLD AUTO: 0.65 K/UL — HIGH (ref 0.1–0.6)
MONOCYTES NFR BLD AUTO: 6.6 % — SIGNIFICANT CHANGE UP (ref 1.7–9.3)
NEUTROPHILS # BLD AUTO: 7.83 K/UL — HIGH (ref 1.4–6.5)
NEUTROPHILS NFR BLD AUTO: 80 % — HIGH (ref 42.2–75.2)
NRBC # BLD: 0 /100 WBCS — SIGNIFICANT CHANGE UP (ref 0–0)
NRBC BLD-RTO: 0 /100 WBCS — SIGNIFICANT CHANGE UP (ref 0–0)
PLATELET # BLD AUTO: 272 K/UL — SIGNIFICANT CHANGE UP (ref 130–400)
PMV BLD: 9.9 FL — SIGNIFICANT CHANGE UP (ref 7.4–10.4)
POTASSIUM SERPL-MCNC: 4 MMOL/L — SIGNIFICANT CHANGE UP (ref 3.5–5)
POTASSIUM SERPL-SCNC: 4 MMOL/L — SIGNIFICANT CHANGE UP (ref 3.5–5)
PROT SERPL-MCNC: 6.4 G/DL — SIGNIFICANT CHANGE UP (ref 6–8)
RBC # BLD: 3.99 M/UL — LOW (ref 4.2–5.4)
RBC # FLD: 14.5 % — SIGNIFICANT CHANGE UP (ref 11.5–14.5)
SODIUM SERPL-SCNC: 142 MMOL/L — SIGNIFICANT CHANGE UP (ref 135–146)
WBC # BLD: 9.79 K/UL — SIGNIFICANT CHANGE UP (ref 4.8–10.8)
WBC # FLD AUTO: 9.79 K/UL — SIGNIFICANT CHANGE UP (ref 4.8–10.8)

## 2024-08-03 PROCEDURE — 93971 EXTREMITY STUDY: CPT | Mod: 26,RT

## 2024-08-03 PROCEDURE — 93971 EXTREMITY STUDY: CPT | Mod: RT

## 2024-08-03 PROCEDURE — 99284 EMERGENCY DEPT VISIT MOD MDM: CPT

## 2024-08-03 PROCEDURE — 36415 COLL VENOUS BLD VENIPUNCTURE: CPT

## 2024-08-03 PROCEDURE — 80053 COMPREHEN METABOLIC PANEL: CPT

## 2024-08-03 PROCEDURE — 83605 ASSAY OF LACTIC ACID: CPT

## 2024-08-03 PROCEDURE — 85025 COMPLETE CBC W/AUTO DIFF WBC: CPT

## 2024-08-03 PROCEDURE — 36000 PLACE NEEDLE IN VEIN: CPT

## 2024-08-03 PROCEDURE — 99284 EMERGENCY DEPT VISIT MOD MDM: CPT | Mod: 25

## 2024-08-03 RX ORDER — DOXYCYCLINE HYCLATE 100 MG
100 TABLET ORAL ONCE
Refills: 0 | Status: COMPLETED | OUTPATIENT
Start: 2024-08-03 | End: 2024-08-03

## 2024-08-03 RX ORDER — IBUPROFEN 200 MG
600 TABLET ORAL ONCE
Refills: 0 | Status: COMPLETED | OUTPATIENT
Start: 2024-08-03 | End: 2024-08-03

## 2024-08-03 RX ORDER — DOXYCYCLINE HYCLATE 100 MG
1 TABLET ORAL
Qty: 14 | Refills: 0
Start: 2024-08-03 | End: 2024-08-09

## 2024-08-03 RX ADMIN — Medication 100 MILLIGRAM(S): at 17:16

## 2024-08-03 RX ADMIN — Medication 600 MILLIGRAM(S): at 14:55

## 2024-08-12 ENCOUNTER — APPOINTMENT (OUTPATIENT)
Dept: VASCULAR SURGERY | Facility: CLINIC | Age: 69
End: 2024-08-12
Payer: MEDICARE

## 2024-08-12 DIAGNOSIS — I87.2 VENOUS INSUFFICIENCY (CHRONIC) (PERIPHERAL): ICD-10-CM

## 2024-08-12 PROCEDURE — 99213 OFFICE O/P EST LOW 20 MIN: CPT

## 2024-08-12 RX ORDER — CLOBETASOL PROPIONATE 0.5 MG/G
0.05 CREAM TOPICAL TWICE DAILY
Qty: 1 | Refills: 2 | Status: ACTIVE | COMMUNITY
Start: 2024-08-12 | End: 1900-01-01

## 2024-08-12 NOTE — HISTORY OF PRESENT ILLNESS
[FreeTextEntry1] : 67 y/o F presents for follow up of RLE venous ulcer, developed skin breakdown from unna boot, and switched wound care from unna boot to daily wound care with Xeroform and compression wrap. C/o RLE swelling, seen in ED on 8/3/24, venous duplex negative for DVT.

## 2024-08-12 NOTE — ASSESSMENT
[FreeTextEntry1] : 67 y/o F presents for follow up of RLE venous ulcer, developed skin breakdown from unna boot, and switched wound care from unna boot to daily wound care with Xeroform and compression wrap. C/o RLE swelling, seen in ED on 8/3/24, venous duplex negative for DVT in the RLE.  Wounds are healed, has RLE swelling and stasis dermatitis.  Clobetasol prescribed, compression wrap applied.  Advised to continue with daily compression.  Follow up in 2 weeks.

## 2024-08-26 ENCOUNTER — APPOINTMENT (OUTPATIENT)
Dept: VASCULAR SURGERY | Facility: CLINIC | Age: 69
End: 2024-08-26
Payer: MEDICARE

## 2024-08-26 DIAGNOSIS — L97.919 VARICOSE VEINS OF RIGHT LOWER EXTREMITY WITH ULCER OF UNSPECIFIED SITE: ICD-10-CM

## 2024-08-26 DIAGNOSIS — I83.019 VARICOSE VEINS OF RIGHT LOWER EXTREMITY WITH ULCER OF UNSPECIFIED SITE: ICD-10-CM

## 2024-08-26 PROCEDURE — 99202 OFFICE O/P NEW SF 15 MIN: CPT

## 2024-08-26 PROCEDURE — 99212 OFFICE O/P EST SF 10 MIN: CPT

## 2024-08-26 NOTE — PHYSICAL EXAM
[2+] : right 2+ [FreeTextEntry1] : Right leg posterior calf superficial ulcers present with some mild weeping edema.  No significant cellulitis.

## 2024-08-26 NOTE — HISTORY OF PRESENT ILLNESS
[FreeTextEntry1] : The patient is a 68-year-old female with a history of a right leg venous stasis ulcer and weeping edema.  The patient was receiving weekly Unna boot therapy however her wounds had some excessive drainage.  The patient was switched to daily wound care with Xeroform and Ace wrap compression.  Today she presents for follow-up evaluation.  Her right lower extremity is overall improved still has some superficial wounds present with some mild drainage.

## 2024-08-26 NOTE — ASSESSMENT
[FreeTextEntry1] : The patient is a [age]-year-old female who is status post right lower extremity venous stasis ulcer currently improving on daily wound care with Xeroform and Ace wrap compression.  From my standpoint I recommend continuing with clobetasol cream Ace wrap compression and Xeroform.  I would like to see her back in my office in 1 month's time or sooner if any new symptoms develop.

## 2024-09-23 ENCOUNTER — APPOINTMENT (OUTPATIENT)
Dept: VASCULAR SURGERY | Facility: CLINIC | Age: 69
End: 2024-09-23
Payer: MEDICARE

## 2024-09-23 DIAGNOSIS — L97.919 VARICOSE VEINS OF RIGHT LOWER EXTREMITY WITH ULCER OF UNSPECIFIED SITE: ICD-10-CM

## 2024-09-23 DIAGNOSIS — I87.2 VENOUS INSUFFICIENCY (CHRONIC) (PERIPHERAL): ICD-10-CM

## 2024-09-23 DIAGNOSIS — I83.019 VARICOSE VEINS OF RIGHT LOWER EXTREMITY WITH ULCER OF UNSPECIFIED SITE: ICD-10-CM

## 2024-09-23 PROCEDURE — 99213 OFFICE O/P EST LOW 20 MIN: CPT

## 2024-09-23 RX ORDER — CLOBETASOL PROPIONATE 0.5 MG/G
0.05 CREAM TOPICAL TWICE DAILY
Qty: 1 | Refills: 2 | Status: ACTIVE | COMMUNITY
Start: 2024-09-23 | End: 1900-01-01

## 2024-09-23 RX ORDER — SILVER SULFADIAZINE 10 MG/G
1 CREAM TOPICAL TWICE DAILY
Qty: 1 | Refills: 0 | Status: ACTIVE | COMMUNITY
Start: 2024-09-23 | End: 1900-01-01

## 2024-09-23 NOTE — REASON FOR VISIT
[Follow-Up: _____] : a [unfilled] follow-up visit [FreeTextEntry1] : Right leg venous stasis ulcer new left foot superficial dermatitis

## 2024-09-23 NOTE — ASSESSMENT
[FreeTextEntry1] : The patient is a 69-year-old female who is status post right lower extremity venous stasis ulcer in the posterior calf.  The patient has some weeping edema still present her wounds are overall improving from her last visit.  I recommend starting Silvadene on her posterior wound with Adaptic and daily Ace wrap compression.  The patient has a dermatitis presentation to her left foot I recommend starting clobetasol cream topically to her left foot for the next 7 to 10 days.  I would like to see the patient back in my office in 1 month's time or sooner if her symptoms worsen.  Continue with local wound care Right lower extremity daily Silvadene/Adaptic/Kerlix ABD pads to the posterior calf/Ace wrap compression from the foot to the knee.  Left foot start clobetasol cream topically q12 hr for 10 days

## 2024-09-23 NOTE — HISTORY OF PRESENT ILLNESS
[FreeTextEntry1] : The patient is a 69-year-old female with a history of a right leg venous stasis ulcer and weeping edema.  The patient was receiving weekly Unna boot therapy however her wounds had some excessive drainage.  The patient was switched to daily wound care with Xeroform and Ace wrap compression.  Today she presents for follow-up evaluation.  Her right lower extremity is overall improved still has some superficial wounds present in the posterior calf and today presents with a new left foot dermatitis.  She complains that it is itchy.  The patient has known venous insufficiency and venous stasis skin changes.

## 2024-09-23 NOTE — PHYSICAL EXAM
[2+] : right 2+ [Ankle Swelling (On Exam)] : present [Ankle Swelling Bilaterally] : bilaterally  [Ankle Swelling On The Left] : moderate [] : bilaterally [Ankle Swelling On The Right] : mild [FreeTextEntry1] : Right leg posterior calf superficial ulcers present with some mild weeping edema.  No significant cellulitis. Left foot dorsum positive dermatitis present

## 2024-10-21 ENCOUNTER — APPOINTMENT (OUTPATIENT)
Dept: VASCULAR SURGERY | Facility: CLINIC | Age: 69
End: 2024-10-21
Payer: MEDICARE

## 2024-10-21 DIAGNOSIS — L03.115 CELLULITIS OF LEFT LOWER LIMB: ICD-10-CM

## 2024-10-21 DIAGNOSIS — L03.115 CELLULITIS OF RIGHT LOWER LIMB: ICD-10-CM

## 2024-10-21 DIAGNOSIS — L03.116 CELLULITIS OF LEFT LOWER LIMB: ICD-10-CM

## 2024-10-21 PROCEDURE — 99213 OFFICE O/P EST LOW 20 MIN: CPT

## 2024-10-21 RX ORDER — SULFAMETHOXAZOLE AND TRIMETHOPRIM 400; 80 MG/1; MG/1
400-80 TABLET ORAL TWICE DAILY
Qty: 20 | Refills: 0 | Status: ACTIVE | COMMUNITY
Start: 2024-10-21 | End: 1900-01-01

## 2024-10-28 ENCOUNTER — APPOINTMENT (OUTPATIENT)
Dept: VASCULAR SURGERY | Facility: CLINIC | Age: 69
End: 2024-10-28
Payer: MEDICARE

## 2024-10-28 DIAGNOSIS — I83.019 VARICOSE VEINS OF RIGHT LOWER EXTREMITY WITH ULCER OF UNSPECIFIED SITE: ICD-10-CM

## 2024-10-28 DIAGNOSIS — L97.919 VARICOSE VEINS OF RIGHT LOWER EXTREMITY WITH ULCER OF UNSPECIFIED SITE: ICD-10-CM

## 2024-10-28 PROCEDURE — 99212 OFFICE O/P EST SF 10 MIN: CPT

## 2024-10-28 RX ORDER — BISMUTH TRIBROMOPH/PETROLATUM 5"X9"
BANDAGE TOPICAL DAILY
Qty: 30 | Refills: 0 | Status: ACTIVE | COMMUNITY
Start: 2024-10-28 | End: 1900-01-01

## 2024-11-22 ENCOUNTER — APPOINTMENT (OUTPATIENT)
Dept: PULMONOLOGY | Facility: CLINIC | Age: 69
End: 2024-11-22
Payer: MEDICARE

## 2024-11-22 VITALS
BODY MASS INDEX: 25.58 KG/M2 | SYSTOLIC BLOOD PRESSURE: 118 MMHG | RESPIRATION RATE: 14 BRPM | HEIGHT: 62 IN | WEIGHT: 139 LBS | OXYGEN SATURATION: 99 % | DIASTOLIC BLOOD PRESSURE: 68 MMHG | HEART RATE: 69 BPM

## 2024-11-22 DIAGNOSIS — F17.210 NICOTINE DEPENDENCE, CIGARETTES, UNCOMPLICATED: ICD-10-CM

## 2024-11-22 DIAGNOSIS — J44.9 CHRONIC OBSTRUCTIVE PULMONARY DISEASE, UNSPECIFIED: ICD-10-CM

## 2024-11-22 DIAGNOSIS — G47.30 SLEEP APNEA, UNSPECIFIED: ICD-10-CM

## 2024-11-22 PROCEDURE — 99406 BEHAV CHNG SMOKING 3-10 MIN: CPT

## 2024-11-22 PROCEDURE — G0296 VISIT TO DETERM LDCT ELIG: CPT

## 2024-11-22 PROCEDURE — 99204 OFFICE O/P NEW MOD 45 MIN: CPT | Mod: 25

## 2024-11-25 ENCOUNTER — INPATIENT (INPATIENT)
Facility: HOSPITAL | Age: 69
LOS: 3 days | Discharge: NURSING, REHAB OR LONG-TERM CARE | DRG: 603 | End: 2024-11-29
Attending: INTERNAL MEDICINE | Admitting: STUDENT IN AN ORGANIZED HEALTH CARE EDUCATION/TRAINING PROGRAM
Payer: MEDICARE

## 2024-11-25 ENCOUNTER — APPOINTMENT (OUTPATIENT)
Dept: VASCULAR SURGERY | Facility: CLINIC | Age: 69
End: 2024-11-25
Payer: MEDICARE

## 2024-11-25 VITALS
RESPIRATION RATE: 20 BRPM | HEIGHT: 62 IN | OXYGEN SATURATION: 99 % | DIASTOLIC BLOOD PRESSURE: 68 MMHG | TEMPERATURE: 98 F | HEART RATE: 59 BPM | SYSTOLIC BLOOD PRESSURE: 106 MMHG | WEIGHT: 138.89 LBS

## 2024-11-25 DIAGNOSIS — Z90.710 ACQUIRED ABSENCE OF BOTH CERVIX AND UTERUS: Chronic | ICD-10-CM

## 2024-11-25 DIAGNOSIS — L03.90 CELLULITIS, UNSPECIFIED: ICD-10-CM

## 2024-11-25 DIAGNOSIS — L03.115 CELLULITIS OF RIGHT LOWER LIMB: ICD-10-CM

## 2024-11-25 DIAGNOSIS — I83.019 VARICOSE VEINS OF RIGHT LOWER EXTREMITY WITH ULCER OF UNSPECIFIED SITE: ICD-10-CM

## 2024-11-25 DIAGNOSIS — L97.919 VARICOSE VEINS OF RIGHT LOWER EXTREMITY WITH ULCER OF UNSPECIFIED SITE: ICD-10-CM

## 2024-11-25 DIAGNOSIS — Z96.659 PRESENCE OF UNSPECIFIED ARTIFICIAL KNEE JOINT: Chronic | ICD-10-CM

## 2024-11-25 DIAGNOSIS — Z82.69 FAMILY HISTORY OF OTHER DISEASES OF THE MUSCULOSKELETAL SYSTEM AND CONNECTIVE TISSUE: Chronic | ICD-10-CM

## 2024-11-25 LAB
ALBUMIN SERPL ELPH-MCNC: 3.9 G/DL — SIGNIFICANT CHANGE UP (ref 3.5–5.2)
ALP SERPL-CCNC: 97 U/L — SIGNIFICANT CHANGE UP (ref 30–115)
ALT FLD-CCNC: 7 U/L — SIGNIFICANT CHANGE UP (ref 0–41)
ANION GAP SERPL CALC-SCNC: 13 MMOL/L — SIGNIFICANT CHANGE UP (ref 7–14)
AST SERPL-CCNC: 19 U/L — SIGNIFICANT CHANGE UP (ref 0–41)
BASOPHILS # BLD AUTO: 0.02 K/UL — SIGNIFICANT CHANGE UP (ref 0–0.2)
BASOPHILS NFR BLD AUTO: 0.2 % — SIGNIFICANT CHANGE UP (ref 0–1)
BILIRUB SERPL-MCNC: 0.3 MG/DL — SIGNIFICANT CHANGE UP (ref 0.2–1.2)
BUN SERPL-MCNC: 14 MG/DL — SIGNIFICANT CHANGE UP (ref 10–20)
CALCIUM SERPL-MCNC: 9.4 MG/DL — SIGNIFICANT CHANGE UP (ref 8.4–10.5)
CHLORIDE SERPL-SCNC: 103 MMOL/L — SIGNIFICANT CHANGE UP (ref 98–110)
CO2 SERPL-SCNC: 25 MMOL/L — SIGNIFICANT CHANGE UP (ref 17–32)
CREAT SERPL-MCNC: 0.8 MG/DL — SIGNIFICANT CHANGE UP (ref 0.7–1.5)
EGFR: 80 ML/MIN/1.73M2 — SIGNIFICANT CHANGE UP
EOSINOPHIL # BLD AUTO: 0.14 K/UL — SIGNIFICANT CHANGE UP (ref 0–0.7)
EOSINOPHIL NFR BLD AUTO: 1.7 % — SIGNIFICANT CHANGE UP (ref 0–8)
GLUCOSE SERPL-MCNC: 90 MG/DL — SIGNIFICANT CHANGE UP (ref 70–99)
HCT VFR BLD CALC: 37.7 % — SIGNIFICANT CHANGE UP (ref 37–47)
HGB BLD-MCNC: 11.7 G/DL — LOW (ref 12–16)
IMM GRANULOCYTES NFR BLD AUTO: 0.5 % — HIGH (ref 0.1–0.3)
LACTATE SERPL-SCNC: 0.9 MMOL/L — SIGNIFICANT CHANGE UP (ref 0.7–2)
LYMPHOCYTES # BLD AUTO: 1.19 K/UL — LOW (ref 1.2–3.4)
LYMPHOCYTES # BLD AUTO: 14.1 % — LOW (ref 20.5–51.1)
MCHC RBC-ENTMCNC: 26.1 PG — LOW (ref 27–31)
MCHC RBC-ENTMCNC: 31 G/DL — LOW (ref 32–37)
MCV RBC AUTO: 84.2 FL — SIGNIFICANT CHANGE UP (ref 81–99)
MONOCYTES # BLD AUTO: 0.59 K/UL — SIGNIFICANT CHANGE UP (ref 0.1–0.6)
MONOCYTES NFR BLD AUTO: 7 % — SIGNIFICANT CHANGE UP (ref 1.7–9.3)
NEUTROPHILS # BLD AUTO: 6.43 K/UL — SIGNIFICANT CHANGE UP (ref 1.4–6.5)
NEUTROPHILS NFR BLD AUTO: 76.5 % — HIGH (ref 42.2–75.2)
NRBC # BLD: 0 /100 WBCS — SIGNIFICANT CHANGE UP (ref 0–0)
PLATELET # BLD AUTO: 257 K/UL — SIGNIFICANT CHANGE UP (ref 130–400)
PMV BLD: 10.2 FL — SIGNIFICANT CHANGE UP (ref 7.4–10.4)
POTASSIUM SERPL-MCNC: 3.7 MMOL/L — SIGNIFICANT CHANGE UP (ref 3.5–5)
POTASSIUM SERPL-SCNC: 3.7 MMOL/L — SIGNIFICANT CHANGE UP (ref 3.5–5)
PROT SERPL-MCNC: 7.6 G/DL — SIGNIFICANT CHANGE UP (ref 6–8)
RBC # BLD: 4.48 M/UL — SIGNIFICANT CHANGE UP (ref 4.2–5.4)
RBC # FLD: 15.7 % — HIGH (ref 11.5–14.5)
SODIUM SERPL-SCNC: 141 MMOL/L — SIGNIFICANT CHANGE UP (ref 135–146)
WBC # BLD: 8.41 K/UL — SIGNIFICANT CHANGE UP (ref 4.8–10.8)
WBC # FLD AUTO: 8.41 K/UL — SIGNIFICANT CHANGE UP (ref 4.8–10.8)

## 2024-11-25 PROCEDURE — 82607 VITAMIN B-12: CPT

## 2024-11-25 PROCEDURE — 36415 COLL VENOUS BLD VENIPUNCTURE: CPT

## 2024-11-25 PROCEDURE — 82728 ASSAY OF FERRITIN: CPT

## 2024-11-25 PROCEDURE — 99214 OFFICE O/P EST MOD 30 MIN: CPT

## 2024-11-25 PROCEDURE — 83550 IRON BINDING TEST: CPT

## 2024-11-25 PROCEDURE — 83540 ASSAY OF IRON: CPT

## 2024-11-25 PROCEDURE — 87641 MR-STAPH DNA AMP PROBE: CPT

## 2024-11-25 PROCEDURE — 86850 RBC ANTIBODY SCREEN: CPT

## 2024-11-25 PROCEDURE — 84100 ASSAY OF PHOSPHORUS: CPT

## 2024-11-25 PROCEDURE — 99222 1ST HOSP IP/OBS MODERATE 55: CPT

## 2024-11-25 PROCEDURE — 85025 COMPLETE CBC W/AUTO DIFF WBC: CPT

## 2024-11-25 PROCEDURE — 82746 ASSAY OF FOLIC ACID SERUM: CPT

## 2024-11-25 PROCEDURE — 83735 ASSAY OF MAGNESIUM: CPT

## 2024-11-25 PROCEDURE — 99285 EMERGENCY DEPT VISIT HI MDM: CPT | Mod: FS

## 2024-11-25 PROCEDURE — 84466 ASSAY OF TRANSFERRIN: CPT

## 2024-11-25 PROCEDURE — 84145 PROCALCITONIN (PCT): CPT

## 2024-11-25 PROCEDURE — 80202 ASSAY OF VANCOMYCIN: CPT

## 2024-11-25 PROCEDURE — 86901 BLOOD TYPING SEROLOGIC RH(D): CPT

## 2024-11-25 PROCEDURE — 73590 X-RAY EXAM OF LOWER LEG: CPT | Mod: 26,RT

## 2024-11-25 PROCEDURE — 87640 STAPH A DNA AMP PROBE: CPT

## 2024-11-25 PROCEDURE — 80053 COMPREHEN METABOLIC PANEL: CPT

## 2024-11-25 PROCEDURE — 86900 BLOOD TYPING SEROLOGIC ABO: CPT

## 2024-11-25 RX ORDER — ACETAMINOPHEN, DIPHENHYDRAMINE HCL, PHENYLEPHRINE HCL 325; 25; 5 MG/1; MG/1; MG/1
10 TABLET ORAL AT BEDTIME
Refills: 0 | Status: DISCONTINUED | OUTPATIENT
Start: 2024-11-25 | End: 2024-11-29

## 2024-11-25 RX ORDER — ATENOLOL 100 MG/1
25 TABLET ORAL DAILY
Refills: 0 | Status: DISCONTINUED | OUTPATIENT
Start: 2024-11-25 | End: 2024-11-26

## 2024-11-25 RX ORDER — TRAZODONE HYDROCHLORIDE 150 MG/1
50 TABLET ORAL AT BEDTIME
Refills: 0 | Status: DISCONTINUED | OUTPATIENT
Start: 2024-11-25 | End: 2024-11-29

## 2024-11-25 RX ORDER — VANCOMYCIN HCL 900 MCG/MG
1000 POWDER (GRAM) MISCELLANEOUS EVERY 12 HOURS
Refills: 0 | Status: DISCONTINUED | OUTPATIENT
Start: 2024-11-25 | End: 2024-11-27

## 2024-11-25 RX ORDER — ACETAMINOPHEN, DIPHENHYDRAMINE HCL, PHENYLEPHRINE HCL 325; 25; 5 MG/1; MG/1; MG/1
3 TABLET ORAL AT BEDTIME
Refills: 0 | Status: DISCONTINUED | OUTPATIENT
Start: 2024-11-25 | End: 2024-11-29

## 2024-11-25 RX ORDER — AZTREONAM 2 G
1000 VIAL (EA) INJECTION EVERY 8 HOURS
Refills: 0 | Status: DISCONTINUED | OUTPATIENT
Start: 2024-11-25 | End: 2024-11-27

## 2024-11-25 RX ORDER — HYDROXYCHLOROQUINE SULFATE 200 MG/1
200 TABLET, FILM COATED ORAL DAILY
Refills: 0 | Status: DISCONTINUED | OUTPATIENT
Start: 2024-11-25 | End: 2024-11-29

## 2024-11-25 RX ORDER — AZTREONAM 2 G
VIAL (EA) INJECTION
Refills: 0 | Status: DISCONTINUED | OUTPATIENT
Start: 2024-11-25 | End: 2024-11-27

## 2024-11-25 RX ORDER — FAMOTIDINE 20 MG/1
20 TABLET, FILM COATED ORAL DAILY
Refills: 0 | Status: DISCONTINUED | OUTPATIENT
Start: 2024-11-26 | End: 2024-11-29

## 2024-11-25 RX ORDER — KETOROLAC TROMETHAMINE 30 MG/ML
15 INJECTION INTRAMUSCULAR; INTRAVENOUS ONCE
Refills: 0 | Status: DISCONTINUED | OUTPATIENT
Start: 2024-11-25 | End: 2024-11-25

## 2024-11-25 RX ORDER — MAGNESIUM, ALUMINUM HYDROXIDE 200-225/5
30 SUSPENSION, ORAL (FINAL DOSE FORM) ORAL EVERY 4 HOURS
Refills: 0 | Status: DISCONTINUED | OUTPATIENT
Start: 2024-11-25 | End: 2024-11-29

## 2024-11-25 RX ORDER — LEVOTHYROXINE SODIUM 150 MCG
25 TABLET ORAL
Refills: 0 | Status: DISCONTINUED | OUTPATIENT
Start: 2024-11-25 | End: 2024-11-29

## 2024-11-25 RX ORDER — ONDANSETRON HYDROCHLORIDE 4 MG/1
4 TABLET, FILM COATED ORAL EVERY 8 HOURS
Refills: 0 | Status: DISCONTINUED | OUTPATIENT
Start: 2024-11-25 | End: 2024-11-29

## 2024-11-25 RX ORDER — VANCOMYCIN HCL 900 MCG/MG
1000 POWDER (GRAM) MISCELLANEOUS ONCE
Refills: 0 | Status: COMPLETED | OUTPATIENT
Start: 2024-11-25 | End: 2024-11-25

## 2024-11-25 RX ORDER — ENOXAPARIN SODIUM 30 MG/.3ML
40 INJECTION SUBCUTANEOUS EVERY 24 HOURS
Refills: 0 | Status: DISCONTINUED | OUTPATIENT
Start: 2024-11-25 | End: 2024-11-29

## 2024-11-25 RX ORDER — FUROSEMIDE 40 MG/1
40 TABLET ORAL DAILY
Refills: 0 | Status: DISCONTINUED | OUTPATIENT
Start: 2024-11-26 | End: 2024-11-26

## 2024-11-25 RX ORDER — CHOLECALCIFEROL (VITAMIN D3) 10MCG/0.25
5000 DROPS ORAL DAILY
Refills: 0 | Status: DISCONTINUED | OUTPATIENT
Start: 2024-11-25 | End: 2024-11-29

## 2024-11-25 RX ORDER — LEVOTHYROXINE SODIUM 150 MCG
175 TABLET ORAL DAILY
Refills: 0 | Status: DISCONTINUED | OUTPATIENT
Start: 2024-11-26 | End: 2024-11-29

## 2024-11-25 RX ORDER — ACETAMINOPHEN 500MG 500 MG/1
650 TABLET, COATED ORAL EVERY 6 HOURS
Refills: 0 | Status: DISCONTINUED | OUTPATIENT
Start: 2024-11-25 | End: 2024-11-29

## 2024-11-25 RX ORDER — AZTREONAM 2 G
1000 VIAL (EA) INJECTION ONCE
Refills: 0 | Status: COMPLETED | OUTPATIENT
Start: 2024-11-25 | End: 2024-11-25

## 2024-11-25 RX ADMIN — KETOROLAC TROMETHAMINE 15 MILLIGRAM(S): 30 INJECTION INTRAMUSCULAR; INTRAVENOUS at 21:45

## 2024-11-25 RX ADMIN — ACETAMINOPHEN, DIPHENHYDRAMINE HCL, PHENYLEPHRINE HCL 10 MILLIGRAM(S): 325; 25; 5 TABLET ORAL at 21:46

## 2024-11-25 RX ADMIN — TRAZODONE HYDROCHLORIDE 50 MILLIGRAM(S): 150 TABLET ORAL at 21:46

## 2024-11-25 RX ADMIN — Medication 50 MILLIGRAM(S): at 18:12

## 2024-11-25 RX ADMIN — Medication 250 MILLIGRAM(S): at 13:17

## 2024-11-25 NOTE — ED PROVIDER NOTE - OBJECTIVE STATEMENT
69-year-old female with a past medical history of hypertension, CVA, SLE sent in for worsening right lower extremity cellulitis. Patient states to have experienced redness over the past 2 weeks and completed a course of doxycycline.  Patient states she was seen by Dr. Crespo today and told to come to the ED due to worsening infection. pt denies any other symptoms including fevers, chill, headache, recent illness/travel, cough, abdominal pain, chest pain, or SOB.

## 2024-11-25 NOTE — PATIENT PROFILE ADULT - INTERNATIONAL TRAVEL
[facility]    Patient Information: Bjorn Baumgarten 49 y o  female MRN: [id number]  Unit/Bed#: -01 Encounter: [id number]    Discharging Physician / Practitioner: MER Hooks  PCP: Yoly Mondragon MD  Admission Date: 10/30/2017  Discharge Date: 11/01/17    Reason for Admission:  Urinary incontinence    Discharge Diagnoses:     Principal Problem:    Urinary incontinence  Active Problems:    Chronic kidney disease, stage 4 (severe) ([facility] )    Acquired hypothyroidism    Type 1 diabetes mellitus ([facility] )    Status post simultaneous kidney and pancreas transplant ([place] [id number] )    Immunosuppression ([facility] )    Chronic diastolic congestive heart failure ([place] [address] )    Calculus, bladder, diverticulum  Resolved Problems:    * No resolved hospital problems  *      Consultations During Hospital Stay:  · Nephrology  · Urology  · Infectious Disease    Procedures Performed:     · None    Significant Findings / Test Results:     · Ultrasound kidney bladder:  Normal sonographic appearance of the transplanted kidney  Atrophic native kidney  No evidence of PVR  Stable right-sided bladder diverticulum containing calculi  · Urine culture:  [id number] mixed contaminants  · Creatinine 1 69 (day of discharge)      Incidental Findings:   · None    Test Results Pending at Discharge (will require follow up): · None     Outpatient Tests Requested:  · Labs per Nephrology    Complications:  None    Hospital Course: Bjorn Baumgarten is a 49 y o  female patient with PMH CKD stage 4, hypothyroidism, CHF, DM type 1, hx kidney/pancreatic transplant who originally presented to the hospital on 10/30/2017 due to reports of urinary incontinence  There was suspicion for UTI and she was started on Ancef  Infectious Disease was consulted for recommendations regarding recurring UTI  She was maintained on IV Ancef during her hospital stay  Her symptoms did alleviate   culture was not convincing for infection therefore she was not discharged on daily abx  Per discussion between ID and Nephrology it was recommended that patient be on chronic antibiotic prophylaxis given her history of renal transplant which was decided that she would maintain on Bactrim regular strength 1 tablet every other day  She was advised to follow up with Nephrology as an outpatient and have blood work completed at their request   Urology was consulted regarding urinary incontinence  She had a renal ultrasound completed which showed transplant kidney normal appearance but also showed stable right-sided bladder diverticulum containing calculi  She did have stable bladder calculi noted and per Urology patient could consider lithotripsy as an outpatient  She was advised to follow up with Urology as an outpatient  At time of discharge she did report resolution of urinary incontinence  Condition at Discharge: stable     Discharge Day Visit / Exam:     Subjective:  Patient states she feels better  No further urinary incontinence  States dizziness has improved  Tremors are at baseline  Tolerating diet  Out of bed ambulatory  Vitals: Blood Pressure: 170/60 (11/01/17 1356)  Pulse: 82 (11/01/17 0904)  Temperature: 98 3 °F (36 8 °C) (11/01/17 0904)  Temp Source: Oral (11/01/17 0904)  Respirations: 18 (11/01/17 0904)  Height: 5' 7 5" (171 5 cm) (10/30/17 1832)  Weight - Scale: 98 6 kg (217 lb 5 oz) (10/30/17 1832)  SpO2: 98 % (11/01/17 0904)  Exam:   Physical Exam   Constitutional: She is oriented to person, place, and time  She appears well-developed and well-nourished  No distress  HENT:   Head: Normocephalic  Neck: Neck supple  Cardiovascular: Normal rate and regular rhythm  No murmur heard  Pulmonary/Chest: Effort normal and breath sounds normal  No respiratory distress  Abdominal: Soft  Bowel sounds are normal  She exhibits no distension  Musculoskeletal: Normal range of motion  She exhibits no edema     Neurological: She is alert and oriented to person, place, and time  Tremor noted   Skin: Skin is warm and dry  Psychiatric: She has a normal mood and affect  Her behavior is normal    Vitals reviewed  Discussion with Family:  Mother at bedside    Discharge instructions/Information to patient and family:   See after visit summary for information provided to patient and family  Provisions for Follow-Up Care:  See after visit summary for information related to follow-up care and any pertinent home health orders  Disposition:     Home    For Discharges to [facility] SNF:   · Not Applicable to this Patient - Not Applicable to this Patient    Planned Readmission:  Not anticipated     Discharge Statement:  I spent 40 minutes discharging the patient  This time was spent on the day of discharge  I had direct contact with the patient on the day of discharge  Greater than 50% of the total time was spent examining patient, answering all patient questions, arranging and discussing plan of care with patient as well as directly providing post-discharge instructions  Additional time then spent on discharge activities  Discharge Medications:  See after visit summary for reconciled discharge medications provided to patient and family        ** Please Note: This note has been constructed using a voice recognition system ** No

## 2024-11-25 NOTE — H&P ADULT - NSHPPHYSICALEXAM_GEN_ALL_CORE
CONSTITUTIONAL: No acute distress, well-developed, well-groomed, AAOx3  HEAD: Atraumatic, normocephalic  EYES: EOM intact, PERRLA, conjunctiva and sclera clear  ENT: Supple, no masses, no thyromegaly, no bruits, no JVD; moist mucous membranes  PULMONARY: Clear to auscultation bilaterally; no wheezes, rales, or rhonchi  CARDIOVASCULAR: Regular rate and rhythm; no murmurs, rubs, or gallops  GASTROINTESTINAL: Soft, non-tender, non-distended; bowel sounds present  MUSCULOSKELETAL: 2+ peripheral pulses; no clubbing, no cyanosis,  +ve demarcated redness w tenderness from mid right leg till mid foot   NEUROLOGY: non-focal  SKIN: No rashes or lesions; warm and dry

## 2024-11-25 NOTE — H&P ADULT - NSHPLABSRESULTS_GEN_ALL_CORE
LABS:  cret                        11.7   8.41  )-----------( 257      ( 25 Nov 2024 12:42 )             37.7     11-25    141  |  103  |  14  ----------------------------<  90  3.7   |  25  |  0.8    Ca    9.4      25 Nov 2024 12:42    TPro  7.6  /  Alb  3.9  /  TBili  0.3  /  DBili  x   /  AST  19  /  ALT  7   /  AlkPhos  97  11-25

## 2024-11-25 NOTE — H&P ADULT - HISTORY OF PRESENT ILLNESS
69F w/ PMHx HTN, SLE, Hypothyroidism, Anemia, Chronic Venous Statis and h/o CVA w/o residual deficits (1987)sent in for worsening right lower extremity cellulitis. Patient states to have experienced redness over the past 2 weeks and completed a course of doxycycline.  Patient states she was seen by Dr. Crespo today and told to come to the ED due to worsening infection. pt denies any other symptoms including fevers, chill, headache, recent illness/travel, cough, abdominal pain, chest pain, or SOB.    Vitals: Temp 97.6F, /68, HR 59, RR 20, SpO2 99% on RA    Labs: Hgb 11.7 (previously 10.5), Cr 0.8 (at previous baseline),     Imaging: X-ray of Right Tib/Fib shows hardware and lower extremity swelling. no acute fractures/dislocations.    In the ED:  - s/p Vanc IV x1    Admit to medicine for further management of cellulitis.

## 2024-11-25 NOTE — H&P ADULT - ASSESSMENT
69F w/ PMHx HTN, SLE, Hypothyroidism, Anemia, Chronic Venous Statis and h/o CVA w/o residual deficits (1987)sent in for worsening right lower extremity cellulitis. Patient states to have experienced redness over the past 2 weeks and completed a course of doxycycline.  Patient states she was seen by Dr. Crespo today and told to come to the ED due to worsening infection. pt denies any other symptoms including fevers, chill, headache, recent illness/travel, cough, abdominal pain, chest pain, or SOB.    #RLE cellulitis  #Possible RLE Erysipelas  #Chronic Venous Stasis/Insufficiency  - failed PO abx, s/p doxycycline  - xray shows no evidence of OM  - c/w Vanc and Aztreonam  - f/u BCx and Procal  - f/u ID    #SLE  - on Plaquenil    #HTN  #h/o CVA  -       - dvt ppx w lovenox    Vitals: Temp 97.6F, /68, HR 59, RR 20, SpO2 99% on RA    Labs: Hgb 11.7 (previously 10.5), Cr 0.8 (at previous baseline),     Imaging: X-ray of Right Tib/Fib shows hardware and lower extremity swelling. no acute fractures/dislocations.    In the ED:  - s/p Vanc IV x1    Admit to medicine for further management of cellulitis.   69F w/ PMHx HTN, SLE, Hypothyroidism, Anemia, Chronic Venous Statis and h/o CVA w/o residual deficits (1987)sent in for worsening right lower extremity cellulitis. Patient states to have experienced redness over the past 2 weeks and completed a course of doxycycline.  Patient states she was seen by Dr. Crespo today and told to come to the ED due to worsening infection. pt denies any other symptoms including fevers, chill, headache, recent illness/travel, cough, abdominal pain, chest pain, or SOB.    #RLE cellulitis  #Possible RLE Erysipelas  #Chronic Venous Stasis/Insufficiency  - failed PO abx, s/p doxycycline  - xray shows no evidence of OM  - s/p Vanc IV x1 in the ED  - c/w Vanc and Aztreonam  - f/u BCx and Procal  - f/u ID    #SLE  - on Plaquenil    #Chronic Normocytic Anemia  - Hgb 11.7 (previously 10.5)    #HTN  #h/o CVA  -     #DVT ppx: Lovenox  #GI ppx: n/a  #Diet: Regular - DASH/TLC  #Activity: IAT  #Code Status: Full Code  #Dispo: admit to medicine  #Handoff: f/u ID, f/u BCx and procal

## 2024-11-25 NOTE — H&P ADULT - ATTENDING COMMENTS
]    #RLE cellulitis likley Erysipelas failed PO doxy  #SLE on Plaquenil  #HTN, Hx of CVA    PLANs     - xray w no OM  - abx w vancomycin and Aztreonam  - send Bcx and Procal, ID eval  - dvt ppx w lovenox #RLE cellulitis likley Erysipelas failed PO doxy  #SLE on Plaquenil  #chronic venous stasis  #HTN, Hx of CVA    PLANs     - xray w no OM  - abx w vancomycin and Aztreonam  - send Bcx and Procal, ID eval  - dvt ppx w lovenox

## 2024-11-25 NOTE — PATIENT PROFILE ADULT - FALL HARM RISK - HARM RISK INTERVENTIONS

## 2024-11-25 NOTE — ED PROVIDER NOTE - PHYSICAL EXAMINATION
Gen: NAD, AOx3  Head: NCAT  HEENT: PERRL, oral mucosa moist, normal conjunctiva, oropharynx clear without exudate or erythema  Lung: CTAB, no respiratory distress, no wheezing, rales, rhonchi  CV: normal s1/s2, rrr, Normal perfusion, pulses 2+ throughout  Abd: soft, NTND, no CVA tenderness  Genitourinary: no pelvic tenderness  MSK: No edema, no visible deformities, full range of motion in all 4 extremities  Neuro: CN II-XII grossly intact, No focal neurologic deficits  Skin: cellulitis to R tibia   Psych: normal affect

## 2024-11-25 NOTE — ED PROVIDER NOTE - CLINICAL SUMMARY MEDICAL DECISION MAKING FREE TEXT BOX
The MDM was documented by me personally, Dr. Ed Rodriguez, supervising attending and serves as my attending contribution to the care of the patient. I have personally performed a history and physical exam on this patient and personally directed the management of the patient.    the pt is a 69-year-old female with a past medical history of hypertension, CVA, SLE sent in for worsening right lower extremity cellulitis after failing outpatient antibiotics. The pt has had worsening redness for 2 weeks and was on a course of doxycycline. she went to Dr. Crespo for follow up and was send to ED for IV abx due to worsening of infection. Pt denies fevers, chills, nausea, vomiting, chest pain, shortness of breath. she does endorse pain in the right lower extremity.     Vitals: HD stable, O2 stable  Gen: appropriate affect, no acute distress  Neuro: no focal defects, no sensory deficits  HEENT: EOMI, no JVD, normocephalic, atraumatic, no scleral icterus  Cardio: RRR, S1 S2 present, no murmurs, rubs, or gallops  Resp: lungs b/l CTA, chest wall intact  Abd: soft, nondistended, nontender  MSK: no gross joint abnormalities, no obvious swelling  Skin: diffuse erythema of lower extremity, in the right distal lateral portion of the RLE there is purulent discharge with open wound  heme: no ecchymosis or petechiae     plan to obtain blood cultures and start vancomycin in ED due to failure of outpatient abx. pt agreeable for admission to the hospital.

## 2024-11-26 LAB
ALBUMIN SERPL ELPH-MCNC: 2.9 G/DL — LOW (ref 3.5–5.2)
ALP SERPL-CCNC: 67 U/L — SIGNIFICANT CHANGE UP (ref 30–115)
ALT FLD-CCNC: <5 U/L — SIGNIFICANT CHANGE UP (ref 0–41)
ANION GAP SERPL CALC-SCNC: 10 MMOL/L — SIGNIFICANT CHANGE UP (ref 7–14)
AST SERPL-CCNC: 11 U/L — SIGNIFICANT CHANGE UP (ref 0–41)
BASOPHILS # BLD AUTO: 0.01 K/UL — SIGNIFICANT CHANGE UP (ref 0–0.2)
BASOPHILS # BLD AUTO: 0.02 K/UL — SIGNIFICANT CHANGE UP (ref 0–0.2)
BASOPHILS NFR BLD AUTO: 0.1 % — SIGNIFICANT CHANGE UP (ref 0–1)
BASOPHILS NFR BLD AUTO: 0.3 % — SIGNIFICANT CHANGE UP (ref 0–1)
BILIRUB SERPL-MCNC: <0.2 MG/DL — SIGNIFICANT CHANGE UP (ref 0.2–1.2)
BUN SERPL-MCNC: 18 MG/DL — SIGNIFICANT CHANGE UP (ref 10–20)
CALCIUM SERPL-MCNC: 7.9 MG/DL — LOW (ref 8.4–10.4)
CHLORIDE SERPL-SCNC: 108 MMOL/L — SIGNIFICANT CHANGE UP (ref 98–110)
CO2 SERPL-SCNC: 23 MMOL/L — SIGNIFICANT CHANGE UP (ref 17–32)
CREAT SERPL-MCNC: 0.8 MG/DL — SIGNIFICANT CHANGE UP (ref 0.7–1.5)
EGFR: 80 ML/MIN/1.73M2 — SIGNIFICANT CHANGE UP
EOSINOPHIL # BLD AUTO: 0.11 K/UL — SIGNIFICANT CHANGE UP (ref 0–0.7)
EOSINOPHIL # BLD AUTO: 0.14 K/UL — SIGNIFICANT CHANGE UP (ref 0–0.7)
EOSINOPHIL NFR BLD AUTO: 1.5 % — SIGNIFICANT CHANGE UP (ref 0–8)
EOSINOPHIL NFR BLD AUTO: 1.9 % — SIGNIFICANT CHANGE UP (ref 0–8)
FERRITIN SERPL-MCNC: 64 NG/ML — SIGNIFICANT CHANGE UP (ref 13–330)
FOLATE SERPL-MCNC: 10.3 NG/ML — SIGNIFICANT CHANGE UP
GLUCOSE SERPL-MCNC: 91 MG/DL — SIGNIFICANT CHANGE UP (ref 70–99)
HCT VFR BLD CALC: 29.9 % — LOW (ref 37–47)
HCT VFR BLD CALC: 33.2 % — LOW (ref 37–47)
HGB BLD-MCNC: 10.3 G/DL — LOW (ref 12–16)
HGB BLD-MCNC: 9.5 G/DL — LOW (ref 12–16)
IMM GRANULOCYTES NFR BLD AUTO: 0.4 % — HIGH (ref 0.1–0.3)
IMM GRANULOCYTES NFR BLD AUTO: 0.4 % — HIGH (ref 0.1–0.3)
IRON SATN MFR SERPL: 11 % — LOW (ref 15–50)
IRON SATN MFR SERPL: 20 UG/DL — LOW (ref 35–150)
LYMPHOCYTES # BLD AUTO: 1.13 K/UL — LOW (ref 1.2–3.4)
LYMPHOCYTES # BLD AUTO: 1.26 K/UL — SIGNIFICANT CHANGE UP (ref 1.2–3.4)
LYMPHOCYTES # BLD AUTO: 15.1 % — LOW (ref 20.5–51.1)
LYMPHOCYTES # BLD AUTO: 16.7 % — LOW (ref 20.5–51.1)
MAGNESIUM SERPL-MCNC: 1.8 MG/DL — SIGNIFICANT CHANGE UP (ref 1.8–2.4)
MCHC RBC-ENTMCNC: 26.1 PG — LOW (ref 27–31)
MCHC RBC-ENTMCNC: 26.4 PG — LOW (ref 27–31)
MCHC RBC-ENTMCNC: 31 G/DL — LOW (ref 32–37)
MCHC RBC-ENTMCNC: 31.8 G/DL — LOW (ref 32–37)
MCV RBC AUTO: 83.1 FL — SIGNIFICANT CHANGE UP (ref 81–99)
MCV RBC AUTO: 84.3 FL — SIGNIFICANT CHANGE UP (ref 81–99)
MONOCYTES # BLD AUTO: 0.5 K/UL — SIGNIFICANT CHANGE UP (ref 0.1–0.6)
MONOCYTES # BLD AUTO: 0.6 K/UL — SIGNIFICANT CHANGE UP (ref 0.1–0.6)
MONOCYTES NFR BLD AUTO: 6.7 % — SIGNIFICANT CHANGE UP (ref 1.7–9.3)
MONOCYTES NFR BLD AUTO: 8 % — SIGNIFICANT CHANGE UP (ref 1.7–9.3)
NEUTROPHILS # BLD AUTO: 5.51 K/UL — SIGNIFICANT CHANGE UP (ref 1.4–6.5)
NEUTROPHILS # BLD AUTO: 5.66 K/UL — SIGNIFICANT CHANGE UP (ref 1.4–6.5)
NEUTROPHILS NFR BLD AUTO: 73.1 % — SIGNIFICANT CHANGE UP (ref 42.2–75.2)
NEUTROPHILS NFR BLD AUTO: 75.8 % — HIGH (ref 42.2–75.2)
NRBC # BLD: 0 /100 WBCS — SIGNIFICANT CHANGE UP (ref 0–0)
NRBC # BLD: 0 /100 WBCS — SIGNIFICANT CHANGE UP (ref 0–0)
PLATELET # BLD AUTO: 224 K/UL — SIGNIFICANT CHANGE UP (ref 130–400)
PLATELET # BLD AUTO: 247 K/UL — SIGNIFICANT CHANGE UP (ref 130–400)
PMV BLD: 9.4 FL — SIGNIFICANT CHANGE UP (ref 7.4–10.4)
PMV BLD: 9.9 FL — SIGNIFICANT CHANGE UP (ref 7.4–10.4)
POTASSIUM SERPL-MCNC: 3.5 MMOL/L — SIGNIFICANT CHANGE UP (ref 3.5–5)
POTASSIUM SERPL-SCNC: 3.5 MMOL/L — SIGNIFICANT CHANGE UP (ref 3.5–5)
PROCALCITONIN SERPL-MCNC: 0.04 NG/ML — SIGNIFICANT CHANGE UP (ref 0.02–0.1)
PROT SERPL-MCNC: 5.6 G/DL — LOW (ref 6–8)
RBC # BLD: 3.6 M/UL — LOW (ref 4.2–5.4)
RBC # BLD: 3.94 M/UL — LOW (ref 4.2–5.4)
RBC # FLD: 15.7 % — HIGH (ref 11.5–14.5)
RBC # FLD: 15.8 % — HIGH (ref 11.5–14.5)
SODIUM SERPL-SCNC: 141 MMOL/L — SIGNIFICANT CHANGE UP (ref 135–146)
TIBC SERPL-MCNC: 184 UG/DL — LOW (ref 220–430)
TRANSFERRIN SERPL-MCNC: 162 MG/DL — LOW (ref 200–360)
UIBC SERPL-MCNC: 164 UG/DL — SIGNIFICANT CHANGE UP (ref 110–370)
VANCOMYCIN FLD-MCNC: 20.8 UG/ML — HIGH (ref 5–10)
VIT B12 SERPL-MCNC: 309 PG/ML — SIGNIFICANT CHANGE UP (ref 232–1245)
WBC # BLD: 7.47 K/UL — SIGNIFICANT CHANGE UP (ref 4.8–10.8)
WBC # BLD: 7.53 K/UL — SIGNIFICANT CHANGE UP (ref 4.8–10.8)
WBC # FLD AUTO: 7.47 K/UL — SIGNIFICANT CHANGE UP (ref 4.8–10.8)
WBC # FLD AUTO: 7.53 K/UL — SIGNIFICANT CHANGE UP (ref 4.8–10.8)

## 2024-11-26 PROCEDURE — 99232 SBSQ HOSP IP/OBS MODERATE 35: CPT

## 2024-11-26 RX ORDER — OXYCODONE HYDROCHLORIDE 30 MG/1
2.5 TABLET ORAL EVERY 6 HOURS
Refills: 0 | Status: DISCONTINUED | OUTPATIENT
Start: 2024-11-26 | End: 2024-11-29

## 2024-11-26 RX ORDER — 0.9 % SODIUM CHLORIDE 0.9 %
1000 INTRAVENOUS SOLUTION INTRAVENOUS ONCE
Refills: 0 | Status: COMPLETED | OUTPATIENT
Start: 2024-11-26 | End: 2024-11-26

## 2024-11-26 RX ORDER — POLYETHYLENE GLYCOL 3350 17 G/17G
17 POWDER, FOR SOLUTION ORAL EVERY 12 HOURS
Refills: 0 | Status: DISCONTINUED | OUTPATIENT
Start: 2024-11-26 | End: 2024-11-29

## 2024-11-26 RX ORDER — SENNOSIDES 8.6 MG
2 TABLET ORAL AT BEDTIME
Refills: 0 | Status: DISCONTINUED | OUTPATIENT
Start: 2024-11-26 | End: 2024-11-29

## 2024-11-26 RX ORDER — HYDROMORPHONE HYDROCHLORIDE 2 MG/1
0.5 TABLET ORAL EVERY 8 HOURS
Refills: 0 | Status: DISCONTINUED | OUTPATIENT
Start: 2024-11-26 | End: 2024-11-27

## 2024-11-26 RX ADMIN — Medication 50 MILLIGRAM(S): at 01:08

## 2024-11-26 RX ADMIN — FAMOTIDINE 20 MILLIGRAM(S): 20 TABLET, FILM COATED ORAL at 11:11

## 2024-11-26 RX ADMIN — ACETAMINOPHEN 500MG 650 MILLIGRAM(S): 500 TABLET, COATED ORAL at 13:46

## 2024-11-26 RX ADMIN — HYDROXYCHLOROQUINE SULFATE 200 MILLIGRAM(S): 200 TABLET, FILM COATED ORAL at 12:18

## 2024-11-26 RX ADMIN — Medication 50 MILLIGRAM(S): at 17:38

## 2024-11-26 RX ADMIN — Medication 250 MILLIGRAM(S): at 12:20

## 2024-11-26 RX ADMIN — Medication 50 MILLIGRAM(S): at 11:12

## 2024-11-26 RX ADMIN — Medication 250 MILLIGRAM(S): at 02:35

## 2024-11-26 RX ADMIN — ENOXAPARIN SODIUM 40 MILLIGRAM(S): 30 INJECTION SUBCUTANEOUS at 21:58

## 2024-11-26 RX ADMIN — TRAZODONE HYDROCHLORIDE 50 MILLIGRAM(S): 150 TABLET ORAL at 21:59

## 2024-11-26 RX ADMIN — Medication 175 MICROGRAM(S): at 05:57

## 2024-11-26 RX ADMIN — Medication 2 TABLET(S): at 21:59

## 2024-11-26 RX ADMIN — ACETAMINOPHEN, DIPHENHYDRAMINE HCL, PHENYLEPHRINE HCL 10 MILLIGRAM(S): 325; 25; 5 TABLET ORAL at 22:00

## 2024-11-26 RX ADMIN — Medication 1000 MILLILITER(S): at 12:19

## 2024-11-26 RX ADMIN — Medication 5000 UNIT(S): at 11:11

## 2024-11-26 RX ADMIN — ACETAMINOPHEN 500MG 650 MILLIGRAM(S): 500 TABLET, COATED ORAL at 21:59

## 2024-11-26 RX ADMIN — ACETAMINOPHEN 500MG 650 MILLIGRAM(S): 500 TABLET, COATED ORAL at 11:11

## 2024-11-26 NOTE — PROGRESS NOTE ADULT - SUBJECTIVE AND OBJECTIVE BOX
SUBJECTIVE:  HPI:  69F w/ PMHx HTN, SLE, Hypothyroidism, Anemia, Chronic Venous Statis and h/o CVA w/o residual deficits (1987)sent in for worsening right lower extremity cellulitis. Patient states to have experienced redness over the past 2 weeks and completed a course of doxycycline.  Patient states she was seen by Dr. Crespo today and told to come to the ED due to worsening infection. pt denies any other symptoms including fevers, chill, headache, recent illness/travel, cough, abdominal pain, chest pain, or SOB.  Admit to medicine for further management of cellulitis. No current complaints, soft blood pressure.        Today is hospital day 1d.     PAST MEDICAL & SURGICAL HISTORY  Lupus    Essential hypertension    CVA (cerebral vascular accident)  1987    Hypothyroidism    FH: bilateral hip replacements    H/O total knee replacement    H/O abdominal hysterectomy        ALLERGIES:  penicillins (Other)  morphine (Other)  Compazine (Other)  aspirin (Other)  Levaquin (Other)    MEDICATIONS:  ACTIVE MEDICATIONS  acetaminophen     Tablet .. 650 milliGRAM(s) Oral every 6 hours PRN  aluminum hydroxide/magnesium hydroxide/simethicone Suspension 30 milliLiter(s) Oral every 4 hours PRN  aztreonam  IVPB 1000 milliGRAM(s) IV Intermittent every 8 hours  aztreonam  IVPB      cholecalciferol 5000 Unit(s) Oral daily  enoxaparin Injectable 40 milliGRAM(s) SubCutaneous every 24 hours  famotidine    Tablet 20 milliGRAM(s) Oral daily  HYDROmorphone  Injectable 0.5 milliGRAM(s) IV Push every 8 hours PRN  hydroxychloroquine 200 milliGRAM(s) Oral daily  levothyroxine 175 MICROGram(s) Oral daily  levothyroxine 25 MICROGram(s) Oral <User Schedule>  melatonin 10 milliGRAM(s) Oral at bedtime  melatonin 3 milliGRAM(s) Oral at bedtime PRN  ondansetron Injectable 4 milliGRAM(s) IV Push every 8 hours PRN  oxyCODONE    IR 2.5 milliGRAM(s) Oral every 6 hours PRN  polyethylene glycol 3350 17 Gram(s) Oral every 12 hours PRN  senna 2 Tablet(s) Oral at bedtime  traZODone 50 milliGRAM(s) Oral at bedtime  vancomycin  IVPB 1000 milliGRAM(s) IV Intermittent every 12 hours      VITALS:   T(F): 98  HR: 60  BP: 91/52  RR: 18  SpO2: 98%    LABS:                        10.3   7.47  )-----------( 247      ( 26 Nov 2024 11:34 )             33.2     11-26    141  |  108  |  18  ----------------------------<  91  3.5   |  23  |  0.8    Ca    7.9[L]      26 Nov 2024 04:30  Mg     1.8     11-26    TPro  5.6[L]  /  Alb  2.9[L]  /  TBili  <0.2  /  DBili  x   /  AST  11  /  ALT  <5  /  AlkPhos  67  11-26      Urinalysis Basic - ( 26 Nov 2024 04:30 )    Color: x / Appearance: x / SG: x / pH: x  Gluc: 91 mg/dL / Ketone: x  / Bili: x / Urobili: x   Blood: x / Protein: x / Nitrite: x   Leuk Esterase: x / RBC: x / WBC x   Sq Epi: x / Non Sq Epi: x / Bacteria: x                    PHYSICAL EXAM:  GEN: No acute distress  LUNGS: Clear to auscultation bilaterally   HEART: S1/S2 present.    ABD: Soft, non-tender, non-distended.   EXT: No pedal edema  NEURO: AAOX3

## 2024-11-26 NOTE — PROGRESS NOTE ADULT - ASSESSMENT
#RLE cellulitis  #Possible RLE Erysipelas  #Chronic Venous Stasis/Insufficiency  - failed PO abx, s/p doxycycline  - xray shows no evidence of OM  - s/p Vanc IV x1 in the ED  - c/w Vanc and Aztreonam  - f/u BCx and Procal  - f/u ID    #SLE  - on Plaquenil    #Chronic Normocytic Anemia  - Hgb 11.7 (previously 10.5), repeat cbc is stable    #HTN  #h/o CVA  - c/w home meds    #DVT ppx: Lovenox  #GI ppx: n/a  #Diet: Regular - DASH/TLC  #Activity: IAT  #Code Status: Full Code  #Dispo: admit to medicine  #Handoff: f/u ID, f/u BCx and procal         #RLE cellulitis  #Possible RLE Erysipelas  #Chronic Venous Stasis/Insufficiency  - SIRS -ve but BP soft, no TTE in system but patient dry on exam  - failed PO abx, s/p doxycycline  - xray shows no evidence of OM  - s/p Vanc IV x1 in the ED  - c/w Vanc and Aztreonam, bolused 1 liter LR, f/u BP  - f/u BCx and Procal  - f/u ID    #SLE  - on Plaquenil    #Chronic Normocytic Anemia  - Hgb 11.7 (previously 10.5), repeat cbc is stable    #HTN  #h/o CVA  - c/w home meds    #DVT ppx: Lovenox  #GI ppx: n/a  #Diet: Regular - DASH/TLC  #Activity: IAT  #Code Status: Full Code  #Dispo: admit to medicine  #Handoff: f/u ID, f/u BCx and procal         #RLE cellulitis  #Possible RLE Erysipelas  #Chronic Venous Stasis/Insufficiency  - SIRS -ve but BP soft, no TTE in system but patient dry on exam  - failed PO abx, s/p doxycycline  - xray shows no evidence of OM  - s/p Vanc IV x1 in the ED  - c/w Vanc and Aztreonam, bolused 1 liter LR, f/u BP  - add pain regimen  - f/u BCx and Procal  - f/u ID    #SLE  - on Plaquenil    #Chronic Normocytic Anemia  - Hgb 11.7 (previously 10.5), repeat cbc is stable    #HTN  #h/o CVA  - c/w home meds    #DVT ppx: Lovenox  #GI ppx: n/a  #Diet: Regular - DASH/TLC  #Activity: IAT  #Code Status: Full Code  #Dispo: admit to medicine likely in 48 hours   #Handoff: f/u ID, f/u BCx and procal

## 2024-11-26 NOTE — CONSULT NOTE ADULT - SUBJECTIVE AND OBJECTIVE BOX
LATOYA FULTON  69y, Female  Allergy: penicillins (Other)  morphine (Other)  Compazine (Other)  aspirin (Other)  Levaquin (Other)      CHIEF COMPLAINT: Cellulitis (25 Nov 2024 17:13)      LOS  1d    HPI:  69F w/ PMHx HTN, SLE, Hypothyroidism, Anemia, Chronic Venous Statis and h/o CVA w/o residual deficits (1987)sent in for worsening right lower extremity cellulitis. Patient states to have experienced redness over the past 2 weeks and completed a course of doxycycline.  Patient states she was seen by Dr. Crespo today and told to come to the ED due to worsening infection. pt denies any other symptoms including fevers, chill, headache, recent illness/travel, cough, abdominal pain, chest pain, or SOB.    Vitals: Temp 97.6F, /68, HR 59, RR 20, SpO2 99% on RA    Labs: Hgb 11.7 (previously 10.5), Cr 0.8 (at previous baseline),     Imaging: X-ray of Right Tib/Fib shows hardware and lower extremity swelling. no acute fractures/dislocations.    In the ED:  - s/p Vanc IV x1    Admit to medicine for further management of cellulitis. (25 Nov 2024 17:13)      INFECTIOUS DISEASE HISTORY:    PAST MEDICAL & SURGICAL HISTORY:  Lupus      Essential hypertension      CVA (cerebral vascular accident)  1987      Hypothyroidism      FH: bilateral hip replacements      H/O total knee replacement      H/O abdominal hysterectomy          FAMILY HISTORY  No pertinent family history in first degree relatives        SOCIAL HISTORY  Social History:  smokes 3 cigarretes daily has tried to quit in past  lives with roomate at [facility] (10 Feb 2022 08:41)        ROS  General: Denies rigors, nightsweats  HEENT: Denies headache, rhinorrhea, sore throat, eye pain  CV: Denies CP, palpitations  PULM: Denies wheezing, hemoptysis  GI: Denies hematemesis, hematochezia, melena  : Denies discharge, hematuria  MSK: Denies arthralgias, myalgias  SKIN: Denies rash, lesions  NEURO: Denies paresthesias, weakness  PSYCH: Denies depression, anxiety    VITALS:  T(F): 98, Max: 98.2 (11-25-24 @ 16:03)  HR: 60  BP: 91/52  RR: 18Vital Signs Last 24 Hrs  T(C): 36.7 (26 Nov 2024 07:36), Max: 36.8 (25 Nov 2024 16:03)  T(F): 98 (26 Nov 2024 07:36), Max: 98.2 (25 Nov 2024 16:03)  HR: 60 (26 Nov 2024 07:36) (58 - 65)  BP: 91/52 (26 Nov 2024 07:36) (85/49 - 132/74)  BP(mean): 93 (25 Nov 2024 16:03) (93 - 93)  RR: 18 (26 Nov 2024 07:36) (18 - 20)  SpO2: 98% (25 Nov 2024 16:03) (98% - 99%)    Parameters below as of 25 Nov 2024 16:03  Patient On (Oxygen Delivery Method): room air        PHYSICAL EXAM:  Gen: NAD, resting in bed  HEENT: Normocephalic, atraumatic  Neck: supple, no lymphadenopathy  CV: Regular rate & regular rhythm  Lungs: decreased BS at bases, no fremitus  Abdomen: Soft, BS present  Ext: Warm, well perfused  Neuro: non focal, awake  Skin: no rash, no erythema  Lines: no phlebitis    TESTS & MEASUREMENTS:                        9.5    7.53  )-----------( 224      ( 26 Nov 2024 04:30 )             29.9     11-26    141  |  108  |  18  ----------------------------<  91  3.5   |  23  |  0.8    Ca    7.9[L]      26 Nov 2024 04:30  Mg     1.8     11-26    TPro  5.6[L]  /  Alb  2.9[L]  /  TBili  <0.2  /  DBili  x   /  AST  11  /  ALT  <5  /  AlkPhos  67  11-26      LIVER FUNCTIONS - ( 26 Nov 2024 04:30 )  Alb: 2.9 g/dL / Pro: 5.6 g/dL / ALK PHOS: 67 U/L / ALT: <5 U/L / AST: 11 U/L / GGT: x           Urinalysis Basic - ( 26 Nov 2024 04:30 )    Color: x / Appearance: x / SG: x / pH: x  Gluc: 91 mg/dL / Ketone: x  / Bili: x / Urobili: x   Blood: x / Protein: x / Nitrite: x   Leuk Esterase: x / RBC: x / WBC x   Sq Epi: x / Non Sq Epi: x / Bacteria: x        Culture - Blood (collected 07-16-24 @ 19:49)  Source: .Blood Blood  Final Report (07-22-24 @ 02:00):    No growth at 5 days    Culture - Blood (collected 07-16-24 @ 19:49)  Source: .Blood Blood  Final Report (07-22-24 @ 02:00):    No growth at 5 days    Culture - Blood (collected 05-04-24 @ 11:02)  Source: .Blood Blood  Final Report (05-09-24 @ 22:01):    No growth at 5 days    Culture - Blood (collected 03-28-24 @ 13:05)  Source: .Blood Blood-Peripheral  Final Report (04-02-24 @ 22:00):    No growth at 5 days        Lactate, Blood: 0.9 mmol/L (11-25-24 @ 12:42)      INFECTIOUS DISEASES TESTING  Procalcitonin: 0.04 ng/mL (11-25-24 @ 17:35)  COVID-19 PCR: NotDetec (05-08-24 @ 11:37)  Procalcitonin: 0.03 ng/mL (05-05-24 @ 07:30)  COVID-19 PCR: NotDetec (04-01-24 @ 13:55)      RADIOLOGY & ADDITIONAL TESTS:  I have personally reviewed the last Chest xray  CXR      CT      CARDIOLOGY TESTING      MEDICATIONS  atenolol  Tablet 25 Oral daily  aztreonam  IVPB 1000 IV Intermittent every 8 hours  aztreonam  IVPB     cholecalciferol 5000 Oral daily  enoxaparin Injectable 40 SubCutaneous every 24 hours  famotidine    Tablet 20 Oral daily  furosemide    Tablet 40 Oral daily  hydroxychloroquine 200 Oral daily  levothyroxine 175 Oral daily  levothyroxine 25 Oral <User Schedule>  melatonin 10 Oral at bedtime  traZODone 50 Oral at bedtime  vancomycin  IVPB 1000 IV Intermittent every 12 hours      Weight  Weight (kg): 63 (11-25-24 @ 11:36)    ANTIBIOTICS:  aztreonam  IVPB 1000 milliGRAM(s) IV Intermittent every 8 hours  aztreonam  IVPB      hydroxychloroquine 200 milliGRAM(s) Oral daily  vancomycin  IVPB 1000 milliGRAM(s) IV Intermittent every 12 hours      ALLERGIES:  penicillins (Other)  morphine (Other)  Compazine (Other)  aspirin (Other)  Levaquin (Other)       LATOYA FULTON  69y, Female  Allergy: penicillins (Other)  morphine (Other)  Compazine (Other)  aspirin (Other)  Levaquin (Other)      CHIEF COMPLAINT: Cellulitis (25 Nov 2024 17:13)      LOS  1d    HPI:  69F w/ PMHx HTN, SLE, Hypothyroidism, Anemia, Chronic Venous Statis and h/o CVA w/o residual deficits (1987)sent in for worsening right lower extremity cellulitis. Patient states to have experienced redness over the past 2 weeks and completed a course of doxycycline.  Patient states she was seen by Dr. Crespo today and told to come to the ED due to worsening infection. pt denies any other symptoms including fevers, chill, headache, recent illness/travel, cough, abdominal pain, chest pain, or SOB.    Vitals: Temp 97.6F, /68, HR 59, RR 20, SpO2 99% on RA    Labs: Hgb 11.7 (previously 10.5), Cr 0.8 (at previous baseline),     Imaging: X-ray of Right Tib/Fib shows hardware and lower extremity swelling. no acute fractures/dislocations.    In the ED:  - s/p Vanc IV x1    Admit to medicine for further management of cellulitis. (25 Nov 2024 17:13)      INFECTIOUS DISEASE HISTORY:  History as above.  Presents for worsening RLE pain and swelling.   Has been on doxycycline for 2 weeks without much improvement.   Started on vancomycin here.   RLE pain mostly unchanged.   No fevers or chills.     PAST MEDICAL & SURGICAL HISTORY:  Lupus      Essential hypertension      CVA (cerebral vascular accident)  1987      Hypothyroidism      FH: bilateral hip replacements      H/O total knee replacement      H/O abdominal hysterectomy          FAMILY HISTORY  No pertinent family history in first degree relatives        SOCIAL HISTORY  Social History:  smokes 3 cigarretes daily has tried to quit in past  lives with roomate at [facility] (10 Feb 2022 08:41)        ROS  General: Denies rigors, nightsweats  HEENT: Denies headache, rhinorrhea, sore throat, eye pain  CV: Denies CP, palpitations  PULM: Denies wheezing, hemoptysis  GI: Denies hematemesis, hematochezia, melena  : Denies discharge, hematuria  MSK: Denies arthralgias, myalgias  SKIN: Denies rash, lesions  NEURO: Denies paresthesias, weakness  PSYCH: Denies depression, anxiety    VITALS:  T(F): 98, Max: 98.2 (11-25-24 @ 16:03)  HR: 60  BP: 91/52  RR: 18Vital Signs Last 24 Hrs  T(C): 36.7 (26 Nov 2024 07:36), Max: 36.8 (25 Nov 2024 16:03)  T(F): 98 (26 Nov 2024 07:36), Max: 98.2 (25 Nov 2024 16:03)  HR: 60 (26 Nov 2024 07:36) (58 - 65)  BP: 91/52 (26 Nov 2024 07:36) (85/49 - 132/74)  BP(mean): 93 (25 Nov 2024 16:03) (93 - 93)  RR: 18 (26 Nov 2024 07:36) (18 - 20)  SpO2: 98% (25 Nov 2024 16:03) (98% - 99%)    Parameters below as of 25 Nov 2024 16:03  Patient On (Oxygen Delivery Method): room air        PHYSICAL EXAM:  Gen: NAD, resting in bed  HEENT: Normocephalic, atraumatic  Neck: supple, no lymphadenopathy  CV: Regular rate & regular rhythm  Lungs: decreased BS at bases, no fremitus  Abdomen: Soft, BS present  Ext: Warm, well perfused  Neuro: non focal, awake  Skin: RLE with patchyes of erythema -- demarcated borders - tender to palpation - posterior leg with open wounds/red tissue base   Lines: no phlebitis    TESTS & MEASUREMENTS:                        9.5    7.53  )-----------( 224      ( 26 Nov 2024 04:30 )             29.9     11-26    141  |  108  |  18  ----------------------------<  91  3.5   |  23  |  0.8    Ca    7.9[L]      26 Nov 2024 04:30  Mg     1.8     11-26    TPro  5.6[L]  /  Alb  2.9[L]  /  TBili  <0.2  /  DBili  x   /  AST  11  /  ALT  <5  /  AlkPhos  67  11-26      LIVER FUNCTIONS - ( 26 Nov 2024 04:30 )  Alb: 2.9 g/dL / Pro: 5.6 g/dL / ALK PHOS: 67 U/L / ALT: <5 U/L / AST: 11 U/L / GGT: x           Urinalysis Basic - ( 26 Nov 2024 04:30 )    Color: x / Appearance: x / SG: x / pH: x  Gluc: 91 mg/dL / Ketone: x  / Bili: x / Urobili: x   Blood: x / Protein: x / Nitrite: x   Leuk Esterase: x / RBC: x / WBC x   Sq Epi: x / Non Sq Epi: x / Bacteria: x        Culture - Blood (collected 07-16-24 @ 19:49)  Source: .Blood Blood  Final Report (07-22-24 @ 02:00):    No growth at 5 days    Culture - Blood (collected 07-16-24 @ 19:49)  Source: .Blood Blood  Final Report (07-22-24 @ 02:00):    No growth at 5 days    Culture - Blood (collected 05-04-24 @ 11:02)  Source: .Blood Blood  Final Report (05-09-24 @ 22:01):    No growth at 5 days    Culture - Blood (collected 03-28-24 @ 13:05)  Source: .Blood Blood-Peripheral  Final Report (04-02-24 @ 22:00):    No growth at 5 days        Lactate, Blood: 0.9 mmol/L (11-25-24 @ 12:42)      INFECTIOUS DISEASES TESTING  Procalcitonin: 0.04 ng/mL (11-25-24 @ 17:35)  COVID-19 PCR: NotDetec (05-08-24 @ 11:37)  Procalcitonin: 0.03 ng/mL (05-05-24 @ 07:30)  COVID-19 PCR: NotDetec (04-01-24 @ 13:55)      RADIOLOGY & ADDITIONAL TESTS:  I have personally reviewed the last Chest xray  CXR      CT      CARDIOLOGY TESTING      MEDICATIONS  atenolol  Tablet 25 Oral daily  aztreonam  IVPB 1000 IV Intermittent every 8 hours  aztreonam  IVPB     cholecalciferol 5000 Oral daily  enoxaparin Injectable 40 SubCutaneous every 24 hours  famotidine    Tablet 20 Oral daily  furosemide    Tablet 40 Oral daily  hydroxychloroquine 200 Oral daily  levothyroxine 175 Oral daily  levothyroxine 25 Oral <User Schedule>  melatonin 10 Oral at bedtime  traZODone 50 Oral at bedtime  vancomycin  IVPB 1000 IV Intermittent every 12 hours      Weight  Weight (kg): 63 (11-25-24 @ 11:36)    ANTIBIOTICS:  aztreonam  IVPB 1000 milliGRAM(s) IV Intermittent every 8 hours  aztreonam  IVPB      hydroxychloroquine 200 milliGRAM(s) Oral daily  vancomycin  IVPB 1000 milliGRAM(s) IV Intermittent every 12 hours      ALLERGIES:  penicillins (Other)  morphine (Other)  Compazine (Other)  aspirin (Other)  Levaquin (Other)

## 2024-11-26 NOTE — CONSULT NOTE ADULT - ASSESSMENT
ASSESSMENT  69F w/ PMHx HTN, SLE, Hypothyroidism, Anemia, Chronic Venous Statis and h/o CVA w/o residual deficits (1987)sent in for worsening right lower extremity cellulitis.    IMPRESSION  #RLE Cellulitis  #Chronic venous stasis     #SLE  #Hypothyroid  #Hx of CVA    #Abx allergy: morphine (Other)  Compazine (Other)  aspirin (Other)  Levaquin (Other)        RECOMMENDATIONS  This is a preliminary incomplete pended note, all final recommendations to follow after interview and examination of the patient.    Please call or message on [employer] Teams if with any questions.  Spectra [phone]     ASSESSMENT  69F w/ PMHx HTN, SLE, Hypothyroidism, Anemia, Chronic Venous Statis and h/o CVA w/o residual deficits (1987)sent in for worsening right lower extremity cellulitis.    IMPRESSION  #RLE Cellulitis vs contact dermatitis   #Chronic venous stasis     #SLE  #Hypothyroid  #Hx of CVA    #Abx allergy: morphine (Other)  Compazine (Other)  aspirin (Other)  Levaquin (Other)      RECOMMENDATIONS  - area of erythema seem to be tracking at border of gauze wraps -- possible component of contact dermatitis   - check MRSA nares -- if negative, narrow to cefazolin 1g q 8 hours (penicillin allergy reviewed -- reports rash in adult years, but tolerated amoxicillin)  - derm evaluation to see if benefit from steroid cream   - local wound care     Please call or message on [employer] Teams if with any questions.  Spectra [phone]

## 2024-11-27 LAB
ALBUMIN SERPL ELPH-MCNC: 2.9 G/DL — LOW (ref 3.5–5.2)
ALP SERPL-CCNC: 97 U/L — SIGNIFICANT CHANGE UP (ref 30–115)
ALT FLD-CCNC: 30 U/L — SIGNIFICANT CHANGE UP (ref 0–41)
ANION GAP SERPL CALC-SCNC: 8 MMOL/L — SIGNIFICANT CHANGE UP (ref 7–14)
AST SERPL-CCNC: 76 U/L — HIGH (ref 0–41)
BASOPHILS # BLD AUTO: 0.01 K/UL — SIGNIFICANT CHANGE UP (ref 0–0.2)
BASOPHILS NFR BLD AUTO: 0.1 % — SIGNIFICANT CHANGE UP (ref 0–1)
BILIRUB SERPL-MCNC: 0.2 MG/DL — SIGNIFICANT CHANGE UP (ref 0.2–1.2)
BUN SERPL-MCNC: 14 MG/DL — SIGNIFICANT CHANGE UP (ref 10–20)
CALCIUM SERPL-MCNC: 7.9 MG/DL — LOW (ref 8.4–10.5)
CHLORIDE SERPL-SCNC: 108 MMOL/L — SIGNIFICANT CHANGE UP (ref 98–110)
CO2 SERPL-SCNC: 24 MMOL/L — SIGNIFICANT CHANGE UP (ref 17–32)
CREAT SERPL-MCNC: 0.6 MG/DL — LOW (ref 0.7–1.5)
EGFR: 98 ML/MIN/1.73M2 — SIGNIFICANT CHANGE UP
EOSINOPHIL # BLD AUTO: 0.1 K/UL — SIGNIFICANT CHANGE UP (ref 0–0.7)
EOSINOPHIL NFR BLD AUTO: 1.4 % — SIGNIFICANT CHANGE UP (ref 0–8)
GLUCOSE SERPL-MCNC: 89 MG/DL — SIGNIFICANT CHANGE UP (ref 70–99)
HCT VFR BLD CALC: 33.6 % — LOW (ref 37–47)
HGB BLD-MCNC: 10.5 G/DL — LOW (ref 12–16)
IMM GRANULOCYTES NFR BLD AUTO: 0.4 % — HIGH (ref 0.1–0.3)
LYMPHOCYTES # BLD AUTO: 1.14 K/UL — LOW (ref 1.2–3.4)
LYMPHOCYTES # BLD AUTO: 16.2 % — LOW (ref 20.5–51.1)
MAGNESIUM SERPL-MCNC: 1.8 MG/DL — SIGNIFICANT CHANGE UP (ref 1.8–2.4)
MCHC RBC-ENTMCNC: 26.4 PG — LOW (ref 27–31)
MCHC RBC-ENTMCNC: 31.3 G/DL — LOW (ref 32–37)
MCV RBC AUTO: 84.4 FL — SIGNIFICANT CHANGE UP (ref 81–99)
MONOCYTES # BLD AUTO: 0.49 K/UL — SIGNIFICANT CHANGE UP (ref 0.1–0.6)
MONOCYTES NFR BLD AUTO: 7 % — SIGNIFICANT CHANGE UP (ref 1.7–9.3)
MRSA PCR RESULT.: NEGATIVE — SIGNIFICANT CHANGE UP
NEUTROPHILS # BLD AUTO: 5.26 K/UL — SIGNIFICANT CHANGE UP (ref 1.4–6.5)
NEUTROPHILS NFR BLD AUTO: 74.9 % — SIGNIFICANT CHANGE UP (ref 42.2–75.2)
NRBC # BLD: 0 /100 WBCS — SIGNIFICANT CHANGE UP (ref 0–0)
PHOSPHATE SERPL-MCNC: 3.1 MG/DL — SIGNIFICANT CHANGE UP (ref 2.1–4.9)
PLATELET # BLD AUTO: 216 K/UL — SIGNIFICANT CHANGE UP (ref 130–400)
PMV BLD: 9.7 FL — SIGNIFICANT CHANGE UP (ref 7.4–10.4)
POTASSIUM SERPL-MCNC: 3.6 MMOL/L — SIGNIFICANT CHANGE UP (ref 3.5–5)
POTASSIUM SERPL-SCNC: 3.6 MMOL/L — SIGNIFICANT CHANGE UP (ref 3.5–5)
PROT SERPL-MCNC: 5.5 G/DL — LOW (ref 6–8)
RBC # BLD: 3.98 M/UL — LOW (ref 4.2–5.4)
RBC # FLD: 15.8 % — HIGH (ref 11.5–14.5)
SODIUM SERPL-SCNC: 140 MMOL/L — SIGNIFICANT CHANGE UP (ref 135–146)
WBC # BLD: 7.03 K/UL — SIGNIFICANT CHANGE UP (ref 4.8–10.8)
WBC # FLD AUTO: 7.03 K/UL — SIGNIFICANT CHANGE UP (ref 4.8–10.8)

## 2024-11-27 PROCEDURE — 99232 SBSQ HOSP IP/OBS MODERATE 35: CPT

## 2024-11-27 RX ORDER — CEFAZOLIN SODIUM 10 G
1000 VIAL (EA) INJECTION EVERY 8 HOURS
Refills: 0 | Status: DISCONTINUED | OUTPATIENT
Start: 2024-11-27 | End: 2024-11-28

## 2024-11-27 RX ORDER — CEFAZOLIN SODIUM 10 G
VIAL (EA) INJECTION
Refills: 0 | Status: DISCONTINUED | OUTPATIENT
Start: 2024-11-27 | End: 2024-11-28

## 2024-11-27 RX ORDER — CEFAZOLIN SODIUM 10 G
1000 VIAL (EA) INJECTION ONCE
Refills: 0 | Status: COMPLETED | OUTPATIENT
Start: 2024-11-27 | End: 2024-11-27

## 2024-11-27 RX ORDER — TRIAMCINOLONE ACETONIDE 0.15 MG/G
1 AEROSOL, SPRAY TOPICAL EVERY 12 HOURS
Refills: 0 | Status: DISCONTINUED | OUTPATIENT
Start: 2024-11-27 | End: 2024-11-29

## 2024-11-27 RX ADMIN — TRAZODONE HYDROCHLORIDE 50 MILLIGRAM(S): 150 TABLET ORAL at 21:26

## 2024-11-27 RX ADMIN — Medication 175 MICROGRAM(S): at 06:00

## 2024-11-27 RX ADMIN — HYDROXYCHLOROQUINE SULFATE 200 MILLIGRAM(S): 200 TABLET, FILM COATED ORAL at 11:50

## 2024-11-27 RX ADMIN — Medication 250 MILLIGRAM(S): at 00:31

## 2024-11-27 RX ADMIN — Medication 100 MILLIGRAM(S): at 22:00

## 2024-11-27 RX ADMIN — Medication 50 MILLIGRAM(S): at 04:13

## 2024-11-27 RX ADMIN — OXYCODONE HYDROCHLORIDE 2.5 MILLIGRAM(S): 30 TABLET ORAL at 14:20

## 2024-11-27 RX ADMIN — Medication 5000 UNIT(S): at 11:49

## 2024-11-27 RX ADMIN — OXYCODONE HYDROCHLORIDE 2.5 MILLIGRAM(S): 30 TABLET ORAL at 21:49

## 2024-11-27 RX ADMIN — Medication 100 MILLIGRAM(S): at 11:49

## 2024-11-27 RX ADMIN — OXYCODONE HYDROCHLORIDE 2.5 MILLIGRAM(S): 30 TABLET ORAL at 13:20

## 2024-11-27 RX ADMIN — FAMOTIDINE 20 MILLIGRAM(S): 20 TABLET, FILM COATED ORAL at 11:49

## 2024-11-27 RX ADMIN — ACETAMINOPHEN, DIPHENHYDRAMINE HCL, PHENYLEPHRINE HCL 10 MILLIGRAM(S): 325; 25; 5 TABLET ORAL at 21:26

## 2024-11-27 RX ADMIN — ENOXAPARIN SODIUM 40 MILLIGRAM(S): 30 INJECTION SUBCUTANEOUS at 21:26

## 2024-11-27 NOTE — DIETITIAN INITIAL EVALUATION ADULT - EDUCATION DIETARY MODIFICATIONS
Discussed diet order; explained importance of meeting estimated nutrient needs via PO intake/(2) meets goals/outcomes/verbalization

## 2024-11-27 NOTE — PROGRESS NOTE ADULT - ASSESSMENT
ASSESSMENT  69F w/ PMHx HTN, SLE, Hypothyroidism, Anemia, Chronic Venous Statis and h/o CVA w/o residual deficits (1987)sent in for worsening right lower extremity cellulitis.    IMPRESSION  #RLE Cellulitis vs contact dermatitis   #Chronic venous stasis     #SLE  #Hypothyroid  #Hx of CVA    #Abx allergy: morphine (Other)  Compazine (Other)  aspirin (Other)  Levaquin (Other)      RECOMMENDATIONS  - continue cefazolin 1g q 8 hours   - erythema does seem mildly improved  - area of erythema seem to be tracking at border of gauze wraps -- possible component of contact dermatitis -- continue derm eval   - eventually can switch to PO cefadroxil 500 mg BID or PO Keflex 500 mg QID if cefadroxil not covered to complete until 12/4    Please call or message on Microsoft Teams if with any questions.  Spectra [phone]

## 2024-11-27 NOTE — DIETITIAN INITIAL EVALUATION ADULT - ORAL INTAKE PTA/DIET HISTORY
Patient reports fair appetite at home; patient says she does not like the food they serve at home ([facility]). Denies difficulty chewing/swallowing. NKFA or Buddhist/cultural food restrictions. Reports UBW is 63kg; reports unintentional weight loss within the past 2 months. Previous visit data shows weight trend of ?????    Patient reports consuming % of meals in hospital. Denies symptoms of N/V/D/Constipation. Last BM was on 11/27/24 as per patient. Patient reports fair appetite at home; patient says she does not like the food they serve at home ([facility] living [facility]). Denies difficulty chewing/swallowing. NKFA or Protestant/cultural food restrictions. Reports UBW is 68kg; reports unintentional weight loss within the past 2 months. Previous visit data shows weight history of 67.kg on 5/4/24 and 63.5kg on 8/3/24; current weight of 63g is reflective of 6.7% weight loss within 6 months.    Patient reports consuming % of meals in hospital. Denies symptoms of N/V/D/Constipation. Last BM was on 11/27/24 as per patient.

## 2024-11-27 NOTE — PROGRESS NOTE ADULT - ASSESSMENT
69F w/ PMHx HTN, SLE, Hypothyroidism, Anemia, Chronic Venous Statis and h/o CVA w/o residual deficits (1987) sent in for worsening right lower extremity cellulitis.      #RLE Cellulitis vs contact dermatitis   #Chronic venous stasis   - SIRS -ve but BP soft, no TTE in system but patient dry on exam  - failed PO abx, s/p doxycycline  - xray shows no evidence of OM  - area of erythema seem to be tracking at border of gauze wraps -- possible component of contact dermatitis   - MRSA nares negative  - c/w cefazolin 1g q 8 hours   - ID following  - derm evaluation  - local wound care     #SLE  - on Plaquenil    #Chronic Normocytic Anemia  - Hgb 11.7 (previously 10.5), repeat cbc is stable    #HTN  #h/o CVA  - c/w home meds    #DVT ppx: Lovenox  #GI ppx: n/a  #Diet: Regular - DASH/TLC  #Activity: IAT  #Code Status: Full Code   69F w/ PMHx HTN, SLE, Hypothyroidism, Anemia, Chronic Venous Statis and h/o CVA w/o residual deficits (1987) sent in for worsening right lower extremity cellulitis.      #RLE Cellulitis with contact dermatitis   #Chronic venous stasis   - SIRS -ve but BP soft, no TTE in system but patient dry on exam  - failed PO abx, s/p doxycycline  - xray shows no evidence of OM  - area of erythema seem to be tracking at border of gauze wraps -- possible component of contact dermatitis   - MRSA nares negative  - c/w cefazolin 1g q 8 hours - switch to keflex tomorrow   - ID following  - derm evaluation- start topical steroids   - local wound care     #SLE  - on Plaquenil    #Chronic Normocytic Anemia  - Hgb 11.7 (previously 10.5), repeat cbc is stable    #HTN  #h/o CVA  - c/w home meds    #DVT ppx: Lovenox  #GI ppx: n/a  #Diet: Regular - DASH/TLC  #Activity: IAT  #Code Status: Full Code  #Dispo: Patient from [facility] on friday

## 2024-11-27 NOTE — DIETITIAN INITIAL EVALUATION ADULT - NUTRITIONGOAL OUTCOME1
patient presents with sore throat and concern for COVID exposure.  As patient is nontoxic appearing will test for COVID and d/c.  Quarantine reviewed and return precautions reviewed. Patient to meet 50-75% estimated nutrient needs within 3-5 days

## 2024-11-27 NOTE — PROGRESS NOTE ADULT - NS ATTEST RISK PROBLEM GEN_ALL_CORE FT
the following   --------------------------------Complexity of data reviewed ----------------------------------------------  The Patients complexity of data reviewed  is Low [ ] Moderate [ x] High [] due to the following:   Reviewed prior external records [ ]  Considering/ Ordered a unique test:  Labs [x ] Imaging [ ] Stress Test  [ ] Other: Specify [ ]  Reviewed each unique test result [x ]   Assessment requiring an independent historian [ ]  Independent interpretation of:   X-Ray [ ] EKG [ ]  Other: Specify  [ ]  Discussion of management of tests with clinician outside of my group [x derm and ID
the following   --------------------------------Complexity of data reviewed ----------------------------------------------  The Patients complexity of data reviewed  is Low [ ] Moderate [x ] High [ ] due to the following:   Reviewed prior external records [ ]  Considering/ Ordered a unique test:  Labs [x ] Imaging [x ] Stress Test  [ ] Other: Specify [ ]  Reviewed each unique test result [x ]   Assessment requiring an independent historian [ ]  Independent interpretation of:   X-Ray [x ] EKG [ ]  Other: Specify  [ ]  Discussion of management of tests with clinician outside of my group [ID ]

## 2024-11-27 NOTE — DIETITIAN INITIAL EVALUATION ADULT - PERTINENT LABORATORY DATA
11-27    140  |  108  |  14  ----------------------------<  89  3.6   |  24  |  0.6[L]    Ca    7.9[L]      27 Nov 2024 04:30  Phos  3.1     11-27  Mg     1.8     11-27    TPro  5.5[L]  /  Alb  2.9[L]  /  TBili  0.2  /  DBili  x   /  AST  76[H]  /  ALT  30  /  AlkPhos  97  11-27

## 2024-11-27 NOTE — PHARMACOTHERAPY INTERVENTION NOTE - COMMENTS
As per policy, ordered a vancomycin trough level for 11/29 @1600 to assist with further vancomycin dosing optimization.     Susan Bower, PharmD  Clinical Pharmacy Specialist, Infectious Diseases  Tele-Antimicrobial Stewardship Program (Tele-ASP)  Tele-ASP Phone: [phone]

## 2024-11-27 NOTE — PROGRESS NOTE ADULT - SUBJECTIVE AND OBJECTIVE BOX
LATOYA FULTON  69y, Female  Allergy: penicillins (Other)  morphine (Other)  Compazine (Other)  aspirin (Other)  Levaquin (Other)      LOS  2d    CHIEF COMPLAINT: Cellulitis (27 Nov 2024 10:01)      INTERVAL EVENTS/HPI  - No acute events overnight  - T(F): , Max: 98.2 (11-26-24 @ 15:30)  - ongoing pain, but erythema seems lighter   - WBC Count: 7.03 (11-27-24 @ 04:30)  WBC Count: 7.47 (11-26-24 @ 11:34)     - Creatinine: 0.6 (11-27-24 @ 04:30)  Creatinine: 0.8 (11-26-24 @ 04:30)       ROS  General: Denies rigors, nightsweats  HEENT: Denies headache, rhinorrhea, sore throat, eye pain  CV: Denies CP, palpitations  PULM: Denies wheezing, hemoptysis  GI: Denies hematemesis, hematochezia, melena  : Denies discharge, hematuria  MSK: Denies arthralgias, myalgias  SKIN: Denies rash, lesions  NEURO: Denies paresthesias, weakness  PSYCH: Denies depression, anxiety    VITALS:  T(F): 97.9, Max: 98.2 (11-26-24 @ 15:30)  HR: 62  BP: 146/83  RR: 18Vital Signs Last 24 Hrs  T(C): 36.6 (27 Nov 2024 07:38), Max: 36.8 (26 Nov 2024 15:30)  T(F): 97.9 (27 Nov 2024 07:38), Max: 98.2 (26 Nov 2024 15:30)  HR: 62 (27 Nov 2024 07:38) (61 - 70)  BP: 146/83 (27 Nov 2024 07:38) (114/71 - 151/72)  BP(mean): 99 (27 Nov 2024 05:08) (82 - 99)  RR: 18 (27 Nov 2024 07:38) (18 - 18)  SpO2: 98% (27 Nov 2024 07:38) (97% - 99%)    Parameters below as of 27 Nov 2024 05:08  Patient On (Oxygen Delivery Method): room air        PHYSICAL EXAM:  Gen: NAD, resting in bed  HEENT: Normocephalic, atraumatic  Neck: supple, no lymphadenopathy  CV: Regular rate & regular rhythm  Lungs: decreased BS at bases, no fremitus  Abdomen: Soft, BS present  Ext: Warm, well perfused  Neuro: non focal, awake  Skin: no rash, no erythema  Lines: no phlebitis    FH: Non-contributory  Social Hx: Non-contributory    TESTS & MEASUREMENTS:                        10.5   7.03  )-----------( 216      ( 27 Nov 2024 04:30 )             33.6     11-27    140  |  108  |  14  ----------------------------<  89  3.6   |  24  |  0.6[L]    Ca    7.9[L]      27 Nov 2024 04:30  Phos  3.1     11-27  Mg     1.8     11-27    TPro  5.5[L]  /  Alb  2.9[L]  /  TBili  0.2  /  DBili  x   /  AST  76[H]  /  ALT  30  /  AlkPhos  97  11-27      LIVER FUNCTIONS - ( 27 Nov 2024 04:30 )  Alb: 2.9 g/dL / Pro: 5.5 g/dL / ALK PHOS: 97 U/L / ALT: 30 U/L / AST: 76 U/L / GGT: x           Urinalysis Basic - ( 27 Nov 2024 04:30 )    Color: x / Appearance: x / SG: x / pH: x  Gluc: 89 mg/dL / Ketone: x  / Bili: x / Urobili: x   Blood: x / Protein: x / Nitrite: x   Leuk Esterase: x / RBC: x / WBC x   Sq Epi: x / Non Sq Epi: x / Bacteria: x        Culture - Blood (collected 11-25-24 @ 12:42)  Source: .Blood BLOOD  Preliminary Report (11-26-24 @ 22:02):    No growth at 24 hours    Culture - Blood (collected 11-25-24 @ 12:42)  Source: .Blood BLOOD  Preliminary Report (11-26-24 @ 22:01):    No growth at 24 hours        Lactate, Blood: 0.9 mmol/L (11-25-24 @ 12:42)      INFECTIOUS DISEASES TESTING  MRSA PCR Result.: Negative (11-27-24 @ 04:10)  Procalcitonin: 0.04 (11-25-24 @ 17:35)  COVID-19 PCR: NotDetec (05-08-24 @ 11:37)  Procalcitonin: 0.03 (05-05-24 @ 07:30)  COVID-19 PCR: NotDetec (04-01-24 @ 13:55)      INFLAMMATORY MARKERS      RADIOLOGY & ADDITIONAL TESTS:  I have personally reviewed the last available Chest xray  CXR      CT      CARDIOLOGY TESTING      MEDICATIONS  ceFAZolin   IVPB     ceFAZolin   IVPB 1000 IV Intermittent every 8 hours  cholecalciferol 5000 Oral daily  enoxaparin Injectable 40 SubCutaneous every 24 hours  famotidine    Tablet 20 Oral daily  hydroxychloroquine 200 Oral daily  levothyroxine 175 Oral daily  levothyroxine 25 Oral <User Schedule>  melatonin 10 Oral at bedtime  senna 2 Oral at bedtime  traZODone 50 Oral at bedtime      WEIGHT  Weight (kg): 63 (11-25-24 @ 11:36)  Creatinine: 0.6 mg/dL (11-27-24 @ 04:30)      ANTIBIOTICS:  ceFAZolin   IVPB      ceFAZolin   IVPB 1000 milliGRAM(s) IV Intermittent every 8 hours  hydroxychloroquine 200 milliGRAM(s) Oral daily      All available historical records have been reviewed

## 2024-11-27 NOTE — CONSULT NOTE ADULT - SUBJECTIVE AND OBJECTIVE BOX
LATOYA FULTON 69y Female  MRN#: [id number]   Hospital Day: 2d    HPI:  69F w/ PMHx HTN, SLE, Hypothyroidism, Anemia, Chronic Venous Statis and h/o CVA w/o residual deficits (1987)sent in for worsening right lower extremity cellulitis. Patient states to have experienced redness over the past 2 weeks and completed a course of doxycycline.  Patient states she was seen by Dr. Crespo today and told to come to the ED due to worsening infection. pt denies any other symptoms including fevers, chill, headache, recent illness/travel, cough, abdominal pain, chest pain, or SOB.    Vitals: Temp 97.6F, /68, HR 59, RR 20, SpO2 99% on RA    Labs: Hgb 11.7 (previously 10.5), Cr 0.8 (at previous baseline),     Imaging: X-ray of Right Tib/Fib shows hardware and lower extremity swelling. no acute fractures/dislocations.    In the ED:  - s/p Vanc IV x1    Admit to medicine for further management of cellulitis. (25 Nov 2024 17:13)      SUBJECTIVE  Patient is a 69y old Female who presents with a chief complaint of Cellulitis (27 Nov 2024 14:13)  Currently admitted to medicine with the primary diagnosis of Cellulitis      INTERVAL HPI AND OVERNIGHT EVENTS:  Patient was examined and seen at bedside. This morning she has some RLE pain and burning.     OBJECTIVE  PAST MEDICAL & SURGICAL HISTORY  Lupus    Essential hypertension    CVA (cerebral vascular accident)  1987    Hypothyroidism    FH: bilateral hip replacements    H/O total knee replacement    H/O abdominal hysterectomy      ALLERGIES:  penicillins (Other)  morphine (Other)  Compazine (Other)  aspirin (Other)  Levaquin (Other)    MEDICATIONS:  STANDING MEDICATIONS  ceFAZolin   IVPB      ceFAZolin   IVPB 1000 milliGRAM(s) IV Intermittent every 8 hours  cholecalciferol 5000 Unit(s) Oral daily  enoxaparin Injectable 40 milliGRAM(s) SubCutaneous every 24 hours  famotidine    Tablet 20 milliGRAM(s) Oral daily  hydroxychloroquine 200 milliGRAM(s) Oral daily  levothyroxine 175 MICROGram(s) Oral daily  levothyroxine 25 MICROGram(s) Oral <User Schedule>  melatonin 10 milliGRAM(s) Oral at bedtime  senna 2 Tablet(s) Oral at bedtime  traZODone 50 milliGRAM(s) Oral at bedtime    PRN MEDICATIONS  acetaminophen     Tablet .. 650 milliGRAM(s) Oral every 6 hours PRN  aluminum hydroxide/magnesium hydroxide/simethicone Suspension 30 milliLiter(s) Oral every 4 hours PRN  HYDROmorphone  Injectable 0.5 milliGRAM(s) IV Push every 8 hours PRN  melatonin 3 milliGRAM(s) Oral at bedtime PRN  ondansetron Injectable 4 milliGRAM(s) IV Push every 8 hours PRN  oxyCODONE    IR 2.5 milliGRAM(s) Oral every 6 hours PRN  polyethylene glycol 3350 17 Gram(s) Oral every 12 hours PRN      VITAL SIGNS: Last 24 Hours  T(C): 36.6 (27 Nov 2024 07:38), Max: 36.8 (26 Nov 2024 15:30)  T(F): 97.9 (27 Nov 2024 07:38), Max: 98.2 (26 Nov 2024 15:30)  HR: 62 (27 Nov 2024 07:38) (61 - 70)  BP: 146/83 (27 Nov 2024 07:38) (114/71 - 151/72)  BP(mean): 99 (27 Nov 2024 05:08) (82 - 99)  RR: 18 (27 Nov 2024 07:38) (18 - 18)  SpO2: 98% (27 Nov 2024 07:38) (97% - 99%)    LABS:                        10.5   7.03  )-----------( 216      ( 27 Nov 2024 04:30 )             33.6     11-27    140  |  108  |  14  ----------------------------<  89  3.6   |  24  |  0.6[L]    Ca    7.9[L]      27 Nov 2024 04:30  Phos  3.1     11-27  Mg     1.8     11-27    TPro  5.5[L]  /  Alb  2.9[L]  /  TBili  0.2  /  DBili  x   /  AST  76[H]  /  ALT  30  /  AlkPhos  97  11-27      Urinalysis Basic - ( 27 Nov 2024 04:30 )    Color: x / Appearance: x / SG: x / pH: x  Gluc: 89 mg/dL / Ketone: x  / Bili: x / Urobili: x   Blood: x / Protein: x / Nitrite: x   Leuk Esterase: x / RBC: x / WBC x   Sq Epi: x / Non Sq Epi: x / Bacteria: x            Culture - Blood (collected 25 Nov 2024 12:42)  Source: .Blood BLOOD  Preliminary Report (26 Nov 2024 22:02):    No growth at 24 hours    Culture - Blood (collected 25 Nov 2024 12:42)  Source: .Blood BLOOD  Preliminary Report (26 Nov 2024 22:01):    No growth at 24 hours          RADIOLOGY:      PHYSICAL EXAM:  CONSTITUTIONAL: No acute distress, AAOx3  HEAD: Atraumatic, normocephalic  EYES: EOM intact, PERRLA, conjunctiva and sclera clear  ENT: Supple, no masses, no thyromegaly, no bruits, no JVD; moist mucous membranes  PULMONARY: Clear to auscultation bilaterally; no wheezes, rales, or rhonchi  CARDIOVASCULAR: Regular rate and rhythm; no murmurs, rubs, or gallops  GASTROINTESTINAL: Soft, non-tender, non-distended; bowel sounds present  MUSCULOSKELETAL: 2+ peripheral pulses  SKIN: RLE scaly erythematous macular rash, eczematous appearance    History:   Derm Hx: Symptoms starting months ago, tried silvedene with gauze and ace bandage with no improvement. Tried doxycycline for 2 weeks, no improvement. Has had this many times before but silvedene usually helps. Rash is painful and burns. No fevers, chills, white count.    ASSESSMENT & PLAN    #Contact dermatitis  - appearance favors dermatitis over cellulitis, rash may have been a cellulitis at one point but now likely a dermatitis  - triamcinolone 0.1% BID to LLE  - avoid corrosive cleaning agents  - discussed with attending Dr Penn LATOYA FULTON 69y Female  MRN#: [id number]   Hospital Day: 2d    HPI:  69F w/ PMHx HTN, SLE, Hypothyroidism, Anemia, Chronic Venous Statis and h/o CVA w/o residual deficits (1987)sent in for worsening right lower extremity cellulitis. Patient states to have experienced redness over the past 2 weeks and completed a course of doxycycline.  Patient states she was seen by Dr. Crespo today and told to come to the ED due to worsening infection. pt denies any other symptoms including fevers, chill, headache, recent illness/travel, cough, abdominal pain, chest pain, or SOB.    Vitals: Temp 97.6F, /68, HR 59, RR 20, SpO2 99% on RA    Labs: Hgb 11.7 (previously 10.5), Cr 0.8 (at previous baseline),     Imaging: X-ray of Right Tib/Fib shows hardware and lower extremity swelling. no acute fractures/dislocations.    In the ED:  - s/p Vanc IV x1    Admit to medicine for further management of cellulitis. (25 Nov 2024 17:13)      SUBJECTIVE  Patient is a 69y old Female who presents with a chief complaint of Cellulitis (27 Nov 2024 14:13)  Currently admitted to medicine with the primary diagnosis of Cellulitis      INTERVAL HPI AND OVERNIGHT EVENTS:  Patient was examined and seen at bedside. This morning she has some RLE pain and burning.     OBJECTIVE  PAST MEDICAL & SURGICAL HISTORY  Lupus    Essential hypertension    CVA (cerebral vascular accident)  1987    Hypothyroidism    FH: bilateral hip replacements    H/O total knee replacement    H/O abdominal hysterectomy      ALLERGIES:  penicillins (Other)  morphine (Other)  Compazine (Other)  aspirin (Other)  Levaquin (Other)    MEDICATIONS:  STANDING MEDICATIONS  ceFAZolin   IVPB      ceFAZolin   IVPB 1000 milliGRAM(s) IV Intermittent every 8 hours  cholecalciferol 5000 Unit(s) Oral daily  enoxaparin Injectable 40 milliGRAM(s) SubCutaneous every 24 hours  famotidine    Tablet 20 milliGRAM(s) Oral daily  hydroxychloroquine 200 milliGRAM(s) Oral daily  levothyroxine 175 MICROGram(s) Oral daily  levothyroxine 25 MICROGram(s) Oral <User Schedule>  melatonin 10 milliGRAM(s) Oral at bedtime  senna 2 Tablet(s) Oral at bedtime  traZODone 50 milliGRAM(s) Oral at bedtime    PRN MEDICATIONS  acetaminophen     Tablet .. 650 milliGRAM(s) Oral every 6 hours PRN  aluminum hydroxide/magnesium hydroxide/simethicone Suspension 30 milliLiter(s) Oral every 4 hours PRN  HYDROmorphone  Injectable 0.5 milliGRAM(s) IV Push every 8 hours PRN  melatonin 3 milliGRAM(s) Oral at bedtime PRN  ondansetron Injectable 4 milliGRAM(s) IV Push every 8 hours PRN  oxyCODONE    IR 2.5 milliGRAM(s) Oral every 6 hours PRN  polyethylene glycol 3350 17 Gram(s) Oral every 12 hours PRN      VITAL SIGNS: Last 24 Hours  T(C): 36.6 (27 Nov 2024 07:38), Max: 36.8 (26 Nov 2024 15:30)  T(F): 97.9 (27 Nov 2024 07:38), Max: 98.2 (26 Nov 2024 15:30)  HR: 62 (27 Nov 2024 07:38) (61 - 70)  BP: 146/83 (27 Nov 2024 07:38) (114/71 - 151/72)  BP(mean): 99 (27 Nov 2024 05:08) (82 - 99)  RR: 18 (27 Nov 2024 07:38) (18 - 18)  SpO2: 98% (27 Nov 2024 07:38) (97% - 99%)    LABS:                        10.5   7.03  )-----------( 216      ( 27 Nov 2024 04:30 )             33.6     11-27    140  |  108  |  14  ----------------------------<  89  3.6   |  24  |  0.6[L]    Ca    7.9[L]      27 Nov 2024 04:30  Phos  3.1     11-27  Mg     1.8     11-27    TPro  5.5[L]  /  Alb  2.9[L]  /  TBili  0.2  /  DBili  x   /  AST  76[H]  /  ALT  30  /  AlkPhos  97  11-27      Urinalysis Basic - ( 27 Nov 2024 04:30 )    Color: x / Appearance: x / SG: x / pH: x  Gluc: 89 mg/dL / Ketone: x  / Bili: x / Urobili: x   Blood: x / Protein: x / Nitrite: x   Leuk Esterase: x / RBC: x / WBC x   Sq Epi: x / Non Sq Epi: x / Bacteria: x            Culture - Blood (collected 25 Nov 2024 12:42)  Source: .Blood BLOOD  Preliminary Report (26 Nov 2024 22:02):    No growth at 24 hours    Culture - Blood (collected 25 Nov 2024 12:42)  Source: .Blood BLOOD  Preliminary Report (26 Nov 2024 22:01):    No growth at 24 hours          RADIOLOGY:      PHYSICAL EXAM:  CONSTITUTIONAL: No acute distress, AAOx3  HEAD: Atraumatic, normocephalic  EYES: EOM intact, PERRLA, conjunctiva and sclera clear  ENT: Supple, no masses, no thyromegaly, no bruits, no JVD; moist mucous membranes  PULMONARY: Clear to auscultation bilaterally; no wheezes, rales, or rhonchi  CARDIOVASCULAR: Regular rate and rhythm; no murmurs, rubs, or gallops  GASTROINTESTINAL: Soft, non-tender, non-distended; bowel sounds present  MUSCULOSKELETAL: 2+ peripheral pulses  SKIN: (photos reviewed) erythema with scale and eczematous changes right leg  History:   Derm Hx: Symptoms starting months ago, tried silvadene with gauze and ace bandage with no improvement. Tried doxycycline for 2 weeks, no improvement. Has had this many times before but silvadene usually helps. Rash is painful and burns. No fevers, chills, white count.    ASSESSMENT & PLAN

## 2024-11-27 NOTE — DIETITIAN INITIAL EVALUATION ADULT - ADD RECOMMEND
Interventions:  -Meals and snacks; cater to food preferences as able  -Coordination of care    Monitoring:  -PO intake  -Diet/texture tolerance  -Weight  -Nutrition related labs  -NFPF    Moderate Nutrition Risk f/u

## 2024-11-27 NOTE — DIETITIAN INITIAL EVALUATION ADULT - OTHER INFO
Patient is a 69F with a PMHx of HTN, SLE, Hypothyroidism, Anemia, Chronic Venous Statis and h/o CVA w/o residual deficits (1987) sent in for worsening right lower extremity cellulitis.

## 2024-11-27 NOTE — DIETITIAN INITIAL EVALUATION ADULT - DATE OF WEIGHT PRIOR TO ADM
well developed, well nourished , in no acute distress , ambulating without difficulty , normal communication ability () 04-May-2024

## 2024-11-27 NOTE — DIETITIAN INITIAL EVALUATION ADULT - PERTINENT MEDS FT
MEDICATIONS  (STANDING):  ceFAZolin   IVPB      ceFAZolin   IVPB 1000 milliGRAM(s) IV Intermittent every 8 hours  cholecalciferol 5000 Unit(s) Oral daily  enoxaparin Injectable 40 milliGRAM(s) SubCutaneous every 24 hours  famotidine    Tablet 20 milliGRAM(s) Oral daily  hydroxychloroquine 200 milliGRAM(s) Oral daily  levothyroxine 175 MICROGram(s) Oral daily  levothyroxine 25 MICROGram(s) Oral <User Schedule>  melatonin 10 milliGRAM(s) Oral at bedtime  senna 2 Tablet(s) Oral at bedtime  traZODone 50 milliGRAM(s) Oral at bedtime    MEDICATIONS  (PRN):  acetaminophen     Tablet .. 650 milliGRAM(s) Oral every 6 hours PRN Temp greater or equal to 38C (100.4F), Mild Pain (1 - 3)  aluminum hydroxide/magnesium hydroxide/simethicone Suspension 30 milliLiter(s) Oral every 4 hours PRN Dyspepsia  HYDROmorphone  Injectable 0.5 milliGRAM(s) IV Push every 8 hours PRN Severe Pain (7 - 10)  melatonin 3 milliGRAM(s) Oral at bedtime PRN Insomnia  ondansetron Injectable 4 milliGRAM(s) IV Push every 8 hours PRN Nausea and/or Vomiting  oxyCODONE    IR 2.5 milliGRAM(s) Oral every 6 hours PRN Moderate Pain (4 - 6)  polyethylene glycol 3350 17 Gram(s) Oral every 12 hours PRN Constipation

## 2024-11-27 NOTE — PROGRESS NOTE ADULT - SUBJECTIVE AND OBJECTIVE BOX
SUBJECTIVE:    Patient is a 69y old Female who presents with a chief complaint of Cellulitis (26 Nov 2024 13:43)    Currently admitted to medicine with the primary diagnosis of Cellulitis       Today is hospital day 2d. This morning she is resting comfortably in bed and reports no new issues or overnight events.     INTERVAL EVENTS:     PAST MEDICAL & SURGICAL HISTORY  Lupus    Essential hypertension    CVA (cerebral vascular accident)  1987    Hypothyroidism    FH: bilateral hip replacements    H/O total knee replacement    H/O abdominal hysterectomy        ALLERGIES:  penicillins (Other)  morphine (Other)  Compazine (Other)  aspirin (Other)  Levaquin (Other)    MEDICATIONS:  STANDING MEDICATIONS  aztreonam  IVPB 1000 milliGRAM(s) IV Intermittent every 8 hours  aztreonam  IVPB      cholecalciferol 5000 Unit(s) Oral daily  enoxaparin Injectable 40 milliGRAM(s) SubCutaneous every 24 hours  famotidine    Tablet 20 milliGRAM(s) Oral daily  hydroxychloroquine 200 milliGRAM(s) Oral daily  levothyroxine 175 MICROGram(s) Oral daily  levothyroxine 25 MICROGram(s) Oral <User Schedule>  melatonin 10 milliGRAM(s) Oral at bedtime  senna 2 Tablet(s) Oral at bedtime  traZODone 50 milliGRAM(s) Oral at bedtime  vancomycin  IVPB 1000 milliGRAM(s) IV Intermittent every 12 hours    PRN MEDICATIONS  acetaminophen     Tablet .. 650 milliGRAM(s) Oral every 6 hours PRN  aluminum hydroxide/magnesium hydroxide/simethicone Suspension 30 milliLiter(s) Oral every 4 hours PRN  HYDROmorphone  Injectable 0.5 milliGRAM(s) IV Push every 8 hours PRN  melatonin 3 milliGRAM(s) Oral at bedtime PRN  ondansetron Injectable 4 milliGRAM(s) IV Push every 8 hours PRN  oxyCODONE    IR 2.5 milliGRAM(s) Oral every 6 hours PRN  polyethylene glycol 3350 17 Gram(s) Oral every 12 hours PRN    VITALS:   T(F): 97.9  HR: 62  BP: 146/83  RR: 18  SpO2: 98%    LABS:                        10.5   7.03  )-----------( 216      ( 27 Nov 2024 04:30 )             33.6     11-27    140  |  108  |  14  ----------------------------<  89  3.6   |  24  |  0.6[L]    Ca    7.9[L]      27 Nov 2024 04:30  Phos  3.1     11-27  Mg     1.8     11-27    TPro  5.5[L]  /  Alb  2.9[L]  /  TBili  0.2  /  DBili  x   /  AST  76[H]  /  ALT  30  /  AlkPhos  97  11-27      Urinalysis Basic - ( 27 Nov 2024 04:30 )    Color: x / Appearance: x / SG: x / pH: x  Gluc: 89 mg/dL / Ketone: x  / Bili: x / Urobili: x   Blood: x / Protein: x / Nitrite: x   Leuk Esterase: x / RBC: x / WBC x   Sq Epi: x / Non Sq Epi: x / Bacteria: x            Culture - Blood (collected 25 Nov 2024 12:42)  Source: .Blood BLOOD  Preliminary Report (26 Nov 2024 22:02):    No growth at 24 hours    Culture - Blood (collected 25 Nov 2024 12:42)  Source: .Blood BLOOD  Preliminary Report (26 Nov 2024 22:01):    No growth at 24 hours          RADIOLOGY:    PHYSICAL EXAM:  GEN: No acute distress  PULM/CHEST: Clear to auscultation bilaterally, no rales, rhonchi or wheezes   CVS: Regular rate and rhythm, S1-S2, no murmurs  ABD: Soft, non-tender, non-distended, +BS  EXT: No edema  NEURO: AAOx3    Wilkes Catheter:        SUBJECTIVE:    Patient is a 69y old Female who presents with a chief complaint of Cellulitis (26 Nov 2024 13:43)    Currently admitted to medicine with the primary diagnosis of Cellulitis       Today is hospital day 2d. This morning she is resting comfortably in bed and reports no new issues or overnight events.     INTERVAL EVENTS:     PAST MEDICAL & SURGICAL HISTORY  Lupus    Essential hypertension    CVA (cerebral vascular accident)  1987    Hypothyroidism    FH: bilateral hip replacements    H/O total knee replacement    H/O abdominal hysterectomy        ALLERGIES:  penicillins (Other)  morphine (Other)  Compazine (Other)  aspirin (Other)  Levaquin (Other)    MEDICATIONS:  STANDING MEDICATIONS  aztreonam  IVPB 1000 milliGRAM(s) IV Intermittent every 8 hours  aztreonam  IVPB      cholecalciferol 5000 Unit(s) Oral daily  enoxaparin Injectable 40 milliGRAM(s) SubCutaneous every 24 hours  famotidine    Tablet 20 milliGRAM(s) Oral daily  hydroxychloroquine 200 milliGRAM(s) Oral daily  levothyroxine 175 MICROGram(s) Oral daily  levothyroxine 25 MICROGram(s) Oral <User Schedule>  melatonin 10 milliGRAM(s) Oral at bedtime  senna 2 Tablet(s) Oral at bedtime  traZODone 50 milliGRAM(s) Oral at bedtime  vancomycin  IVPB 1000 milliGRAM(s) IV Intermittent every 12 hours    PRN MEDICATIONS  acetaminophen     Tablet .. 650 milliGRAM(s) Oral every 6 hours PRN  aluminum hydroxide/magnesium hydroxide/simethicone Suspension 30 milliLiter(s) Oral every 4 hours PRN  HYDROmorphone  Injectable 0.5 milliGRAM(s) IV Push every 8 hours PRN  melatonin 3 milliGRAM(s) Oral at bedtime PRN  ondansetron Injectable 4 milliGRAM(s) IV Push every 8 hours PRN  oxyCODONE    IR 2.5 milliGRAM(s) Oral every 6 hours PRN  polyethylene glycol 3350 17 Gram(s) Oral every 12 hours PRN    VITALS:   T(F): 97.9  HR: 62  BP: 146/83  RR: 18  SpO2: 98%    LABS:                        10.5   7.03  )-----------( 216      ( 27 Nov 2024 04:30 )             33.6     11-27    140  |  108  |  14  ----------------------------<  89  3.6   |  24  |  0.6[L]    Ca    7.9[L]      27 Nov 2024 04:30  Phos  3.1     11-27  Mg     1.8     11-27    TPro  5.5[L]  /  Alb  2.9[L]  /  TBili  0.2  /  DBili  x   /  AST  76[H]  /  ALT  30  /  AlkPhos  97  11-27      Urinalysis Basic - ( 27 Nov 2024 04:30 )    Color: x / Appearance: x / SG: x / pH: x  Gluc: 89 mg/dL / Ketone: x  / Bili: x / Urobili: x   Blood: x / Protein: x / Nitrite: x   Leuk Esterase: x / RBC: x / WBC x   Sq Epi: x / Non Sq Epi: x / Bacteria: x            Culture - Blood (collected 25 Nov 2024 12:42)  Source: .Blood BLOOD  Preliminary Report (26 Nov 2024 22:02):    No growth at 24 hours    Culture - Blood (collected 25 Nov 2024 12:42)  Source: .Blood BLOOD  Preliminary Report (26 Nov 2024 22:01):    No growth at 24 hours          RADIOLOGY:    PHYSICAL EXAM:  GEN: No acute distress  PULM/CHEST: Clear to auscultation bilaterally, no rales, rhonchi or wheezes   CVS: Regular rate and rhythm, S1-S2, no murmurs  ABD: Soft, non-tender, non-distended, +BS  EXT: RLE erythema improving   NEURO: AAOx3    Wilkes Catheter:

## 2024-11-27 NOTE — CONSULT NOTE ADULT - ASSESSMENT
#Contact dermatitis  - appearance favors dermatitis over cellulitis, rash may have been a cellulitis at one point but now likely a dermatitis  - triamcinolone 0.1% cream BID to RLE

## 2024-11-27 NOTE — PHARMACOTHERAPY INTERVENTION NOTE - COMMENTS
As per policy, recommended to continue vancomycin 1000mg IV q12hrs, as vancomycin trough on 11/26 returned at 20.8mg/dL. As per PrecisePK calculations, current vancomycin regimen predicts AUC/ANNE-MARIE to be 575.51 mg/L*h, which is within therapeutic range of 400 - 600 mg/L*h. Scr 0.8 mg/dL    Susan Bower, PharmD  Clinical Pharmacy Specialist, Infectious Diseases  Tele-Antimicrobial Stewardship Program (Tele-ASP)  Tele-ASP Phone: [phone]  Recommended to continue vancomycin 1000mg IV q12hrs, as vancomycin trough on 11/26 returned at 20.8mg/dL. As per PrecisePK calculations, current vancomycin regimen predicts AUC/ANNE-MARIE to be 575.51 mg/L*h, which is within therapeutic range of 400 - 600 mg/L*h. Scr 0.8 mg/dL    Susan Bower, PharmD  Clinical Pharmacy Specialist, Infectious Diseases  Tele-Antimicrobial Stewardship Program (Tele-ASP)  Tele-ASP Phone: [phone]

## 2024-11-27 NOTE — PROGRESS NOTE ADULT - ATTENDING COMMENTS
Being treated for cellulitis and dermatitis  Notes edited above
Continue IV abx for cellulitis. Improving. ID follow up. Otherwise as note stated above.

## 2024-11-27 NOTE — DIETITIAN INITIAL EVALUATION ADULT - OTHER CALCULATIONS
Energy: kcal/day (using MSJ x 1.2-1.4) with consideration for malnutrition  Protein: g/day (using 1.2-1.3g/kg) same reason as above  Fluid: 1mL/kcal/day Energy: 1118-1342kcal/day (using MSJ x 1-1.2) with consideration for acute illness  Protein: 63-76g/day (using 1-1.2g/kg) same reason as above  Fluid: 1mL/kcal/day

## 2024-11-28 LAB
ALBUMIN SERPL ELPH-MCNC: 2.9 G/DL — LOW (ref 3.5–5.2)
ALP SERPL-CCNC: 143 U/L — HIGH (ref 30–115)
ALT FLD-CCNC: 64 U/L — HIGH (ref 0–41)
ANION GAP SERPL CALC-SCNC: 12 MMOL/L — SIGNIFICANT CHANGE UP (ref 7–14)
AST SERPL-CCNC: 99 U/L — HIGH (ref 0–41)
BASOPHILS # BLD AUTO: 0.02 K/UL — SIGNIFICANT CHANGE UP (ref 0–0.2)
BASOPHILS NFR BLD AUTO: 0.3 % — SIGNIFICANT CHANGE UP (ref 0–1)
BILIRUB SERPL-MCNC: 0.2 MG/DL — SIGNIFICANT CHANGE UP (ref 0.2–1.2)
BUN SERPL-MCNC: 10 MG/DL — SIGNIFICANT CHANGE UP (ref 10–20)
CALCIUM SERPL-MCNC: 7.9 MG/DL — LOW (ref 8.4–10.5)
CHLORIDE SERPL-SCNC: 108 MMOL/L — SIGNIFICANT CHANGE UP (ref 98–110)
CO2 SERPL-SCNC: 21 MMOL/L — SIGNIFICANT CHANGE UP (ref 17–32)
CREAT SERPL-MCNC: 0.6 MG/DL — LOW (ref 0.7–1.5)
EGFR: 98 ML/MIN/1.73M2 — SIGNIFICANT CHANGE UP
EOSINOPHIL # BLD AUTO: 0.1 K/UL — SIGNIFICANT CHANGE UP (ref 0–0.7)
EOSINOPHIL NFR BLD AUTO: 1.6 % — SIGNIFICANT CHANGE UP (ref 0–8)
GLUCOSE SERPL-MCNC: 88 MG/DL — SIGNIFICANT CHANGE UP (ref 70–99)
HCT VFR BLD CALC: 33.6 % — LOW (ref 37–47)
HGB BLD-MCNC: 10.6 G/DL — LOW (ref 12–16)
IMM GRANULOCYTES NFR BLD AUTO: 0.7 % — HIGH (ref 0.1–0.3)
LYMPHOCYTES # BLD AUTO: 1.19 K/UL — LOW (ref 1.2–3.4)
LYMPHOCYTES # BLD AUTO: 19.4 % — LOW (ref 20.5–51.1)
MAGNESIUM SERPL-MCNC: 1.8 MG/DL — SIGNIFICANT CHANGE UP (ref 1.8–2.4)
MCHC RBC-ENTMCNC: 26.6 PG — LOW (ref 27–31)
MCHC RBC-ENTMCNC: 31.5 G/DL — LOW (ref 32–37)
MCV RBC AUTO: 84.4 FL — SIGNIFICANT CHANGE UP (ref 81–99)
MONOCYTES # BLD AUTO: 0.52 K/UL — SIGNIFICANT CHANGE UP (ref 0.1–0.6)
MONOCYTES NFR BLD AUTO: 8.5 % — SIGNIFICANT CHANGE UP (ref 1.7–9.3)
NEUTROPHILS # BLD AUTO: 4.26 K/UL — SIGNIFICANT CHANGE UP (ref 1.4–6.5)
NEUTROPHILS NFR BLD AUTO: 69.5 % — SIGNIFICANT CHANGE UP (ref 42.2–75.2)
NRBC # BLD: 0 /100 WBCS — SIGNIFICANT CHANGE UP (ref 0–0)
PLATELET # BLD AUTO: 236 K/UL — SIGNIFICANT CHANGE UP (ref 130–400)
PMV BLD: 9.8 FL — SIGNIFICANT CHANGE UP (ref 7.4–10.4)
POTASSIUM SERPL-MCNC: 3.8 MMOL/L — SIGNIFICANT CHANGE UP (ref 3.5–5)
POTASSIUM SERPL-SCNC: 3.8 MMOL/L — SIGNIFICANT CHANGE UP (ref 3.5–5)
PROT SERPL-MCNC: 5.7 G/DL — LOW (ref 6–8)
RBC # BLD: 3.98 M/UL — LOW (ref 4.2–5.4)
RBC # FLD: 15.8 % — HIGH (ref 11.5–14.5)
SODIUM SERPL-SCNC: 141 MMOL/L — SIGNIFICANT CHANGE UP (ref 135–146)
WBC # BLD: 6.13 K/UL — SIGNIFICANT CHANGE UP (ref 4.8–10.8)
WBC # FLD AUTO: 6.13 K/UL — SIGNIFICANT CHANGE UP (ref 4.8–10.8)

## 2024-11-28 PROCEDURE — 99232 SBSQ HOSP IP/OBS MODERATE 35: CPT

## 2024-11-28 RX ADMIN — OXYCODONE HYDROCHLORIDE 2.5 MILLIGRAM(S): 30 TABLET ORAL at 18:16

## 2024-11-28 RX ADMIN — HYDROXYCHLOROQUINE SULFATE 200 MILLIGRAM(S): 200 TABLET, FILM COATED ORAL at 11:47

## 2024-11-28 RX ADMIN — TRIAMCINOLONE ACETONIDE 1 APPLICATION(S): 0.15 AEROSOL, SPRAY TOPICAL at 06:53

## 2024-11-28 RX ADMIN — Medication 2 TABLET(S): at 21:28

## 2024-11-28 RX ADMIN — TRIAMCINOLONE ACETONIDE 1 APPLICATION(S): 0.15 AEROSOL, SPRAY TOPICAL at 18:56

## 2024-11-28 RX ADMIN — TRAZODONE HYDROCHLORIDE 50 MILLIGRAM(S): 150 TABLET ORAL at 21:28

## 2024-11-28 RX ADMIN — FAMOTIDINE 20 MILLIGRAM(S): 20 TABLET, FILM COATED ORAL at 11:46

## 2024-11-28 RX ADMIN — Medication 2 TABLET(S): at 17:49

## 2024-11-28 RX ADMIN — OXYCODONE HYDROCHLORIDE 2.5 MILLIGRAM(S): 30 TABLET ORAL at 19:37

## 2024-11-28 RX ADMIN — Medication 100 MILLIGRAM(S): at 05:30

## 2024-11-28 RX ADMIN — OXYCODONE HYDROCHLORIDE 2.5 MILLIGRAM(S): 30 TABLET ORAL at 12:20

## 2024-11-28 RX ADMIN — ACETAMINOPHEN, DIPHENHYDRAMINE HCL, PHENYLEPHRINE HCL 10 MILLIGRAM(S): 325; 25; 5 TABLET ORAL at 21:28

## 2024-11-28 RX ADMIN — Medication 30 MILLILITER(S): at 20:18

## 2024-11-28 RX ADMIN — Medication 5000 UNIT(S): at 11:46

## 2024-11-28 RX ADMIN — OXYCODONE HYDROCHLORIDE 2.5 MILLIGRAM(S): 30 TABLET ORAL at 11:51

## 2024-11-28 RX ADMIN — Medication 100 MILLIGRAM(S): at 13:28

## 2024-11-28 RX ADMIN — ENOXAPARIN SODIUM 40 MILLIGRAM(S): 30 INJECTION SUBCUTANEOUS at 21:29

## 2024-11-28 RX ADMIN — Medication 175 MICROGRAM(S): at 05:30

## 2024-11-28 NOTE — PROGRESS NOTE ADULT - ASSESSMENT
69F w/ PMHx HTN, SLE, Hypothyroidism, Anemia, Chronic Venous Statis and h/o CVA w/o residual deficits (1987) sent in for worsening right lower extremity cellulitis.      #RLE Cellulitis with contact dermatitis   #Chronic venous stasis   - SIRS -ve but BP soft, no TTE in system but patient dry on exam  - failed PO abx, s/p doxycycline  - xray shows no evidence of OM  - area of erythema seem to be tracking at border of gauze wraps -- possible component of contact dermatitis   - MRSA nares negative  - c/w cefazolin 1g q 8 hours - given the purulence would use bactrim. follow up with ID tomorrow  - wound care consult   - ID following  - derm evaluation- start topical steroids      #SLE  - on Plaquenil    #Chronic Normocytic Anemia  - Hgb 11.7 (previously 10.5), repeat cbc is stable    #HTN  #h/o CVA  - c/w home meds    #DVT ppx: Lovenox  #GI ppx: n/a  #Diet: Regular - DASH/TLC  #Activity: IAT  #Code Status: Full Code  #Dispo: Patient from [facility] on friday         Attestation Statements:    Attestation Statements:  I have personally seen and examined the patient.  I fully participated in the care of this patient.  I have made amendments to the documentation where necessary, and agree with the history, physical exam, and plan as documented by the Resident.     Being treated for cellulitis and dermatitis  Notes edited above .     Risk Statement (NON-critical care).     On this date of service, level of risk to patient is considered: Moderate.     Due to: the following   --------------------------------Complexity of data reviewed ----------------------------------------------  The Patients complexity of data reviewed  is Low [ ] Moderate [ x] High [] due to the following:   Reviewed prior external records [ ]  Considering/ Ordered a unique test:  Labs [x ] Imaging [ ] Stress Test  [ ] Other: Specify [ ]  Reviewed each unique test result [x ]   Assessment requiring an independent historian [ ]  Independent interpretation of:   X-Ray [ ] EKG [ ]  Other: Specify  [ ]  Discussion of management of tests with clinician outside of my group [ ]

## 2024-11-28 NOTE — PROGRESS NOTE ADULT - SUBJECTIVE AND OBJECTIVE BOX
SUBJECTIVE:    Patient is a 69y old Female who presents with a chief complaint of Cellulitis     (27 Nov 2024 18:05)      Today is hospital day 3d.     Overnight Events:     purulent discharge from the wound. skin sloughing off     PAST MEDICAL & SURGICAL HISTORY  Lupus    Essential hypertension    CVA (cerebral vascular accident)  1987    Hypothyroidism    FH: bilateral hip replacements    H/O total knee replacement    H/O abdominal hysterectomy          ALLERGIES:  penicillins (Other)  morphine (Other)  Compazine (Other)  aspirin (Other)  Levaquin (Other)    MEDICATIONS:  STANDING MEDICATIONS  cholecalciferol 5000 Unit(s) Oral daily  enoxaparin Injectable 40 milliGRAM(s) SubCutaneous every 24 hours  famotidine    Tablet 20 milliGRAM(s) Oral daily  hydroxychloroquine 200 milliGRAM(s) Oral daily  levothyroxine 175 MICROGram(s) Oral daily  levothyroxine 25 MICROGram(s) Oral <User Schedule>  melatonin 10 milliGRAM(s) Oral at bedtime  senna 2 Tablet(s) Oral at bedtime  traZODone 50 milliGRAM(s) Oral at bedtime  triamcinolone 0.1% Cream 1 Application(s) Topical every 12 hours  trimethoprim  160 mG/sulfamethoxazole 800 mG 2 Tablet(s) Oral two times a day    PRN MEDICATIONS  acetaminophen     Tablet .. 650 milliGRAM(s) Oral every 6 hours PRN  aluminum hydroxide/magnesium hydroxide/simethicone Suspension 30 milliLiter(s) Oral every 4 hours PRN  melatonin 3 milliGRAM(s) Oral at bedtime PRN  ondansetron Injectable 4 milliGRAM(s) IV Push every 8 hours PRN  oxyCODONE    IR 2.5 milliGRAM(s) Oral every 6 hours PRN  polyethylene glycol 3350 17 Gram(s) Oral every 12 hours PRN    VITALS:   ICU Vital Signs Last 24 Hrs  T(C): 36.7 (28 Nov 2024 14:05), Max: 36.8 (28 Nov 2024 06:03)  T(F): 98.1 (28 Nov 2024 14:05), Max: 98.2 (28 Nov 2024 06:03)  HR: 63 (28 Nov 2024 14:05) (63 - 83)  BP: 143/80 (28 Nov 2024 14:05) (128/74 - 158/86)  BP(mean): 99 (27 Nov 2024 21:30) (99 - 99)     RR: 19 (28 Nov 2024 14:05) (18 - 19)  SpO2: 93% (27 Nov 2024 23:31) (93% - 96%)    O2 Parameters below as of 27 Nov 2024 23:31  Patient On (Oxygen Delivery Method): room air            LABS:                        10.6   6.13  )-----------( 236      ( 28 Nov 2024 06:48 )             33.6     11-28    141  |  108  |  10  ----------------------------<  88  3.8   |  21  |  0.6[L]    Ca    7.9[L]      28 Nov 2024 06:48  Phos  3.1     11-27  Mg     1.8     11-28    TPro  5.7[L]  /  Alb  2.9[L]  /  TBili  0.2  /  DBili  x   /  AST  99[H]  /  ALT  64[H]  /  AlkPhos  143[H]  11-28       Urinalysis Basic - ( 28 Nov 2024 06:48 )    Color: x / Appearance: x / SG: x / pH: x  Gluc: 88 mg/dL / Ketone: x  / Bili: x / Urobili: x   Blood: x / Protein: x / Nitrite: x   Leuk Esterase: x / RBC: x / WBC x   Sq Epi: x / Non Sq Epi: x / Bacteria: x                Cardiology:            RADIOLOGY:        Lines:  Central line:              Date placed:             Indication:   Intravenous Access:   NG tube:   Wilkes Catheter:       Diagnositic orders     trimethoprim  160 mG/sulfamethoxazole 800 mG (11-28-24 @ 15:01) [Active]  Consult- Wound Care Nurse/Team (11-28-24 @ 13:02) [Active]  Transfer In - Clerical Use Only (11-27-24 @ 22:28) [Active]  Transfer In - Clerical Use Only (11-27-24 @ 22:26) [Active]  triamcinolone 0.1% Cream (11-27-24 @ 19:06) [Active]

## 2024-11-29 ENCOUNTER — TRANSCRIPTION ENCOUNTER (OUTPATIENT)
Age: 69
End: 2024-11-29

## 2024-11-29 VITALS
OXYGEN SATURATION: 99 % | HEART RATE: 73 BPM | RESPIRATION RATE: 18 BRPM | DIASTOLIC BLOOD PRESSURE: 70 MMHG | TEMPERATURE: 99 F | SYSTOLIC BLOOD PRESSURE: 135 MMHG

## 2024-11-29 LAB
ALBUMIN SERPL ELPH-MCNC: 3 G/DL — LOW (ref 3.5–5.2)
ALP SERPL-CCNC: 139 U/L — HIGH (ref 30–115)
ALT FLD-CCNC: 56 U/L — HIGH (ref 0–41)
ANION GAP SERPL CALC-SCNC: 9 MMOL/L — SIGNIFICANT CHANGE UP (ref 7–14)
AST SERPL-CCNC: 66 U/L — HIGH (ref 0–41)
BASOPHILS # BLD AUTO: 0.01 K/UL — SIGNIFICANT CHANGE UP (ref 0–0.2)
BASOPHILS NFR BLD AUTO: 0.2 % — SIGNIFICANT CHANGE UP (ref 0–1)
BILIRUB SERPL-MCNC: <0.2 MG/DL — SIGNIFICANT CHANGE UP (ref 0.2–1.2)
BUN SERPL-MCNC: 13 MG/DL — SIGNIFICANT CHANGE UP (ref 10–20)
CALCIUM SERPL-MCNC: 8.2 MG/DL — LOW (ref 8.4–10.5)
CHLORIDE SERPL-SCNC: 106 MMOL/L — SIGNIFICANT CHANGE UP (ref 98–110)
CO2 SERPL-SCNC: 24 MMOL/L — SIGNIFICANT CHANGE UP (ref 17–32)
CREAT SERPL-MCNC: 0.7 MG/DL — SIGNIFICANT CHANGE UP (ref 0.7–1.5)
EGFR: 94 ML/MIN/1.73M2 — SIGNIFICANT CHANGE UP
EOSINOPHIL # BLD AUTO: 0.09 K/UL — SIGNIFICANT CHANGE UP (ref 0–0.7)
EOSINOPHIL NFR BLD AUTO: 1.4 % — SIGNIFICANT CHANGE UP (ref 0–8)
GLUCOSE SERPL-MCNC: 87 MG/DL — SIGNIFICANT CHANGE UP (ref 70–99)
HCT VFR BLD CALC: 33 % — LOW (ref 37–47)
HGB BLD-MCNC: 10.4 G/DL — LOW (ref 12–16)
IMM GRANULOCYTES NFR BLD AUTO: 0.5 % — HIGH (ref 0.1–0.3)
LYMPHOCYTES # BLD AUTO: 0.91 K/UL — LOW (ref 1.2–3.4)
LYMPHOCYTES # BLD AUTO: 14.2 % — LOW (ref 20.5–51.1)
MAGNESIUM SERPL-MCNC: 1.9 MG/DL — SIGNIFICANT CHANGE UP (ref 1.8–2.4)
MCHC RBC-ENTMCNC: 26.4 PG — LOW (ref 27–31)
MCHC RBC-ENTMCNC: 31.5 G/DL — LOW (ref 32–37)
MCV RBC AUTO: 83.8 FL — SIGNIFICANT CHANGE UP (ref 81–99)
MONOCYTES # BLD AUTO: 0.42 K/UL — SIGNIFICANT CHANGE UP (ref 0.1–0.6)
MONOCYTES NFR BLD AUTO: 6.6 % — SIGNIFICANT CHANGE UP (ref 1.7–9.3)
NEUTROPHILS # BLD AUTO: 4.94 K/UL — SIGNIFICANT CHANGE UP (ref 1.4–6.5)
NEUTROPHILS NFR BLD AUTO: 77.1 % — HIGH (ref 42.2–75.2)
NRBC # BLD: 0 /100 WBCS — SIGNIFICANT CHANGE UP (ref 0–0)
PLATELET # BLD AUTO: 248 K/UL — SIGNIFICANT CHANGE UP (ref 130–400)
PMV BLD: 9.6 FL — SIGNIFICANT CHANGE UP (ref 7.4–10.4)
POTASSIUM SERPL-MCNC: 4.2 MMOL/L — SIGNIFICANT CHANGE UP (ref 3.5–5)
POTASSIUM SERPL-SCNC: 4.2 MMOL/L — SIGNIFICANT CHANGE UP (ref 3.5–5)
PROT SERPL-MCNC: 5.8 G/DL — LOW (ref 6–8)
RBC # BLD: 3.94 M/UL — LOW (ref 4.2–5.4)
RBC # FLD: 15.6 % — HIGH (ref 11.5–14.5)
SODIUM SERPL-SCNC: 139 MMOL/L — SIGNIFICANT CHANGE UP (ref 135–146)
WBC # BLD: 6.4 K/UL — SIGNIFICANT CHANGE UP (ref 4.8–10.8)
WBC # FLD AUTO: 6.4 K/UL — SIGNIFICANT CHANGE UP (ref 4.8–10.8)

## 2024-11-29 PROCEDURE — 99239 HOSP IP/OBS DSCHRG MGMT >30: CPT

## 2024-11-29 RX ORDER — CHLORHEXIDINE GLUCONATE 1.2 MG/ML
1 RINSE ORAL
Refills: 0 | Status: DISCONTINUED | OUTPATIENT
Start: 2024-11-29 | End: 2024-11-29

## 2024-11-29 RX ORDER — CEPHALEXIN 500 MG
1 CAPSULE ORAL
Qty: 24 | Refills: 0
Start: 2024-11-29 | End: 2024-12-04

## 2024-11-29 RX ORDER — TRIAMCINOLONE ACETONIDE 0.15 MG/G
1 AEROSOL, SPRAY TOPICAL
Qty: 0 | Refills: 0 | DISCHARGE
Start: 2024-11-29

## 2024-11-29 RX ORDER — CEPHALEXIN 500 MG
500 CAPSULE ORAL
Refills: 0 | Status: DISCONTINUED | OUTPATIENT
Start: 2024-11-29 | End: 2024-11-29

## 2024-11-29 RX ADMIN — Medication 500 MILLIGRAM(S): at 12:25

## 2024-11-29 RX ADMIN — TRIAMCINOLONE ACETONIDE 1 APPLICATION(S): 0.15 AEROSOL, SPRAY TOPICAL at 18:02

## 2024-11-29 RX ADMIN — TRIAMCINOLONE ACETONIDE 1 APPLICATION(S): 0.15 AEROSOL, SPRAY TOPICAL at 06:20

## 2024-11-29 RX ADMIN — Medication 175 MICROGRAM(S): at 06:26

## 2024-11-29 RX ADMIN — OXYCODONE HYDROCHLORIDE 2.5 MILLIGRAM(S): 30 TABLET ORAL at 06:41

## 2024-11-29 RX ADMIN — CHLORHEXIDINE GLUCONATE 1 APPLICATION(S): 1.2 RINSE ORAL at 18:02

## 2024-11-29 RX ADMIN — FAMOTIDINE 20 MILLIGRAM(S): 20 TABLET, FILM COATED ORAL at 12:26

## 2024-11-29 RX ADMIN — HYDROXYCHLOROQUINE SULFATE 200 MILLIGRAM(S): 200 TABLET, FILM COATED ORAL at 12:25

## 2024-11-29 RX ADMIN — Medication 2 TABLET(S): at 06:20

## 2024-11-29 RX ADMIN — Medication 500 MILLIGRAM(S): at 18:02

## 2024-11-29 RX ADMIN — Medication 5000 UNIT(S): at 12:26

## 2024-11-29 NOTE — DISCHARGE NOTE NURSING/CASE MANAGEMENT/SOCIAL WORK - PATIENT PORTAL LINK FT
You can access the FollowMyHealth Patient Portal offered by [facility] by registering at the following website: [email or web address]. By joining [employer]’s FollowMyHealth portal, you will also be able to view your health information using other applications (apps) compatible with our system.

## 2024-11-29 NOTE — DISCHARGE NOTE PROVIDER - CARE PROVIDER_API CALL
WOJCIECH ENNIS  [facility] [place] [address]  Phone: [phone]  Fax: [phone]  Follow Up Time: 2 weeks    Katie Crespo  Physician Assistant Services  [address] [place] [address], [facility] [address]  [place] [address]  Phone: [phone]  Fax: [phone]  Follow Up Time: 1 week    Bernard Gutiérrez  Infectious Disease  [address] [place] [address]  Phone: [phone]  Fax: [phone]  Follow Up Time: 1 week

## 2024-11-29 NOTE — DISCHARGE NOTE PROVIDER - NSDCFUSCHEDAPPT_GEN_ALL_CORE_FT
[facility]  PULMMED Kimmie Coronado  Scheduled Appointment: 01/24/2025

## 2024-11-29 NOTE — DISCHARGE NOTE PROVIDER - CARE PROVIDERS DIRECT ADDRESSES
,[email or web address]

## 2024-11-29 NOTE — DISCHARGE NOTE PROVIDER - NSDCMRMEDTOKEN_GEN_ALL_CORE_FT
alendronate 70 mg oral tablet: 1 tab(s) orally once a week  atenolol 25 mg oral tablet: 1 tab(s) orally once a day  Banophen 25 mg oral tablet: 1 tab(s) orally once a day (at bedtime)  cephalexin 500 mg oral capsule: 1 cap(s) orally 4 times a day  cholecalciferol oral tablet: 5000 unit(s) orally once a day  furosemide 40 mg oral tablet: 1 tab(s) orally once a day  levothyroxine 175 mcg (0.175 mg) oral tablet: 1 tab(s) orally once a day  Melatonin 10 mg oral tablet: 1 tab(s) orally once a day (at bedtime)  Pepcid 20 mg oral tablet: 1 tab(s) orally once a day  Plaquenil 200 mg oral tablet: 1 tab(s) orally once a day  Synthroid 25 mcg (0.025 mg) oral tablet: 1 tab(s) orally once a week  traZODone 50 mg oral tablet: 1 tab(s) orally once a day (at bedtime)  triamcinolone 0.1% topical cream: 1 Apply topically to affected area every 12 hours

## 2024-11-29 NOTE — PROGRESS NOTE ADULT - ASSESSMENT
ASSESSMENT  69F w/ PMHx HTN, SLE, Hypothyroidism, Anemia, Chronic Venous Statis and h/o CVA w/o residual deficits (1987)sent in for worsening right lower extremity cellulitis.    IMPRESSION  #RLE Cellulitis vs contact dermatitis   #Chronic venous stasis     #SLE  #Hypothyroid  #Hx of CVA    #Abx allergy: morphine (Other)  Compazine (Other)  aspirin (Other)  Levaquin (Other)      RECOMMENDATIONS  - appreciate derm evaluation -- started on steroid cream   - scant drainage on posterior aspect, but fluid does not appear purulent   - continue cefazolin 1g q 8 hours   -- can switch to  PO cefadroxil 500 mg BID or PO Keflex 500 mg QID if cefadroxil not covered to complete until 12/4  - can hold bactrim     Please call or message on Microsoft Teams if with any questions.  Spectra [phone]

## 2024-11-29 NOTE — DISCHARGE NOTE NURSING/CASE MANAGEMENT/SOCIAL WORK - FINANCIAL ASSISTANCE
[facility] provides services at a reduced cost to those who are determined to be eligible through [facility]’s financial assistance program. Information regarding [facility]’s financial assistance program can be found by going to [email or web address] or by calling [phone].

## 2024-11-29 NOTE — DISCHARGE NOTE PROVIDER - PROVIDER TOKENS
PROVIDER:[TOKEN:[[id number]],FOLLOWUP:[2 weeks]],PROVIDER:[TOKEN:[[id number]],FOLLOWUP:[1 week]],PROVIDER:[TOKEN:[[id number]],FOLLOWUP:[1 week]]

## 2024-11-29 NOTE — PROGRESS NOTE ADULT - SUBJECTIVE AND OBJECTIVE BOX
SUBJECTIVE/OVERNIGHT EVENTS  Today is hospital day 4d. This morning patient was seen and examined at bedside, resting comfortably in bed. No acute or major events overnight.      CODE STATUS: FULL      MEDICATIONS  STANDING MEDICATIONS  cephalexin 500 milliGRAM(s) Oral four times a day  cholecalciferol 5000 Unit(s) Oral daily  enoxaparin Injectable 40 milliGRAM(s) SubCutaneous every 24 hours  famotidine    Tablet 20 milliGRAM(s) Oral daily  hydroxychloroquine 200 milliGRAM(s) Oral daily  levothyroxine 175 MICROGram(s) Oral daily  levothyroxine 25 MICROGram(s) Oral <User Schedule>  melatonin 10 milliGRAM(s) Oral at bedtime  senna 2 Tablet(s) Oral at bedtime  traZODone 50 milliGRAM(s) Oral at bedtime  triamcinolone 0.1% Cream 1 Application(s) Topical every 12 hours    PRN MEDICATIONS  acetaminophen     Tablet .. 650 milliGRAM(s) Oral every 6 hours PRN  aluminum hydroxide/magnesium hydroxide/simethicone Suspension 30 milliLiter(s) Oral every 4 hours PRN  melatonin 3 milliGRAM(s) Oral at bedtime PRN  ondansetron Injectable 4 milliGRAM(s) IV Push every 8 hours PRN  oxyCODONE    IR 2.5 milliGRAM(s) Oral every 6 hours PRN  polyethylene glycol 3350 17 Gram(s) Oral every 12 hours PRN    VITALS  T(F): 98.1 (11-29-24 @ 05:32), Max: 98.1 (11-28-24 @ 14:05)  HR: 80 (11-29-24 @ 05:32) (63 - 80)  BP: 136/88 (11-29-24 @ 05:32) (136/88 - 143/80)  RR: 18 (11-29-24 @ 05:32) (18 - 19)  SpO2: 94% (11-28-24 @ 22:45) (94% - 94%)    PHYSICAL EXAM        (  ) Indwelling Wilkes Catheter   Date inserted:    Reason (  ) Critical illness     (  ) urinary retention    (  ) Accurate Ins/Outs Monitoring     (  ) CMO patient    (  ) Central Line  Date inserted:  Location: (  ) Right IJ   (  ) Left IJ   (  ) Right Fem   (  ) Left Fem    (  ) SPC  (  ) pigtail  (  ) PEG tube  (  ) colostomy  (  ) jejunostomy  (  ) U-Dall    LABS             10.4   6.40  )-----------( 248      ( 11-29-24 @ 07:11 )             33.0     139  |  106  |  13  -------------------------<  87   11-29-24 @ 07:11  4.2  |  24  |  0.7    Ca      8.2     11-29-24 @ 07:11  Mg     1.9     11-29-24 @ 07:11    TPro  5.8  /  Alb  3.0  /  TBili  <0.2  /  DBili  x   /  AST  66  /  ALT  56  /  AlkPhos  139  /  GGT  x     11-29-24 @ 07:11        Urinalysis Basic - ( 29 Nov 2024 07:11 )    Color: x / Appearance: x / SG: x / pH: x  Gluc: 87 mg/dL / Ketone: x  / Bili: x / Urobili: x   Blood: x / Protein: x / Nitrite: x   Leuk Esterase: x / RBC: x / WBC x   Sq Epi: x / Non Sq Epi: x / Bacteria: x          IMAGING SUBJECTIVE/OVERNIGHT EVENTS  Today is hospital day 4d. This morning patient was seen and examined at bedside, resting comfortably in bed. No acute or major events overnight.      CODE STATUS: FULL      MEDICATIONS  STANDING MEDICATIONS  cephalexin 500 milliGRAM(s) Oral four times a day  cholecalciferol 5000 Unit(s) Oral daily  enoxaparin Injectable 40 milliGRAM(s) SubCutaneous every 24 hours  famotidine    Tablet 20 milliGRAM(s) Oral daily  hydroxychloroquine 200 milliGRAM(s) Oral daily  levothyroxine 175 MICROGram(s) Oral daily  levothyroxine 25 MICROGram(s) Oral <User Schedule>  melatonin 10 milliGRAM(s) Oral at bedtime  senna 2 Tablet(s) Oral at bedtime  traZODone 50 milliGRAM(s) Oral at bedtime  triamcinolone 0.1% Cream 1 Application(s) Topical every 12 hours    PRN MEDICATIONS  acetaminophen     Tablet .. 650 milliGRAM(s) Oral every 6 hours PRN  aluminum hydroxide/magnesium hydroxide/simethicone Suspension 30 milliLiter(s) Oral every 4 hours PRN  melatonin 3 milliGRAM(s) Oral at bedtime PRN  ondansetron Injectable 4 milliGRAM(s) IV Push every 8 hours PRN  oxyCODONE    IR 2.5 milliGRAM(s) Oral every 6 hours PRN  polyethylene glycol 3350 17 Gram(s) Oral every 12 hours PRN    VITALS  T(F): 98.1 (11-29-24 @ 05:32), Max: 98.1 (11-28-24 @ 14:05)  HR: 80 (11-29-24 @ 05:32) (63 - 80)  BP: 136/88 (11-29-24 @ 05:32) (136/88 - 143/80)  RR: 18 (11-29-24 @ 05:32) (18 - 19)  SpO2: 94% (11-28-24 @ 22:45) (94% - 94%)    PHYSICAL EXAM  GENERAL: NAD, lying in bed comfortably  HEAD:  Atraumatic, normocephalic  EYES: EOMI, PERRL  NECK: Supple, trachea midline, no JVD  HEART: Regular rate and rhythm  LUNGS: Clear to auscultation bilaterally, no crackles, wheezing, or rhonchi  ABDOMEN: Soft, nontender, nondistended, +BS  EXTREMITIES: No clubbing, cyanosis, or edema  NERVOUS SYSTEM:  A&Ox3, moving all extremities, no focal deficits   SKIN: R LE tender, erythematous with scales    (  ) Indwelling Wilkes Catheter   Date inserted:    Reason (  ) Critical illness     (  ) urinary retention    (  ) Accurate Ins/Outs Monitoring     (  ) CMO patient    (  ) Central Line  Date inserted:  Location: (  ) Right IJ   (  ) Left IJ   (  ) Right Fem   (  ) Left Fem    (  ) SPC  (  ) pigtail  (  ) PEG tube  (  ) colostomy  (  ) jejunostomy  (  ) U-Dall    LABS             10.4   6.40  )-----------( 248      ( 11-29-24 @ 07:11 )             33.0     139  |  106  |  13  -------------------------<  87   11-29-24 @ 07:11  4.2  |  24  |  0.7    Ca      8.2     11-29-24 @ 07:11  Mg     1.9     11-29-24 @ 07:11    TPro  5.8  /  Alb  3.0  /  TBili  <0.2  /  DBili  x   /  AST  66  /  ALT  56  /  AlkPhos  139  /  GGT  x     11-29-24 @ 07:11        Urinalysis Basic - ( 29 Nov 2024 07:11 )    Color: x / Appearance: x / SG: x / pH: x  Gluc: 87 mg/dL / Ketone: x  / Bili: x / Urobili: x   Blood: x / Protein: x / Nitrite: x   Leuk Esterase: x / RBC: x / WBC x   Sq Epi: x / Non Sq Epi: x / Bacteria: x          IMAGING

## 2024-11-29 NOTE — PROGRESS NOTE ADULT - ASSESSMENT
69F w/ PMHx HTN, SLE, Hypothyroidism, Anemia, Chronic Venous Statis and h/o CVA w/o residual deficits (1987) sent in for worsening right lower extremity cellulitis.      #RLE Cellulitis with contact dermatitis   #Chronic venous stasis   - SIRS -ve but BP soft, no TTE in system but patient dry on exam  - failed PO abx, s/p doxycycline  - xray shows no evidence of OM  - area of erythema seem to be tracking at border of gauze wraps -- possible component of contact dermatitis   - MRSA nares negative  - c/w cefazolin 1g q 8 hours - switch to keflex tomorrow   - ID following  - derm evaluation- start topical steroids   - local wound care     #SLE  - on Plaquenil    #Chronic Normocytic Anemia  - Hgb 11.7 (previously 10.5), repeat cbc is stable    #HTN  #h/o CVA  - c/w home meds    #DVT ppx: Lovenox  #GI ppx: n/a  #Diet: Regular - DASH/TLC  #Activity: IAT  #Code Status: Full Code  #Dispo: Patient from [facility]   69F w/ PMHx HTN, SLE, Hypothyroidism, Anemia, Chronic Venous Statis and h/o CVA w/o residual deficits (1987) sent in for worsening right lower extremity cellulitis.      #RLE Cellulitis with contact dermatitis   #Chronic venous stasis   - SIRS -ve but BP soft, no TTE in system but patient dry on exam  - failed PO abx, s/p doxycycline  - xray shows no evidence of OM  - area of erythema seem to be tracking at border of gauze wraps -- possible component of contact dermatitis   - MRSA nares negative  - Procal 0.04, Blood cx NGTD  - d/c cefazolin 1g q 8 hours, d/c Bactrim - switch to keflex 500 BID to complete until 12/4  - ID following  - Per derm evaluation- appearance favors dermatitis over cellulitis, rash may have been a cellulitis at one point but now likely a dermatitis. Recommend triamcinolone 0.1% cream BID to RLE  - local wound care  - PT eval     #SLE  - follows with rheumatologist Dr. Donaldo Dukes at [facility]  - c/w Plaquenil 200 mg qd  - f/u rheum OP    #Chronic Normocytic Anemia  - Hgb 11.7 (previously 10.5), repeat cbc is stable    #HTN  #h/o CVA in 1987 w/o residual deficits  - c/w home meds    #Hypothyroidism  - c/w Levothyroxine 175 mcg qd  - c/w Levothyroxine 25 mcg q weekly      #DVT ppx: Lovenox  #GI ppx: Famotidine  #Diet: Regular - DASH/TLC  #Activity: IAT  #Code Status: Full Code  #Dispo: Patient from [facility]   69F w/ PMHx HTN, SLE, Hypothyroidism, Anemia, Chronic Venous Statis and h/o CVA w/o residual deficits (1987) sent in for worsening right lower extremity cellulitis.      #RLE Cellulitis with contact dermatitis   #Chronic venous stasis   - SIRS -ve but BP soft, no TTE in system but patient dry on exam  - failed PO abx, s/p doxycycline  - xray shows no evidence of OM  - area of erythema seem to be tracking at border of gauze wraps -- possible component of contact dermatitis   - MRSA nares negative  - Procal 0.04, Blood cx NGTD  - d/c cefazolin 1g q 8 hours, d/c Bactrim - switch to keflex 500 QID to complete until 12/4  - ID following  - Per derm evaluation- appearance favors dermatitis over cellulitis, rash may have been a cellulitis at one point but now likely a dermatitis. Recommend triamcinolone 0.1% cream BID to RLE  - local wound care  - PT eval     #SLE  - follows with rheumatologist Dr. Donaldo Dukes at [facility]  - c/w Plaquenil 200 mg qd  - f/u rheum OP    #Chronic Normocytic Anemia  - Hgb 11.7 (previously 10.5), repeat cbc is stable    #HTN  #h/o CVA in 1987 w/o residual deficits  - c/w home meds    #Hypothyroidism  - c/w Levothyroxine 175 mcg qd  - c/w Levothyroxine 25 mcg q weekly      #DVT ppx: Lovenox  #GI ppx: Famotidine  #Diet: Regular - DASH/TLC  #Activity: IAT  #Code Status: Full Code  #Dispo: Patient from [facility]

## 2024-11-29 NOTE — DISCHARGE NOTE PROVIDER - NSDCCPCAREPLAN_GEN_ALL_CORE_FT
PRINCIPAL DISCHARGE DIAGNOSIS  Diagnosis: Cellulitis  Assessment and Plan of Treatment: You presented to the hospital for worsening right lower leg cellulitis despite being on doxycycline. You were treated with IV antibiotics and further management of your cellulitis. Blood cultures were negative, and procalcitonin was low. Dermatology evaluated your right leg and determined the rash was more consistent with contact dermatitis than cellulitis, possibly representing a resolved cellulitis. Triamcinolone 0.1% cream twice a day was recommended.   After discharge from the hospital, you will need to:  - Take Keflex 500 mg four times daily to complete by 12/4.  - Apply Triamcinolone 0.1% cream twice a day to your right lower leg. Keep the affected area clean and dry.  - Take all the medications you were discharged with, unless otherwise instructed by your healthcare provider(s).   - Follow up with your primary care doctor within 1-2 weeks  - Follow up with infectious disease doctor within 1 week.  - Follow up with rheumatologist Dr. Donaldo Dukes for the management of your SLE.  Please follow up with your providers by calling them to make an appointment so that you can see them in 1-2weeks; bring your paperwork from this hospital stay to that visit. You can access your visit information by signing up for an account for the patient portal at [email or web address]   Seek immediate medical attention if you experience increased redness, swelling, pain, fever, chills, or drainage from the affected area.

## 2024-11-29 NOTE — PROGRESS NOTE ADULT - PROVIDER SPECIALTY LIST ADULT
Internal Medicine
Infectious Disease
Internal Medicine
Infectious Disease

## 2024-11-29 NOTE — DISCHARGE NOTE PROVIDER - HOSPITAL COURSE
HPI:  69F w/ PMHx HTN, SLE, Hypothyroidism, Anemia, Chronic Venous Statis and h/o CVA w/o residual deficits (1987)sent in for worsening right lower extremity cellulitis. Patient states to have experienced redness over the past 2 weeks and completed a course of doxycycline.  Patient states she was seen by Dr. Crespo today and told to come to the ED due to worsening infection. pt denies any other symptoms including fevers, chill, headache, recent illness/travel, cough, abdominal pain, chest pain, or SOB.    Vitals: Temp 97.6F, /68, HR 59, RR 20, SpO2 99% on RA    Labs: Hgb 11.7 (previously 10.5), Cr 0.8 (at previous baseline),     Imaging: X-ray of Right Tib/Fib shows hardware and lower extremity swelling. no acute fractures/dislocations.    In the ED:  - s/p Vanc IV x1    Admit to medicine for further management of cellulitis.  ----------------------------------------------------------------------------------------------  Assessment and Plan  # hx sepsis secondary to LE cellulitis  # Hx Stasis Dermatitis   #RLE Cellulitis with contact dermatitis   #Chronic venous stasis   - SIRS -ve but BP soft, no TTE in system but patient dry on exam  - failed PO abx, s/p doxycycline  - xray shows no evidence of OM  - area of erythema seem to be tracking at border of gauze wraps -- possible component of contact dermatitis   - MRSA nares negative  - Procal 0.04, Blood cx NGTD  - d/c cefazolin 1g q 8 hours, d/c Bactrim - Start keflex 500 QID to complete until 12/4  - ID following  - Per derm evaluation- appearance favors dermatitis over cellulitis, rash may have been a cellulitis at one point but now likely a dermatitis. Recommend triamcinolone 0.1% cream BID to RLE  - local wound care  - PT eval     #SLE  - follows with rheumatologist Dr. Donaldo Dukes at [facility]  - c/w Plaquenil 200 mg qd  - f/u rheum OP    #Chronic Normocytic Anemia  - Hgb 11.7 (previously 10.5), repeat cbc is stable    #HTN  #h/o CVA in 1987 w/o residual deficits  - c/w atenolol 25 mg qd  - c/w Lasix 40 qd    #Hypothyroidism  - c/w Levothyroxine 175 mcg qd  - c/w Levothyroxine 25 mcg q weekly      Discussion of discharge care of plan, including discharge diagnoses, medication reconciliation, and follow-ups, was conducted with Dr. Louann Rowe on 11/29/24 and discharge was approved.

## 2024-12-02 ENCOUNTER — NON-APPOINTMENT (OUTPATIENT)
Age: 69
End: 2024-12-02

## 2024-12-05 DIAGNOSIS — M32.9 SYSTEMIC LUPUS ERYTHEMATOSUS, UNSPECIFIED: ICD-10-CM

## 2024-12-05 DIAGNOSIS — L03.115 CELLULITIS OF RIGHT LOWER LIMB: ICD-10-CM

## 2024-12-05 DIAGNOSIS — Z86.73 PERSONAL HISTORY OF TRANSIENT ISCHEMIC ATTACK (TIA), AND CEREBRAL INFARCTION WITHOUT RESIDUAL DEFICITS: ICD-10-CM

## 2024-12-05 DIAGNOSIS — E03.9 HYPOTHYROIDISM, UNSPECIFIED: ICD-10-CM

## 2024-12-05 DIAGNOSIS — I87.8 OTHER SPECIFIED DISORDERS OF VEINS: ICD-10-CM

## 2024-12-05 DIAGNOSIS — I10 ESSENTIAL (PRIMARY) HYPERTENSION: ICD-10-CM

## 2024-12-05 DIAGNOSIS — D64.9 ANEMIA, UNSPECIFIED: ICD-10-CM

## 2024-12-05 DIAGNOSIS — L25.9 UNSPECIFIED CONTACT DERMATITIS, UNSPECIFIED CAUSE: ICD-10-CM

## 2025-01-22 ENCOUNTER — APPOINTMENT (OUTPATIENT)
Dept: UROLOGY | Facility: CLINIC | Age: 70
End: 2025-01-22

## 2025-01-27 ENCOUNTER — INPATIENT (INPATIENT)
Facility: HOSPITAL | Age: 70
LOS: 2 days | Discharge: HOME | DRG: 392 | End: 2025-01-30
Attending: INTERNAL MEDICINE | Admitting: STUDENT IN AN ORGANIZED HEALTH CARE EDUCATION/TRAINING PROGRAM
Payer: MEDICARE

## 2025-01-27 VITALS
HEART RATE: 80 BPM | TEMPERATURE: 97 F | SYSTOLIC BLOOD PRESSURE: 145 MMHG | OXYGEN SATURATION: 99 % | RESPIRATION RATE: 18 BRPM | DIASTOLIC BLOOD PRESSURE: 70 MMHG | WEIGHT: 119.93 LBS

## 2025-01-27 DIAGNOSIS — Z82.69 FAMILY HISTORY OF OTHER DISEASES OF THE MUSCULOSKELETAL SYSTEM AND CONNECTIVE TISSUE: Chronic | ICD-10-CM

## 2025-01-27 DIAGNOSIS — Z96.659 PRESENCE OF UNSPECIFIED ARTIFICIAL KNEE JOINT: Chronic | ICD-10-CM

## 2025-01-27 DIAGNOSIS — Z90.710 ACQUIRED ABSENCE OF BOTH CERVIX AND UTERUS: Chronic | ICD-10-CM

## 2025-01-27 PROCEDURE — 99285 EMERGENCY DEPT VISIT HI MDM: CPT | Mod: FS

## 2025-01-27 RX ORDER — SODIUM CHLORIDE 9 G/ML
1000 INJECTION, SOLUTION INTRAVENOUS ONCE
Refills: 0 | Status: COMPLETED | OUTPATIENT
Start: 2025-01-27 | End: 2025-01-27

## 2025-01-27 RX ORDER — ONDANSETRON 4 MG/1
4 TABLET, ORALLY DISINTEGRATING ORAL ONCE
Refills: 0 | Status: COMPLETED | OUTPATIENT
Start: 2025-01-27 | End: 2025-01-28

## 2025-01-27 RX ORDER — FAMOTIDINE 10 MG/ML
20 INJECTION INTRAVENOUS ONCE
Refills: 0 | Status: COMPLETED | OUTPATIENT
Start: 2025-01-27 | End: 2025-01-27

## 2025-01-27 NOTE — ED ADULT NURSE NOTE - NSFALLHARMRISKINTERV_ED_ALL_ED

## 2025-01-27 NOTE — ED ADULT NURSE NOTE - CHIEF COMPLAINT QUOTE
[facility]D [facility]

## 2025-01-28 LAB
ALBUMIN SERPL ELPH-MCNC: 3.5 G/DL — SIGNIFICANT CHANGE UP (ref 3.5–5.2)
ALBUMIN SERPL ELPH-MCNC: 4.1 G/DL — SIGNIFICANT CHANGE UP (ref 3.5–5.2)
ALP SERPL-CCNC: 67 U/L — SIGNIFICANT CHANGE UP (ref 30–115)
ALP SERPL-CCNC: 74 U/L — SIGNIFICANT CHANGE UP (ref 30–115)
ALT FLD-CCNC: 11 U/L — SIGNIFICANT CHANGE UP (ref 0–41)
ALT FLD-CCNC: 8 U/L — SIGNIFICANT CHANGE UP (ref 0–41)
ANION GAP SERPL CALC-SCNC: 11 MMOL/L — SIGNIFICANT CHANGE UP (ref 7–14)
ANION GAP SERPL CALC-SCNC: 13 MMOL/L — SIGNIFICANT CHANGE UP (ref 7–14)
AST SERPL-CCNC: 15 U/L — SIGNIFICANT CHANGE UP (ref 0–41)
AST SERPL-CCNC: 31 U/L — SIGNIFICANT CHANGE UP (ref 0–41)
BASOPHILS # BLD AUTO: 0.01 K/UL — SIGNIFICANT CHANGE UP (ref 0–0.2)
BASOPHILS # BLD AUTO: 0.01 K/UL — SIGNIFICANT CHANGE UP (ref 0–0.2)
BASOPHILS NFR BLD AUTO: 0.1 % — SIGNIFICANT CHANGE UP (ref 0–1)
BASOPHILS NFR BLD AUTO: 0.1 % — SIGNIFICANT CHANGE UP (ref 0–1)
BILIRUB SERPL-MCNC: 0.3 MG/DL — SIGNIFICANT CHANGE UP (ref 0.2–1.2)
BILIRUB SERPL-MCNC: 0.4 MG/DL — SIGNIFICANT CHANGE UP (ref 0.2–1.2)
BUN SERPL-MCNC: 17 MG/DL — SIGNIFICANT CHANGE UP (ref 10–20)
BUN SERPL-MCNC: 24 MG/DL — HIGH (ref 10–20)
CALCIUM SERPL-MCNC: 8.5 MG/DL — SIGNIFICANT CHANGE UP (ref 8.4–10.4)
CALCIUM SERPL-MCNC: 9.1 MG/DL — SIGNIFICANT CHANGE UP (ref 8.4–10.5)
CHLORIDE SERPL-SCNC: 103 MMOL/L — SIGNIFICANT CHANGE UP (ref 98–110)
CHLORIDE SERPL-SCNC: 106 MMOL/L — SIGNIFICANT CHANGE UP (ref 98–110)
CO2 SERPL-SCNC: 23 MMOL/L — SIGNIFICANT CHANGE UP (ref 17–32)
CO2 SERPL-SCNC: 25 MMOL/L — SIGNIFICANT CHANGE UP (ref 17–32)
CREAT SERPL-MCNC: 0.7 MG/DL — SIGNIFICANT CHANGE UP (ref 0.7–1.5)
CREAT SERPL-MCNC: 0.9 MG/DL — SIGNIFICANT CHANGE UP (ref 0.7–1.5)
EGFR: 70 ML/MIN/1.73M2 — SIGNIFICANT CHANGE UP
EGFR: 94 ML/MIN/1.73M2 — SIGNIFICANT CHANGE UP
EOSINOPHIL # BLD AUTO: 0 K/UL — SIGNIFICANT CHANGE UP (ref 0–0.7)
EOSINOPHIL # BLD AUTO: 0.12 K/UL — SIGNIFICANT CHANGE UP (ref 0–0.7)
EOSINOPHIL NFR BLD AUTO: 0 % — SIGNIFICANT CHANGE UP (ref 0–8)
EOSINOPHIL NFR BLD AUTO: 1 % — SIGNIFICANT CHANGE UP (ref 0–8)
FLUAV AG NPH QL: SIGNIFICANT CHANGE UP
FLUBV AG NPH QL: SIGNIFICANT CHANGE UP
GLUCOSE SERPL-MCNC: 103 MG/DL — HIGH (ref 70–99)
GLUCOSE SERPL-MCNC: 112 MG/DL — HIGH (ref 70–99)
HCT VFR BLD CALC: 36 % — LOW (ref 37–47)
HCT VFR BLD CALC: 40.7 % — SIGNIFICANT CHANGE UP (ref 37–47)
HGB BLD-MCNC: 11.6 G/DL — LOW (ref 12–16)
HGB BLD-MCNC: 13.1 G/DL — SIGNIFICANT CHANGE UP (ref 12–16)
IMM GRANULOCYTES NFR BLD AUTO: 0.3 % — SIGNIFICANT CHANGE UP (ref 0.1–0.3)
IMM GRANULOCYTES NFR BLD AUTO: 0.5 % — HIGH (ref 0.1–0.3)
LIDOCAIN IGE QN: 51 U/L — SIGNIFICANT CHANGE UP (ref 7–60)
LYMPHOCYTES # BLD AUTO: 0.38 K/UL — LOW (ref 1.2–3.4)
LYMPHOCYTES # BLD AUTO: 0.48 K/UL — LOW (ref 1.2–3.4)
LYMPHOCYTES # BLD AUTO: 4.1 % — LOW (ref 20.5–51.1)
LYMPHOCYTES # BLD AUTO: 4.6 % — LOW (ref 20.5–51.1)
MAGNESIUM SERPL-MCNC: 1.5 MG/DL — LOW (ref 1.8–2.4)
MCHC RBC-ENTMCNC: 27.4 PG — SIGNIFICANT CHANGE UP (ref 27–31)
MCHC RBC-ENTMCNC: 27.6 PG — SIGNIFICANT CHANGE UP (ref 27–31)
MCHC RBC-ENTMCNC: 32.2 G/DL — SIGNIFICANT CHANGE UP (ref 32–37)
MCHC RBC-ENTMCNC: 32.2 G/DL — SIGNIFICANT CHANGE UP (ref 32–37)
MCV RBC AUTO: 85.1 FL — SIGNIFICANT CHANGE UP (ref 81–99)
MCV RBC AUTO: 85.9 FL — SIGNIFICANT CHANGE UP (ref 81–99)
MONOCYTES # BLD AUTO: 0.19 K/UL — SIGNIFICANT CHANGE UP (ref 0.1–0.6)
MONOCYTES # BLD AUTO: 0.35 K/UL — SIGNIFICANT CHANGE UP (ref 0.1–0.6)
MONOCYTES NFR BLD AUTO: 2.3 % — SIGNIFICANT CHANGE UP (ref 1.7–9.3)
MONOCYTES NFR BLD AUTO: 3 % — SIGNIFICANT CHANGE UP (ref 1.7–9.3)
NEUTROPHILS # BLD AUTO: 10.73 K/UL — HIGH (ref 1.4–6.5)
NEUTROPHILS # BLD AUTO: 7.68 K/UL — HIGH (ref 1.4–6.5)
NEUTROPHILS NFR BLD AUTO: 91.5 % — HIGH (ref 42.2–75.2)
NEUTROPHILS NFR BLD AUTO: 92.5 % — HIGH (ref 42.2–75.2)
NRBC # BLD: 0 /100 WBCS — SIGNIFICANT CHANGE UP (ref 0–0)
NRBC # BLD: 0 /100 WBCS — SIGNIFICANT CHANGE UP (ref 0–0)
NRBC BLD-RTO: 0 /100 WBCS — SIGNIFICANT CHANGE UP (ref 0–0)
NRBC BLD-RTO: 0 /100 WBCS — SIGNIFICANT CHANGE UP (ref 0–0)
PLATELET # BLD AUTO: 235 K/UL — SIGNIFICANT CHANGE UP (ref 130–400)
PLATELET # BLD AUTO: 250 K/UL — SIGNIFICANT CHANGE UP (ref 130–400)
PMV BLD: 10.9 FL — HIGH (ref 7.4–10.4)
PMV BLD: 9.5 FL — SIGNIFICANT CHANGE UP (ref 7.4–10.4)
POTASSIUM SERPL-MCNC: 3.7 MMOL/L — SIGNIFICANT CHANGE UP (ref 3.5–5)
POTASSIUM SERPL-MCNC: 4.9 MMOL/L — SIGNIFICANT CHANGE UP (ref 3.5–5)
POTASSIUM SERPL-SCNC: 3.7 MMOL/L — SIGNIFICANT CHANGE UP (ref 3.5–5)
POTASSIUM SERPL-SCNC: 4.9 MMOL/L — SIGNIFICANT CHANGE UP (ref 3.5–5)
PROT SERPL-MCNC: 6.3 G/DL — SIGNIFICANT CHANGE UP (ref 6–8)
PROT SERPL-MCNC: 7.6 G/DL — SIGNIFICANT CHANGE UP (ref 6–8)
RBC # BLD: 4.23 M/UL — SIGNIFICANT CHANGE UP (ref 4.2–5.4)
RBC # BLD: 4.74 M/UL — SIGNIFICANT CHANGE UP (ref 4.2–5.4)
RBC # FLD: 15.3 % — HIGH (ref 11.5–14.5)
RBC # FLD: 15.5 % — HIGH (ref 11.5–14.5)
RSV RNA NPH QL NAA+NON-PROBE: SIGNIFICANT CHANGE UP
SARS-COV-2 RNA SPEC QL NAA+PROBE: SIGNIFICANT CHANGE UP
SODIUM SERPL-SCNC: 140 MMOL/L — SIGNIFICANT CHANGE UP (ref 135–146)
SODIUM SERPL-SCNC: 141 MMOL/L — SIGNIFICANT CHANGE UP (ref 135–146)
WBC # BLD: 11.72 K/UL — HIGH (ref 4.8–10.8)
WBC # BLD: 8.3 K/UL — SIGNIFICANT CHANGE UP (ref 4.8–10.8)
WBC # FLD AUTO: 11.72 K/UL — HIGH (ref 4.8–10.8)
WBC # FLD AUTO: 8.3 K/UL — SIGNIFICANT CHANGE UP (ref 4.8–10.8)

## 2025-01-28 PROCEDURE — 87086 URINE CULTURE/COLONY COUNT: CPT

## 2025-01-28 PROCEDURE — 83735 ASSAY OF MAGNESIUM: CPT

## 2025-01-28 PROCEDURE — 87186 SC STD MICRODIL/AGAR DIL: CPT

## 2025-01-28 PROCEDURE — 87077 CULTURE AEROBIC IDENTIFY: CPT

## 2025-01-28 PROCEDURE — 36415 COLL VENOUS BLD VENIPUNCTURE: CPT

## 2025-01-28 PROCEDURE — 74177 CT ABD & PELVIS W/CONTRAST: CPT | Mod: 26

## 2025-01-28 PROCEDURE — 80048 BASIC METABOLIC PNL TOTAL CA: CPT

## 2025-01-28 PROCEDURE — 99223 1ST HOSP IP/OBS HIGH 75: CPT

## 2025-01-28 PROCEDURE — 81001 URINALYSIS AUTO W/SCOPE: CPT

## 2025-01-28 PROCEDURE — 85025 COMPLETE CBC W/AUTO DIFF WBC: CPT

## 2025-01-28 PROCEDURE — 80053 COMPREHEN METABOLIC PANEL: CPT

## 2025-01-28 RX ORDER — TRAZODONE HCL 100 MG
50 TABLET ORAL AT BEDTIME
Refills: 0 | Status: DISCONTINUED | OUTPATIENT
Start: 2025-01-28 | End: 2025-01-30

## 2025-01-28 RX ORDER — LEVOTHYROXINE SODIUM 25 UG/1
25 TABLET ORAL
Refills: 0 | Status: DISCONTINUED | OUTPATIENT
Start: 2025-01-28 | End: 2025-01-30

## 2025-01-28 RX ORDER — LEVOTHYROXINE SODIUM 25 UG/1
175 TABLET ORAL DAILY
Refills: 0 | Status: DISCONTINUED | OUTPATIENT
Start: 2025-01-29 | End: 2025-01-30

## 2025-01-28 RX ORDER — CALCIUM CARBONATE/VITAMIN D3 600 MG-10
1 TABLET ORAL EVERY 12 HOURS
Refills: 0 | Status: DISCONTINUED | OUTPATIENT
Start: 2025-01-28 | End: 2025-01-30

## 2025-01-28 RX ORDER — ENOXAPARIN SODIUM 100 MG/ML
40 INJECTION SUBCUTANEOUS EVERY 24 HOURS
Refills: 0 | Status: DISCONTINUED | OUTPATIENT
Start: 2025-01-29 | End: 2025-01-30

## 2025-01-28 RX ORDER — FAMOTIDINE 10 MG/ML
20 INJECTION INTRAVENOUS DAILY
Refills: 0 | Status: DISCONTINUED | OUTPATIENT
Start: 2025-01-28 | End: 2025-01-30

## 2025-01-28 RX ORDER — ACETAMINOPHEN, DIPHENHYDRAMINE HCL, PHENYLEPHRINE HCL 325; 25; 5 MG/1; MG/1; MG/1
5 TABLET ORAL AT BEDTIME
Refills: 0 | Status: DISCONTINUED | OUTPATIENT
Start: 2025-01-28 | End: 2025-01-30

## 2025-01-28 RX ORDER — ATENOLOL 50 MG
25 TABLET ORAL ONCE
Refills: 0 | Status: COMPLETED | OUTPATIENT
Start: 2025-01-28 | End: 2025-01-28

## 2025-01-28 RX ORDER — LEVOTHYROXINE SODIUM 25 UG/1
175 TABLET ORAL ONCE
Refills: 0 | Status: COMPLETED | OUTPATIENT
Start: 2025-01-28 | End: 2025-01-28

## 2025-01-28 RX ORDER — OXYCODONE HYDROCHLORIDE 30 MG/1
5 TABLET ORAL EVERY 12 HOURS
Refills: 0 | Status: DISCONTINUED | OUTPATIENT
Start: 2025-01-28 | End: 2025-01-30

## 2025-01-28 RX ORDER — ASCORBIC ACID 500 MG/ML
1 VIAL (ML) INJECTION
Refills: 0 | DISCHARGE

## 2025-01-28 RX ORDER — HYDROXYCHLOROQUINE SULFATE 200 MG/1
TABLET, FILM COATED ORAL
Refills: 0 | Status: DISCONTINUED | OUTPATIENT
Start: 2025-01-28 | End: 2025-01-30

## 2025-01-28 RX ORDER — OXYCODONE HYDROCHLORIDE 30 MG/1
1 TABLET ORAL
Refills: 0 | DISCHARGE

## 2025-01-28 RX ORDER — ENOXAPARIN SODIUM 100 MG/ML
40 INJECTION SUBCUTANEOUS ONCE
Refills: 0 | Status: COMPLETED | OUTPATIENT
Start: 2025-01-28 | End: 2025-01-28

## 2025-01-28 RX ORDER — TRAMADOL HYDROCHLORIDE 100 MG/1
25 TABLET, EXTENDED RELEASE ORAL ONCE
Refills: 0 | Status: DISCONTINUED | OUTPATIENT
Start: 2025-01-28 | End: 2025-01-28

## 2025-01-28 RX ORDER — DULOXETINE 20 MG/1
30 CAPSULE, DELAYED RELEASE ORAL DAILY
Refills: 0 | Status: DISCONTINUED | OUTPATIENT
Start: 2025-01-28 | End: 2025-01-30

## 2025-01-28 RX ORDER — SENNOSIDES 8.6 MG
1 TABLET ORAL
Refills: 0 | DISCHARGE

## 2025-01-28 RX ORDER — FERROUS SULFATE 325(65) MG
325 TABLET ORAL
Refills: 0 | DISCHARGE

## 2025-01-28 RX ORDER — CALCIUM CARBONATE/VITAMIN D3 600 MG-10
500 TABLET ORAL
Refills: 0 | DISCHARGE

## 2025-01-28 RX ORDER — ONDANSETRON 4 MG/1
4 TABLET, ORALLY DISINTEGRATING ORAL EVERY 8 HOURS
Refills: 0 | Status: DISCONTINUED | OUTPATIENT
Start: 2025-01-28 | End: 2025-01-30

## 2025-01-28 RX ORDER — ACETAMINOPHEN 160 MG/5ML
650 SUSPENSION ORAL EVERY 6 HOURS
Refills: 0 | Status: DISCONTINUED | OUTPATIENT
Start: 2025-01-28 | End: 2025-01-30

## 2025-01-28 RX ORDER — HYDROXYCHLOROQUINE SULFATE 200 MG/1
200 TABLET, FILM COATED ORAL ONCE
Refills: 0 | Status: COMPLETED | OUTPATIENT
Start: 2025-01-28 | End: 2025-01-28

## 2025-01-28 RX ORDER — DULOXETINE 20 MG/1
1 CAPSULE, DELAYED RELEASE ORAL
Refills: 0 | DISCHARGE

## 2025-01-28 RX ORDER — LEVOTHYROXINE SODIUM 25 UG/1
TABLET ORAL
Refills: 0 | Status: DISCONTINUED | OUTPATIENT
Start: 2025-01-28 | End: 2025-01-30

## 2025-01-28 RX ORDER — TRIAMCINOLONE ACETONIDE 0.025 %
1 CREAM (GRAM) TOPICAL EVERY 12 HOURS
Refills: 0 | Status: DISCONTINUED | OUTPATIENT
Start: 2025-01-28 | End: 2025-01-30

## 2025-01-28 RX ORDER — HYDROXYCHLOROQUINE SULFATE 200 MG/1
200 TABLET, FILM COATED ORAL DAILY
Refills: 0 | Status: DISCONTINUED | OUTPATIENT
Start: 2025-01-29 | End: 2025-01-30

## 2025-01-28 RX ORDER — ENOXAPARIN SODIUM 100 MG/ML
INJECTION SUBCUTANEOUS
Refills: 0 | Status: DISCONTINUED | OUTPATIENT
Start: 2025-01-28 | End: 2025-01-30

## 2025-01-28 RX ORDER — SENNOSIDES 8.6 MG
2 TABLET ORAL AT BEDTIME
Refills: 0 | Status: DISCONTINUED | OUTPATIENT
Start: 2025-01-28 | End: 2025-01-28

## 2025-01-28 RX ORDER — ATENOLOL 50 MG
TABLET ORAL
Refills: 0 | Status: DISCONTINUED | OUTPATIENT
Start: 2025-01-28 | End: 2025-01-30

## 2025-01-28 RX ORDER — ONDANSETRON 4 MG/1
4 TABLET, ORALLY DISINTEGRATING ORAL ONCE
Refills: 0 | Status: COMPLETED | OUTPATIENT
Start: 2025-01-28 | End: 2025-01-28

## 2025-01-28 RX ORDER — L.ACID,CASEI/B.ANIMAL/S.THERMO 16B CELL
1 CAPSULE ORAL DAILY
Refills: 0 | Status: DISCONTINUED | OUTPATIENT
Start: 2025-01-28 | End: 2025-01-30

## 2025-01-28 RX ORDER — FERROUS SULFATE 325(65) MG
325 TABLET ORAL EVERY 8 HOURS
Refills: 0 | Status: DISCONTINUED | OUTPATIENT
Start: 2025-01-28 | End: 2025-01-30

## 2025-01-28 RX ORDER — ATENOLOL 50 MG
25 TABLET ORAL DAILY
Refills: 0 | Status: DISCONTINUED | OUTPATIENT
Start: 2025-01-29 | End: 2025-01-30

## 2025-01-28 RX ADMIN — ONDANSETRON 4 MILLIGRAM(S): 4 TABLET, ORALLY DISINTEGRATING ORAL at 04:02

## 2025-01-28 RX ADMIN — HYDROXYCHLOROQUINE SULFATE 200 MILLIGRAM(S): 200 TABLET, FILM COATED ORAL at 12:20

## 2025-01-28 RX ADMIN — ONDANSETRON 4 MILLIGRAM(S): 4 TABLET, ORALLY DISINTEGRATING ORAL at 00:47

## 2025-01-28 RX ADMIN — FAMOTIDINE 20 MILLIGRAM(S): 10 INJECTION INTRAVENOUS at 00:47

## 2025-01-28 RX ADMIN — Medication 25 MILLIGRAM(S): at 12:20

## 2025-01-28 RX ADMIN — DULOXETINE 30 MILLIGRAM(S): 20 CAPSULE, DELAYED RELEASE ORAL at 12:20

## 2025-01-28 RX ADMIN — Medication 325 MILLIGRAM(S): at 12:20

## 2025-01-28 RX ADMIN — FAMOTIDINE 20 MILLIGRAM(S): 10 INJECTION INTRAVENOUS at 12:20

## 2025-01-28 RX ADMIN — SODIUM CHLORIDE 1000 MILLILITER(S): 9 INJECTION, SOLUTION INTRAVENOUS at 00:47

## 2025-01-28 RX ADMIN — ACETAMINOPHEN 650 MILLIGRAM(S): 160 SUSPENSION ORAL at 16:30

## 2025-01-28 RX ADMIN — Medication 50 MILLIGRAM(S): at 21:23

## 2025-01-28 RX ADMIN — Medication 1 APPLICATION(S): at 18:14

## 2025-01-28 RX ADMIN — ACETAMINOPHEN, DIPHENHYDRAMINE HCL, PHENYLEPHRINE HCL 5 MILLIGRAM(S): 325; 25; 5 TABLET ORAL at 21:24

## 2025-01-28 RX ADMIN — ENOXAPARIN SODIUM 40 MILLIGRAM(S): 100 INJECTION SUBCUTANEOUS at 18:43

## 2025-01-28 RX ADMIN — Medication 325 MILLIGRAM(S): at 21:24

## 2025-01-28 RX ADMIN — Medication 1 TABLET(S): at 18:38

## 2025-01-28 NOTE — ED PROVIDER NOTE - PHYSICAL EXAMINATION
VITAL SIGNS: I have reviewed nursing notes and confirm.  CONSTITUTIONAL: In no acute distress.  SKIN: Skin exam is warm and dry.  HEAD: Normocephalic; atraumatic.  EYES: PERRL, EOM intact; conjunctiva and sclera clear.  ENT: No nasal discharge; airway clear.  NECK: Supple; non tender.  CARD: S1, S2; Regular rate and rhythm.  RESP: CTAB. Speaking in full sentences.   ABD: Normal bowel sounds; soft; non-distended; BL lower quadrant TTP; No rebound or guarding. No CVA tenderness.  EXT: Normal ROM. No LE edema.   NEURO: Alert, oriented. Grossly unremarkable. No focal deficits.

## 2025-01-28 NOTE — H&P ADULT - ASSESSMENT
Patient is a 69-year-old female PMH HTN, hypothyroidism, SLE, CVA without residual deficits, anemia, chronic venous stasis who presents to the ED from the [facility] with complaints of multiple episodes of NB emesis diarrhea for a day. Patient denies sick contacts. Was unable to tolerate p.o.  Denies fever, chills, CP, SOB, urinary symptoms.  Endorses recent antibiotic use that she was treated for UTI. Patient's reports symptoms have improved, and she is now tolerating po.    #Gastroenteritis  - diarrhea and vomitting for a day, symptoms now improved  - s/p 1L LR in ED  - diet advanced  - no fevers, wbc downtrending 11.7 -> 8.3  - CTAP shows fluid-filled, mildly thickened loops of small bowel, may represent enteritis. Liquid stool in colon.  - likely gastroenteritis given resolution of symptoms and CTAP findings  - continue to monitor to ensure patient tolerating po diet    #SLE  - c/w home hydroxychloroquine    #HTN  - c/w home atenolol    #RLE rash  #Venous stasis ulcers  - c/w home triamcinolone cream bid for RLE  - c/w home lasix 40mg daily  - c/w home oxycodone 5mg q12 as needed for pain    #hypothyrodism  - c/w home levothyroxine    #Diet: DASH/TLC  #DVT pro: lovenox  #GI pro: n/a    Pending: clinical improvement, monitor that she tolerates po diet     Patient is a 69-year-old female PMH HTN, hypothyroidism, SLE, CVA without residual deficits, anemia, chronic venous stasis who presents to the ED from the [facility] with complaints of multiple episodes of NB emesis diarrhea for a day. Patient denies sick contacts. Was unable to tolerate p.o.  Denies fever, chills, CP, SOB, urinary symptoms.  Endorses recent antibiotic use that she was treated for UTI. Patient's reports symptoms have improved, and she is now tolerating po.    #Gastroenteritis  #Nausea/Vomiting/Diarrhea  - diarrhea and vomitting for a day, symptoms now improved  - s/p 1L LR in ED  - diet advanced  - no fevers, wbc downtrending 11.7 -> 8.3  - CTAP shows fluid-filled, mildly thickened loops of small bowel, may represent enteritis. Liquid stool in colon.  - likely gastroenteritis given resolution of symptoms and CTAP findings  - continue to monitor to ensure patient tolerating po diet  - zofran prn for nausea, vomitting  - hold home bowel regimen due to diarrhea    #SLE  - c/w home hydroxychloroquine    #HTN  - c/w home atenolol    #RLE rash  #Venous stasis ulcers  - c/w home triamcinolone cream bid for RLE  - c/w home lasix 40mg daily  - c/w home oxycodone 5mg q12 as needed for pain    #hypothyrodism  - c/w home levothyroxine    #Diet: DASH/TLC  #DVT pro: lovenox  #GI pro: n/a    Pending: clinical improvement, monitor that she tolerates po diet

## 2025-01-28 NOTE — H&P ADULT - NSCORESITESY/N_GEN_A_CORE_RD
Independent ALANA Visit.        HPI:  6/12/24, Time: 9:23 PM EDT         Da Addison Lea is a 23 y.o. male presenting to the ED for right great toe pain, swelling, drainage, beginning several days ago.  The complaint has been persistent, moderate in severity, and worsened by nothing.  Patient reports that he had an ingrown toenail to the right great toe.  He removed the ingrown toenail however over the last couple days noticed pain, erythema and swelling has worsened which is what prompted evaluation the emergency department today.  Patient states that he is not diabetic.  No fevers at home.  No recent travel or sick contacts.  Patient denies all other symptoms at this time    Review of Systems:   A complete review of systems was performed and pertinent positives and negatives are stated within HPI, all other systems reviewed and are negative.          --------------------------------------------- PAST HISTORY ---------------------------------------------  Past Medical History:  has a past medical history of Asthma and Sickle cell trait (HCC).    Past Surgical History:  has no past surgical history on file.    Social History:  reports that he has never smoked. He has been exposed to tobacco smoke. He has never used smokeless tobacco. He reports that he does not drink alcohol and does not use drugs.    Family History: family history includes High Blood Pressure in his mother; No Known Problems in his father.     The patient’s home medications have been reviewed.    Allergies: Patient has no known allergies.    -------------------------------------------------- RESULTS -------------------------------------------------  All laboratory and radiology results have been personally reviewed by myself   LABS:  No results found for this visit on 06/12/24.    RADIOLOGY:  Interpreted by Radiologist.  No orders to display       ------------------------- NURSING NOTES AND VITALS REVIEWED ---------------------------   The 
No

## 2025-01-28 NOTE — H&P ADULT - NSHPPHYSICALEXAM_GEN_ALL_CORE
GENERAL: No acute distress, well-developed  HEAD:  Atraumatic, Normocephalic  ENT: PERRL, conjunctiva and sclera clear, neck supple, no JVD, moist mucosa, posterior oropharynx clear  CHEST/LUNG: Clear to auscultation bilaterally; No wheeze, equal breath sounds bilaterally, respirations nonlabored  HEART: Regular rate and rhythm; No murmurs, rubs, or gallops  ABDOMEN: Soft, nontender, nondistended  BACK: no spinal tenderness, no CVA tenderness  EXTREMITIES:  No clubbing, cyanosis, or edema  PSYCH: Nl behavior, nl affect  NEUROLOGY: AAOx3, non-focal, moves all extremities spontaneously  SKIN: Normal color, RLE rash bandaged.

## 2025-01-28 NOTE — ED PROVIDER NOTE - OBJECTIVE STATEMENT
Patient is a 69-year-old female PMH HTN, hypothyroidism, SLE, CVA without residual deficits, who presents to the ED from the [facility] with complaints of multiple episodes of NB emesis diarrhea that began today.  Patient denies sick contacts.  Has been unable to tolerate p.o.  Denies fever, chills, CP, SOB, urinary symptoms.  Endorses recent antibiotic use that she was treated for UTI.

## 2025-01-28 NOTE — H&P ADULT - HISTORY OF PRESENT ILLNESS
Patient is a 69-year-old female PMH HTN, hypothyroidism, SLE, CVA without residual deficits, who presents to the ED from the [facility] with complaints of multiple episodes of NB emesis diarrhea that began today. Patient denies sick contacts. Was unable to tolerate p.o.  Denies fever, chills, CP, SOB, urinary symptoms.  Endorses recent antibiotic use that she was treated for UTI.     In ED, vitals  T(C): 37.2 (01-28-25 @ 08:02), Max: 37.2 (01-28-25 @ 08:02)  T(F): 98.9 (01-28-25 @ 08:02), Max: 98.9 (01-28-25 @ 08:02)  HR: 72 (01-28-25 @ 08:02) (72 - 80)  BP: 133/72 (01-28-25 @ 08:02) (132/72 - 145/70)  RR: 18 (01-28-25 @ 08:02) (18 - 18)  SpO2: 98% (01-28-25 @ 08:02) (98% - 99%)    Labs significant for WBC 11.7.    CTAP shows fluid-filled, mildly thickened loops of small bowel, may represent enteritis. Liquid stool in colon.    Admitted due to gastroenteritis and inability to tolerate po intake.

## 2025-01-28 NOTE — ED PROVIDER NOTE - CLINICAL SUMMARY MEDICAL DECISION MAKING FREE TEXT BOX
68 yo female, PMHx of HTN, SLE, Hypothyroidism, Anemia, Chronic Venous Statis and h/o CVA w/o residual deficits (1987), presenting for nausea, vomiting x2, diarrheax3, generalized abdominal pain, that started this morning, no alleviating or aggravating factors.  Denies fevers, chills, chest pain, shortness of breath. 70 yo female, PMHx of HTN, SLE, Hypothyroidism, Anemia, Chronic Venous Statis and h/o CVA w/o residual deficits (1987), presenting for nausea, vomiting x2, diarrheax3, generalized abdominal pain, that started this morning, no alleviating or aggravating factors.  Denies fevers, chills, chest pain, shortness of breath, urinary symptoms. 68 yo female, PMHx of HTN, SLE, Hypothyroidism, Anemia, Chronic Venous Statis and h/o CVA w/o residual deficits (1987), presenting for nausea, vomiting x2, diarrheax3, generalized abdominal pain, that started this morning, no alleviating or aggravating factors.  Denies fevers, chills, chest pain, shortness of breath, urinary symptoms.  Labs were ordered and reviewed.  Imaging was ordered and reviewed by me.  Appropriate medications for patient's presenting complaints were ordered and effects were reassessed.   Escalation to admission/observation was considered.  Patient unable to tolerate PO and continuing to vomit.  Likely enteritis on imaging.  Requires admission.

## 2025-01-28 NOTE — ED PROVIDER NOTE - ATTENDING APP SHARED VISIT CONTRIBUTION OF CARE
68 yo female, PMHx of HTN, SLE, Hypothyroidism, Anemia, Chronic Venous Statis and h/o CVA w/o residual deficits (1987), presenting for nausea, vomiting x2, diarrheax3, generalized abdominal pain, that started this morning, no alleviating or aggravating factors.  Denies fevers, chills, chest pain, shortness of breath.    CONSTITUTIONAL: Well-developed; well-nourished; in no acute distress.   SKIN: warm, dry  HEAD: Normocephalic  EYES: no conjunctival erythema  ENT: No nasal discharge; airway clear.  NECK: Supple  CARD: S1, S2 normal; Regular rate and rhythm.   RESP: No wheezes, rales or rhonchi.  ABD: soft nd, generalized mild tenderness  EXT: Normal ROM.  No clubbing, cyanosis or edema.   NEURO: Alert, oriented, grossly unremarkable  PSYCH: Cooperative, appropriate. 68 yo female, PMHx of HTN, SLE, Hypothyroidism, Anemia, Chronic Venous Statis and h/o CVA w/o residual deficits (1987), presenting for nausea, vomiting x2, diarrheax3, generalized abdominal pain, that started this morning, no alleviating or aggravating factors.  Denies fevers, chills, chest pain, shortness of breath, urinary symptoms.    CONSTITUTIONAL: Well-developed; well-nourished; in no acute distress.   SKIN: warm, dry  HEAD: Normocephalic  EYES: no conjunctival erythema  ENT: No nasal discharge; airway clear.  NECK: Supple  CARD: S1, S2 normal; Regular rate and rhythm.   RESP: No wheezes, rales or rhonchi.  ABD: soft nd, generalized mild tenderness  EXT: Normal ROM.  No clubbing, cyanosis or edema.   NEURO: Alert, oriented, grossly unremarkable  PSYCH: Cooperative, appropriate.

## 2025-01-28 NOTE — H&P ADULT - ATTENDING COMMENTS
Patient seen and examined independently. Describes feeling less nauseated and ability to "keep food down"     PAST MEDICAL & SURGICAL HISTORY:  Lupus  Essential hypertension  CVA (cerebral vascular accident)  Hypothyroidism  FH: bilateral hip replacements  H/O total knee replacement  H/O abdominal hysterectomy      Vitals:  T(F): 98.2 (01-28-25 @ 16:13)  HR: 70 (01-28-25 @ 16:13)  BP: 117/56 (01-28-25 @ 16:13)  RR: 18 (01-28-25 @ 16:13)  SpO2: 97% (01-28-25 @ 16:13)    TESTS & MEASUREMENTS:                        11.6   8.30  )-----------( 235      ( 28 Jan 2025 10:58 )             36.0       01-28    140  |  106  |  17  ----------------------------<  103[H]  3.7   |  23  |  0.7    Ca    8.5      28 Jan 2025 10:58  Mg     1.5     01-28    TPro  6.3  /  Alb  3.5  /  TBili  0.4  /  DBili  x   /  AST  15  /  ALT  8   /  AlkPhos  67  01-28    LIVER FUNCTIONS - ( 28 Jan 2025 10:58 )  Alb: 3.5 g/dL / Pro: 6.3 g/dL / ALK PHOS: 67 U/L / ALT: 8 U/L / AST: 15 U/L / GGT: x             < from: CT Abdomen and Pelvis w/ IV Cont (01.28.25 @ 01:46) >      Fluid-filled mildly thickened loops of small bowel may represent   enteritis given the symptomatology. Liquid stool in the colon compatible   with diarrheal illness.            In summary:  HEALTH ISSUES - PROBLEM Dx:    - acute viral illness with acute gastroenteritis and inability to tolerate oral intake (patient reports sick contacts and viral prodrome)       less likely bacterial enteritis, unlikely ischemic colitis  - no evidence of EUGENIO or other acute end-organ damage at this time         Rest of A/P as per edited detailed note above   Patient will be on advancing diet starting today   "prepare for discharge" notice provided and order entered. DC once able to tolerate PO Patient seen and examined independently. Describes feeling less nauseated and ability to "keep food down"     PAST MEDICAL & SURGICAL HISTORY:  Lupus  Essential hypertension  CVA (cerebral vascular accident)  Hypothyroidism  FH: bilateral hip replacements  H/O total knee replacement  H/O abdominal hysterectomy      Vitals:  T(F): 98.2 (01-28-25 @ 16:13)  HR: 70 (01-28-25 @ 16:13)  BP: 117/56 (01-28-25 @ 16:13)  RR: 18 (01-28-25 @ 16:13)  SpO2: 97% (01-28-25 @ 16:13)    TESTS & MEASUREMENTS:                        11.6   8.30  )-----------( 235      ( 28 Jan 2025 10:58 )             36.0       01-28    140  |  106  |  17  ----------------------------<  103[H]  3.7   |  23  |  0.7    Ca    8.5      28 Jan 2025 10:58  Mg     1.5     01-28    TPro  6.3  /  Alb  3.5  /  TBili  0.4  /  DBili  x   /  AST  15  /  ALT  8   /  AlkPhos  67  01-28    LIVER FUNCTIONS - ( 28 Jan 2025 10:58 )  Alb: 3.5 g/dL / Pro: 6.3 g/dL / ALK PHOS: 67 U/L / ALT: 8 U/L / AST: 15 U/L / GGT: x             < from: CT Abdomen and Pelvis w/ IV Cont (01.28.25 @ 01:46) >      Fluid-filled mildly thickened loops of small bowel may represent   enteritis given the symptomatology. Liquid stool in the colon compatible   with diarrheal illness.            In summary:  HEALTH ISSUES - PROBLEM Dx:    - acute viral illness with acute gastroenteritis and inability to tolerate oral intake (patient reports viral prodromes)       less likely bacterial enteritis, unlikely ischemic colitis  - no evidence of EUGENIO or other acute end-organ damage at this time         Rest of A/P as per edited detailed note above   Patient will be on advancing diet starting today   "prepare for discharge" notice provided and order entered. DC once able to tolerate PO

## 2025-01-28 NOTE — H&P ADULT - TIME BILLING
Direct clinical care, patient education and counseling related to viral gastroenteritis,  coordination with consultants, nursing and case management/social work teams, review of tests and scheduled medications,  and resident supervision. Time spent on the encounter excludes teaching time and/or separately reported services.

## 2025-01-28 NOTE — H&P ADULT - NSHPLABSRESULTS_GEN_ALL_CORE
LABS:                        11.6   8.30  )-----------( 235      ( 28 Jan 2025 10:58 )             36.0     01-28    140  |  106  |  17  ----------------------------<  103[H]  3.7   |  23  |  0.7    Ca    8.5      28 Jan 2025 10:58  Mg     1.5     01-28    TPro  6.3  /  Alb  3.5  /  TBili  0.4  /  DBili  x   /  AST  15  /  ALT  8   /  AlkPhos  67  01-28      IMAGING  CT Abdomen and Pelvis w/ IV Cont (01.28.25 @ 01:46)    IMPRESSION:  1.  Fluid-filled mildly thickened loops of small bowel may represent   enteritis given the symptomatology. Liquid stool in the colon compatible   with diarrheal illness.  2.  Additional findings as above.

## 2025-01-29 ENCOUNTER — TRANSCRIPTION ENCOUNTER (OUTPATIENT)
Age: 70
End: 2025-01-29

## 2025-01-29 LAB
ANION GAP SERPL CALC-SCNC: 13 MMOL/L — SIGNIFICANT CHANGE UP (ref 7–14)
APPEARANCE UR: ABNORMAL
BASOPHILS # BLD AUTO: 0.02 K/UL — SIGNIFICANT CHANGE UP (ref 0–0.2)
BASOPHILS NFR BLD AUTO: 0.4 % — SIGNIFICANT CHANGE UP (ref 0–1)
BILIRUB UR-MCNC: NEGATIVE — SIGNIFICANT CHANGE UP
BUN SERPL-MCNC: 17 MG/DL — SIGNIFICANT CHANGE UP (ref 10–20)
CALCIUM SERPL-MCNC: 8.7 MG/DL — SIGNIFICANT CHANGE UP (ref 8.4–10.4)
CHLORIDE SERPL-SCNC: 105 MMOL/L — SIGNIFICANT CHANGE UP (ref 98–110)
CO2 SERPL-SCNC: 22 MMOL/L — SIGNIFICANT CHANGE UP (ref 17–32)
COLOR SPEC: YELLOW — SIGNIFICANT CHANGE UP
CREAT SERPL-MCNC: 0.8 MG/DL — SIGNIFICANT CHANGE UP (ref 0.7–1.5)
DIFF PNL FLD: NEGATIVE — SIGNIFICANT CHANGE UP
EGFR: 80 ML/MIN/1.73M2 — SIGNIFICANT CHANGE UP
EOSINOPHIL # BLD AUTO: 0.06 K/UL — SIGNIFICANT CHANGE UP (ref 0–0.7)
EOSINOPHIL NFR BLD AUTO: 1.3 % — SIGNIFICANT CHANGE UP (ref 0–8)
GLUCOSE SERPL-MCNC: 66 MG/DL — LOW (ref 70–99)
GLUCOSE UR QL: NEGATIVE MG/DL — SIGNIFICANT CHANGE UP
HCT VFR BLD CALC: 38.5 % — SIGNIFICANT CHANGE UP (ref 37–47)
HGB BLD-MCNC: 12.4 G/DL — SIGNIFICANT CHANGE UP (ref 12–16)
IMM GRANULOCYTES NFR BLD AUTO: 0.4 % — HIGH (ref 0.1–0.3)
KETONES UR-MCNC: ABNORMAL MG/DL
LEUKOCYTE ESTERASE UR-ACNC: ABNORMAL
LYMPHOCYTES # BLD AUTO: 0.82 K/UL — LOW (ref 1.2–3.4)
LYMPHOCYTES # BLD AUTO: 17.1 % — LOW (ref 20.5–51.1)
MCHC RBC-ENTMCNC: 27.6 PG — SIGNIFICANT CHANGE UP (ref 27–31)
MCHC RBC-ENTMCNC: 32.2 G/DL — SIGNIFICANT CHANGE UP (ref 32–37)
MCV RBC AUTO: 85.6 FL — SIGNIFICANT CHANGE UP (ref 81–99)
MONOCYTES # BLD AUTO: 0.39 K/UL — SIGNIFICANT CHANGE UP (ref 0.1–0.6)
MONOCYTES NFR BLD AUTO: 8.1 % — SIGNIFICANT CHANGE UP (ref 1.7–9.3)
NEUTROPHILS # BLD AUTO: 3.49 K/UL — SIGNIFICANT CHANGE UP (ref 1.4–6.5)
NEUTROPHILS NFR BLD AUTO: 72.7 % — SIGNIFICANT CHANGE UP (ref 42.2–75.2)
NITRITE UR-MCNC: NEGATIVE — SIGNIFICANT CHANGE UP
NRBC # BLD: 0 /100 WBCS — SIGNIFICANT CHANGE UP (ref 0–0)
NRBC BLD-RTO: 0 /100 WBCS — SIGNIFICANT CHANGE UP (ref 0–0)
PH UR: 8 — SIGNIFICANT CHANGE UP (ref 5–8)
PLATELET # BLD AUTO: 205 K/UL — SIGNIFICANT CHANGE UP (ref 130–400)
PMV BLD: 9 FL — SIGNIFICANT CHANGE UP (ref 7.4–10.4)
POTASSIUM SERPL-MCNC: 3.1 MMOL/L — LOW (ref 3.5–5)
POTASSIUM SERPL-SCNC: 3.1 MMOL/L — LOW (ref 3.5–5)
PROT UR-MCNC: 100 MG/DL
RBC # BLD: 4.5 M/UL — SIGNIFICANT CHANGE UP (ref 4.2–5.4)
RBC # FLD: 15.7 % — HIGH (ref 11.5–14.5)
SODIUM SERPL-SCNC: 140 MMOL/L — SIGNIFICANT CHANGE UP (ref 135–146)
SP GR SPEC: 1.03 — SIGNIFICANT CHANGE UP (ref 1–1.03)
UROBILINOGEN FLD QL: 1 MG/DL — SIGNIFICANT CHANGE UP (ref 0.2–1)
WBC # BLD: 4.8 K/UL — SIGNIFICANT CHANGE UP (ref 4.8–10.8)
WBC # FLD AUTO: 4.8 K/UL — SIGNIFICANT CHANGE UP (ref 4.8–10.8)

## 2025-01-29 PROCEDURE — 99239 HOSP IP/OBS DSCHRG MGMT >30: CPT

## 2025-01-29 RX ORDER — L.ACID,CASEI/B.ANIMAL/S.THERMO 16B CELL
1 CAPSULE ORAL
Qty: 30 | Refills: 0
Start: 2025-01-29 | End: 2025-02-27

## 2025-01-29 RX ORDER — POTASSIUM CHLORIDE 750 MG/1
40 TABLET, EXTENDED RELEASE ORAL ONCE
Refills: 0 | Status: COMPLETED | OUTPATIENT
Start: 2025-01-29 | End: 2025-01-29

## 2025-01-29 RX ORDER — POTASSIUM CHLORIDE 750 MG/1
20 TABLET, EXTENDED RELEASE ORAL ONCE
Refills: 0 | Status: COMPLETED | OUTPATIENT
Start: 2025-01-29 | End: 2025-01-29

## 2025-01-29 RX ORDER — POTASSIUM CHLORIDE 750 MG/1
2 TABLET, EXTENDED RELEASE ORAL
Qty: 6 | Refills: 0
Start: 2025-01-29 | End: 2025-01-31

## 2025-01-29 RX ADMIN — POTASSIUM CHLORIDE 40 MILLIEQUIVALENT(S): 750 TABLET, EXTENDED RELEASE ORAL at 10:51

## 2025-01-29 RX ADMIN — ONDANSETRON 4 MILLIGRAM(S): 4 TABLET, ORALLY DISINTEGRATING ORAL at 17:34

## 2025-01-29 RX ADMIN — Medication 325 MILLIGRAM(S): at 14:05

## 2025-01-29 RX ADMIN — POTASSIUM CHLORIDE 50 MILLIEQUIVALENT(S): 750 TABLET, EXTENDED RELEASE ORAL at 10:51

## 2025-01-29 RX ADMIN — DULOXETINE 30 MILLIGRAM(S): 20 CAPSULE, DELAYED RELEASE ORAL at 12:22

## 2025-01-29 RX ADMIN — Medication 325 MILLIGRAM(S): at 05:17

## 2025-01-29 RX ADMIN — Medication 1 TABLET(S): at 17:39

## 2025-01-29 RX ADMIN — LEVOTHYROXINE SODIUM 175 MICROGRAM(S): 25 TABLET ORAL at 05:17

## 2025-01-29 RX ADMIN — Medication 25 MILLIGRAM(S): at 05:19

## 2025-01-29 RX ADMIN — ACETAMINOPHEN 650 MILLIGRAM(S): 160 SUSPENSION ORAL at 00:53

## 2025-01-29 RX ADMIN — Medication 325 MILLIGRAM(S): at 21:54

## 2025-01-29 RX ADMIN — Medication 1 TABLET(S): at 12:22

## 2025-01-29 RX ADMIN — FAMOTIDINE 20 MILLIGRAM(S): 10 INJECTION INTRAVENOUS at 12:22

## 2025-01-29 RX ADMIN — Medication 50 MILLIGRAM(S): at 21:55

## 2025-01-29 RX ADMIN — ACETAMINOPHEN, DIPHENHYDRAMINE HCL, PHENYLEPHRINE HCL 5 MILLIGRAM(S): 325; 25; 5 TABLET ORAL at 21:55

## 2025-01-29 RX ADMIN — HYDROXYCHLOROQUINE SULFATE 200 MILLIGRAM(S): 200 TABLET, FILM COATED ORAL at 12:23

## 2025-01-29 RX ADMIN — Medication 1 APPLICATION(S): at 17:34

## 2025-01-29 RX ADMIN — Medication 1 TABLET(S): at 05:17

## 2025-01-29 RX ADMIN — Medication 40 MILLIGRAM(S): at 05:17

## 2025-01-29 RX ADMIN — Medication 1 APPLICATION(S): at 05:17

## 2025-01-29 RX ADMIN — ENOXAPARIN SODIUM 40 MILLIGRAM(S): 100 INJECTION SUBCUTANEOUS at 14:04

## 2025-01-29 NOTE — DISCHARGE NOTE PROVIDER - NSDCFUADDAPPT_GEN_ALL_CORE_FT
APPTS ARE READY TO BE MADE: [x] YES    Best Family or Patient Contact (if needed):    Additional Information about above appointments (if needed):    1: IM: Post hospital follow up    Other comments or requests:

## 2025-01-29 NOTE — ED ADULT NURSE REASSESSMENT NOTE - NS ED NURSE REASSESS COMMENT FT1
Pt is AOX4. IV patent and flushed without any issues. Pt currently on primafit. Pt does not have any BM's and does not have any urge to go. Right lower leg is wrapped in gauze. C.diff precautions maintained.

## 2025-01-29 NOTE — DISCHARGE NOTE PROVIDER - NSDCFUSCHEDAPPT_GEN_ALL_CORE_FT
[facility]  PULMMED Kimmie Coronado  Scheduled Appointment: 02/07/2025

## 2025-01-29 NOTE — DISCHARGE NOTE PROVIDER - ATTENDING DISCHARGE PHYSICAL EXAMINATION:
T(F): , Max: 98.8 (01-29-25 @ 16:19)  HR: 63 (01-29-25 @ 16:19) (60 - 67)  BP: 199/92 (01-29-25 @ 16:19)  RR: 18 (01-29-25 @ 16:19)  SpO2: 100% (01-29-25 @ 16:19)  General: No apparent distress  Cardiovascular: S1, S2  Gastrointestinal: Soft, Non-tender, Non-distended  Respiratory: Good air entry bilaterally  Musculoskeletal: Moves all extremities  Lymphatic: No edema  Neurologic: No gross motor deficit  Dermatologic: Skin dry                          12.4   4.80  )-----------( 205      ( 29 Jan 2025 07:26 )             38.5     01-29    140  |  105  |  17  ----------------------------<  66[L]  3.1[L]   |  22  |  0.8    Ca    8.7      29 Jan 2025 07:26  Mg     1.5     01-28    TPro  6.3  /  Alb  3.5  /  TBili  0.4  /  DBili  x   /  AST  15  /  ALT  8   /  AlkPhos  67  01-28

## 2025-01-29 NOTE — DISCHARGE NOTE PROVIDER - NSDCMRMEDTOKEN_GEN_ALL_CORE_FT
alendronate 70 mg oral tablet: 1 tab(s) orally once a week  atenolol 25 mg oral tablet: 1 tab(s) orally once a day  calcium-vitamin D: 500 milligram(s) orally 2 times a day  DULoxetine 30 mg oral delayed release capsule: 1 cap(s) orally once a day  ferrous sulfate: 325 milligram(s) orally 3 times a day  furosemide 40 mg oral tablet: 1 tab(s) orally once a day  lactobacillus acidophilus oral capsule: 1 cap(s) orally once a day  levothyroxine 175 mcg (0.175 mg) oral tablet: 1 tab(s) orally once a day  Melatonin 10 mg oral tablet: 1 tab(s) orally once a day (at bedtime)  oxyCODONE 5 mg oral tablet: 1 tab(s) orally every 12 hours  Pepcid 20 mg oral tablet: 1 tab(s) orally once a day  Plaquenil 200 mg oral tablet: 1 tab(s) orally once a day  potassium chloride 20 mEq oral powder for reconstitution: 2 packet(s) orally once a day  senna (sennosides) 8.6 mg oral tablet: 1 tab(s) orally once a day (at bedtime)  Synthroid 25 mcg (0.025 mg) oral tablet: 1 tab(s) orally once a week  traZODone 50 mg oral tablet: 1 tab(s) orally once a day (at bedtime)  triamcinolone 0.1% topical cream: 1 Apply topically to affected area every 12 hours  Vitamin C 250 mg oral tablet, chewable: 1 tab(s) chewed once a day

## 2025-01-29 NOTE — DISCHARGE NOTE PROVIDER - CARE PROVIDER_API CALL
Katie Crespo  Physician Assistant Services  [address] [place] [address], [address]  [place] [address]  Phone: [phone]  Fax: [phone]  Follow Up Time: 1 week

## 2025-01-29 NOTE — DISCHARGE NOTE PROVIDER - NSDCCPCAREPLAN_GEN_ALL_CORE_FT
PRINCIPAL DISCHARGE DIAGNOSIS  Diagnosis: Abdominal pain, vomiting, and diarrhea  Assessment and Plan of Treatment: You came to the hospital complaining of abdominal pain and diarrhea. This is likely caused by a viral illness. Your sympyoms have now improved and you were sent home.   Please follow up with your PCP in 1 week.

## 2025-01-29 NOTE — DISCHARGE NOTE PROVIDER - HOSPITAL COURSE
Patient is a 69-year-old female PMH HTN, hypothyroidism, SLE, CVA without residual deficits, anemia, chronic venous stasis who presents to the ED from the [facility] with complaints of multiple episodes of NB emesis diarrhea for a day. Patient denies sick contacts. Was unable to tolerate p.o.  Denies fever, chills, CP, SOB, urinary symptoms.  Endorses recent antibiotic use that she was treated for UTI. Patient's reports symptoms have improved, and she is now tolerating po.    #Gastroenteritis  #Inability to tolerate PO  - diarrhea and vomitting for a day, symptoms now improved  - s/p 1L LR in ED  - diet advanced  - no fevers, wbc downtrending 11.7 -> 8.3  - CTAP shows fluid-filled, mildly thickened loops of small bowel, may represent enteritis. Liquid stool in colon.  - likely gastroenteritis given resolution of symptoms and CTAP findings  - hold home bowel regimen due to diarrhea    #SLE  - c/w home hydroxychloroquine    #HTN  - c/w home atenolol    #RLE rash  #Venous stasis ulcers  - c/w home triamcinolone cream bid for RLE  - c/w home lasix 40mg daily  - c/w home oxycodone 5mg q12 as needed for pain    #hypothyrodism  - c/w home levothyroxine   Patient is a 69-year-old female PMH HTN, hypothyroidism, SLE, CVA without residual deficits, anemia, chronic venous stasis who presents to the ED from the [facility] with complaints of multiple episodes of NB emesis diarrhea for a day. Patient denies sick contacts. Was unable to tolerate p.o.  Denies fever, chills, CP, SOB, urinary symptoms.  Endorses recent antibiotic use that she was treated for UTI. Patient's reports symptoms have improved, and she is now tolerating po.    #Gastroenteritis  #Inability to tolerate PO  - diarrhea and vomitting for a day, symptoms now improved  - s/p 1L LR in ED  - diet advanced  - no fevers, wbc downtrending 11.7 -> 8.3  - CTAP shows fluid-filled, mildly thickened loops of small bowel, may represent enteritis. Liquid stool in colon.  - likely gastroenteritis given resolution of symptoms and CTAP findings  - hold home bowel regimen due to diarrhea    #SLE  - c/w home hydroxychloroquine    #HTN  - c/w home atenolol    #RLE rash  #Venous stasis ulcers  - c/w home triamcinolone cream bid for RLE  - c/w home lasix 40mg daily  - c/w home oxycodone 5mg q12 as needed for pain    #hypothyrodism  - c/w home levothyroxine    Plans of discharge discussed with Dr. Campbell on 1/30/2025 and discharge was approved.

## 2025-01-30 ENCOUNTER — TRANSCRIPTION ENCOUNTER (OUTPATIENT)
Age: 70
End: 2025-01-30

## 2025-01-30 VITALS
OXYGEN SATURATION: 99 % | SYSTOLIC BLOOD PRESSURE: 132 MMHG | HEART RATE: 65 BPM | DIASTOLIC BLOOD PRESSURE: 61 MMHG | TEMPERATURE: 98 F | RESPIRATION RATE: 18 BRPM

## 2025-01-30 LAB
ANION GAP SERPL CALC-SCNC: 11 MMOL/L — SIGNIFICANT CHANGE UP (ref 7–14)
BASOPHILS # BLD AUTO: 0.02 K/UL — SIGNIFICANT CHANGE UP (ref 0–0.2)
BASOPHILS NFR BLD AUTO: 0.3 % — SIGNIFICANT CHANGE UP (ref 0–1)
BUN SERPL-MCNC: 13 MG/DL — SIGNIFICANT CHANGE UP (ref 10–20)
CALCIUM SERPL-MCNC: 8 MG/DL — LOW (ref 8.4–10.5)
CHLORIDE SERPL-SCNC: 107 MMOL/L — SIGNIFICANT CHANGE UP (ref 98–110)
CO2 SERPL-SCNC: 21 MMOL/L — SIGNIFICANT CHANGE UP (ref 17–32)
CREAT SERPL-MCNC: 0.6 MG/DL — LOW (ref 0.7–1.5)
EGFR: 98 ML/MIN/1.73M2 — SIGNIFICANT CHANGE UP
EOSINOPHIL # BLD AUTO: 0.08 K/UL — SIGNIFICANT CHANGE UP (ref 0–0.7)
EOSINOPHIL NFR BLD AUTO: 1.3 % — SIGNIFICANT CHANGE UP (ref 0–8)
GLUCOSE SERPL-MCNC: 88 MG/DL — SIGNIFICANT CHANGE UP (ref 70–99)
HCT VFR BLD CALC: 37.7 % — SIGNIFICANT CHANGE UP (ref 37–47)
HGB BLD-MCNC: 12.1 G/DL — SIGNIFICANT CHANGE UP (ref 12–16)
IMM GRANULOCYTES NFR BLD AUTO: 0.3 % — SIGNIFICANT CHANGE UP (ref 0.1–0.3)
LYMPHOCYTES # BLD AUTO: 1.28 K/UL — SIGNIFICANT CHANGE UP (ref 1.2–3.4)
LYMPHOCYTES # BLD AUTO: 20.7 % — SIGNIFICANT CHANGE UP (ref 20.5–51.1)
MCHC RBC-ENTMCNC: 27.4 PG — SIGNIFICANT CHANGE UP (ref 27–31)
MCHC RBC-ENTMCNC: 32.1 G/DL — SIGNIFICANT CHANGE UP (ref 32–37)
MCV RBC AUTO: 85.3 FL — SIGNIFICANT CHANGE UP (ref 81–99)
MONOCYTES # BLD AUTO: 0.58 K/UL — SIGNIFICANT CHANGE UP (ref 0.1–0.6)
MONOCYTES NFR BLD AUTO: 9.4 % — HIGH (ref 1.7–9.3)
NEUTROPHILS # BLD AUTO: 4.19 K/UL — SIGNIFICANT CHANGE UP (ref 1.4–6.5)
NEUTROPHILS NFR BLD AUTO: 68 % — SIGNIFICANT CHANGE UP (ref 42.2–75.2)
NRBC # BLD: 0 /100 WBCS — SIGNIFICANT CHANGE UP (ref 0–0)
NRBC BLD-RTO: 0 /100 WBCS — SIGNIFICANT CHANGE UP (ref 0–0)
PLATELET # BLD AUTO: 225 K/UL — SIGNIFICANT CHANGE UP (ref 130–400)
PMV BLD: 9.7 FL — SIGNIFICANT CHANGE UP (ref 7.4–10.4)
POTASSIUM SERPL-MCNC: 3.8 MMOL/L — SIGNIFICANT CHANGE UP (ref 3.5–5)
POTASSIUM SERPL-SCNC: 3.8 MMOL/L — SIGNIFICANT CHANGE UP (ref 3.5–5)
RBC # BLD: 4.42 M/UL — SIGNIFICANT CHANGE UP (ref 4.2–5.4)
RBC # FLD: 15.3 % — HIGH (ref 11.5–14.5)
SODIUM SERPL-SCNC: 139 MMOL/L — SIGNIFICANT CHANGE UP (ref 135–146)
WBC # BLD: 6.17 K/UL — SIGNIFICANT CHANGE UP (ref 4.8–10.8)
WBC # FLD AUTO: 6.17 K/UL — SIGNIFICANT CHANGE UP (ref 4.8–10.8)

## 2025-01-30 PROCEDURE — 99232 SBSQ HOSP IP/OBS MODERATE 35: CPT

## 2025-01-30 RX ADMIN — FAMOTIDINE 20 MILLIGRAM(S): 10 INJECTION INTRAVENOUS at 13:18

## 2025-01-30 RX ADMIN — Medication 25 MILLIGRAM(S): at 06:47

## 2025-01-30 RX ADMIN — Medication 325 MILLIGRAM(S): at 06:47

## 2025-01-30 RX ADMIN — Medication 1 TABLET(S): at 13:19

## 2025-01-30 RX ADMIN — ACETAMINOPHEN 650 MILLIGRAM(S): 160 SUSPENSION ORAL at 02:54

## 2025-01-30 RX ADMIN — Medication 1 TABLET(S): at 06:52

## 2025-01-30 RX ADMIN — ENOXAPARIN SODIUM 40 MILLIGRAM(S): 100 INJECTION SUBCUTANEOUS at 13:18

## 2025-01-30 RX ADMIN — DULOXETINE 30 MILLIGRAM(S): 20 CAPSULE, DELAYED RELEASE ORAL at 13:18

## 2025-01-30 RX ADMIN — HYDROXYCHLOROQUINE SULFATE 200 MILLIGRAM(S): 200 TABLET, FILM COATED ORAL at 13:19

## 2025-01-30 RX ADMIN — Medication 325 MILLIGRAM(S): at 13:18

## 2025-01-30 RX ADMIN — Medication 40 MILLIGRAM(S): at 06:47

## 2025-01-30 RX ADMIN — Medication 1 APPLICATION(S): at 06:47

## 2025-01-30 RX ADMIN — LEVOTHYROXINE SODIUM 175 MICROGRAM(S): 25 TABLET ORAL at 07:12

## 2025-01-30 NOTE — DISCHARGE NOTE NURSING/CASE MANAGEMENT/SOCIAL WORK - FINANCIAL ASSISTANCE
[facility] provides services at a reduced cost to those who are determined to be eligible through [facility]’s financial assistance program. Information regarding [facility]’s financial assistance program can be found by going to [email or web address] or by calling [phone].

## 2025-01-30 NOTE — PROGRESS NOTE ADULT - SUBJECTIVE AND OBJECTIVE BOX
Patient declined to leave yesterday despite medical stability    I counseled patient today and told her that she needs to leave [facility] today as she is feeling better per her own words.    I discussed this with her resident also.        VITALS:  T(F): 98 (01-30-25 @ 07:39), Max: 98.8 (01-29-25 @ 16:19)  HR: 49 (01-30-25 @ 07:39)  BP: 123/69 (01-30-25 @ 07:39) (123/69 - 199/92)  RR: 18 (01-30-25 @ 07:39)  SpO2: 99% (01-30-25 @ 07:39)      a x o x 3                            12.1   6.17  )-----------( 225      ( 30 Jan 2025 07:02 )             37.7       01-30    139  |  107  |  13  ----------------------------<  88  3.8   |  21  |  0.6[L]    Ca    8.0[L]      30 Jan 2025 07:02    MEDICATIONS:  MEDICATIONS  (STANDING):  atenolol  Tablet 25 milliGRAM(s) Oral daily  atenolol  Tablet      calcium carbonate 1250 mG  + Vitamin D (OsCal 500 + D) 1 Tablet(s) Oral every 12 hours  DULoxetine 30 milliGRAM(s) Oral daily  enoxaparin Injectable      enoxaparin Injectable 40 milliGRAM(s) SubCutaneous every 24 hours  famotidine    Tablet 20 milliGRAM(s) Oral daily  ferrous    sulfate 325 milliGRAM(s) Oral every 8 hours  furosemide    Tablet 40 milliGRAM(s) Oral daily  hydroxychloroquine      hydroxychloroquine 200 milliGRAM(s) Oral daily  lactobacillus acidophilus 1 Tablet(s) Oral daily  levothyroxine 175 MICROGram(s) Oral daily  levothyroxine      levothyroxine 25 MICROGram(s) Oral <User Schedule>  melatonin 5 milliGRAM(s) Oral at bedtime  traZODone 50 milliGRAM(s) Oral at bedtime  triamcinolone 0.1% Cream 1 Application(s) Topical every 12 hours    MEDICATIONS  (PRN):  acetaminophen     Tablet .. 650 milliGRAM(s) Oral every 6 hours PRN Temp greater or equal to 38C (100.4F), Moderate Pain (4 - 6), Severe Pain (7 - 10)  ondansetron Injectable 4 milliGRAM(s) IV Push every 8 hours PRN Nausea and/or Vomiting  oxyCODONE    IR 5 milliGRAM(s) Oral every 12 hours PRN Severe Pain (7 - 10)      HEALTH ISSUES - PROBLEM Dx:          Case discussed in details with: resident    Ucx >100K gram negative organisms but recently treated ?significance in asymptomatic patient although has SLE     Immunosuppressed secondary to SLE and hydroxychloroquine use

## 2025-01-30 NOTE — DISCHARGE NOTE NURSING/CASE MANAGEMENT/SOCIAL WORK - PATIENT PORTAL LINK FT
You can access the FollowMyHealth Patient Portal offered by [facility] by registering at the following website: [email or web address]. By joining [facility]’s FollowMyHealth portal, you will also be able to view your health information using other applications (apps) compatible with our system.

## 2025-01-30 NOTE — PROGRESS NOTE ADULT - SUBJECTIVE AND OBJECTIVE BOX
CC: Nausea, Vomiting, Diarrhea (30 Jan 2025 13:22)    INTERVAL HPI/OVERNIGHT EVENTS:  Patient seen and examined at bedside. Patient feels better and can tolerate PO intake. Patient will be discharged back to adult home. VSS.     Vital Signs Last 24 Hrs  T(C): 36.7 (30 Jan 2025 07:39), Max: 37.1 (29 Jan 2025 16:19)  T(F): 98 (30 Jan 2025 07:39), Max: 98.8 (29 Jan 2025 16:19)  HR: 49 (30 Jan 2025 07:39) (49 - 64)  BP: 123/69 (30 Jan 2025 07:39) (123/69 - 199/92)  BP(mean): 132 (29 Jan 2025 16:19) (132 - 132)  RR: 18 (30 Jan 2025 07:39) (18 - 18)  SpO2: 99% (30 Jan 2025 07:39) (98% - 100%)    Parameters below as of 30 Jan 2025 07:39  Patient On (Oxygen Delivery Method): room air      PHYSICAL EXAM:  GENERAL: No acute distress, well-developed  HEAD:  Atraumatic, Normocephalic  CHEST/LUNG: Clear to auscultation bilaterally; No wheeze, equal breath sounds bilaterally, respirations nonlabored  HEART: Regular rate and rhythm; No murmurs, rubs, or gallops  ABDOMEN: Soft, nontender, nondistended  BACK: no spinal tenderness, no CVA tenderness  EXTREMITIES:  No clubbing, cyanosis, or edema  PSYCH: Nl behavior, nl affect  NEUROLOGY: AAOx3, non-focal, moves all extremities spontaneously  SKIN: Normal color, RLE rash bandaged.    I&O's Detail                                12.1   6.17  )-----------( 225      ( 30 Jan 2025 07:02 )             37.7     30 Jan 2025 07:02    139    |  107    |  13     ----------------------------<  88     3.8     |  21     |  0.6      Ca    8.0        30 Jan 2025 07:02        CAPILLARY BLOOD GLUCOSE          Urinalysis Basic - ( 30 Jan 2025 07:02 )    Color: x / Appearance: x / SG: x / pH: x  Gluc: 88 mg/dL / Ketone: x  / Bili: x / Urobili: x   Blood: x / Protein: x / Nitrite: x   Leuk Esterase: x / RBC: x / WBC x   Sq Epi: x / Non Sq Epi: x / Bacteria: x        MEDICATIONS  (STANDING):  atenolol  Tablet 25 milliGRAM(s) Oral daily  atenolol  Tablet      calcium carbonate 1250 mG  + Vitamin D (OsCal 500 + D) 1 Tablet(s) Oral every 12 hours  DULoxetine 30 milliGRAM(s) Oral daily  enoxaparin Injectable      enoxaparin Injectable 40 milliGRAM(s) SubCutaneous every 24 hours  famotidine    Tablet 20 milliGRAM(s) Oral daily  ferrous    sulfate 325 milliGRAM(s) Oral every 8 hours  furosemide    Tablet 40 milliGRAM(s) Oral daily  hydroxychloroquine      hydroxychloroquine 200 milliGRAM(s) Oral daily  lactobacillus acidophilus 1 Tablet(s) Oral daily  levothyroxine 175 MICROGram(s) Oral daily  levothyroxine      levothyroxine 25 MICROGram(s) Oral <User Schedule>  melatonin 5 milliGRAM(s) Oral at bedtime  traZODone 50 milliGRAM(s) Oral at bedtime  triamcinolone 0.1% Cream 1 Application(s) Topical every 12 hours    MEDICATIONS  (PRN):  acetaminophen     Tablet .. 650 milliGRAM(s) Oral every 6 hours PRN Temp greater or equal to 38C (100.4F), Moderate Pain (4 - 6), Severe Pain (7 - 10)  ondansetron Injectable 4 milliGRAM(s) IV Push every 8 hours PRN Nausea and/or Vomiting  oxyCODONE    IR 5 milliGRAM(s) Oral every 12 hours PRN Severe Pain (7 - 10)      RADIOLOGY & ADDITIONAL TESTS:

## 2025-01-30 NOTE — PROGRESS NOTE ADULT - ASSESSMENT
69-year-old female PMH HTN, hypothyroidism, SLE, CVA without residual deficits, anemia, chronic venous stasis who presents to the ED from the [facility] with complaints of multiple episodes of NB emesis diarrhea for a day. Patient denies sick contacts. Was unable to tolerate p.o.  Denies fever, chills, CP, SOB, urinary symptoms.  Endorses recent antibiotic use that she was treated for UTI. Patient's reports symptoms have improved, and she is now tolerating po.    #Gastroenteritis  #Inability to tolerate PO  - diarrhea and vomitting for a day, symptoms now improved  - s/p 1L LR in ED  - diet advanced  - no fevers, wbc downtrending 11.7 -> 8.3  - CTAP shows fluid-filled, mildly thickened loops of small bowel, may represent enteritis. Liquid stool in colon.  - likely gastroenteritis given resolution of symptoms and CTAP findings  - hold home bowel regimen due to diarrhea  - pt reports improvement in sx and can tolerate PO    #SLE  - c/w home hydroxychloroquine    #HTN  - c/w home atenolol    #RLE rash  #Venous stasis ulcers  - c/w home triamcinolone cream bid for RLE  - c/w home lasix 40mg daily  - c/w home oxycodone 5mg q12 as needed for pain    #hypothyrodism  - c/w home levothyroxine    Pending discharge back to adult home.

## 2025-01-31 ENCOUNTER — NON-APPOINTMENT (OUTPATIENT)
Age: 70
End: 2025-01-31

## 2025-02-01 LAB
-  AMPICILLIN/SULBACTAM: SIGNIFICANT CHANGE UP
-  AMPICILLIN: SIGNIFICANT CHANGE UP
-  AZTREONAM: SIGNIFICANT CHANGE UP
-  CEFAZOLIN: SIGNIFICANT CHANGE UP
-  CEFEPIME: SIGNIFICANT CHANGE UP
-  CEFTRIAXONE: SIGNIFICANT CHANGE UP
-  CEFUROXIME: SIGNIFICANT CHANGE UP
-  CIPROFLOXACIN: SIGNIFICANT CHANGE UP
-  ERTAPENEM: SIGNIFICANT CHANGE UP
-  GENTAMICIN: SIGNIFICANT CHANGE UP
-  LEVOFLOXACIN: SIGNIFICANT CHANGE UP
-  MEROPENEM: SIGNIFICANT CHANGE UP
-  NITROFURANTOIN: SIGNIFICANT CHANGE UP
-  PIPERACILLIN/TAZOBACTAM: SIGNIFICANT CHANGE UP
-  TOBRAMYCIN: SIGNIFICANT CHANGE UP
-  TRIMETHOPRIM/SULFAMETHOXAZOLE: SIGNIFICANT CHANGE UP
CULTURE RESULTS: ABNORMAL
METHOD TYPE: SIGNIFICANT CHANGE UP
ORGANISM # SPEC MICROSCOPIC CNT: ABNORMAL
ORGANISM # SPEC MICROSCOPIC CNT: SIGNIFICANT CHANGE UP
SPECIMEN SOURCE: SIGNIFICANT CHANGE UP

## 2025-02-03 NOTE — CHART NOTE - NSCHARTNOTEFT_GEN_A_CORE
pt discharged to the [facility], follows up with pcp at facility 1/31 - TALIA (PCP Referral)
I was informed of a urine cx growing >100K MDR Proteus.  Patient was asymptomatic on discharge and was recently treated as an outpt.  I spoke with Dr. Herrera of ID and he counseled me to ignore the finding.

## 2025-02-06 DIAGNOSIS — Z79.890 HORMONE REPLACEMENT THERAPY: ICD-10-CM

## 2025-02-06 DIAGNOSIS — Z90.710 ACQUIRED ABSENCE OF BOTH CERVIX AND UTERUS: ICD-10-CM

## 2025-02-06 DIAGNOSIS — R82.89 OTHER ABNORMAL FINDINGS ON CYTOLOGICAL AND HISTOLOGICAL EXAMINATION OF URINE: ICD-10-CM

## 2025-02-06 DIAGNOSIS — I10 ESSENTIAL (PRIMARY) HYPERTENSION: ICD-10-CM

## 2025-02-06 DIAGNOSIS — D84.89 OTHER IMMUNODEFICIENCIES: ICD-10-CM

## 2025-02-06 DIAGNOSIS — Z87.440 PERSONAL HISTORY OF URINARY (TRACT) INFECTIONS: ICD-10-CM

## 2025-02-06 DIAGNOSIS — A08.4 VIRAL INTESTINAL INFECTION, UNSPECIFIED: ICD-10-CM

## 2025-02-06 DIAGNOSIS — Z79.899 OTHER LONG TERM (CURRENT) DRUG THERAPY: ICD-10-CM

## 2025-02-06 DIAGNOSIS — Z86.73 PERSONAL HISTORY OF TRANSIENT ISCHEMIC ATTACK (TIA), AND CEREBRAL INFARCTION WITHOUT RESIDUAL DEFICITS: ICD-10-CM

## 2025-02-06 DIAGNOSIS — Z88.6 ALLERGY STATUS TO ANALGESIC AGENT: ICD-10-CM

## 2025-02-06 DIAGNOSIS — L97.818 NON-PRESSURE CHRONIC ULCER OF OTHER PART OF RIGHT LOWER LEG WITH OTHER SPECIFIED SEVERITY: ICD-10-CM

## 2025-02-06 DIAGNOSIS — E03.9 HYPOTHYROIDISM, UNSPECIFIED: ICD-10-CM

## 2025-02-06 DIAGNOSIS — Z88.5 ALLERGY STATUS TO NARCOTIC AGENT: ICD-10-CM

## 2025-02-06 DIAGNOSIS — Z88.8 ALLERGY STATUS TO OTHER DRUGS, MEDICAMENTS AND BIOLOGICAL SUBSTANCES: ICD-10-CM

## 2025-02-06 DIAGNOSIS — M32.9 SYSTEMIC LUPUS ERYTHEMATOSUS, UNSPECIFIED: ICD-10-CM

## 2025-02-06 DIAGNOSIS — Z88.1 ALLERGY STATUS TO OTHER ANTIBIOTIC AGENTS: ICD-10-CM

## 2025-02-06 DIAGNOSIS — Z88.0 ALLERGY STATUS TO PENICILLIN: ICD-10-CM

## 2025-02-07 ENCOUNTER — APPOINTMENT (OUTPATIENT)
Dept: PULMONOLOGY | Facility: CLINIC | Age: 70
End: 2025-02-07
Payer: MEDICARE

## 2025-02-07 VITALS
RESPIRATION RATE: 14 BRPM | OXYGEN SATURATION: 99 % | WEIGHT: 127 LBS | DIASTOLIC BLOOD PRESSURE: 62 MMHG | SYSTOLIC BLOOD PRESSURE: 108 MMHG | HEART RATE: 56 BPM | BODY MASS INDEX: 23.23 KG/M2

## 2025-02-07 DIAGNOSIS — J44.9 CHRONIC OBSTRUCTIVE PULMONARY DISEASE, UNSPECIFIED: ICD-10-CM

## 2025-02-07 DIAGNOSIS — F17.210 NICOTINE DEPENDENCE, CIGARETTES, UNCOMPLICATED: ICD-10-CM

## 2025-02-07 PROCEDURE — 99406 BEHAV CHNG SMOKING 3-10 MIN: CPT

## 2025-02-07 PROCEDURE — 99214 OFFICE O/P EST MOD 30 MIN: CPT | Mod: 25

## 2025-02-07 PROCEDURE — G0296 VISIT TO DETERM LDCT ELIG: CPT

## 2025-04-03 ENCOUNTER — EMERGENCY (EMERGENCY)
Facility: HOSPITAL | Age: 70
LOS: 0 days | Discharge: HOME | End: 2025-04-03
Attending: EMERGENCY MEDICINE
Payer: MEDICARE

## 2025-04-03 VITALS
TEMPERATURE: 98 F | DIASTOLIC BLOOD PRESSURE: 70 MMHG | RESPIRATION RATE: 18 BRPM | SYSTOLIC BLOOD PRESSURE: 110 MMHG | HEART RATE: 68 BPM | OXYGEN SATURATION: 100 %

## 2025-04-03 VITALS
TEMPERATURE: 99 F | DIASTOLIC BLOOD PRESSURE: 68 MMHG | RESPIRATION RATE: 17 BRPM | OXYGEN SATURATION: 100 % | SYSTOLIC BLOOD PRESSURE: 104 MMHG | HEART RATE: 64 BPM

## 2025-04-03 DIAGNOSIS — Z90.710 ACQUIRED ABSENCE OF BOTH CERVIX AND UTERUS: Chronic | ICD-10-CM

## 2025-04-03 DIAGNOSIS — Z96.659 PRESENCE OF UNSPECIFIED ARTIFICIAL KNEE JOINT: Chronic | ICD-10-CM

## 2025-04-03 DIAGNOSIS — Z82.69 FAMILY HISTORY OF OTHER DISEASES OF THE MUSCULOSKELETAL SYSTEM AND CONNECTIVE TISSUE: Chronic | ICD-10-CM

## 2025-04-03 LAB
ALBUMIN SERPL ELPH-MCNC: 3.7 G/DL — SIGNIFICANT CHANGE UP (ref 3.5–5.2)
ALP SERPL-CCNC: 77 U/L — SIGNIFICANT CHANGE UP (ref 30–115)
ALT FLD-CCNC: 7 U/L — SIGNIFICANT CHANGE UP (ref 0–41)
ANION GAP SERPL CALC-SCNC: 9 MMOL/L — SIGNIFICANT CHANGE UP (ref 7–14)
AST SERPL-CCNC: 15 U/L — SIGNIFICANT CHANGE UP (ref 0–41)
BASOPHILS # BLD AUTO: 0.02 K/UL — SIGNIFICANT CHANGE UP (ref 0–0.2)
BASOPHILS NFR BLD AUTO: 0.3 % — SIGNIFICANT CHANGE UP (ref 0–1)
BILIRUB SERPL-MCNC: <0.2 MG/DL — SIGNIFICANT CHANGE UP (ref 0.2–1.2)
BUN SERPL-MCNC: 22 MG/DL — HIGH (ref 10–20)
CALCIUM SERPL-MCNC: 9 MG/DL — SIGNIFICANT CHANGE UP (ref 8.4–10.5)
CHLORIDE SERPL-SCNC: 105 MMOL/L — SIGNIFICANT CHANGE UP (ref 98–110)
CO2 SERPL-SCNC: 26 MMOL/L — SIGNIFICANT CHANGE UP (ref 17–32)
CREAT SERPL-MCNC: 0.8 MG/DL — SIGNIFICANT CHANGE UP (ref 0.7–1.5)
EGFR: 80 ML/MIN/1.73M2 — SIGNIFICANT CHANGE UP
EOSINOPHIL # BLD AUTO: 0.17 K/UL — SIGNIFICANT CHANGE UP (ref 0–0.7)
EOSINOPHIL NFR BLD AUTO: 2.3 % — SIGNIFICANT CHANGE UP (ref 0–8)
GLUCOSE SERPL-MCNC: 89 MG/DL — SIGNIFICANT CHANGE UP (ref 70–99)
HCT VFR BLD CALC: 34 % — LOW (ref 37–47)
HGB BLD-MCNC: 11.5 G/DL — LOW (ref 12–16)
IMM GRANULOCYTES NFR BLD AUTO: 0.4 % — HIGH (ref 0.1–0.3)
LYMPHOCYTES # BLD AUTO: 1.39 K/UL — SIGNIFICANT CHANGE UP (ref 1.2–3.4)
LYMPHOCYTES # BLD AUTO: 18.6 % — LOW (ref 20.5–51.1)
MCHC RBC-ENTMCNC: 33.8 G/DL — SIGNIFICANT CHANGE UP (ref 32–37)
MCV RBC AUTO: 86.1 FL — SIGNIFICANT CHANGE UP (ref 81–99)
MONOCYTES # BLD AUTO: 0.54 K/UL — SIGNIFICANT CHANGE UP (ref 0.1–0.6)
MONOCYTES NFR BLD AUTO: 7.2 % — SIGNIFICANT CHANGE UP (ref 1.7–9.3)
NEUTROPHILS # BLD AUTO: 5.34 K/UL — SIGNIFICANT CHANGE UP (ref 1.4–6.5)
NEUTROPHILS NFR BLD AUTO: 71.2 % — SIGNIFICANT CHANGE UP (ref 42.2–75.2)
NRBC BLD AUTO-RTO: 0 /100 WBCS — SIGNIFICANT CHANGE UP (ref 0–0)
PLATELET # BLD AUTO: 231 K/UL — SIGNIFICANT CHANGE UP (ref 130–400)
PMV BLD: 9.9 FL — SIGNIFICANT CHANGE UP (ref 7.4–10.4)
POTASSIUM SERPL-MCNC: 4.4 MMOL/L — SIGNIFICANT CHANGE UP (ref 3.5–5)
POTASSIUM SERPL-SCNC: 4.4 MMOL/L — SIGNIFICANT CHANGE UP (ref 3.5–5)
PROT SERPL-MCNC: 6.8 G/DL — SIGNIFICANT CHANGE UP (ref 6–8)
RBC # BLD: 3.95 M/UL — LOW (ref 4.2–5.4)
RBC # FLD: 15 % — HIGH (ref 11.5–14.5)
SODIUM SERPL-SCNC: 140 MMOL/L — SIGNIFICANT CHANGE UP (ref 135–146)
WBC # BLD: 7.49 K/UL — SIGNIFICANT CHANGE UP (ref 4.8–10.8)
WBC # FLD AUTO: 7.49 K/UL — SIGNIFICANT CHANGE UP (ref 4.8–10.8)

## 2025-04-03 PROCEDURE — 36415 COLL VENOUS BLD VENIPUNCTURE: CPT

## 2025-04-03 PROCEDURE — 73590 X-RAY EXAM OF LOWER LEG: CPT | Mod: RT

## 2025-04-03 PROCEDURE — 99284 EMERGENCY DEPT VISIT MOD MDM: CPT | Mod: FS

## 2025-04-03 PROCEDURE — 99284 EMERGENCY DEPT VISIT MOD MDM: CPT | Mod: 25

## 2025-04-03 PROCEDURE — 85025 COMPLETE CBC W/AUTO DIFF WBC: CPT

## 2025-04-03 PROCEDURE — 80053 COMPREHEN METABOLIC PANEL: CPT

## 2025-04-03 PROCEDURE — 73590 X-RAY EXAM OF LOWER LEG: CPT | Mod: 26,RT

## 2025-04-03 PROCEDURE — 96374 THER/PROPH/DIAG INJ IV PUSH: CPT

## 2025-04-03 RX ORDER — DOXYCYCLINE HYCLATE 100 MG
1 TABLET ORAL
Qty: 14 | Refills: 0
Start: 2025-04-03 | End: 2025-04-09

## 2025-04-03 RX ORDER — ACETAMINOPHEN 500 MG/5ML
975 LIQUID (ML) ORAL ONCE
Refills: 0 | Status: COMPLETED | OUTPATIENT
Start: 2025-04-03 | End: 2025-04-03

## 2025-04-03 RX ORDER — DOXYCYCLINE HYCLATE 100 MG
100 TABLET ORAL ONCE
Refills: 0 | Status: COMPLETED | OUTPATIENT
Start: 2025-04-03 | End: 2025-04-03

## 2025-04-03 RX ADMIN — Medication 975 MILLIGRAM(S): at 16:25

## 2025-04-03 RX ADMIN — Medication 100 MILLIGRAM(S): at 18:47

## 2025-04-03 NOTE — ED PROVIDER NOTE - PHYSICAL EXAMINATION
Gen: NAD, AOx3  Head: NCAT  HEENT: PERRL, oral mucosa moist, normal conjunctiva, oropharynx clear without exudate or erythema  Lung: CTAB, no respiratory distress, no wheezing, rales, rhonchi  CV: normal s1/s2, rrr, Normal perfusion, pulses 2+ throughout  Abd: soft, NTND, no CVA tenderness  Genitourinary: no pelvic tenderness  MSK: No edema, no visible deformities, full range of motion in all 4 extremities, RLE: pulse intact, AROM, chronic wound to lateral distal tibia with erythema and no discharge/crepitus/streaking  Neuro: CN II-XII grossly intact, No focal neurologic deficits  Skin: No rash   Psych: normal affect

## 2025-04-03 NOTE — ED PROVIDER NOTE - PATIENT PORTAL LINK FT
You can access the FollowMyHealth Patient Portal offered by [facility] by registering at the following website: [email or web address]. By joining [employer]’s FollowMyHealth portal, you will also be able to view your health information using other applications (apps) compatible with our system.

## 2025-04-03 NOTE — ED PROVIDER NOTE - OBJECTIVE STATEMENT
69-year-old female with a past medical history of SLE, hypertension, CVA, hypothyroidism presents complaining of right leg wound.  Patient states to have an intermittent wound to her left leg over the past 4 years and today it was noted to be red by the nurses at her living facility. pt denies any other symptoms including fevers, chill, headache, recent illness/travel, cough, abdominal pain, chest pain, or SOB.

## 2025-04-03 NOTE — ED PROVIDER NOTE - CLINICAL SUMMARY MEDICAL DECISION MAKING FREE TEXT BOX
Exam consistent with cellulitis.  Tolerating PO.  No signs of sepsis.  Will dc with PO Abx.  Strict return instructions discussed.

## 2025-05-06 ENCOUNTER — OUTPATIENT (OUTPATIENT)
Dept: OUTPATIENT SERVICES | Facility: HOSPITAL | Age: 70
LOS: 1 days | End: 2025-05-06
Payer: MEDICARE

## 2025-05-06 ENCOUNTER — NON-APPOINTMENT (OUTPATIENT)
Age: 70
End: 2025-05-06

## 2025-05-06 ENCOUNTER — APPOINTMENT (OUTPATIENT)
Dept: BURN CARE | Facility: CLINIC | Age: 70
End: 2025-05-06
Payer: MEDICARE

## 2025-05-06 VITALS
OXYGEN SATURATION: 93 % | DIASTOLIC BLOOD PRESSURE: 82 MMHG | BODY MASS INDEX: 23.37 KG/M2 | HEIGHT: 62 IN | HEART RATE: 57 BPM | SYSTOLIC BLOOD PRESSURE: 146 MMHG | WEIGHT: 127 LBS

## 2025-05-06 DIAGNOSIS — Z00.8 ENCOUNTER FOR OTHER GENERAL EXAMINATION: ICD-10-CM

## 2025-05-06 DIAGNOSIS — Z96.659 PRESENCE OF UNSPECIFIED ARTIFICIAL KNEE JOINT: Chronic | ICD-10-CM

## 2025-05-06 DIAGNOSIS — Z90.710 ACQUIRED ABSENCE OF BOTH CERVIX AND UTERUS: Chronic | ICD-10-CM

## 2025-05-06 DIAGNOSIS — Z82.69 FAMILY HISTORY OF OTHER DISEASES OF THE MUSCULOSKELETAL SYSTEM AND CONNECTIVE TISSUE: Chronic | ICD-10-CM

## 2025-05-06 PROCEDURE — 99214 OFFICE O/P EST MOD 30 MIN: CPT

## 2025-05-13 DIAGNOSIS — L97.819 NON-PRESSURE CHRONIC ULCER OF OTHER PART OF RIGHT LOWER LEG WITH UNSPECIFIED SEVERITY: ICD-10-CM

## 2025-05-13 DIAGNOSIS — I83.018 VARICOSE VEINS OF RIGHT LOWER EXTREMITY WITH ULCER OTHER PART OF LOWER LEG: ICD-10-CM

## 2025-05-22 ENCOUNTER — APPOINTMENT (OUTPATIENT)
Dept: BURN CARE | Facility: CLINIC | Age: 70
End: 2025-05-22

## 2025-05-23 ENCOUNTER — APPOINTMENT (OUTPATIENT)
Dept: VASCULAR SURGERY | Facility: CLINIC | Age: 70
End: 2025-05-23
Payer: MEDICARE

## 2025-05-23 DIAGNOSIS — L97.919 VARICOSE VEINS OF RIGHT LOWER EXTREMITY WITH ULCER OF UNSPECIFIED SITE: ICD-10-CM

## 2025-05-23 DIAGNOSIS — I83.019 VARICOSE VEINS OF RIGHT LOWER EXTREMITY WITH ULCER OF UNSPECIFIED SITE: ICD-10-CM

## 2025-05-23 PROCEDURE — 29580 STRAPPING UNNA BOOT: CPT | Mod: RT

## 2025-05-23 RX ORDER — SILVER SULFADIAZINE 10 MG/G
1 CREAM TOPICAL DAILY
Qty: 1 | Refills: 3 | Status: ACTIVE | COMMUNITY
Start: 2025-05-23 | End: 1900-01-01

## 2025-06-06 ENCOUNTER — APPOINTMENT (OUTPATIENT)
Dept: PULMONOLOGY | Facility: CLINIC | Age: 70
End: 2025-06-06
Payer: MEDICARE

## 2025-06-06 ENCOUNTER — APPOINTMENT (OUTPATIENT)
Dept: VASCULAR SURGERY | Facility: CLINIC | Age: 70
End: 2025-06-06
Payer: MEDICARE

## 2025-06-06 VITALS
BODY MASS INDEX: 22.13 KG/M2 | SYSTOLIC BLOOD PRESSURE: 116 MMHG | HEART RATE: 63 BPM | WEIGHT: 121 LBS | DIASTOLIC BLOOD PRESSURE: 70 MMHG | OXYGEN SATURATION: 96 %

## 2025-06-06 PROCEDURE — G0296 VISIT TO DETERM LDCT ELIG: CPT

## 2025-06-06 PROCEDURE — 99214 OFFICE O/P EST MOD 30 MIN: CPT | Mod: 25

## 2025-06-06 PROCEDURE — 99213 OFFICE O/P EST LOW 20 MIN: CPT

## 2025-06-06 PROCEDURE — 99406 BEHAV CHNG SMOKING 3-10 MIN: CPT

## 2025-06-06 RX ORDER — SULFAMETHOXAZOLE AND TRIMETHOPRIM 800; 160 MG/1; MG/1
800-160 TABLET ORAL TWICE DAILY
Qty: 20 | Refills: 0 | Status: ACTIVE | COMMUNITY
Start: 2025-06-06 | End: 1900-01-01

## 2025-06-08 ENCOUNTER — INPATIENT (INPATIENT)
Facility: HOSPITAL | Age: 70
LOS: 2 days | Discharge: NURSING, REHAB OR LONG-TERM CARE | DRG: 300 | End: 2025-06-11
Attending: STUDENT IN AN ORGANIZED HEALTH CARE EDUCATION/TRAINING PROGRAM | Admitting: STUDENT IN AN ORGANIZED HEALTH CARE EDUCATION/TRAINING PROGRAM
Payer: MEDICARE

## 2025-06-08 VITALS
TEMPERATURE: 98 F | OXYGEN SATURATION: 97 % | RESPIRATION RATE: 18 BRPM | WEIGHT: 134.92 LBS | HEART RATE: 62 BPM | DIASTOLIC BLOOD PRESSURE: 74 MMHG | SYSTOLIC BLOOD PRESSURE: 121 MMHG

## 2025-06-08 DIAGNOSIS — Z82.69 FAMILY HISTORY OF OTHER DISEASES OF THE MUSCULOSKELETAL SYSTEM AND CONNECTIVE TISSUE: Chronic | ICD-10-CM

## 2025-06-08 DIAGNOSIS — Z90.710 ACQUIRED ABSENCE OF BOTH CERVIX AND UTERUS: Chronic | ICD-10-CM

## 2025-06-08 DIAGNOSIS — L03.90 CELLULITIS, UNSPECIFIED: ICD-10-CM

## 2025-06-08 DIAGNOSIS — Z96.659 PRESENCE OF UNSPECIFIED ARTIFICIAL KNEE JOINT: Chronic | ICD-10-CM

## 2025-06-08 LAB
ALBUMIN SERPL ELPH-MCNC: 3.9 G/DL — SIGNIFICANT CHANGE UP (ref 3.5–5.2)
ALP SERPL-CCNC: 72 U/L — SIGNIFICANT CHANGE UP (ref 30–115)
ALT FLD-CCNC: 10 U/L — SIGNIFICANT CHANGE UP (ref 0–41)
ANION GAP SERPL CALC-SCNC: 13 MMOL/L — SIGNIFICANT CHANGE UP (ref 7–14)
APTT BLD: 30.7 SEC — SIGNIFICANT CHANGE UP (ref 27–39.2)
AST SERPL-CCNC: 21 U/L — SIGNIFICANT CHANGE UP (ref 0–41)
BASOPHILS # BLD AUTO: 0.02 K/UL — SIGNIFICANT CHANGE UP (ref 0–0.2)
BASOPHILS NFR BLD AUTO: 0.2 % — SIGNIFICANT CHANGE UP (ref 0–1)
BILIRUB SERPL-MCNC: <0.2 MG/DL — SIGNIFICANT CHANGE UP (ref 0.2–1.2)
BUN SERPL-MCNC: 22 MG/DL — HIGH (ref 10–20)
CALCIUM SERPL-MCNC: 8.8 MG/DL — SIGNIFICANT CHANGE UP (ref 8.4–10.5)
CHLORIDE SERPL-SCNC: 110 MMOL/L — SIGNIFICANT CHANGE UP (ref 98–110)
CO2 SERPL-SCNC: 19 MMOL/L — SIGNIFICANT CHANGE UP (ref 17–32)
CREAT SERPL-MCNC: 0.9 MG/DL — SIGNIFICANT CHANGE UP (ref 0.7–1.5)
EGFR: 70 ML/MIN/1.73M2 — SIGNIFICANT CHANGE UP
EGFR: 70 ML/MIN/1.73M2 — SIGNIFICANT CHANGE UP
EOSINOPHIL # BLD AUTO: 0 K/UL — SIGNIFICANT CHANGE UP (ref 0–0.7)
EOSINOPHIL NFR BLD AUTO: 0 % — SIGNIFICANT CHANGE UP (ref 0–8)
GLUCOSE SERPL-MCNC: 107 MG/DL — HIGH (ref 70–99)
HCT VFR BLD CALC: 35.9 % — LOW (ref 37–47)
HGB BLD-MCNC: 11.6 G/DL — LOW (ref 12–16)
IMM GRANULOCYTES NFR BLD AUTO: 0.4 % — HIGH (ref 0.1–0.3)
INR BLD: 0.86 RATIO — SIGNIFICANT CHANGE UP (ref 0.65–1.3)
LACTATE SERPL-SCNC: 1.7 MMOL/L — SIGNIFICANT CHANGE UP (ref 0.7–2)
LYMPHOCYTES # BLD AUTO: 0.59 K/UL — LOW (ref 1.2–3.4)
LYMPHOCYTES # BLD AUTO: 4.9 % — LOW (ref 20.5–51.1)
MCHC RBC-ENTMCNC: 29.1 PG — SIGNIFICANT CHANGE UP (ref 27–31)
MCHC RBC-ENTMCNC: 32.3 G/DL — SIGNIFICANT CHANGE UP (ref 32–37)
MCV RBC AUTO: 90.2 FL — SIGNIFICANT CHANGE UP (ref 81–99)
MONOCYTES # BLD AUTO: 0.34 K/UL — SIGNIFICANT CHANGE UP (ref 0.1–0.6)
MONOCYTES NFR BLD AUTO: 2.8 % — SIGNIFICANT CHANGE UP (ref 1.7–9.3)
NEUTROPHILS # BLD AUTO: 10.93 K/UL — HIGH (ref 1.4–6.5)
NEUTROPHILS NFR BLD AUTO: 91.7 % — HIGH (ref 42.2–75.2)
NRBC BLD AUTO-RTO: 0 /100 WBCS — SIGNIFICANT CHANGE UP (ref 0–0)
PLATELET # BLD AUTO: 275 K/UL — SIGNIFICANT CHANGE UP (ref 130–400)
PMV BLD: 9.7 FL — SIGNIFICANT CHANGE UP (ref 7.4–10.4)
POTASSIUM SERPL-MCNC: 4.7 MMOL/L — SIGNIFICANT CHANGE UP (ref 3.5–5)
POTASSIUM SERPL-SCNC: 4.7 MMOL/L — SIGNIFICANT CHANGE UP (ref 3.5–5)
PROT SERPL-MCNC: 6.7 G/DL — SIGNIFICANT CHANGE UP (ref 6–8)
PROTHROM AB SERPL-ACNC: 10.1 SEC — SIGNIFICANT CHANGE UP (ref 9.95–12.87)
RBC # BLD: 3.98 M/UL — LOW (ref 4.2–5.4)
RBC # FLD: 14.5 % — SIGNIFICANT CHANGE UP (ref 11.5–14.5)
SODIUM SERPL-SCNC: 142 MMOL/L — SIGNIFICANT CHANGE UP (ref 135–146)
WBC # BLD: 11.93 K/UL — HIGH (ref 4.8–10.8)
WBC # FLD AUTO: 11.93 K/UL — HIGH (ref 4.8–10.8)

## 2025-06-08 PROCEDURE — 73590 X-RAY EXAM OF LOWER LEG: CPT | Mod: 26,RT

## 2025-06-08 PROCEDURE — 93925 LOWER EXTREMITY STUDY: CPT

## 2025-06-08 PROCEDURE — 82962 GLUCOSE BLOOD TEST: CPT

## 2025-06-08 PROCEDURE — 80053 COMPREHEN METABOLIC PANEL: CPT

## 2025-06-08 PROCEDURE — 80048 BASIC METABOLIC PNL TOTAL CA: CPT

## 2025-06-08 PROCEDURE — 99285 EMERGENCY DEPT VISIT HI MDM: CPT

## 2025-06-08 PROCEDURE — 36415 COLL VENOUS BLD VENIPUNCTURE: CPT

## 2025-06-08 PROCEDURE — 83735 ASSAY OF MAGNESIUM: CPT

## 2025-06-08 PROCEDURE — 97162 PT EVAL MOD COMPLEX 30 MIN: CPT | Mod: GP

## 2025-06-08 PROCEDURE — 85025 COMPLETE CBC W/AUTO DIFF WBC: CPT

## 2025-06-08 PROCEDURE — 84145 PROCALCITONIN (PCT): CPT

## 2025-06-08 PROCEDURE — 94640 AIRWAY INHALATION TREATMENT: CPT

## 2025-06-08 PROCEDURE — 99223 1ST HOSP IP/OBS HIGH 75: CPT

## 2025-06-08 PROCEDURE — 93970 EXTREMITY STUDY: CPT

## 2025-06-08 RX ORDER — LISINOPRIL 30 MG/1
50 TABLET ORAL DAILY
Refills: 0 | Status: DISCONTINUED | OUTPATIENT
Start: 2025-06-08 | End: 2025-06-11

## 2025-06-08 RX ORDER — SULFAMETHOXAZOLE/TRIMETHOPRIM 800-160 MG
1 TABLET ORAL
Refills: 0 | Status: DISCONTINUED | OUTPATIENT
Start: 2025-06-08 | End: 2025-06-09

## 2025-06-08 RX ORDER — TRIAMCINOLONE ACETONIDE 1 MG/G
1 CREAM TOPICAL
Refills: 0 | Status: DISCONTINUED | OUTPATIENT
Start: 2025-06-08 | End: 2025-06-11

## 2025-06-08 RX ORDER — HYDROXYCHLOROQUINE SULFATE 200 MG/1
200 TABLET, FILM COATED ORAL
Refills: 0 | Status: DISCONTINUED | OUTPATIENT
Start: 2025-06-08 | End: 2025-06-11

## 2025-06-08 RX ORDER — METRONIDAZOLE 250 MG
500 TABLET ORAL ONCE
Refills: 0 | Status: COMPLETED | OUTPATIENT
Start: 2025-06-08 | End: 2025-06-08

## 2025-06-08 RX ORDER — OXYCODONE HYDROCHLORIDE AND ACETAMINOPHEN 10; 325 MG/1; MG/1
1 TABLET ORAL
Refills: 0 | DISCHARGE

## 2025-06-08 RX ORDER — OXYCODONE HYDROCHLORIDE AND ACETAMINOPHEN 10; 325 MG/1; MG/1
1 TABLET ORAL ONCE
Refills: 0 | Status: DISCONTINUED | OUTPATIENT
Start: 2025-06-08 | End: 2025-06-08

## 2025-06-08 RX ORDER — CEFEPIME 2 G/20ML
2000 INJECTION, POWDER, FOR SOLUTION INTRAVENOUS ONCE
Refills: 0 | Status: COMPLETED | OUTPATIENT
Start: 2025-06-08 | End: 2025-06-08

## 2025-06-08 RX ORDER — LEVOTHYROXINE SODIUM 300 MCG
175 TABLET ORAL DAILY
Refills: 0 | Status: DISCONTINUED | OUTPATIENT
Start: 2025-06-08 | End: 2025-06-11

## 2025-06-08 RX ORDER — FERROUS FUMARATE 324(106)MG
1 TABLET ORAL
Refills: 0 | DISCHARGE

## 2025-06-08 RX ORDER — FUROSEMIDE 10 MG/ML
40 INJECTION INTRAMUSCULAR; INTRAVENOUS DAILY
Refills: 0 | Status: DISCONTINUED | OUTPATIENT
Start: 2025-06-08 | End: 2025-06-11

## 2025-06-08 RX ORDER — ALBUTEROL SULFATE 2.5 MG/3ML
2 VIAL, NEBULIZER (ML) INHALATION EVERY 6 HOURS
Refills: 0 | Status: DISCONTINUED | OUTPATIENT
Start: 2025-06-08 | End: 2025-06-11

## 2025-06-08 RX ORDER — AZATHIOPRINE 50 MG/1
50 TABLET ORAL DAILY
Refills: 0 | Status: DISCONTINUED | OUTPATIENT
Start: 2025-06-08 | End: 2025-06-11

## 2025-06-08 RX ORDER — HEPARIN SODIUM 1000 [USP'U]/ML
5000 INJECTION INTRAVENOUS; SUBCUTANEOUS EVERY 12 HOURS
Refills: 0 | Status: DISCONTINUED | OUTPATIENT
Start: 2025-06-08 | End: 2025-06-11

## 2025-06-08 RX ORDER — FLUTICASONE PROPIONATE 50 UG/1
1 SPRAY, METERED NASAL
Refills: 0 | Status: DISCONTINUED | OUTPATIENT
Start: 2025-06-08 | End: 2025-06-11

## 2025-06-08 RX ORDER — PREDNISONE 20 MG/1
40 TABLET ORAL DAILY
Refills: 0 | Status: DISCONTINUED | OUTPATIENT
Start: 2025-06-09 | End: 2025-06-11

## 2025-06-08 RX ORDER — SODIUM CHLORIDE 9 G/1000ML
1900 INJECTION, SOLUTION INTRAVENOUS ONCE
Refills: 0 | Status: COMPLETED | OUTPATIENT
Start: 2025-06-08 | End: 2025-06-08

## 2025-06-08 RX ORDER — SENNA 187 MG
2 TABLET ORAL AT BEDTIME
Refills: 0 | Status: DISCONTINUED | OUTPATIENT
Start: 2025-06-08 | End: 2025-06-11

## 2025-06-08 RX ORDER — MELATONIN 5 MG
5 TABLET ORAL AT BEDTIME
Refills: 0 | Status: DISCONTINUED | OUTPATIENT
Start: 2025-06-08 | End: 2025-06-11

## 2025-06-08 RX ORDER — ASPIRIN 325 MG
81 TABLET ORAL DAILY
Refills: 0 | Status: DISCONTINUED | OUTPATIENT
Start: 2025-06-08 | End: 2025-06-11

## 2025-06-08 RX ORDER — LISINOPRIL 30 MG/1
1 TABLET ORAL
Refills: 0 | DISCHARGE

## 2025-06-08 RX ORDER — AZATHIOPRINE 50 MG/1
1 TABLET ORAL
Refills: 0 | DISCHARGE

## 2025-06-08 RX ORDER — VANCOMYCIN HCL IN 5 % DEXTROSE 1.5G/250ML
1000 PLASTIC BAG, INJECTION (ML) INTRAVENOUS ONCE
Refills: 0 | Status: COMPLETED | OUTPATIENT
Start: 2025-06-08 | End: 2025-06-08

## 2025-06-08 RX ADMIN — OXYCODONE HYDROCHLORIDE AND ACETAMINOPHEN 1 TABLET(S): 10; 325 TABLET ORAL at 15:31

## 2025-06-08 RX ADMIN — Medication 5 MILLIGRAM(S): at 21:05

## 2025-06-08 RX ADMIN — SODIUM CHLORIDE 1900 MILLILITER(S): 9 INJECTION, SOLUTION INTRAVENOUS at 15:09

## 2025-06-08 RX ADMIN — CEFEPIME 100 MILLIGRAM(S): 2 INJECTION, POWDER, FOR SOLUTION INTRAVENOUS at 17:11

## 2025-06-08 RX ADMIN — Medication 250 MILLIGRAM(S): at 19:01

## 2025-06-08 RX ADMIN — Medication 100 MILLIGRAM(S): at 16:38

## 2025-06-08 RX ADMIN — Medication 2 TABLET(S): at 21:05

## 2025-06-08 NOTE — PATIENT PROFILE ADULT - FALL HARM RISK - HARM RISK INTERVENTIONS

## 2025-06-08 NOTE — ED PROVIDER NOTE - PHYSICAL EXAMINATION
CONSTITUTIONAL: Well-developed; well-nourished; in no acute distress.   SKIN: warm, dry  HEAD: Normocephalic; atraumatic.  EYES: PERRL, EOMI, no conjunctival erythema  ENT: No nasal discharge; airway clear.  NECK: Supple; non tender.  CARD: S1, S2 normal; no murmurs, gallops, or rubs. Regular rate and rhythm.   RESP: No wheezes, rales or rhonchi.  ABD: soft ntnd  EXT: Normal ROM. RLE is erythematous, clear discharge noted, small ulcer to the right lateral lower extremity, warmth. DP pulse 2+ to the RLE.   NEURO: Alert, oriented, grossly unremarkable.  PSYCH: Cooperative, appropriate.

## 2025-06-08 NOTE — H&P ADULT - ATTENDING COMMENTS
69-year-old female with history of lupus on immunosuppression meds, hypertension, hx of CVA, hypothyroidism, chronic venous insufficiency presenting today for evaluation of erythema and pain to the right lower extremity.  Patient has had intermittent episodes of cellulitis to the right lower extremity over the last few years however states that for the last 3 to 4 days she has had erythema and pain especially after removal of the unna boot on Friday (has been placed for 1 week).  Has been on doxycycline for URI-like symptoms for the last week without improvement in the erythema to the right lower extremity. On Saturday she was started on Bactrim. Denies chest pain, shortness breath, fever, or chills.  Denies other medical complaints. Patient is currently on 3 days prednisone course 20mg BID (doesn't know when it was started) for SLE. Patient was sent from [facility] for wound care evaluation.    PHYSICAL EXAM  GENERAL: NAD, lying in bed comfortably  HEENT:  Atraumatic, normocephalic, no JVD  HEART: Regular rate and rhythm, no murmurs, rubs, or gallops  LUNGS: Unlabored respirations.  Clear to auscultation bilaterally, no crackles, wheezing, or rhonchi  ABDOMEN: Soft, nontender, nondistended, +BS  EXTREMITIES: 2+ peripheral pulses bilaterally. RLE with erythema extending from Knee to foot. Ulcer on Rt lateral ankle. Friable desquamated skin with no induration. RLE more swollen then left  NERVOUS SYSTEM:  A&Ox3, no focal deficits   SKIN: As above      #Suspected RLE cellulitis  #Chronic venous insufficiency with Rt ankle ulcer  - Pt has had recurrent RLE cellulitis with chronic venous insufficiency for over 2 years  - In ED: HD stable, afebrile, satting 97% on RA, lactate 1.7  - WBC 11k but pt on outpatient prednisone for SLE  - X ray Rt leg: Status post total knee arthroplasty, partially visualized. Soft tissue edema without subcutaneous emphysema.  - Was on doxy with no improvement in RLE; had Unna boot last week that worsened symptoms  - s/p vanc, cefepime, and flagyl in the ED  - Started on Bactrim on 06.07.2025  - c/w 4 days Prednisone course   - c/w Oxycodone for pain control started outpatient  - c/w vanc for now given purulent discharge and extensive involvement, pending MRSA  - F/u BCx, procal, MRSA swab  - wound care consult   - ID consult  - Leg elevation  - c/w home lasix     #SLE  - c/w Azathioprine and HCQ  - Pt also on prednisone currently     #Chronic Normocytic Anemia  - on iron replacement therapy outpatient    #HTN  #h/o CVA  - c/w home meds    # Misc  - GI ppx: N/A  - DVT ppx: heparin  - Diet: DASH  - Activity: IAT 69-year-old female with history of lupus on immunosuppression meds, hypertension, hx of CVA, hypothyroidism, chronic venous insufficiency presenting today for evaluation of erythema and pain to the right lower extremity.  Patient has had intermittent episodes of cellulitis to the right lower extremity over the last few years however states that for the last 3 to 4 days she has had erythema and pain especially after removal of the unna boot on Friday (has been placed for 1 week).  Has been on doxycycline for URI-like symptoms for the last week without improvement in the erythema to the right lower extremity. On Saturday she was started on Bactrim. Denies chest pain, shortness breath, fever, or chills.  Denies other medical complaints. Patient is currently on 3 days prednisone course 20mg BID (doesn't know when it was started) for SLE. Patient was sent from [facility] for wound care evaluation.    PHYSICAL EXAM  GENERAL: NAD, lying in bed comfortably  HEENT:  Atraumatic, normocephalic, no JVD  HEART: Regular rate and rhythm, no murmurs, rubs, or gallops  LUNGS: Unlabored respirations.  Clear to auscultation bilaterally, no crackles, wheezing, or rhonchi  ABDOMEN: Soft, nontender, nondistended, +BS  EXTREMITIES: 2+ peripheral pulses bilaterally. RLE with erythema extending from Knee to foot. Ulcer on Rt lateral ankle. Friable desquamated skin with no induration. RLE more swollen then left  NERVOUS SYSTEM:  A&Ox3, no focal deficits   SKIN: As above      #Suspected RLE cellulitis  #Chronic venous insufficiency with Rt ankle ulcer  - Pt has had recurrent RLE cellulitis with chronic venous insufficiency for over 2 years  - In ED: HD stable, afebrile, satting 97% on RA, lactate 1.7  - WBC 11k but pt on outpatient prednisone for SLE  - X ray Rt leg: Status post total knee arthroplasty, partially visualized. Soft tissue edema without subcutaneous emphysema.  - Was on doxy with no improvement in RLE; had Unna boot last week that worsened symptoms  - s/p vanc, cefepime, and flagyl in the ED  - Started on Bactrim on 06.07.2025  - c/w 4 days Prednisone course   - c/w Oxycodone for pain control started outpatient  - c/w vanc for now given purulent discharge and extensive involvement, pending MRSA  - F/u BCx, procal, MRSA swab  - F/u RLE duplex  - wound care consult   - ID consult  - Leg elevation  - c/w home lasix     #SLE  - c/w Azathioprine and HCQ  - Pt also on prednisone currently     #Chronic Normocytic Anemia  - on iron replacement therapy outpatient    #HTN  #h/o CVA  - c/w home meds    # Misc  - GI ppx: N/A  - DVT ppx: heparin  - Diet: DASH  - Activity: IAT

## 2025-06-08 NOTE — ED ADULT TRIAGE NOTE - ARRIVAL FROM
Patient ambulated to the bathroom without issue       Can Khanna RN  07/14/18 [id number] living facility

## 2025-06-08 NOTE — PATIENT PROFILE ADULT - DO YOU FEEL THREATENED BY OTHERS?
BP stable - no symptoms  Educated patient on signs and symptoms of preeclampsia including but not limited to: elevated blood pressure, increased swelling, persistent headache, visual changes, nausea/vomiting & right upper quadrant pain.  Patient to notify us and/or seek immediate care  
Educated patient of signs and symptoms of  labor including but not limited to regular uterine contractions every 5-7 minutes for 1 hour, vaginal bleeding or leakage of fluid to seek immediate care.   
Stable currently  
Stable yvette  
noted  
no

## 2025-06-08 NOTE — ED PROVIDER NOTE - CLINICAL SUMMARY MEDICAL DECISION MAKING FREE TEXT BOX
69-year-old female with history of lupus on Plaquenil, hypertension, CVA, hypothyroidism presenting today for evaluation of erythema and pain to the right lower extremity. exam as documented. labs reviewed. imaging with no acute air. given iv abx. admitted for further management.

## 2025-06-08 NOTE — H&P ADULT - NSHPPHYSICALEXAM_GEN_ALL_CORE
CONSTITUTIONAL: Well-developed; well-nourished; in no acute distress.   CARD: Regular rate and rhythm.   RESP: No wheezes, rales or rhonchi.  ABD: soft ntnd  EXT: Normal ROM. RLE is erythematous, clear discharge noted.   NEURO: Alert, oriented, grossly unremarkable.

## 2025-06-08 NOTE — H&P ADULT - HISTORY OF PRESENT ILLNESS
69-year-old female with history of lupus on immunosuppression meds, hypertension, hx of CVA, hypothyroidism, chronic venous insufficiency presenting today for evaluation of erythema and pain to the right lower extremity.  Patient has had intermittent episodes of cellulitis to the right lower extremity over the last few years however states that for the last 3 to 4 days she has had erythema and pain especially after removal of the unna boot on Friday (has been placed for 1 week).  Has been on doxycycline for URI-like symptoms for the last week without improvement in the erythema to the right lower extremity. On Saturday she was started on Bactrim. Denies chest pain, shortness breath, fever, or chills.  Denies other medical complaints. Patient is currently on 3 days prednisone course 20mg BID (doesn't know when it was started). Patient was sent from [facility] for wound care evaluation.      On presentation vitals:  · BP Systolic	121 mm Hg  · BP Diastolic	74 mm Hg  · Heart Rate	62 /min  · Respiration Rate (breaths/min)	18 /min  · Temp (F)	97.9 Degrees F  · Temp (C)	36.6 Degrees C  · Temp site	oral  · SpO2 (%)	97 %  · O2 Delivery/Oxygen Delivery Method	room air      Labs are significant for mild leucocytosis and chronic normocytic anemia.     Patient admitted to medicine for suspected cellulitis.

## 2025-06-08 NOTE — PATIENT PROFILE ADULT - AVIAN FLU SYMPTOMS
Patient Instructions by Lamar Hammond [employer].            Revision History        User Key Date/Time User Provider Type Action    > [N/A] 01/23/17 06:28 PM Lamar Hammond DO Physician Addend     [N/A] 01/23/17 06:18 PM Lamar Hammond DO Physician Sign             No

## 2025-06-08 NOTE — ED PROVIDER NOTE - OBJECTIVE STATEMENT
69-year-old female with history of lupus on Plaquenil, hypertension, CVA, hypothyroidism presenting today for evaluation of erythema and pain to the right lower extremity.  Patient has had intermittent episodes of cellulitis to the right lower extremity over the last few years however states that for the last 3 to 4 days she has had erythema pain and discharge.  Has been on doxycycline for URI-like symptoms for the last week without improvement in the erythema to the right lower extremity.  Denies chest pain shortness breath.  Denies other medical complaints.

## 2025-06-08 NOTE — PATIENT PROFILE ADULT - NSPROPTRIGHTNOTIFY_GEN_A_NUR
Ongoing SW/CM Assessment/Plan of Care Note     See SW/CM flowsheets for goals and other objective data.      Progress note:   Plan: [facility] acute inpt rehab on 6W when medically ready. Poss dc today.  Pt from Home. Lives alone. Pt plans to dc home w/son upon dc from 6W. Son's address is [address] [place] [address] [address] [place] [address]. Pt lives in a condo on 6th floor with an elevator. No stairs inside son's condo. Pt plans to stay with son for at least 6months upon dc. Plan was for [facility] RN, wound care for Lt neck wound 3x/week and poss PT/OT pending status upon dc. Pt states her neck wound may be healed by dc so she may not need C RN for wound care upon dc.          declines

## 2025-06-09 LAB
ALBUMIN SERPL ELPH-MCNC: 3.3 G/DL — LOW (ref 3.5–5.2)
ALP SERPL-CCNC: 60 U/L — SIGNIFICANT CHANGE UP (ref 30–115)
ALT FLD-CCNC: 8 U/L — SIGNIFICANT CHANGE UP (ref 0–41)
ANION GAP SERPL CALC-SCNC: 16 MMOL/L — HIGH (ref 7–14)
AST SERPL-CCNC: 17 U/L — SIGNIFICANT CHANGE UP (ref 0–41)
BASOPHILS # BLD AUTO: 0.03 K/UL — SIGNIFICANT CHANGE UP (ref 0–0.2)
BASOPHILS NFR BLD AUTO: 0.3 % — SIGNIFICANT CHANGE UP (ref 0–1)
BILIRUB SERPL-MCNC: 0.2 MG/DL — SIGNIFICANT CHANGE UP (ref 0.2–1.2)
BUN SERPL-MCNC: 23 MG/DL — HIGH (ref 10–20)
CALCIUM SERPL-MCNC: 8.6 MG/DL — SIGNIFICANT CHANGE UP (ref 8.4–10.5)
CHLORIDE SERPL-SCNC: 113 MMOL/L — HIGH (ref 98–110)
CO2 SERPL-SCNC: 17 MMOL/L — SIGNIFICANT CHANGE UP (ref 17–32)
CREAT SERPL-MCNC: 0.9 MG/DL — SIGNIFICANT CHANGE UP (ref 0.7–1.5)
EGFR: 70 ML/MIN/1.73M2 — SIGNIFICANT CHANGE UP
EGFR: 70 ML/MIN/1.73M2 — SIGNIFICANT CHANGE UP
EOSINOPHIL # BLD AUTO: 0.03 K/UL — SIGNIFICANT CHANGE UP (ref 0–0.7)
EOSINOPHIL NFR BLD AUTO: 0.3 % — SIGNIFICANT CHANGE UP (ref 0–8)
GLUCOSE BLDC GLUCOMTR-MCNC: 152 MG/DL — HIGH (ref 70–99)
GLUCOSE SERPL-MCNC: 80 MG/DL — SIGNIFICANT CHANGE UP (ref 70–99)
HCT VFR BLD CALC: 33.3 % — LOW (ref 37–47)
HGB BLD-MCNC: 10.7 G/DL — LOW (ref 12–16)
IMM GRANULOCYTES NFR BLD AUTO: 0.5 % — HIGH (ref 0.1–0.3)
LYMPHOCYTES # BLD AUTO: 1.52 K/UL — SIGNIFICANT CHANGE UP (ref 1.2–3.4)
LYMPHOCYTES # BLD AUTO: 15.6 % — LOW (ref 20.5–51.1)
MAGNESIUM SERPL-MCNC: 1.8 MG/DL — SIGNIFICANT CHANGE UP (ref 1.8–2.4)
MCHC RBC-ENTMCNC: 29.2 PG — SIGNIFICANT CHANGE UP (ref 27–31)
MCHC RBC-ENTMCNC: 32.1 G/DL — SIGNIFICANT CHANGE UP (ref 32–37)
MCV RBC AUTO: 91 FL — SIGNIFICANT CHANGE UP (ref 81–99)
MONOCYTES # BLD AUTO: 0.76 K/UL — HIGH (ref 0.1–0.6)
MONOCYTES NFR BLD AUTO: 7.8 % — SIGNIFICANT CHANGE UP (ref 1.7–9.3)
NEUTROPHILS # BLD AUTO: 7.34 K/UL — HIGH (ref 1.4–6.5)
NEUTROPHILS NFR BLD AUTO: 75.5 % — HIGH (ref 42.2–75.2)
NRBC BLD AUTO-RTO: 0 /100 WBCS — SIGNIFICANT CHANGE UP (ref 0–0)
PLATELET # BLD AUTO: 207 K/UL — SIGNIFICANT CHANGE UP (ref 130–400)
PMV BLD: 10.6 FL — HIGH (ref 7.4–10.4)
POTASSIUM SERPL-MCNC: 3.9 MMOL/L — SIGNIFICANT CHANGE UP (ref 3.5–5)
POTASSIUM SERPL-SCNC: 3.9 MMOL/L — SIGNIFICANT CHANGE UP (ref 3.5–5)
PROCALCITONIN SERPL-MCNC: 0.03 NG/ML — SIGNIFICANT CHANGE UP (ref 0.02–0.1)
PROT SERPL-MCNC: 5.9 G/DL — LOW (ref 6–8)
RBC # BLD: 3.66 M/UL — LOW (ref 4.2–5.4)
RBC # FLD: 14.6 % — HIGH (ref 11.5–14.5)
SODIUM SERPL-SCNC: 146 MMOL/L — SIGNIFICANT CHANGE UP (ref 135–146)
WBC # BLD: 9.73 K/UL — SIGNIFICANT CHANGE UP (ref 4.8–10.8)
WBC # FLD AUTO: 9.73 K/UL — SIGNIFICANT CHANGE UP (ref 4.8–10.8)

## 2025-06-09 PROCEDURE — 93970 EXTREMITY STUDY: CPT | Mod: 26

## 2025-06-09 PROCEDURE — 99232 SBSQ HOSP IP/OBS MODERATE 35: CPT

## 2025-06-09 PROCEDURE — 93925 LOWER EXTREMITY STUDY: CPT | Mod: 26

## 2025-06-09 RX ORDER — VANCOMYCIN HCL IN 5 % DEXTROSE 1.5G/250ML
1000 PLASTIC BAG, INJECTION (ML) INTRAVENOUS EVERY 12 HOURS
Refills: 0 | Status: DISCONTINUED | OUTPATIENT
Start: 2025-06-09 | End: 2025-06-09

## 2025-06-09 RX ORDER — CEPHALEXIN 250 MG/1
500 CAPSULE ORAL EVERY 12 HOURS
Refills: 0 | Status: DISCONTINUED | OUTPATIENT
Start: 2025-06-09 | End: 2025-06-11

## 2025-06-09 RX ADMIN — HYDROXYCHLOROQUINE SULFATE 200 MILLIGRAM(S): 200 TABLET, FILM COATED ORAL at 05:56

## 2025-06-09 RX ADMIN — Medication 1 APPLICATION(S): at 05:32

## 2025-06-09 RX ADMIN — TRIAMCINOLONE ACETONIDE 1 APPLICATION(S): 1 CREAM TOPICAL at 05:55

## 2025-06-09 RX ADMIN — FLUTICASONE PROPIONATE 1 SPRAY(S): 50 SPRAY, METERED NASAL at 17:40

## 2025-06-09 RX ADMIN — Medication 175 MICROGRAM(S): at 05:56

## 2025-06-09 RX ADMIN — Medication 250 MILLIGRAM(S): at 11:41

## 2025-06-09 RX ADMIN — Medication 81 MILLIGRAM(S): at 11:41

## 2025-06-09 RX ADMIN — CEPHALEXIN 500 MILLIGRAM(S): 250 CAPSULE ORAL at 17:36

## 2025-06-09 RX ADMIN — Medication 2 TABLET(S): at 22:03

## 2025-06-09 RX ADMIN — LISINOPRIL 50 MILLIGRAM(S): 30 TABLET ORAL at 05:32

## 2025-06-09 RX ADMIN — AZATHIOPRINE 50 MILLIGRAM(S): 50 TABLET ORAL at 11:41

## 2025-06-09 RX ADMIN — Medication 250 MILLIGRAM(S): at 12:40

## 2025-06-09 RX ADMIN — HEPARIN SODIUM 5000 UNIT(S): 1000 INJECTION INTRAVENOUS; SUBCUTANEOUS at 17:36

## 2025-06-09 RX ADMIN — TRIAMCINOLONE ACETONIDE 1 APPLICATION(S): 1 CREAM TOPICAL at 17:41

## 2025-06-09 RX ADMIN — Medication 20 MILLIGRAM(S): at 11:42

## 2025-06-09 RX ADMIN — HEPARIN SODIUM 5000 UNIT(S): 1000 INJECTION INTRAVENOUS; SUBCUTANEOUS at 05:32

## 2025-06-09 RX ADMIN — HYDROXYCHLOROQUINE SULFATE 200 MILLIGRAM(S): 200 TABLET, FILM COATED ORAL at 17:36

## 2025-06-09 RX ADMIN — FUROSEMIDE 40 MILLIGRAM(S): 10 INJECTION INTRAMUSCULAR; INTRAVENOUS at 05:32

## 2025-06-09 RX ADMIN — FLUTICASONE PROPIONATE 1 SPRAY(S): 50 SPRAY, METERED NASAL at 05:54

## 2025-06-09 RX ADMIN — PREDNISONE 40 MILLIGRAM(S): 20 TABLET ORAL at 05:32

## 2025-06-09 RX ADMIN — Medication 5 MILLIGRAM(S): at 22:04

## 2025-06-09 NOTE — PROGRESS NOTE ADULT - ATTENDING COMMENTS
69-year-old female with history of lupus on immunosuppression meds, hypertension, hx of CVA, hypothyroidism, chronic venous insufficiency presenting today for evaluation of erythema and pain to the right lower extremity.  Patient has had intermittent episodes of cellulitis to the right lower extremity over the last few years however states that for the last 3 to 4 days she has had erythema and pain especially after removal of the unna boot on Friday (has been placed for 1 week).  Has been on doxycycline for URI-like symptoms for the last week without improvement in the erythema to the right lower extremity. On Saturday she was started on Bactrim. Denies chest pain, shortness breath, fever, or chills.  Denies other medical complaints. Patient is currently on 3 days prednisone course 20mg BID (doesn't know when it was started) for SLE. Patient was sent from [facility] for wound care evaluation.        #Suspected RLE cellulitis  #Chronic venous insufficiency with Rt ankle ulcer  - Pt has had recurrent RLE cellulitis with chronic venous insufficiency for over 2 years  - - X ray Rt leg: Status post total knee arthroplasty, partially visualized. Soft tissue edema without subcutaneous emphysema.  - Was on doxy with no improvement in RLE; had Unna boot last week that worsened symptoms  -- c/w 4 days Prednisone course   - c/w Oxycodone for pain control started outpatient  - c/w vanc for now given purulent discharge and extensive involvement, pending MRSA  - F/u BCx, procal, pending  - F/u RLE duplex and VA duplex  - wound care consult   - ID wants keflex  -      #SLE  - c/w Azathioprine and HCQ  - Pt also on prednisone currently     #Chronic Normocytic Anemia  - on iron replacement therapy outpatient    #HTN  #h/o CVA  - c/w home meds    DC plan after PT eval

## 2025-06-09 NOTE — CONSULT NOTE ADULT - SUBJECTIVE AND OBJECTIVE BOX
LATOYA FULTON  69y, Female  Allergy: penicillins (Other)  aspirin (Other)  morphine (Other)  Compazine (Other)  Levaquin (Other)      CHIEF COMPLAINT:       LOS  1d    HPI  HPI:  69-year-old female with history of lupus on immunosuppression meds, hypertension, hx of CVA, hypothyroidism, chronic venous insufficiency presenting today for evaluation of erythema and pain to the right lower extremity.  Patient has had intermittent episodes of cellulitis to the right lower extremity over the last few years however states that for the last 3 to 4 days she has had erythema and pain especially after removal of the unna boot on Friday (has been placed for 1 week).  Has been on doxycycline for URI-like symptoms for the last week without improvement in the erythema to the right lower extremity. On Saturday she was started on Bactrim. Denies chest pain, shortness breath, fever, or chills.  Denies other medical complaints. Patient is currently on 3 days prednisone course 20mg BID (doesn't know when it was started). Patient was sent from [facility] for wound care evaluation.      On presentation vitals:  · BP Systolic	121 mm Hg  · BP Diastolic	74 mm Hg  · Heart Rate	62 /min  · Respiration Rate (breaths/min)	18 /min  · Temp (F)	97.9 Degrees F  · Temp (C)	36.6 Degrees C  · Temp site	oral  · SpO2 (%)	97 %  · O2 Delivery/Oxygen Delivery Method	room air      Labs are significant for mild leucocytosis and chronic normocytic anemia.     Patient admitted to medicine for suspected cellulitis.  (08 Jun 2025 19:01)      INFECTIOUS DISEASE HISTORY:  ID consulted for RLE cellulitis   SLE on immunosuppression     Currently ordered for:  hydroxychloroquine 200 milliGRAM(s) Oral two times a day  vancomycin  IVPB 1000 milliGRAM(s) IV Intermittent every 12 hours      PMH  PAST MEDICAL & SURGICAL HISTORY:  Lupus      Essential hypertension      CVA (cerebral vascular accident)  1987      Hypothyroidism      FH: bilateral hip replacements      H/O total knee replacement      H/O abdominal hysterectomy          FAMILY HISTORY  No pertinent family history in first degree relatives        SOCIAL HISTORY  Social History:        ROS  ***    VITALS:  T(F): 98.6, Max: 98.6 (06-09-25 @ 00:00)  HR: 84  BP: 164/77  RR: 18Vital Signs Last 24 Hrs  T(C): 37 (09 Jun 2025 00:00), Max: 37 (09 Jun 2025 00:00)  T(F): 98.6 (09 Jun 2025 00:00), Max: 98.6 (09 Jun 2025 00:00)  HR: 84 (09 Jun 2025 00:00) (62 - 84)  BP: 164/77 (09 Jun 2025 00:00) (121/74 - 164/77)  BP(mean): --  RR: 18 (09 Jun 2025 00:00) (18 - 18)  SpO2: 98% (09 Jun 2025 00:00) (97% - 98%)    Parameters below as of 09 Jun 2025 00:00  Patient On (Oxygen Delivery Method): room air        PHYSICAL EXAM:  ***    TESTS & MEASUREMENTS:                        11.6   11.93 )-----------( 275      ( 08 Jun 2025 15:45 )             35.9     06-08    142  |  110  |  22[H]  ----------------------------<  107[H]  4.7   |  19  |  0.9    Ca    8.8      08 Jun 2025 15:45    TPro  6.7  /  Alb  3.9  /  TBili  <0.2  /  DBili  x   /  AST  21  /  ALT  10  /  AlkPhos  72  06-08      LIVER FUNCTIONS - ( 08 Jun 2025 15:45 )  Alb: 3.9 g/dL / Pro: 6.7 g/dL / ALK PHOS: 72 U/L / ALT: 10 U/L / AST: 21 U/L / GGT: x           Urinalysis Basic - ( 08 Jun 2025 15:45 )    Color: x / Appearance: x / SG: x / pH: x  Gluc: 107 mg/dL / Ketone: x  / Bili: x / Urobili: x   Blood: x / Protein: x / Nitrite: x   Leuk Esterase: x / RBC: x / WBC x   Sq Epi: x / Non Sq Epi: x / Bacteria: x        Culture - Urine (collected 01-29-25 @ 01:00)  Source: Clean Catch Clean Catch (Midstream)  Final Report (02-01-25 @ 00:43):    >100,000 CFU/ml Proteus mirabilis ESBL  Organism: Proteus mirabilis ESBL (02-01-25 @ 00:43)  Organism: Proteus mirabilis ESBL (02-01-25 @ 00:43)      -  Levofloxacin: R >4      -  Tobramycin: R 8      -  Nitrofurantoin: R 64 Should not be used to treat pyelonephritis      -  Aztreonam: R <=4      -  Gentamicin: R >8      -  Cefazolin: R >16 For uncomplicated UTI with K. pneumoniae, E. coli, or P. mirablis: ANN-EMARIE <=16 is sensitive and ANNE-MARIE >=32 is resistant. This also predicts results for oral agents cefaclor, cefdinir, cefpodoxime, cefprozil, cefuroxime axetil, cephalexin and locarbef for uncomplicated UTI. Note that some isolates may be susceptible to these agents while testing resistant to cefazolin.      -  Cefepime: R 8      -  Piperacillin/Tazobactam: S <=8      -  Ciprofloxacin: R >2      -  Ceftriaxone: R >32      -  Ampicillin: R >16 These ampicillin results predict results for amoxicillin      Method Type: ANNE-MARIE      -  Meropenem: S <=1      -  Ampicillin/Sulbactam: S <=4/2      -  Cefuroxime: R >16      -  Trimethoprim/Sulfamethoxazole: R >2/38      -  Ertapenem: S <=0.5    Culture - Blood (collected 11-25-24 @ 12:42)  Source: .Blood BLOOD  Final Report (11-30-24 @ 22:01):    No growth at 5 days    Culture - Blood (collected 11-25-24 @ 12:42)  Source: .Blood BLOOD  Final Report (11-30-24 @ 22:01):    No growth at 5 days    Culture - Blood (collected 07-16-24 @ 19:49)  Source: .Blood Blood  Final Report (07-22-24 @ 02:00):    No growth at 5 days    Culture - Blood (collected 07-16-24 @ 19:49)  Source: .Blood Blood  Final Report (07-22-24 @ 02:00):    No growth at 5 days        Lactate, Blood: 1.7 mmol/L (06-08-25 @ 15:45)      INFECTIOUS DISEASES TESTING  MRSA PCR Result.: Negative (11-27-24 @ 04:10)  Procalcitonin: 0.04 ng/mL (11-25-24 @ 17:35)      INFLAMMATORY MARKERS      RADIOLOGY & ADDITIONAL TESTS:  I have personally reviewed the last Chest xray  CXR      CT      CARDIOLOGY TESTING       MEDICATIONS  ascorbic acid 250 Oral daily  aspirin  chewable 81 Oral daily  atenolol  Tablet 50 Oral daily  azaTHIOprine 50 Oral daily  chlorhexidine 2% Cloths 1 Topical <User Schedule>  famotidine    Tablet 20 Oral daily  fluticasone propionate 50 MICROgram(s)/spray Nasal Spray 1 Both Nostrils two times a day  furosemide    Tablet 40 Oral daily  heparin   Injectable 5000 SubCutaneous every 12 hours  hydroxychloroquine 200 Oral two times a day  levothyroxine 175 Oral daily  melatonin 5 Oral at bedtime  predniSONE   Tablet 40 Oral daily  senna 2 Oral at bedtime  triamcinolone 0.1% Cream 1 Topical two times a day  vancomycin  IVPB 1000 IV Intermittent every 12 hours      ANTIBIOTICS:  hydroxychloroquine 200 milliGRAM(s) Oral two times a day  vancomycin  IVPB 1000 milliGRAM(s) IV Intermittent every 12 hours      ALLERGIES:  penicillins (Other)  aspirin (Other)  morphine (Other)  Compazine (Other)  Levaquin (Other)         LATOYA FULTON  69y, Female  Allergy: penicillins (Other)  aspirin (Other)  morphine (Other)  Compazine (Other)  Levaquin (Other)      CHIEF COMPLAINT:       LOS  1d    HPI  HPI:  69-year-old female with history of lupus on immunosuppression meds, hypertension, hx of CVA, hypothyroidism, chronic venous insufficiency presenting today for evaluation of erythema and pain to the right lower extremity.  Patient has had intermittent episodes of cellulitis to the right lower extremity over the last few years however states that for the last 3 to 4 days she has had erythema and pain especially after removal of the unna boot on Friday (has been placed for 1 week).  Has been on doxycycline for URI-like symptoms for the last week without improvement in the erythema to the right lower extremity. On Saturday she was started on Bactrim. Denies chest pain, shortness breath, fever, or chills.  Denies other medical complaints. Patient is currently on 3 days prednisone course 20mg BID (doesn't know when it was started). Patient was sent from [facility] for wound care evaluation.      On presentation vitals:  · BP Systolic	121 mm Hg  · BP Diastolic	74 mm Hg  · Heart Rate	62 /min  · Respiration Rate (breaths/min)	18 /min  · Temp (F)	97.9 Degrees F  · Temp (C)	36.6 Degrees C  · Temp site	oral  · SpO2 (%)	97 %  · O2 Delivery/Oxygen Delivery Method	room air      Labs are significant for mild leucocytosis and chronic normocytic anemia.     Patient admitted to medicine for suspected cellulitis.  (08 Jun 2025 19:01)      INFECTIOUS DISEASE HISTORY:  ID consulted for RLE cellulitis   SLE on immunosuppression   Reports chronic RLE redness, pain  On outpatient doxy for a "cold"    Currently ordered for:  hydroxychloroquine 200 milliGRAM(s) Oral two times a day  vancomycin  IVPB 1000 milliGRAM(s) IV Intermittent every 12 hours      PMH  PAST MEDICAL & SURGICAL HISTORY:  Lupus      Essential hypertension      CVA (cerebral vascular accident)  1987      Hypothyroidism      FH: bilateral hip replacements      H/O total knee replacement      H/O abdominal hysterectomy          FAMILY HISTORY  No pertinent family history in first degree relatives        SOCIAL HISTORY  Social History:        ROS  General: Denies rigors, nightsweats  HEENT: Denies headache, rhinorrhea, sore throat, eye pain  CV: Denies CP, palpitations  PULM: Denies wheezing, hemoptysis  GI: Denies hematemesis, hematochezia, melena  : Denies discharge, hematuria  MSK: Denies arthralgias, myalgias  SKIN: as noted above   NEURO: Denies paresthesias, weakness  PSYCH: Denies depression, anxiety     VITALS:  T(F): 98.6, Max: 98.6 (06-09-25 @ 00:00)  HR: 84  BP: 164/77  RR: 18Vital Signs Last 24 Hrs  T(C): 37 (09 Jun 2025 00:00), Max: 37 (09 Jun 2025 00:00)  T(F): 98.6 (09 Jun 2025 00:00), Max: 98.6 (09 Jun 2025 00:00)  HR: 84 (09 Jun 2025 00:00) (62 - 84)  BP: 164/77 (09 Jun 2025 00:00) (121/74 - 164/77)  BP(mean): --  RR: 18 (09 Jun 2025 00:00) (18 - 18)  SpO2: 98% (09 Jun 2025 00:00) (97% - 98%)    Parameters below as of 09 Jun 2025 00:00  Patient On (Oxygen Delivery Method): room air        PHYSICAL EXAM:  Gen: NAD, resting in bed  HEENT: Normocephalic, atraumatic poor dentition  Neck: supple, no lymphadenopathy  CV: Regular rate & regular rhythm  Lungs: decreased BS at bases, no fremitus  Abdomen: Soft, BS present  Ext: Warm, well perfused  Neuro: non focal, awake  Skin: RLE lateral ulcer, erythema compared to LLE , scaly skin  Lines: no phlebitis     TESTS & MEASUREMENTS:                        11.6   11.93 )-----------( 275      ( 08 Jun 2025 15:45 )             35.9     06-08    142  |  110  |  22[H]  ----------------------------<  107[H]  4.7   |  19  |  0.9    Ca    8.8      08 Jun 2025 15:45    TPro  6.7  /  Alb  3.9  /  TBili  <0.2  /  DBili  x   /  AST  21  /  ALT  10  /  AlkPhos  72  06-08      LIVER FUNCTIONS - ( 08 Jun 2025 15:45 )  Alb: 3.9 g/dL / Pro: 6.7 g/dL / ALK PHOS: 72 U/L / ALT: 10 U/L / AST: 21 U/L / GGT: x           Urinalysis Basic - ( 08 Jun 2025 15:45 )    Color: x / Appearance: x / SG: x / pH: x  Gluc: 107 mg/dL / Ketone: x  / Bili: x / Urobili: x   Blood: x / Protein: x / Nitrite: x   Leuk Esterase: x / RBC: x / WBC x   Sq Epi: x / Non Sq Epi: x / Bacteria: x        Culture - Urine (collected 01-29-25 @ 01:00)  Source: Clean Catch Clean Catch (Midstream)  Final Report (02-01-25 @ 00:43):    >100,000 CFU/ml Proteus mirabilis ESBL  Organism: Proteus mirabilis ESBL (02-01-25 @ 00:43)  Organism: Proteus mirabilis ESBL (02-01-25 @ 00:43)      -  Levofloxacin: R >4      -  Tobramycin: R 8      -  Nitrofurantoin: R 64 Should not be used to treat pyelonephritis      -  Aztreonam: R <=4      -  Gentamicin: R >8      -  Cefazolin: R >16 For uncomplicated UTI with K. pneumoniae, E. coli, or P. mirablis: ANNE-MARIE <=16 is sensitive and ANNE-MARIE >=32 is resistant. This also predicts results for oral agents cefaclor, cefdinir, cefpodoxime, cefprozil, cefuroxime axetil, cephalexin and locarbef for uncomplicated UTI. Note that some isolates may be susceptible to these agents while testing resistant to cefazolin.      -  Cefepime: R 8      -  Piperacillin/Tazobactam: S <=8      -  Ciprofloxacin: R >2      -  Ceftriaxone: R >32      -  Ampicillin: R >16 These ampicillin results predict results for amoxicillin      Method Type: ANNE-MARIE      -  Meropenem: S <=1      -  Ampicillin/Sulbactam: S <=4/2      -  Cefuroxime: R >16      -  Trimethoprim/Sulfamethoxazole: R >2/38      -  Ertapenem: S <=0.5    Culture - Blood (collected 11-25-24 @ 12:42)  Source: .Blood BLOOD  Final Report (11-30-24 @ 22:01):    No growth at 5 days    Culture - Blood (collected 11-25-24 @ 12:42)  Source: .Blood BLOOD  Final Report (11-30-24 @ 22:01):    No growth at 5 days    Culture - Blood (collected 07-16-24 @ 19:49)  Source: .Blood Blood  Final Report (07-22-24 @ 02:00):    No growth at 5 days    Culture - Blood (collected 07-16-24 @ 19:49)  Source: .Blood Blood  Final Report (07-22-24 @ 02:00):    No growth at 5 days        Lactate, Blood: 1.7 mmol/L (06-08-25 @ 15:45)      INFECTIOUS DISEASES TESTING  MRSA PCR Result.: Negative (11-27-24 @ 04:10)  Procalcitonin: 0.04 ng/mL (11-25-24 @ 17:35)      INFLAMMATORY MARKERS      RADIOLOGY & ADDITIONAL TESTS:  I have personally reviewed the last Chest xray  CXR      CT      CARDIOLOGY TESTING       MEDICATIONS  ascorbic acid 250 Oral daily  aspirin  chewable 81 Oral daily  atenolol  Tablet 50 Oral daily  azaTHIOprine 50 Oral daily  chlorhexidine 2% Cloths 1 Topical <User Schedule>  famotidine    Tablet 20 Oral daily  fluticasone propionate 50 MICROgram(s)/spray Nasal Spray 1 Both Nostrils two times a day  furosemide    Tablet 40 Oral daily  heparin   Injectable 5000 SubCutaneous every 12 hours  hydroxychloroquine 200 Oral two times a day  levothyroxine 175 Oral daily  melatonin 5 Oral at bedtime  predniSONE   Tablet 40 Oral daily  senna 2 Oral at bedtime  triamcinolone 0.1% Cream 1 Topical two times a day  vancomycin  IVPB 1000 IV Intermittent every 12 hours      ANTIBIOTICS:  hydroxychloroquine 200 milliGRAM(s) Oral two times a day  vancomycin  IVPB 1000 milliGRAM(s) IV Intermittent every 12 hours      ALLERGIES:  penicillins (Other)  aspirin (Other)  morphine (Other)  Compazine (Other)  Levaquin (Other)

## 2025-06-09 NOTE — CONSULT NOTE ADULT - ASSESSMENT
ASSESSMENT  69-year-old female with history of lupus on immunosuppression meds, hypertension, hx of CVA, hypothyroidism, chronic venous insufficiency presenting today for evaluation of erythema and pain to the right lower extremity.        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 All final recommendations to follow after interview and examination of the patient. Please follow recommendations noted below.    If any questions, please send a message or call on [employer] Teams  Please continue to update ID with any pertinent new laboratory, radiographic findings, or change in clinical status   ASSESSMENT  69-year-old female with history of lupus on immunosuppression meds, hypertension, hx of CVA, hypothyroidism, chronic venous insufficiency presenting today for evaluation of erythema and pain to the right lower extremity.        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

## 2025-06-09 NOTE — ADVANCED PRACTICE NURSE CONSULT - ASSESSMENT
History of Present Illness:   69-year-old female with history of lupus on immunosuppression meds, hypertension, hx of CVA, hypothyroidism, chronic venous insufficiency presenting today for evaluation of erythema and pain to the right lower extremity.  Patient has had intermittent episodes of cellulitis to the right lower extremity over the last few years however states that for the last 3 to 4 days she has had erythema and pain especially after removal of the unna boot on Friday (has been placed for 1 week).  Has been on doxycycline for URI-like symptoms for the last week without improvement in the erythema to the right lower extremity. On Saturday she was started on Bactrim. Denies chest pain, shortness breath, fever, or chills.  Denies other medical complaints. Patient is currently on 3 days prednisone course 20mg BID (doesn't know when it was started). Patient was sent from [facility] for wound care evaluation.      Allergies:  	Compazine: Drug, Other, eye problems  	aspirin: Drug, Other, GI bleed  	Levaquin: Drug, Other, lethargy  	morphine: Drug, Other, eye problems  	penicillins: Drug Category, Other, GI bleed    Home Medications:   * Patient Currently Takes Medications as of 08-Jun-2025 19:19 documented in Structured Notes  · 	triamcinolone 0.1% topical cream: Last Dose Taken:  , 1 Apply topically to affected area every 12 hours  · 	Melatonin 10 mg oral tablet: Last Dose Taken:  , 1 tab(s) orally once a day (at bedtime)  · 	ferrous fumarate 324 mg (106 mg elemental iron) oral tablet: Last Dose Taken:  , 1 tab(s) orally 3 times a day  · 	alendronate 70 mg oral tablet: Last Dose Taken:  , 1 tab(s) orally once a week  · 	oxycodone-acetaminophen 7.5 mg-325 mg oral tablet: Last Dose Taken:  , 1 tab(s) orally 3 times a day  · 	fluticasone 50 mcg/inh inhalation powder: Last Dose Taken:  , 1 inhaler(s) inhaled 2 times a day  · 	senna (sennosides) 8.6 mg oral tablet: Last Dose Taken:  , 1 tab(s) orally once a day (at bedtime)  · 	Plaquenil 200 mg oral tablet: Last Dose Taken:  , 1 tab(s) orally 2 times a day  · 	Albuterol (Eqv-ProAir HFA) 90 mcg/inh inhalation aerosol: Last Dose Taken:  , 2 puff(s) inhaled 3 times a day  · 	Vitamin C 250 mg oral tablet, chewable: Last Dose Taken:  , 1 tab(s) chewed once a day  · 	aspirin 81 mg oral capsule: Last Dose Taken:  , 1 cap(s) orally once a day  · 	Pepcid 20 mg oral tablet: Last Dose Taken:  , 1 tab(s) orally once a day  · 	furosemide 40 mg oral tablet: Last Dose Taken:  , 1 tab(s) orally once a day  · 	azaTHIOprine 50 mg oral tablet: Last Dose Taken:  , 1 tab(s) orally once a day  · 	atenolol 50 mg oral tablet: Last Dose Taken:  , 1 tab(s) orally once a day  · 	levothyroxine 175 mcg (0.175 mg) oral tablet: Last Dose Taken:  , 1 tab(s) orally once a day    Patient received in bed, limited mobility, high risk for pressure injury development or progression.    Wound  Type & Location: Right lower extremity cellulitis   Tissue Description: Erythema, warmth with dry peeling tissue  Wound Exudate: None   Wound Edge: intact  Zarina wound Condition: intact     Patient seen by ID and has recommendations for antibiotics.

## 2025-06-09 NOTE — ADVANCED PRACTICE NURSE CONSULT - RECOMMEDATIONS
Cleanse wound with soap and water, pat dry.  Apply Dermaphor/Aquaphor daily to right lower extremity.   Skin and incontinence care.  Pressure Injury Prevention.  Assess wound daily and inform primary provider of any changes.   Case discussed with primary RN.  Wound/ ostomy specialist to f/u as needed.   Other recommendations per Primary Team.

## 2025-06-09 NOTE — CONSULT NOTE ADULT - TIME BILLING
I have personally seen and examined this patient.  I have reviewed all pertinent clinical information and reviewed all relevant imaging and diagnostic studies personally.   I counseled the patient about diagnostic testing and treatment plan.   I discussed my recommendations with the primary team Desirae Gamboa

## 2025-06-10 ENCOUNTER — TRANSCRIPTION ENCOUNTER (OUTPATIENT)
Age: 70
End: 2025-06-10

## 2025-06-10 LAB
ANION GAP SERPL CALC-SCNC: 13 MMOL/L — SIGNIFICANT CHANGE UP (ref 7–14)
BASOPHILS # BLD AUTO: 0.03 K/UL — SIGNIFICANT CHANGE UP (ref 0–0.2)
BASOPHILS NFR BLD AUTO: 0.4 % — SIGNIFICANT CHANGE UP (ref 0–1)
BUN SERPL-MCNC: 29 MG/DL — HIGH (ref 10–20)
CALCIUM SERPL-MCNC: 8.3 MG/DL — LOW (ref 8.4–10.5)
CHLORIDE SERPL-SCNC: 111 MMOL/L — HIGH (ref 98–110)
CO2 SERPL-SCNC: 20 MMOL/L — SIGNIFICANT CHANGE UP (ref 17–32)
CREAT SERPL-MCNC: 0.8 MG/DL — SIGNIFICANT CHANGE UP (ref 0.7–1.5)
EGFR: 80 ML/MIN/1.73M2 — SIGNIFICANT CHANGE UP
EGFR: 80 ML/MIN/1.73M2 — SIGNIFICANT CHANGE UP
EOSINOPHIL # BLD AUTO: 0.05 K/UL — SIGNIFICANT CHANGE UP (ref 0–0.7)
EOSINOPHIL NFR BLD AUTO: 0.7 % — SIGNIFICANT CHANGE UP (ref 0–8)
GLUCOSE SERPL-MCNC: 80 MG/DL — SIGNIFICANT CHANGE UP (ref 70–99)
HCT VFR BLD CALC: 32.8 % — LOW (ref 37–47)
HGB BLD-MCNC: 10.6 G/DL — LOW (ref 12–16)
IMM GRANULOCYTES NFR BLD AUTO: 0.3 % — SIGNIFICANT CHANGE UP (ref 0.1–0.3)
LYMPHOCYTES # BLD AUTO: 1.61 K/UL — SIGNIFICANT CHANGE UP (ref 1.2–3.4)
LYMPHOCYTES # BLD AUTO: 23.1 % — SIGNIFICANT CHANGE UP (ref 20.5–51.1)
MCHC RBC-ENTMCNC: 29 PG — SIGNIFICANT CHANGE UP (ref 27–31)
MCHC RBC-ENTMCNC: 32.3 G/DL — SIGNIFICANT CHANGE UP (ref 32–37)
MCV RBC AUTO: 89.9 FL — SIGNIFICANT CHANGE UP (ref 81–99)
MONOCYTES # BLD AUTO: 0.63 K/UL — HIGH (ref 0.1–0.6)
MONOCYTES NFR BLD AUTO: 9 % — SIGNIFICANT CHANGE UP (ref 1.7–9.3)
NEUTROPHILS # BLD AUTO: 4.63 K/UL — SIGNIFICANT CHANGE UP (ref 1.4–6.5)
NEUTROPHILS NFR BLD AUTO: 66.5 % — SIGNIFICANT CHANGE UP (ref 42.2–75.2)
NRBC BLD AUTO-RTO: 0 /100 WBCS — SIGNIFICANT CHANGE UP (ref 0–0)
PLATELET # BLD AUTO: 220 K/UL — SIGNIFICANT CHANGE UP (ref 130–400)
PMV BLD: 10.6 FL — HIGH (ref 7.4–10.4)
POTASSIUM SERPL-MCNC: 4.2 MMOL/L — SIGNIFICANT CHANGE UP (ref 3.5–5)
POTASSIUM SERPL-SCNC: 4.2 MMOL/L — SIGNIFICANT CHANGE UP (ref 3.5–5)
RBC # BLD: 3.65 M/UL — LOW (ref 4.2–5.4)
RBC # FLD: 14.5 % — SIGNIFICANT CHANGE UP (ref 11.5–14.5)
SODIUM SERPL-SCNC: 144 MMOL/L — SIGNIFICANT CHANGE UP (ref 135–146)
WBC # BLD: 6.97 K/UL — SIGNIFICANT CHANGE UP (ref 4.8–10.8)
WBC # FLD AUTO: 6.97 K/UL — SIGNIFICANT CHANGE UP (ref 4.8–10.8)

## 2025-06-10 PROCEDURE — 99232 SBSQ HOSP IP/OBS MODERATE 35: CPT

## 2025-06-10 RX ORDER — CEPHALEXIN 250 MG/1
1 CAPSULE ORAL
Qty: 10 | Refills: 0
Start: 2025-06-10 | End: 2025-06-14

## 2025-06-10 RX ORDER — PREDNISONE 20 MG/1
2 TABLET ORAL
Qty: 4 | Refills: 0
Start: 2025-06-10 | End: 2025-06-11

## 2025-06-10 RX ADMIN — Medication 20 MILLIGRAM(S): at 11:38

## 2025-06-10 RX ADMIN — HYDROXYCHLOROQUINE SULFATE 200 MILLIGRAM(S): 200 TABLET, FILM COATED ORAL at 05:21

## 2025-06-10 RX ADMIN — HEPARIN SODIUM 5000 UNIT(S): 1000 INJECTION INTRAVENOUS; SUBCUTANEOUS at 17:18

## 2025-06-10 RX ADMIN — FLUTICASONE PROPIONATE 1 SPRAY(S): 50 SPRAY, METERED NASAL at 17:18

## 2025-06-10 RX ADMIN — HYDROXYCHLOROQUINE SULFATE 200 MILLIGRAM(S): 200 TABLET, FILM COATED ORAL at 17:19

## 2025-06-10 RX ADMIN — PREDNISONE 40 MILLIGRAM(S): 20 TABLET ORAL at 05:21

## 2025-06-10 RX ADMIN — Medication 175 MICROGRAM(S): at 05:21

## 2025-06-10 RX ADMIN — Medication 250 MILLIGRAM(S): at 11:38

## 2025-06-10 RX ADMIN — CEPHALEXIN 500 MILLIGRAM(S): 250 CAPSULE ORAL at 17:19

## 2025-06-10 RX ADMIN — HEPARIN SODIUM 5000 UNIT(S): 1000 INJECTION INTRAVENOUS; SUBCUTANEOUS at 05:33

## 2025-06-10 RX ADMIN — TRIAMCINOLONE ACETONIDE 1 APPLICATION(S): 1 CREAM TOPICAL at 17:18

## 2025-06-10 RX ADMIN — Medication 81 MILLIGRAM(S): at 11:38

## 2025-06-10 RX ADMIN — Medication 1 APPLICATION(S): at 05:22

## 2025-06-10 RX ADMIN — FUROSEMIDE 40 MILLIGRAM(S): 10 INJECTION INTRAMUSCULAR; INTRAVENOUS at 05:21

## 2025-06-10 RX ADMIN — LISINOPRIL 50 MILLIGRAM(S): 30 TABLET ORAL at 05:21

## 2025-06-10 RX ADMIN — FLUTICASONE PROPIONATE 1 SPRAY(S): 50 SPRAY, METERED NASAL at 05:32

## 2025-06-10 RX ADMIN — Medication 5 MILLIGRAM(S): at 21:08

## 2025-06-10 RX ADMIN — CEPHALEXIN 500 MILLIGRAM(S): 250 CAPSULE ORAL at 05:22

## 2025-06-10 RX ADMIN — TRIAMCINOLONE ACETONIDE 1 APPLICATION(S): 1 CREAM TOPICAL at 05:33

## 2025-06-10 RX ADMIN — AZATHIOPRINE 50 MILLIGRAM(S): 50 TABLET ORAL at 11:38

## 2025-06-10 NOTE — DISCHARGE NOTE PROVIDER - ATTENDING DISCHARGE PHYSICAL EXAMINATION:
On exam  General: awake, alert, NAD, chronic ill appearance  Lungs:  clear to ausculations b/l, normal resp effort  Heart: regular ryhthm   Abdomen: soft, non tender non distended  Ext: mild R LE edema, no erythema , chronic skin changes , dry skin , can move all  his extremities

## 2025-06-10 NOTE — DISCHARGE NOTE PROVIDER - CARE PROVIDER_API CALL
Katie Crespo  Physician Assistant Services  [address] [place] [address], [facility] [address]  [place] [address]  Phone: [phone]  Fax: [phone]  Follow Up Time: 1 week

## 2025-06-10 NOTE — PHYSICAL THERAPY INITIAL EVALUATION ADULT - PERTINENT HX OF CURRENT PROBLEM, REHAB EVAL
69-year-old female with history of lupus on immunosuppression meds, hypertension, hx of CVA, hypothyroidism, chronic venous insufficiency presenting today for evaluation of erythema and pain to the right lower extremity.  Patient has had intermittent episodes of cellulitis to the right lower extremity over the last few years however states that for the last 3 to 4 days she has had erythema and pain especially after removal of the unna boot on Friday (has been placed for 1 week).  Has been on doxycycline for URI-like symptoms for the last week without improvement in the erythema to the right lower extremity. On Saturday she was started on Bactrim. Denies chest pain, shortness breath, fever, or chills.  Denies other medical complaints. Patient is currently on 3 days prednisone course 20mg BID (doesn't know when it was started). Patient was sent from [facility] for wound care evaluation.

## 2025-06-10 NOTE — PHYSICAL THERAPY INITIAL EVALUATION ADULT - ACTIVE RANGE OF MOTION EXAMINATION, REHAB EVAL
no Active ROM deficits were identified Valtrex Pregnancy And Lactation Text: this medication is Pregnancy Category B and is considered safe during pregnancy. This medication is not directly found in breast milk but it's metabolite acyclovir is present.

## 2025-06-10 NOTE — DISCHARGE NOTE PROVIDER - NSDCCPCAREPLAN_GEN_ALL_CORE_FT
PRINCIPAL DISCHARGE DIAGNOSIS  Diagnosis: Cellulitis  Assessment and Plan of Treatment: You were evaluated in the hospital for your cellulitits. You were prescribed oral antibiotics which you will continue at home.   After discharge, you will need to:   - Follow up with your primary care doctor within 1-2 weeks     - Take all the medications you were discharged with, unless otherwise instructed by your healthcare provider(s).   Please follow up with your providers by calling them to make an appointment so that you can see them in 1-2weeks; bring your paperwork from this hospital stay to that visit. You can access your visit information by signing up for an account for the patient portal at [email or web address]   Seek immediate medical attention if you develop fevers, chills, chest pain, shortness of breath, nausea and vomiting, abdominal pain, passing out, weakness or numbness or tingling on one side of your body, or any other concerning signs or symptoms.

## 2025-06-10 NOTE — DISCHARGE NOTE PROVIDER - NSDCFUSCHEDAPPT_GEN_ALL_CORE_FT
Shayne Matias  [facility]  VASCULAR [address] [place] [address] A  Scheduled Appointment: 06/13/2025

## 2025-06-10 NOTE — DISCHARGE NOTE PROVIDER - HOSPITAL COURSE
69-year-old female with history of lupus on immunosuppression meds, hypertension, hx of CVA, hypothyroidism, chronic venous insufficiency presenting today for evaluation of erythema and pain to the right lower extremity.  Patient has had intermittent episodes of cellulitis to the right lower extremity over the last few years however states that for the last 3 to 4 days she has had erythema and pain especially after removal of the unna boot on Friday (has been placed for 1 week).  Has been on doxycycline for URI-like symptoms for the last week without improvement in the erythema to the right lower extremity. On Saturday she was started on Bactrim. Denies chest pain, shortness breath, fever, or chills.  Denies other medical complaints. Patient is currently on 3 days prednisone course 20mg BID (doesn't know when it was started). Patient was sent from [facility] for wound care evaluation.      On presentation vitals:  · BP Systolic	121 mm Hg  · BP Diastolic	74 mm Hg  · Heart Rate	62 /min  · Respiration Rate (breaths/min)	18 /min  · Temp (F)	97.9 Degrees F  · Temp (C)	36.6 Degrees C  · Temp site	oral  · SpO2 (%)	97 %  · O2 Delivery/Oxygen Delivery Method	room air      Labs are significant for mild leucocytosis and chronic normocytic anemia.     Patient admitted to medicine for suspected cellulitis.   #Stasis dermatitis vs. less likely cellulitis   #Chronic venous insufficiency with Rt ankle ulcer  - Pt has had recurrent RLE cellulitis with chronic venous insufficiency for over 2 years  - WBC 11k but pt on outpatient prednisone for SLE  - X ray Rt leg: Status post total knee arthroplasty, partially visualized. Soft tissue edema without subcutaneous emphysema.  - s/p vanc, cefepime, and flagyl in the ED  - c/w 4 days Prednisone course   - c/w Oxycodone for pain control started outpatient  - F/u BCx, procal, MRSA swab  - C/W home Lasix   - ID on board  - D/C vanc; start PO Keflex continue for 7 days  -   LE arterial duplex: negative       #SLE  - c/w Azathioprine and HCQ  - Pt also on prednisone currently     #Chronic Normocytic Anemia  - on iron replacement therapy outpatient    #HTN  #h/o CVA  - c/w home meds    # Misc  - GI ppx: N/A   69-year-old female with history of lupus on immunosuppression meds, hypertension, hx of CVA, hypothyroidism, chronic venous insufficiency presenting today for evaluation of erythema and pain to the right lower extremity.  Patient has had intermittent episodes of cellulitis to the right lower extremity over the last few years however states that for the last 3 to 4 days she has had erythema and pain especially after removal of the unna boot on Friday (has been placed for 1 week).  Has been on doxycycline for URI-like symptoms for the last week without improvement in the erythema to the right lower extremity. On Saturday she was started on Bactrim. Denies chest pain, shortness breath, fever, or chills.  Denies other medical complaints. Patient is currently on 3 days prednisone course 20mg BID (doesn't know when it was started). Patient was sent from [facility] for wound care evaluation.      On presentation vitals:  · BP Systolic	121 mm Hg  · BP Diastolic	74 mm Hg  · Heart Rate	62 /min  · Respiration Rate (breaths/min)	18 /min  · Temp (F)	97.9 Degrees F  · Temp (C)	36.6 Degrees C  · Temp site	oral  · SpO2 (%)	97 %  · O2 Delivery/Oxygen Delivery Method	room air      Labs are significant for mild leucocytosis and chronic normocytic anemia.     Patient admitted to medicine for suspected cellulitis.   #Stasis dermatitis vs. less likely cellulitis   #Chronic venous insufficiency with Rt ankle ulcer  - Pt has had recurrent RLE cellulitis with chronic venous insufficiency for over 2 years  - WBC 11k but pt on outpatient prednisone for SLE  - X ray Rt leg: Status post total knee arthroplasty, partially visualized. Soft tissue edema without subcutaneous emphysema.  - s/p vanc, cefepime, and flagyl in the ED  - c/w 4 days Prednisone course   - c/w Oxycodone for pain control started outpatient  - F/u BCx, procal, MRSA swab  - C/W home Lasix   - ID on board  - D/C vanc; start PO Keflex continue for 7 days  -   LE arterial duplex: negative       #SLE  - c/w Azathioprine and HCQ  - Pt also on prednisone currently     #Chronic Normocytic Anemia  - on iron replacement therapy outpatient    #HTN  #h/o CVA  - c/w home meds    # Misc  - GI ppx: N/A      Attending addendum:   The patient stated that over all she feels ok, and that she agrees with the plan of discharge today  I explained to her the discharge plan and she stated understanding

## 2025-06-10 NOTE — DISCHARGE NOTE PROVIDER - NSDCMRMEDTOKEN_GEN_ALL_CORE_FT
Albuterol (Eqv-ProAir HFA) 90 mcg/inh inhalation aerosol: 2 puff(s) inhaled 3 times a day  alendronate 70 mg oral tablet: 1 tab(s) orally once a week  aspirin 81 mg oral capsule: 1 cap(s) orally once a day  atenolol 50 mg oral tablet: 1 tab(s) orally once a day  azaTHIOprine 50 mg oral tablet: 1 tab(s) orally once a day  ferrous fumarate 324 mg (106 mg elemental iron) oral tablet: 1 tab(s) orally 3 times a day  fluticasone 50 mcg/inh inhalation powder: 1 inhaler(s) inhaled 2 times a day  furosemide 40 mg oral tablet: 1 tab(s) orally once a day  levothyroxine 175 mcg (0.175 mg) oral tablet: 1 tab(s) orally once a day  Melatonin 10 mg oral tablet: 1 tab(s) orally once a day (at bedtime)  oxycodone-acetaminophen 7.5 mg-325 mg oral tablet: 1 tab(s) orally 3 times a day  Pepcid 20 mg oral tablet: 1 tab(s) orally once a day  Plaquenil 200 mg oral tablet: 1 tab(s) orally 2 times a day  senna (sennosides) 8.6 mg oral tablet: 1 tab(s) orally once a day (at bedtime)  triamcinolone 0.1% topical cream: 1 Apply topically to affected area every 12 hours  Vitamin C 250 mg oral tablet, chewable: 1 tab(s) chewed once a day   Albuterol (Eqv-ProAir HFA) 90 mcg/inh inhalation aerosol: 2 puff(s) inhaled 3 times a day  alendronate 70 mg oral tablet: 1 tab(s) orally once a week  aspirin 81 mg oral capsule: 1 cap(s) orally once a day  atenolol 50 mg oral tablet: 1 tab(s) orally once a day  azaTHIOprine 50 mg oral tablet: 1 tab(s) orally once a day  cephalexin 500 mg oral capsule: 1 cap(s) orally every 12 hours  ferrous fumarate 324 mg (106 mg elemental iron) oral tablet: 1 tab(s) orally 3 times a day  fluticasone 50 mcg/inh inhalation powder: 1 inhaler(s) inhaled 2 times a day  furosemide 40 mg oral tablet: 1 tab(s) orally once a day  levothyroxine 175 mcg (0.175 mg) oral tablet: 1 tab(s) orally once a day  Melatonin 10 mg oral tablet: 1 tab(s) orally once a day (at bedtime)  oxycodone-acetaminophen 7.5 mg-325 mg oral tablet: 1 tab(s) orally 3 times a day  Pepcid 20 mg oral tablet: 1 tab(s) orally once a day  Plaquenil 200 mg oral tablet: 1 tab(s) orally 2 times a day  predniSONE 20 mg oral tablet: 2 tab(s) orally once a day  senna (sennosides) 8.6 mg oral tablet: 1 tab(s) orally once a day (at bedtime)  triamcinolone 0.1% topical cream: 1 Apply topically to affected area every 12 hours  Vitamin C 250 mg oral tablet, chewable: 1 tab(s) chewed once a day

## 2025-06-11 ENCOUNTER — TRANSCRIPTION ENCOUNTER (OUTPATIENT)
Age: 70
End: 2025-06-11

## 2025-06-11 VITALS
DIASTOLIC BLOOD PRESSURE: 64 MMHG | SYSTOLIC BLOOD PRESSURE: 102 MMHG | HEART RATE: 58 BPM | RESPIRATION RATE: 16 BRPM | TEMPERATURE: 99 F | OXYGEN SATURATION: 97 %

## 2025-06-11 LAB
ALBUMIN SERPL ELPH-MCNC: 3.4 G/DL — LOW (ref 3.5–5.2)
ALP SERPL-CCNC: 74 U/L — SIGNIFICANT CHANGE UP (ref 30–115)
ALT FLD-CCNC: 21 U/L — SIGNIFICANT CHANGE UP (ref 0–41)
ANION GAP SERPL CALC-SCNC: 14 MMOL/L — SIGNIFICANT CHANGE UP (ref 7–14)
AST SERPL-CCNC: 35 U/L — SIGNIFICANT CHANGE UP (ref 0–41)
BASOPHILS # BLD AUTO: 0.02 K/UL — SIGNIFICANT CHANGE UP (ref 0–0.2)
BASOPHILS NFR BLD AUTO: 0.1 % — SIGNIFICANT CHANGE UP (ref 0–1)
BILIRUB SERPL-MCNC: 0.3 MG/DL — SIGNIFICANT CHANGE UP (ref 0.2–1.2)
BUN SERPL-MCNC: 29 MG/DL — HIGH (ref 10–20)
CALCIUM SERPL-MCNC: 8.3 MG/DL — LOW (ref 8.4–10.5)
CHLORIDE SERPL-SCNC: 109 MMOL/L — SIGNIFICANT CHANGE UP (ref 98–110)
CO2 SERPL-SCNC: 18 MMOL/L — SIGNIFICANT CHANGE UP (ref 17–32)
CREAT SERPL-MCNC: 0.8 MG/DL — SIGNIFICANT CHANGE UP (ref 0.7–1.5)
EGFR: 80 ML/MIN/1.73M2 — SIGNIFICANT CHANGE UP
EGFR: 80 ML/MIN/1.73M2 — SIGNIFICANT CHANGE UP
EOSINOPHIL # BLD AUTO: 0.02 K/UL — SIGNIFICANT CHANGE UP (ref 0–0.7)
EOSINOPHIL NFR BLD AUTO: 0.1 % — SIGNIFICANT CHANGE UP (ref 0–8)
GLUCOSE SERPL-MCNC: 75 MG/DL — SIGNIFICANT CHANGE UP (ref 70–99)
HCT VFR BLD CALC: 37.9 % — SIGNIFICANT CHANGE UP (ref 37–47)
HGB BLD-MCNC: 12.3 G/DL — SIGNIFICANT CHANGE UP (ref 12–16)
IMM GRANULOCYTES NFR BLD AUTO: 0.7 % — HIGH (ref 0.1–0.3)
LYMPHOCYTES # BLD AUTO: 1.17 K/UL — LOW (ref 1.2–3.4)
LYMPHOCYTES # BLD AUTO: 8.8 % — LOW (ref 20.5–51.1)
MCHC RBC-ENTMCNC: 29 PG — SIGNIFICANT CHANGE UP (ref 27–31)
MCHC RBC-ENTMCNC: 32.5 G/DL — SIGNIFICANT CHANGE UP (ref 32–37)
MCV RBC AUTO: 89.4 FL — SIGNIFICANT CHANGE UP (ref 81–99)
MONOCYTES # BLD AUTO: 0.53 K/UL — SIGNIFICANT CHANGE UP (ref 0.1–0.6)
MONOCYTES NFR BLD AUTO: 4 % — SIGNIFICANT CHANGE UP (ref 1.7–9.3)
NEUTROPHILS # BLD AUTO: 11.51 K/UL — HIGH (ref 1.4–6.5)
NEUTROPHILS NFR BLD AUTO: 86.3 % — HIGH (ref 42.2–75.2)
NRBC BLD AUTO-RTO: 0 /100 WBCS — SIGNIFICANT CHANGE UP (ref 0–0)
PLATELET # BLD AUTO: 231 K/UL — SIGNIFICANT CHANGE UP (ref 130–400)
PMV BLD: 11.5 FL — HIGH (ref 7.4–10.4)
POTASSIUM SERPL-MCNC: 3.8 MMOL/L — SIGNIFICANT CHANGE UP (ref 3.5–5)
POTASSIUM SERPL-SCNC: 3.8 MMOL/L — SIGNIFICANT CHANGE UP (ref 3.5–5)
PROT SERPL-MCNC: 6.1 G/DL — SIGNIFICANT CHANGE UP (ref 6–8)
RBC # BLD: 4.24 M/UL — SIGNIFICANT CHANGE UP (ref 4.2–5.4)
RBC # FLD: 14.3 % — SIGNIFICANT CHANGE UP (ref 11.5–14.5)
SODIUM SERPL-SCNC: 141 MMOL/L — SIGNIFICANT CHANGE UP (ref 135–146)
WBC # BLD: 13.34 K/UL — HIGH (ref 4.8–10.8)
WBC # FLD AUTO: 13.34 K/UL — HIGH (ref 4.8–10.8)

## 2025-06-11 PROCEDURE — 99239 HOSP IP/OBS DSCHRG MGMT >30: CPT

## 2025-06-11 RX ADMIN — FLUTICASONE PROPIONATE 1 SPRAY(S): 50 SPRAY, METERED NASAL at 05:06

## 2025-06-11 RX ADMIN — CEPHALEXIN 500 MILLIGRAM(S): 250 CAPSULE ORAL at 05:06

## 2025-06-11 RX ADMIN — Medication 250 MILLIGRAM(S): at 11:37

## 2025-06-11 RX ADMIN — PREDNISONE 40 MILLIGRAM(S): 20 TABLET ORAL at 05:07

## 2025-06-11 RX ADMIN — LISINOPRIL 50 MILLIGRAM(S): 30 TABLET ORAL at 05:06

## 2025-06-11 RX ADMIN — Medication 1 APPLICATION(S): at 05:09

## 2025-06-11 RX ADMIN — Medication 20 MILLIGRAM(S): at 11:37

## 2025-06-11 RX ADMIN — TRIAMCINOLONE ACETONIDE 1 APPLICATION(S): 1 CREAM TOPICAL at 05:05

## 2025-06-11 RX ADMIN — HYDROXYCHLOROQUINE SULFATE 200 MILLIGRAM(S): 200 TABLET, FILM COATED ORAL at 05:07

## 2025-06-11 RX ADMIN — AZATHIOPRINE 50 MILLIGRAM(S): 50 TABLET ORAL at 11:37

## 2025-06-11 RX ADMIN — Medication 175 MICROGRAM(S): at 05:06

## 2025-06-11 RX ADMIN — Medication 81 MILLIGRAM(S): at 11:37

## 2025-06-11 RX ADMIN — FUROSEMIDE 40 MILLIGRAM(S): 10 INJECTION INTRAMUSCULAR; INTRAVENOUS at 05:07

## 2025-06-11 RX ADMIN — HEPARIN SODIUM 5000 UNIT(S): 1000 INJECTION INTRAVENOUS; SUBCUTANEOUS at 05:07

## 2025-06-11 NOTE — PROGRESS NOTE ADULT - ASSESSMENT
69-year-old female with history of lupus on immunosuppression meds, hypertension, hx of CVA, hypothyroidism, chronic venous insufficiency presenting today for evaluation of erythema and pain to the right lower extremity.  Patient has had intermittent episodes of cellulitis to the right lower extremity over the last few years however states that for the last 3 to 4 days she has had erythema and pain especially after removal of the unna boot on Friday (has been placed for 1 week).  Has been on doxycycline for URI-like symptoms for the last week without improvement in the erythema to the right lower extremity. On Saturday she was started on Bactrim. Denies chest pain, shortness breath, fever, or chills.  Denies other medical complaints. Patient is currently on 3 days prednisone course 20mg BID (doesn't know when it was started) for SLE. Patient was sent from [facility] for wound care evaluation.        #Suspected RLE cellulitis  #Chronic venous insufficiency with Rt ankle ulcer  - Pt has had recurrent RLE cellulitis with chronic venous insufficiency for over 2 years  - - X ray Rt leg: Status post total knee arthroplasty, partially visualized. Soft tissue edema without subcutaneous emphysema.  - Was on doxy with no improvement in RLE; had Unna boot last week that worsened symptoms  -- c/w 4 days Prednisone course   - c/w Oxycodone for pain control started outpatient  - - F/u BCx, procal, are negative  - F/u RLE duplex and VA duplex are negative  - wound care consult   - ID wants keflex  -      #SLE  - c/w Azathioprine and HCQ  - Pt also on prednisone currently     #Chronic Normocytic Anemia  - on iron replacement therapy outpatient    #HTN  #h/o CVA  - c/w home meds    DC plan today--- walked with PT--spent more than 30mins preparing DC papers.  
Assessment:     69-year-old female with history of lupus on immunosuppression meds, hypertension, hx of CVA, hypothyroidism, chronic venous insufficiency presenting today for evaluation of erythema and pain to the right lower extremity.      Plan:     #Stasis dermatitis vs. less likely cellulitis   #Chronic venous insufficiency with Rt ankle ulcer  - Pt has had recurrent RLE cellulitis with chronic venous insufficiency for over 2 years  - WBC 11k but pt on outpatient prednisone for SLE  - X ray Rt leg: Status post total knee arthroplasty, partially visualized. Soft tissue edema without subcutaneous emphysema.  - s/p vanc, cefepime, and flagyl in the ED  - c/w 4 days Prednisone course   - c/w Oxycodone for pain control started outpatient  - F/u BCx, procal, MRSA swab  - C/W home Lasix   - ID on board  - D/C vanc;   Keflex continue for 4 days  -   LE arterial duplex: negative       #SLE  - c/w Azathioprine and HCQ  - Pt also on prednisone currently     #Chronic Normocytic Anemia  - on iron replacement therapy outpatient    #HTN  #h/o CVA  - c/w home meds    # Misc  - GI ppx: N/A  - DVT ppx: heparin  - Diet: DASH  - Activity: IAT. 
Assessment:     69-year-old female with history of lupus on immunosuppression meds, hypertension, hx of CVA, hypothyroidism, chronic venous insufficiency presenting today for evaluation of erythema and pain to the right lower extremity.      Plan:     #Stasis dermatitis vs. less likely cellulitis   #Chronic venous insufficiency with Rt ankle ulcer  - Pt has had recurrent RLE cellulitis with chronic venous insufficiency for over 2 years  - WBC 11k but pt on outpatient prednisone for SLE  - X ray Rt leg: Status post total knee arthroplasty, partially visualized. Soft tissue edema without subcutaneous emphysema.  - s/p vanc, cefepime, and flagyl in the ED  - c/w 4 days Prednisone course   - c/w Oxycodone for pain control started outpatient  - F/u BCx, procal, MRSA swab  - C/W home Lasix   - ID on board  - D/C vanc; start PO Keflex   - F/U LE arterial duplex      #SLE  - c/w Azathioprine and HCQ  - Pt also on prednisone currently     #Chronic Normocytic Anemia  - on iron replacement therapy outpatient    #HTN  #h/o CVA  - c/w home meds    # Misc  - GI ppx: N/A  - DVT ppx: heparin  - Diet: DASH  - Activity: IAT. 
Assessment:     69-year-old female with history of lupus on immunosuppression meds, hypertension, hx of CVA, hypothyroidism, chronic venous insufficiency presenting today for evaluation of erythema and pain to the right lower extremity.      Plan:     #Stasis dermatitis vs. less likely cellulitis   #Chronic venous insufficiency with Rt ankle ulcer  - Pt has had recurrent RLE cellulitis with chronic venous insufficiency for over 2 years  - WBC 11k but pt on outpatient prednisone for SLE  - X ray Rt leg: Status post total knee arthroplasty, partially visualized. Soft tissue edema without subcutaneous emphysema.  - s/p vanc, cefepime, and flagyl in the ED  - c/w 4 days Prednisone course   - c/w Oxycodone for pain control started outpatient  - F/u BCx, procal, MRSA swab  - C/W home Lasix   - ID on board  - D/C vanc; start PO Keflex continue for 7 days  -   LE arterial duplex: negative       #SLE  - c/w Azathioprine and HCQ  - Pt also on prednisone currently     #Chronic Normocytic Anemia  - on iron replacement therapy outpatient    #HTN  #h/o CVA  - c/w home meds    # Misc  - GI ppx: N/A  - DVT ppx: heparin  - Diet: DASH  - Activity: IAT. 
ASSESSMENT  69-year-old female with history of lupus on immunosuppression meds, hypertension, hx of CVA, hypothyroidism, chronic venous insufficiency presenting today for evaluation of erythema and pain to the right lower extremity.        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

## 2025-06-11 NOTE — DISCHARGE NOTE NURSING/CASE MANAGEMENT/SOCIAL WORK - FINANCIAL ASSISTANCE
[facility] provides services at a reduced cost to those who are determined to be eligible through [facility]’s financial assistance program. Information regarding [facility]’s financial assistance program can be found by going to [email or web address] or by calling [phone].

## 2025-06-11 NOTE — PROGRESS NOTE ADULT - SUBJECTIVE AND OBJECTIVE BOX
SUBJECTIVE/OVERNIGHT EVENTS  Today is hospital day 2d. This morning patient was seen and examined at bedside, resting comfortably in bed. No acute or major events overnight.        MEDICATIONS  STANDING MEDICATIONS  ascorbic acid 250 milliGRAM(s) Oral daily  aspirin  chewable 81 milliGRAM(s) Oral daily  atenolol  Tablet 50 milliGRAM(s) Oral daily  azaTHIOprine 50 milliGRAM(s) Oral daily  cephalexin 500 milliGRAM(s) Oral every 12 hours  chlorhexidine 2% Cloths 1 Application(s) Topical <User Schedule>  famotidine    Tablet 20 milliGRAM(s) Oral daily  fluticasone propionate 50 MICROgram(s)/spray Nasal Spray 1 Spray(s) Both Nostrils two times a day  furosemide    Tablet 40 milliGRAM(s) Oral daily  heparin   Injectable 5000 Unit(s) SubCutaneous every 12 hours  hydroxychloroquine 200 milliGRAM(s) Oral two times a day  levothyroxine 175 MICROGram(s) Oral daily  melatonin 5 milliGRAM(s) Oral at bedtime  predniSONE   Tablet 40 milliGRAM(s) Oral daily  senna 2 Tablet(s) Oral at bedtime  triamcinolone 0.1% Cream 1 Application(s) Topical two times a day    PRN MEDICATIONS  albuterol    90 MICROgram(s) HFA Inhaler 2 Puff(s) Inhalation every 6 hours PRN  oxycodone    5 mG/acetaminophen 325 mG 1 Tablet(s) Oral every 6 hours PRN    VITALS  T(F): 97.3 (06-10-25 @ 07:00), Max: 98.4 (06-10-25 @ 00:00)  HR: 62 (06-10-25 @ 07:00) (59 - 77)  BP: 110/68 (06-10-25 @ 07:00) (103/62 - 128/66)  RR: 16 (06-10-25 @ 07:00) (16 - 18)  SpO2: 95% (06-10-25 @ 07:00) (95% - 96%)  POCT Blood Glucose.: 152 mg/dL (06-09-25 @ 11:26)      PHYSICAL EXAM:  GENERAL: NAD, well-groomed, well-developed  HEAD:  Atraumatic, Normocephalic  EYES: EOMI, PERRLA, conjunctiva and sclera clear  ENMT:  Moist mucous membranes  NECK: Supple, No JVD,  CHEST/LUNG: Clear to auscultation bilaterally  HEART: Regular rate and rhythm  ABDOMEN: Soft, Nontender, Nondistended; Bowel sounds present  NEURO:  MENTAL STATUS: AAOx3  LANG/SPEECH: Fluent, intact naming, repetition & comprehension  CRANIAL NERVES:  II: Pupils equal and reactive, no RAPD, normal visual field and fundus  III, IV, VI: EOM intact, no gaze preference or deviation  V: normal  VII: no facial asymmetry  VIII: normal hearing to speech  MOTOR: 5/5 in both upper and lower extremities  REFLEXES: 2/4 throughout  SENSORY: Normal to touch  COORD: Normal finger to nose   Gait:   EXTREMITIES: No LE edema, no calf tenderness  LYMPH: No lymphadenopathy noted  SKIN: R leg dry erythematous skin         LABS             10.6   6.97  )-----------( 220      ( 06-10-25 @ 04:52 )             32.8     144  |  111  |  29  -------------------------<  80   06-10-25 @ 04:52  4.2  |  20  |  0.8    Ca      8.3     06-10-25 @ 04:52  Mg     1.8     06-09-25 @ 05:37    TPro  5.9  /  Alb  3.3  /  TBili  0.2  /  DBili  x   /  AST  17  /  ALT  8   /  AlkPhos  60  /  GGT  x     06-09-25 @ 05:37    PT/INR - ( 06-08-25 @ 15:45 )   PT: 10.10 sec;   INR: 0.86 ratio  PTT - ( 06-08-25 @ 15:45 )  PTT:30.7 sec      Urinalysis Basic - ( 10 Fernandez 2025 04:52 )    Color: x / Appearance: x / SG: x / pH: x  Gluc: 80 mg/dL / Ketone: x  / Bili: x / Urobili: x   Blood: x / Protein: x / Nitrite: x   Leuk Esterase: x / RBC: x / WBC x   Sq Epi: x / Non Sq Epi: x / Bacteria: x          Culture - Blood (collected 08 Jun 2025 15:45)  Source: Blood Blood-Peripheral  Preliminary Report (10 Fernandez 2025 02:02):    No growth at 24 hours    Culture - Blood (collected 08 Jun 2025 15:45)  Source: Blood Blood-Peripheral  Preliminary Report (10 Fernandez 2025 02:02):    No growth at 24 hours      IMAGING      ASSESSMENT AND PLAN:                                     
SUBJECTIVE:    Patient is a 69y old Female who presents with a chief complaint of   Currently admitted to medicine with the primary diagnosis of Cellulitis       Today is hospital day 2d.     PAST MEDICAL & SURGICAL HISTORY  Lupus    Essential hypertension    CVA (cerebral vascular accident)  1987    Hypothyroidism    FH: bilateral hip replacements    H/O total knee replacement    H/O abdominal hysterectomy      ALLERGIES:  penicillins (Other)  aspirin (Other)  morphine (Other)  Compazine (Other)  Levaquin (Other)    MEDICATIONS:  STANDING MEDICATIONS  ascorbic acid 250 milliGRAM(s) Oral daily  aspirin  chewable 81 milliGRAM(s) Oral daily  atenolol  Tablet 50 milliGRAM(s) Oral daily  azaTHIOprine 50 milliGRAM(s) Oral daily  cephalexin 500 milliGRAM(s) Oral every 12 hours  chlorhexidine 2% Cloths 1 Application(s) Topical <User Schedule>  famotidine    Tablet 20 milliGRAM(s) Oral daily  fluticasone propionate 50 MICROgram(s)/spray Nasal Spray 1 Spray(s) Both Nostrils two times a day  furosemide    Tablet 40 milliGRAM(s) Oral daily  heparin   Injectable 5000 Unit(s) SubCutaneous every 12 hours  hydroxychloroquine 200 milliGRAM(s) Oral two times a day  levothyroxine 175 MICROGram(s) Oral daily  melatonin 5 milliGRAM(s) Oral at bedtime  predniSONE   Tablet 40 milliGRAM(s) Oral daily  senna 2 Tablet(s) Oral at bedtime  triamcinolone 0.1% Cream 1 Application(s) Topical two times a day    PRN MEDICATIONS  albuterol    90 MICROgram(s) HFA Inhaler 2 Puff(s) Inhalation every 6 hours PRN  oxycodone    5 mG/acetaminophen 325 mG 1 Tablet(s) Oral every 6 hours PRN    VITALS:   T(F): 97.3  HR: 62  BP: 110/68  RR: 16  SpO2: 95%    LABS:                        10.6   6.97  )-----------( 220      ( 10 Fernandez 2025 04:52 )             32.8     06-10    144  |  111[H]  |  29[H]  ----------------------------<  80  4.2   |  20  |  0.8    Ca    8.3[L]      10 Fernandez 2025 04:52  Mg     1.8     06-09    TPro  5.9[L]  /  Alb  3.3[L]  /  TBili  0.2  /  DBili  x   /  AST  17  /  ALT  8   /  AlkPhos  60  06-09    PT/INR - ( 08 Jun 2025 15:45 )   PT: 10.10 sec;   INR: 0.86 ratio         PTT - ( 08 Jun 2025 15:45 )  PTT:30.7 sec  Urinalysis Basic - ( 10 Fernandez 2025 04:52 )    Color: x / Appearance: x / SG: x / pH: x  Gluc: 80 mg/dL / Ketone: x  / Bili: x / Urobili: x   Blood: x / Protein: x / Nitrite: x   Leuk Esterase: x / RBC: x / WBC x   Sq Epi: x / Non Sq Epi: x / Bacteria: x            Culture - Blood (collected 08 Jun 2025 15:45)  Source: Blood Blood-Peripheral  Preliminary Report (10 Fernandez 2025 02:02):    No growth at 24 hours    Culture - Blood (collected 08 Jun 2025 15:45)  Source: Blood Blood-Peripheral  Preliminary Report (10 Fernandez 2025 02:02):    No growth at 24 hours          RADIOLOGY:    PHYSICAL EXAM:  GEN: No acute distress  LUNGS: Clear to auscultation bilaterally   HEART: S1/S2 present. RRR.   ABD/ GI: Soft, non-tender, non-distended. Bowel sounds present  EXT: rt leg redness reduced  NEURO: AAOX3    
LATOYA FULTON 69y Female  MRN#: [id number]     Hospital Day: 1d    SUBJECTIVE     No overnight events.                                             ----------------------------------------------------------  OBJECTIVE  PAST MEDICAL & SURGICAL HISTORY  Lupus    Essential hypertension    CVA (cerebral vascular accident)  1987    Hypothyroidism    FH: bilateral hip replacements    H/O total knee replacement    H/O abdominal hysterectomy                                              -----------------------------------------------------------  ALLERGIES:  penicillins (Other)  aspirin (Other)  morphine (Other)  Compazine (Other)  Levaquin (Other)                                            ------------------------------------------------------------    HOME MEDICATIONS  Home Medications:  Albuterol (Eqv-ProAir HFA) 90 mcg/inh inhalation aerosol: 2 puff(s) inhaled 3 times a day (08 Jun 2025 19:11)  alendronate 70 mg oral tablet: 1 tab(s) orally once a week (08 Jun 2025 19:18)  aspirin 81 mg oral capsule: 1 cap(s) orally once a day (08 Jun 2025 19:12)  atenolol 50 mg oral tablet: 1 tab(s) orally once a day (08 Jun 2025 19:12)  azaTHIOprine 50 mg oral tablet: 1 tab(s) orally once a day (08 Jun 2025 19:13)  ferrous fumarate 324 mg (106 mg elemental iron) oral tablet: 1 tab(s) orally 3 times a day (08 Jun 2025 19:13)  fluticasone 50 mcg/inh inhalation powder: 1 inhaler(s) inhaled 2 times a day (08 Jun 2025 19:14)  furosemide 40 mg oral tablet: 1 tab(s) orally once a day (08 Jun 2025 19:18)  levothyroxine 175 mcg (0.175 mg) oral tablet: 1 tab(s) orally once a day (08 Jun 2025 19:18)  Melatonin 10 mg oral tablet: 1 tab(s) orally once a day (at bedtime) (08 Jun 2025 19:18)  oxycodone-acetaminophen 7.5 mg-325 mg oral tablet: 1 tab(s) orally 3 times a day (08 Jun 2025 19:16)  Pepcid 20 mg oral tablet: 1 tab(s) orally once a day (08 Jun 2025 19:18)  Plaquenil 200 mg oral tablet: 1 tab(s) orally 2 times a day (08 Jun 2025 19:17)  senna (sennosides) 8.6 mg oral tablet: 1 tab(s) orally once a day (at bedtime) (08 Jun 2025 19:18)  triamcinolone 0.1% topical cream: 1 Apply topically to affected area every 12 hours (08 Jun 2025 19:18)  Vitamin C 250 mg oral tablet, chewable: 1 tab(s) chewed once a day (08 Jun 2025 19:18)                           MEDICATIONS:  STANDING MEDICATIONS  ascorbic acid 250 milliGRAM(s) Oral daily  aspirin  chewable 81 milliGRAM(s) Oral daily  atenolol  Tablet 50 milliGRAM(s) Oral daily  azaTHIOprine 50 milliGRAM(s) Oral daily  chlorhexidine 2% Cloths 1 Application(s) Topical <User Schedule>  famotidine    Tablet 20 milliGRAM(s) Oral daily  fluticasone propionate 50 MICROgram(s)/spray Nasal Spray 1 Spray(s) Both Nostrils two times a day  furosemide    Tablet 40 milliGRAM(s) Oral daily  heparin   Injectable 5000 Unit(s) SubCutaneous every 12 hours  hydroxychloroquine 200 milliGRAM(s) Oral two times a day  levothyroxine 175 MICROGram(s) Oral daily  melatonin 5 milliGRAM(s) Oral at bedtime  predniSONE   Tablet 40 milliGRAM(s) Oral daily  senna 2 Tablet(s) Oral at bedtime  triamcinolone 0.1% Cream 1 Application(s) Topical two times a day    PRN MEDICATIONS  albuterol    90 MICROgram(s) HFA Inhaler 2 Puff(s) Inhalation every 6 hours PRN  oxycodone    5 mG/acetaminophen 325 mG 1 Tablet(s) Oral every 6 hours PRN                                            ------------------------------------------------------------  VITAL SIGNS: Last 24 Hours  T(C): 36.6 (09 Jun 2025 07:00), Max: 37 (09 Jun 2025 00:00)  T(F): 97.8 (09 Jun 2025 07:00), Max: 98.6 (09 Jun 2025 00:00)  HR: 58 (09 Jun 2025 07:00) (58 - 84)  BP: 131/76 (09 Jun 2025 07:00) (129/72 - 164/77)  BP(mean): --  RR: 16 (09 Jun 2025 07:00) (16 - 18)  SpO2: 96% (09 Jun 2025 07:00) (96% - 98%)      06-08-25 @ 07:01  -  06-09-25 @ 07:00  --------------------------------------------------------  IN: 0 mL / OUT: 500 mL / NET: -500 mL    06-09-25 @ 07:01  -  06-09-25 @ 13:56  --------------------------------------------------------  IN: 0 mL / OUT: 900 mL / NET: -900 mL                                             --------------------------------------------------------------  LABS:                        10.7   9.73  )-----------( 207      ( 09 Jun 2025 05:37 )             33.3     06-09    146  |  113[H]  |  23[H]  ----------------------------<  80  3.9   |  17  |  0.9    Ca    8.6      09 Jun 2025 05:37  Mg     1.8     06-09    TPro  5.9[L]  /  Alb  3.3[L]  /  TBili  0.2  /  DBili  x   /  AST  17  /  ALT  8   /  AlkPhos  60  06-09    PT/INR - ( 08 Jun 2025 15:45 )   PT: 10.10 sec;   INR: 0.86 ratio         PTT - ( 08 Jun 2025 15:45 )  PTT:30.7 sec    Lactate, Blood: 1.7 mmol/L (06-08-25 @ 15:45)            PHYSICAL EXAM:  GENERAL: NAD, lying in bed comfortably  HEENT:  Atraumatic, normocephalic, no JVD  HEART: Regular rate and rhythm, no murmurs, rubs, or gallops  LUNGS: Unlabored respirations.  Clear to auscultation bilaterally, no crackles, wheezing, or rhonchi  ABDOMEN: Soft, nontender, nondistended, +BS  EXTREMITIES: RLE with erythema extending from Knee to foot. Ulcer on Rt lateral ankle. Friable desquamated skin with no induration.   NERVOUS SYSTEM:  A&Ox3, no focal deficits                                              --------------------------------------------------------------                
SUBJECTIVE/OVERNIGHT EVENTS  Today is hospital day 3d. This morning patient was seen and examined at bedside, resting comfortably in bed. No acute or major events overnight.        MEDICATIONS  STANDING MEDICATIONS  ascorbic acid 250 milliGRAM(s) Oral daily  aspirin  chewable 81 milliGRAM(s) Oral daily  atenolol  Tablet 50 milliGRAM(s) Oral daily  azaTHIOprine 50 milliGRAM(s) Oral daily  cephalexin 500 milliGRAM(s) Oral every 12 hours  chlorhexidine 2% Cloths 1 Application(s) Topical <User Schedule>  famotidine    Tablet 20 milliGRAM(s) Oral daily  fluticasone propionate 50 MICROgram(s)/spray Nasal Spray 1 Spray(s) Both Nostrils two times a day  furosemide    Tablet 40 milliGRAM(s) Oral daily  heparin   Injectable 5000 Unit(s) SubCutaneous every 12 hours  hydroxychloroquine 200 milliGRAM(s) Oral two times a day  levothyroxine 175 MICROGram(s) Oral daily  melatonin 5 milliGRAM(s) Oral at bedtime  predniSONE   Tablet 40 milliGRAM(s) Oral daily  senna 2 Tablet(s) Oral at bedtime  triamcinolone 0.1% Cream 1 Application(s) Topical two times a day    PRN MEDICATIONS  albuterol    90 MICROgram(s) HFA Inhaler 2 Puff(s) Inhalation every 6 hours PRN  oxycodone    5 mG/acetaminophen 325 mG 1 Tablet(s) Oral every 6 hours PRN    VITALS  T(F): 98.6 (06-11-25 @ 07:00), Max: 98.6 (06-11-25 @ 07:00)  HR: 58 (06-11-25 @ 07:00) (57 - 70)  BP: 102/64 (06-11-25 @ 07:00) (102/64 - 128/70)  RR: 16 (06-11-25 @ 07:00) (16 - 18)  SpO2: 97% (06-11-25 @ 07:00) (97% - 100%)      PHYSICAL EXAM:  GENERAL: NAD, well-groomed, well-developed  HEAD:  Atraumatic, Normocephalic  EYES: EOMI, PERRLA, conjunctiva and sclera clear  ENMT:  Moist mucous membranes  NECK: Supple   CHEST/LUNG: Clear to auscultation bilaterally  HEART: Regular rate and rhythm  ABDOMEN: Soft, Nontender   NEURO:  MENTAL STATUS: AAOx3  LANG/SPEECH: Fluent, intact naming, repetition & comprehension  EXTREMITIES:   LE edema, no calf tenderness  LYMPH: No lymphadenopathy noted  SKIN: R leg erythema dryness          LABS             12.3   13.34 )-----------( 231      ( 06-11-25 @ 04:53 )             37.9     141  |  109  |  29  -------------------------<  75   06-11-25 @ 04:53  3.8  |  18  |  0.8    Ca      8.3     06-11-25 @ 04:53    TPro  6.1  /  Alb  3.4  /  TBili  0.3  /  DBili  x   /  AST  35  /  ALT  21  /  AlkPhos  74  /  GGT  x     06-11-25 @ 04:53        Urinalysis Basic - ( 11 Jun 2025 04:53 )    Color: x / Appearance: x / SG: x / pH: x  Gluc: 75 mg/dL / Ketone: x  / Bili: x / Urobili: x   Blood: x / Protein: x / Nitrite: x   Leuk Esterase: x / RBC: x / WBC x   Sq Epi: x / Non Sq Epi: x / Bacteria: x          Culture - Blood (collected 08 Jun 2025 15:45)  Source: Blood Blood-Peripheral  Preliminary Report (11 Jun 2025 02:02):    No growth at 48 Hours    Culture - Blood (collected 08 Jun 2025 15:45)  Source: Blood Blood-Peripheral  Preliminary Report (11 Jun 2025 02:02):    No growth at 48 Hours      IMAGING      ASSESSMENT AND PLAN:                        
LATOYA FULTON  69y, Female  Allergy: penicillins (Other)  aspirin (Other)  morphine (Other)  Compazine (Other)  Levaquin (Other)      LOS  3d    CHIEF COMPLAINT:     INTERVAL EVENTS/HPI  - No acute events overnight  - T(F): , Max: 98.6 (06-11-25 @ 07:00)  - Denies any worsening symptoms  - Tolerating medication  - WBC Count: 13.34 (06-11-25 @ 04:53)  WBC Count: 6.97 (06-10-25 @ 04:52)     - Creatinine: 0.8 (06-11-25 @ 04:53)       ROS  General: Denies rigors, nightsweats  HEENT: Denies headache, rhinorrhea, sore throat, eye pain  CV: Denies CP, palpitations  PULM: Denies wheezing, hemoptysis  GI: Denies hematemesis, hematochezia, melena  : Denies discharge, hematuria  MSK: Denies arthralgias, myalgias  SKIN: Denies rash, lesions  NEURO: Denies paresthesias, weakness  PSYCH: Denies depression, anxiety    VITALS:  T(F): 98.6, Max: 98.6 (06-11-25 @ 07:00)  HR: 58  BP: 102/64  RR: 16Vital Signs Last 24 Hrs  T(C): 37 (11 Jun 2025 07:00), Max: 37 (11 Jun 2025 07:00)  T(F): 98.6 (11 Jun 2025 07:00), Max: 98.6 (11 Jun 2025 07:00)  HR: 58 (11 Jun 2025 07:00) (58 - 58)  BP: 102/64 (11 Jun 2025 07:00) (102/64 - 102/64)  BP(mean): --  RR: 16 (11 Jun 2025 07:00) (16 - 16)  SpO2: 97% (11 Jun 2025 07:00) (97% - 97%)    Parameters below as of 11 Jun 2025 07:00  Patient On (Oxygen Delivery Method): room air        PHYSICAL EXAM:  Gen: NAD, resting in bed  HEENT: Normocephalic, atraumatic  Neck: supple, no lymphadenopathy  CV: Regular rate & regular rhythm  Lungs: decreased BS at bases, no fremitus  Abdomen: Soft, BS present  Ext: Warm, well perfused, RLE decreased erythema/edema , dry scaly skin  Neuro: non focal, awake  Skin: no rash, no erythema  Lines: no phlebitis    FH: Non-contributory  Social Hx: Non-contributory    TESTS & MEASUREMENTS:                        12.3   13.34 )-----------( 231      ( 11 Jun 2025 04:53 )             37.9     06-11    141  |  109  |  29[H]  ----------------------------<  75  3.8   |  18  |  0.8    Ca    8.3[L]      11 Jun 2025 04:53    TPro  6.1  /  Alb  3.4[L]  /  TBili  0.3  /  DBili  x   /  AST  35  /  ALT  21  /  AlkPhos  74  06-11      LIVER FUNCTIONS - ( 11 Jun 2025 04:53 )  Alb: 3.4 g/dL / Pro: 6.1 g/dL / ALK PHOS: 74 U/L / ALT: 21 U/L / AST: 35 U/L / GGT: x           Urinalysis Basic - ( 11 Jun 2025 04:53 )    Color: x / Appearance: x / SG: x / pH: x  Gluc: 75 mg/dL / Ketone: x  / Bili: x / Urobili: x   Blood: x / Protein: x / Nitrite: x   Leuk Esterase: x / RBC: x / WBC x   Sq Epi: x / Non Sq Epi: x / Bacteria: x        Culture - Blood (collected 06-08-25 @ 15:45)  Source: Blood Blood-Peripheral  Preliminary Report (06-12-25 @ 02:01):    No growth at 72 Hours    Culture - Blood (collected 06-08-25 @ 15:45)  Source: Blood Blood-Peripheral  Preliminary Report (06-12-25 @ 02:01):    No growth at 72 Hours        Lactate, Blood: 1.7 mmol/L (06-08-25 @ 15:45)      INFECTIOUS DISEASES TESTING  Procalcitonin: 0.03 (06-09-25 @ 05:37)  MRSA PCR Result.: Negative (11-27-24 @ 04:10)  Vancomycin Level, Random: 20.8 (11-26-24 @ 18:05)  Procalcitonin: 0.04 (11-25-24 @ 17:35)      INFLAMMATORY MARKERS      RADIOLOGY & ADDITIONAL TESTS:  I have personally reviewed the last available Chest xray  CXR      CT      CARDIOLOGY TESTING      MEDICATIONS      WEIGHT  Weight (kg): 61.2 (06-08-25 @ 11:55)      ANTIBIOTICS:      All available historical records have been reviewed

## 2025-06-11 NOTE — DISCHARGE NOTE NURSING/CASE MANAGEMENT/SOCIAL WORK - PATIENT PORTAL LINK FT
You can access the FollowMyHealth Patient Portal offered by [facility] by registering at the following website: [email or web address]. By joining [employer]’s FollowMyHealth portal, you will also be able to view your health information using other applications (apps) compatible with our system.

## 2025-06-11 NOTE — DISCHARGE NOTE NURSING/CASE MANAGEMENT/SOCIAL WORK - NSDCPEFALRISK_GEN_ALL_CORE
For information on Fall & Injury Prevention, visit: [email or web address] OR  [email or web address] OR  https://www.cdc.gov/steadi/patient.html

## 2025-06-13 ENCOUNTER — APPOINTMENT (OUTPATIENT)
Dept: VASCULAR SURGERY | Facility: CLINIC | Age: 70
End: 2025-06-13
Payer: MEDICARE

## 2025-06-13 PROCEDURE — 99212 OFFICE O/P EST SF 10 MIN: CPT

## 2025-06-13 RX ORDER — COLLAGENASE SANTYL 250 [ARB'U]/G
250 OINTMENT TOPICAL DAILY
Qty: 1 | Refills: 0 | Status: ACTIVE | COMMUNITY
Start: 2025-06-13 | End: 1900-01-01

## 2025-06-17 DIAGNOSIS — Z88.6 ALLERGY STATUS TO ANALGESIC AGENT: ICD-10-CM

## 2025-06-17 DIAGNOSIS — D64.9 ANEMIA, UNSPECIFIED: ICD-10-CM

## 2025-06-17 DIAGNOSIS — M32.9 SYSTEMIC LUPUS ERYTHEMATOSUS, UNSPECIFIED: ICD-10-CM

## 2025-06-17 DIAGNOSIS — Z88.1 ALLERGY STATUS TO OTHER ANTIBIOTIC AGENTS: ICD-10-CM

## 2025-06-17 DIAGNOSIS — L03.115 CELLULITIS OF RIGHT LOWER LIMB: ICD-10-CM

## 2025-06-17 DIAGNOSIS — I10 ESSENTIAL (PRIMARY) HYPERTENSION: ICD-10-CM

## 2025-06-17 DIAGNOSIS — Z86.73 PERSONAL HISTORY OF TRANSIENT ISCHEMIC ATTACK (TIA), AND CEREBRAL INFARCTION WITHOUT RESIDUAL DEFICITS: ICD-10-CM

## 2025-06-17 DIAGNOSIS — J06.9 ACUTE UPPER RESPIRATORY INFECTION, UNSPECIFIED: ICD-10-CM

## 2025-06-17 DIAGNOSIS — Z88.0 ALLERGY STATUS TO PENICILLIN: ICD-10-CM

## 2025-06-17 DIAGNOSIS — Z88.5 ALLERGY STATUS TO NARCOTIC AGENT: ICD-10-CM

## 2025-06-17 DIAGNOSIS — L97.319 NON-PRESSURE CHRONIC ULCER OF RIGHT ANKLE WITH UNSPECIFIED SEVERITY: ICD-10-CM

## 2025-06-17 DIAGNOSIS — I87.2 VENOUS INSUFFICIENCY (CHRONIC) (PERIPHERAL): ICD-10-CM

## 2025-06-17 DIAGNOSIS — D84.81 IMMUNODEFICIENCY DUE TO CONDITIONS CLASSIFIED ELSEWHERE: ICD-10-CM

## 2025-06-21 NOTE — PATIENT PROFILE ADULT - DEAF OR HARD OF HEARING?
[] : Fellow [Fellow] : Fellow [FreeTextEntry3] :  I personally saw and examined this patient with the fellow physician and was present for the key portions of history taking, examination as well as the Mohs and repair procedures performed .  I agree with the assessment and plan as documented in the fellow's note unless noted below.  no

## 2025-06-22 ENCOUNTER — INPATIENT (INPATIENT)
Facility: HOSPITAL | Age: 70
LOS: 4 days | Discharge: NURSING, REHAB OR LONG-TERM CARE | DRG: 603 | End: 2025-06-27
Attending: STUDENT IN AN ORGANIZED HEALTH CARE EDUCATION/TRAINING PROGRAM | Admitting: STUDENT IN AN ORGANIZED HEALTH CARE EDUCATION/TRAINING PROGRAM
Payer: MEDICARE

## 2025-06-22 VITALS
HEART RATE: 66 BPM | RESPIRATION RATE: 19 BRPM | OXYGEN SATURATION: 99 % | HEIGHT: 62 IN | SYSTOLIC BLOOD PRESSURE: 133 MMHG | DIASTOLIC BLOOD PRESSURE: 82 MMHG | TEMPERATURE: 99 F

## 2025-06-22 DIAGNOSIS — Z82.69 FAMILY HISTORY OF OTHER DISEASES OF THE MUSCULOSKELETAL SYSTEM AND CONNECTIVE TISSUE: Chronic | ICD-10-CM

## 2025-06-22 DIAGNOSIS — Z90.710 ACQUIRED ABSENCE OF BOTH CERVIX AND UTERUS: Chronic | ICD-10-CM

## 2025-06-22 DIAGNOSIS — Z96.659 PRESENCE OF UNSPECIFIED ARTIFICIAL KNEE JOINT: Chronic | ICD-10-CM

## 2025-06-22 DIAGNOSIS — L03.90 CELLULITIS, UNSPECIFIED: ICD-10-CM

## 2025-06-22 LAB
ALBUMIN SERPL ELPH-MCNC: 3.8 G/DL — SIGNIFICANT CHANGE UP (ref 3.5–5.2)
ALP SERPL-CCNC: 81 U/L — SIGNIFICANT CHANGE UP (ref 30–115)
ALT FLD-CCNC: 10 U/L — SIGNIFICANT CHANGE UP (ref 0–41)
ANION GAP SERPL CALC-SCNC: 11 MMOL/L — SIGNIFICANT CHANGE UP (ref 7–14)
AST SERPL-CCNC: 20 U/L — SIGNIFICANT CHANGE UP (ref 0–41)
BASOPHILS # BLD AUTO: 0.02 K/UL — SIGNIFICANT CHANGE UP (ref 0–0.2)
BASOPHILS NFR BLD AUTO: 0.2 % — SIGNIFICANT CHANGE UP (ref 0–1)
BILIRUB SERPL-MCNC: 0.2 MG/DL — SIGNIFICANT CHANGE UP (ref 0.2–1.2)
BUN SERPL-MCNC: 17 MG/DL — SIGNIFICANT CHANGE UP (ref 10–20)
CALCIUM SERPL-MCNC: 8.8 MG/DL — SIGNIFICANT CHANGE UP (ref 8.4–10.5)
CHLORIDE SERPL-SCNC: 107 MMOL/L — SIGNIFICANT CHANGE UP (ref 98–110)
CO2 SERPL-SCNC: 24 MMOL/L — SIGNIFICANT CHANGE UP (ref 17–32)
CREAT SERPL-MCNC: 0.8 MG/DL — SIGNIFICANT CHANGE UP (ref 0.7–1.5)
EGFR: 80 ML/MIN/1.73M2 — SIGNIFICANT CHANGE UP
EGFR: 80 ML/MIN/1.73M2 — SIGNIFICANT CHANGE UP
EOSINOPHIL # BLD AUTO: 0.02 K/UL — SIGNIFICANT CHANGE UP (ref 0–0.7)
EOSINOPHIL NFR BLD AUTO: 0.2 % — SIGNIFICANT CHANGE UP (ref 0–8)
GLUCOSE SERPL-MCNC: 83 MG/DL — SIGNIFICANT CHANGE UP (ref 70–99)
HCT VFR BLD CALC: 37.4 % — SIGNIFICANT CHANGE UP (ref 37–47)
HGB BLD-MCNC: 12.1 G/DL — SIGNIFICANT CHANGE UP (ref 12–16)
IMM GRANULOCYTES NFR BLD AUTO: 0.9 % — HIGH (ref 0.1–0.3)
LYMPHOCYTES # BLD AUTO: 1.3 K/UL — SIGNIFICANT CHANGE UP (ref 1.2–3.4)
LYMPHOCYTES # BLD AUTO: 12.9 % — LOW (ref 20.5–51.1)
MCHC RBC-ENTMCNC: 29.2 PG — SIGNIFICANT CHANGE UP (ref 27–31)
MCHC RBC-ENTMCNC: 32.4 G/DL — SIGNIFICANT CHANGE UP (ref 32–37)
MCV RBC AUTO: 90.3 FL — SIGNIFICANT CHANGE UP (ref 81–99)
MONOCYTES # BLD AUTO: 0.49 K/UL — SIGNIFICANT CHANGE UP (ref 0.1–0.6)
MONOCYTES NFR BLD AUTO: 4.9 % — SIGNIFICANT CHANGE UP (ref 1.7–9.3)
NEUTROPHILS # BLD AUTO: 8.15 K/UL — HIGH (ref 1.4–6.5)
NEUTROPHILS NFR BLD AUTO: 80.9 % — HIGH (ref 42.2–75.2)
NRBC BLD AUTO-RTO: 0 /100 WBCS — SIGNIFICANT CHANGE UP (ref 0–0)
PLATELET # BLD AUTO: 331 K/UL — SIGNIFICANT CHANGE UP (ref 130–400)
PMV BLD: 8.8 FL — SIGNIFICANT CHANGE UP (ref 7.4–10.4)
POTASSIUM SERPL-MCNC: 4.2 MMOL/L — SIGNIFICANT CHANGE UP (ref 3.5–5)
POTASSIUM SERPL-SCNC: 4.2 MMOL/L — SIGNIFICANT CHANGE UP (ref 3.5–5)
PROT SERPL-MCNC: 7 G/DL — SIGNIFICANT CHANGE UP (ref 6–8)
RBC # BLD: 4.14 M/UL — LOW (ref 4.2–5.4)
RBC # FLD: 14.2 % — SIGNIFICANT CHANGE UP (ref 11.5–14.5)
SODIUM SERPL-SCNC: 142 MMOL/L — SIGNIFICANT CHANGE UP (ref 135–146)
WBC # BLD: 10.07 K/UL — SIGNIFICANT CHANGE UP (ref 4.8–10.8)
WBC # FLD AUTO: 10.07 K/UL — SIGNIFICANT CHANGE UP (ref 4.8–10.8)

## 2025-06-22 PROCEDURE — 93970 EXTREMITY STUDY: CPT | Mod: 26

## 2025-06-22 PROCEDURE — 80053 COMPREHEN METABOLIC PANEL: CPT

## 2025-06-22 PROCEDURE — 99222 1ST HOSP IP/OBS MODERATE 55: CPT

## 2025-06-22 PROCEDURE — 73630 X-RAY EXAM OF FOOT: CPT | Mod: 26,RT

## 2025-06-22 PROCEDURE — 97162 PT EVAL MOD COMPLEX 30 MIN: CPT | Mod: GP

## 2025-06-22 PROCEDURE — 73630 X-RAY EXAM OF FOOT: CPT | Mod: RT

## 2025-06-22 PROCEDURE — 36415 COLL VENOUS BLD VENIPUNCTURE: CPT

## 2025-06-22 PROCEDURE — 73590 X-RAY EXAM OF LOWER LEG: CPT | Mod: 26,RT

## 2025-06-22 PROCEDURE — 97116 GAIT TRAINING THERAPY: CPT | Mod: GP

## 2025-06-22 PROCEDURE — 83735 ASSAY OF MAGNESIUM: CPT

## 2025-06-22 PROCEDURE — 80202 ASSAY OF VANCOMYCIN: CPT

## 2025-06-22 PROCEDURE — 85652 RBC SED RATE AUTOMATED: CPT

## 2025-06-22 PROCEDURE — 99285 EMERGENCY DEPT VISIT HI MDM: CPT | Mod: GC

## 2025-06-22 PROCEDURE — 87641 MR-STAPH DNA AMP PROBE: CPT

## 2025-06-22 PROCEDURE — 73590 X-RAY EXAM OF LOWER LEG: CPT | Mod: RT

## 2025-06-22 PROCEDURE — 93970 EXTREMITY STUDY: CPT

## 2025-06-22 PROCEDURE — 86140 C-REACTIVE PROTEIN: CPT

## 2025-06-22 PROCEDURE — 87640 STAPH A DNA AMP PROBE: CPT

## 2025-06-22 PROCEDURE — 73701 CT LOWER EXTREMITY W/DYE: CPT | Mod: RT

## 2025-06-22 PROCEDURE — 97110 THERAPEUTIC EXERCISES: CPT | Mod: GP

## 2025-06-22 PROCEDURE — 85025 COMPLETE CBC W/AUTO DIFF WBC: CPT

## 2025-06-22 RX ORDER — AZTREONAM 2 G/1
1000 INJECTION, POWDER, LYOPHILIZED, FOR SOLUTION INTRAMUSCULAR; INTRAVENOUS EVERY 8 HOURS
Refills: 0 | Status: DISCONTINUED | OUTPATIENT
Start: 2025-06-22 | End: 2025-06-23

## 2025-06-22 RX ORDER — ENOXAPARIN SODIUM 100 MG/ML
40 INJECTION SUBCUTANEOUS EVERY 24 HOURS
Refills: 0 | Status: DISCONTINUED | OUTPATIENT
Start: 2025-06-22 | End: 2025-06-27

## 2025-06-22 RX ORDER — LEVOTHYROXINE SODIUM 300 MCG
175 TABLET ORAL DAILY
Refills: 0 | Status: DISCONTINUED | OUTPATIENT
Start: 2025-06-22 | End: 2025-06-27

## 2025-06-22 RX ORDER — VANCOMYCIN HCL IN 5 % DEXTROSE 1.5G/250ML
1000 PLASTIC BAG, INJECTION (ML) INTRAVENOUS ONCE
Refills: 0 | Status: COMPLETED | OUTPATIENT
Start: 2025-06-22 | End: 2025-06-22

## 2025-06-22 RX ORDER — MELATONIN 5 MG
10 TABLET ORAL AT BEDTIME
Refills: 0 | Status: DISCONTINUED | OUTPATIENT
Start: 2025-06-22 | End: 2025-06-27

## 2025-06-22 RX ORDER — AZATHIOPRINE 50 MG/1
50 TABLET ORAL DAILY
Refills: 0 | Status: DISCONTINUED | OUTPATIENT
Start: 2025-06-22 | End: 2025-06-27

## 2025-06-22 RX ORDER — ALBUTEROL SULFATE 2.5 MG/3ML
2 VIAL, NEBULIZER (ML) INHALATION
Refills: 0 | DISCHARGE

## 2025-06-22 RX ORDER — VANCOMYCIN HCL IN 5 % DEXTROSE 1.5G/250ML
1000 PLASTIC BAG, INJECTION (ML) INTRAVENOUS EVERY 12 HOURS
Refills: 0 | Status: DISCONTINUED | OUTPATIENT
Start: 2025-06-22 | End: 2025-06-23

## 2025-06-22 RX ORDER — HYDROXYCHLOROQUINE SULFATE 200 MG/1
200 TABLET, FILM COATED ORAL
Refills: 0 | Status: DISCONTINUED | OUTPATIENT
Start: 2025-06-22 | End: 2025-06-27

## 2025-06-22 RX ORDER — ASPIRIN 325 MG
1 TABLET ORAL
Refills: 0 | DISCHARGE

## 2025-06-22 RX ORDER — FUROSEMIDE 10 MG/ML
40 INJECTION INTRAMUSCULAR; INTRAVENOUS DAILY
Refills: 0 | Status: DISCONTINUED | OUTPATIENT
Start: 2025-06-22 | End: 2025-06-27

## 2025-06-22 RX ORDER — LISINOPRIL 30 MG/1
50 TABLET ORAL DAILY
Refills: 0 | Status: DISCONTINUED | OUTPATIENT
Start: 2025-06-22 | End: 2025-06-27

## 2025-06-22 RX ORDER — SENNA 187 MG
2 TABLET ORAL AT BEDTIME
Refills: 0 | Status: DISCONTINUED | OUTPATIENT
Start: 2025-06-22 | End: 2025-06-27

## 2025-06-22 RX ORDER — TRIAMCINOLONE ACETONIDE 1 MG/G
1 CREAM TOPICAL
Refills: 0 | Status: DISCONTINUED | OUTPATIENT
Start: 2025-06-22 | End: 2025-06-27

## 2025-06-22 RX ADMIN — AZTREONAM 50 MILLIGRAM(S): 2 INJECTION, POWDER, LYOPHILIZED, FOR SOLUTION INTRAMUSCULAR; INTRAVENOUS at 21:37

## 2025-06-22 RX ADMIN — ENOXAPARIN SODIUM 40 MILLIGRAM(S): 100 INJECTION SUBCUTANEOUS at 19:29

## 2025-06-22 RX ADMIN — Medication 250 MILLIGRAM(S): at 11:00

## 2025-06-22 RX ADMIN — Medication 250 MILLIGRAM(S): at 21:38

## 2025-06-22 RX ADMIN — Medication 5 MILLIGRAM(S): at 21:36

## 2025-06-22 RX ADMIN — AZTREONAM 50 MILLIGRAM(S): 2 INJECTION, POWDER, LYOPHILIZED, FOR SOLUTION INTRAMUSCULAR; INTRAVENOUS at 23:04

## 2025-06-22 RX ADMIN — Medication 1 TABLET(S): at 21:36

## 2025-06-22 NOTE — H&P ADULT - NSHPPHYSICALEXAM_GEN_ALL_CORE
GENERAL: Elderly, appears to be in pain  HEAD:  Atraumatic, normocephalic  EYES: EOMI, PERRLA, conjunctiva and sclera clear  ENT: Moist mucous membranes  NECK: Supple, no JVD  HEART: Regular rate and rhythm, no murmurs, rubs, or gallops  LUNGS: Unlabored respirations.  Clear to auscultation bilaterally, no crackles, wheezing, or rhonchi  ABDOMEN: Soft, nontender, nondistended, +BS  EXTREMITIES: RLE erythema extending from midfoot and terminating just below the knee, tender to touch, superficial dry 2x3cm wound of rt lateral leg, rt foot base infected callus, TTP  NERVOUS SYSTEM:  A&Ox3, no focal deficits   SKIN: No rashes or lesions

## 2025-06-22 NOTE — H&P ADULT - HISTORY OF PRESENT ILLNESS
69-year-old female with past medical history of lupus on immunosuppression meds, hypertension, hx of CVA, hypothyroidism, chronic venous insufficiency presenting today for evaluation of erythema and pain to the right lower extremity.Patient has had intermittent episodes of cellulitis to the right lower extremity over the last few years, Was recently admitted two weeks ago for similar symptoms. X-Ray rt leg at that time showed soft tissue edema without subcutaneous emphysema. RLE arterial and venous duplex were negative. Pt was treated with 7 day course of Keflex 500mg BID. Pt states her swelling has worsened despite taking Keflex as prescribed. Patient denies having any fever, chills, n/v, cp, sob, pleuritic cp, palpitations, diaphoresis, cough, neck pain/stiffness, abd pain, diarrhea, constipation, urinary symptoms, trauma, ha/lh/dizziness, numbness/tingling, sick contacts, recent travel.    ED Vitals  Temp 98.7, HR 66, /82, RR 19, Sat 99 on RA    Labs   WBC 10k, Hb 12.1 (at baseline), Na 142, K 4.2, Creat 0.8, HCO3 24    ED interventions  Vancomycin 1g IV once    Admitted to medicine for RLE cellulitis 69-year-old female with past medical history of SLE on immunosuppression meds, HTN, hx of CVA, hypothyroidism, b/l hip and knee replacement, chronic venous insufficiency presenting today for evaluation of erythema and pain to the right lower extremity and pain at the sole of right foot limiting ambulation.Patient has had intermittent episodes of cellulitis to the right lower extremity over the last few years, Was recently admitted two weeks ago for similar symptoms. X-Ray rt leg at that time showed soft tissue edema without subcutaneous emphysema. RLE arterial and venous duplex were negative. Pt was treated with 7 day course of Keflex 500mg BID and PO prednisone. Pt states her swelling has worsened despite taking Keflex as prescribed. Now she is also experiencing pain in the right forefoot. Patient denies having any fever, chills, n/v, cp, sob, pleuritic cp, palpitations, diaphoresis, cough, neck pain/stiffness, abd pain, diarrhea, constipation, urinary symptoms, trauma, ha/lh/dizziness, numbness/tingling, sick contacts, recent travel.    ED Vitals  Temp 98.7, HR 66, /82, RR 19, Sat 99 on RA    Labs   WBC 10k, Hb 12.1 (at baseline), Na 142, K 4.2, Creat 0.8, HCO3 24    ED interventions  Vancomycin 1g IV once, RLE venous duplex    Admitted to medicine for RLE cellulitis and infected callus on base of rt foot

## 2025-06-22 NOTE — H&P ADULT - ATTENDING COMMENTS
I have personally seen and examined the patient.  I fully participated in the care of this patient. I DISCUSSED WITH THE RESIDENT AND I EDITED THE ABOVE NOTE. except as below     On exam Pt in ED 3   General: awake, alert, NAD, chronic ill appearance  Lungs:  clear to ausculation b/l, normal resp effort  Heart: regular rhythm   Abdomen: soft, non tender non distended  Ext: no edema LEFT LE, R leg erythema, with wound back of lower leg, foot sole with ? callus tender, edema , can move all  his extremities         Care including my personal  review of labs, imaging studies, old records, obtaining history, personally examining patient, counselling and communicating with patient, entering orders for medications/tests/etc, discussions with other health care providers,  documentation in electronic health records, independent interpretation of labs, imaging/procedure results and care coordination.

## 2025-06-22 NOTE — ED PROVIDER NOTE - PHYSICAL EXAMINATION
CONSTITUTIONAL: Well-developed; well-nourished; in no acute distress.   SKIN: warm, dry  HEAD: Normocephalic; atraumatic.  EYES: PERRL, EOMI, no conjunctival erythema  ENT: No nasal discharge; airway clear.  NECK: Supple; non tender.  CARD: S1, S2 normal; no murmurs, gallops, or rubs. Regular rate and rhythm.   RESP: No wheezes, rales or rhonchi.  ABD: soft ntnd  EXT: Normal ROM. RLE is erythematous up to the knee, clear discharge noted, small ulcer to the right lateral lower extremity, warmth. DP pulse 2+ to the RLE.   NEURO: Alert, oriented, grossly unremarkable.  PSYCH: Cooperative, appropriate.

## 2025-06-22 NOTE — ED PROVIDER NOTE - NSICDXPASTMEDICALHX_GEN_ALL_CORE_FT
Eloisa Chao is here for a return obstetrical visit. Today she is 31w6d weeks EGA. She is doing well, just feeling very tired. She  does not have vaginal bleeding, leaking of fluid, contractions. She does not have blurred vision, SOB, or increased swelling in legs or face. Pt does feel fetal movement regularly. Reassuring NST, non reactive. Sending to [facility] for interval growth and BPP, pt states she had appt this morning and missed it. Objective: Mother's Prenatal Vitals  BP: 132/72  Weight: 187 lb (84.8 kg)  Pulse: 117  Patient Position: Up in chair  Prenatal Fetal Information  Fetal Heart Rate: NST-134  Movement: Present  Pt is A&Ox3, in no acute distress. Normocephalic, atraumatic. PERRL. Resp even and non-labored. Skin pink, warm & dry. Gravid abdomen. ESTRADA's well. Gait steady. Assessment:  IUP at 31w6d wks      Diagnosis Orders   1. High-risk pregnancy in third trimester  49274 - [place] FETAL NON-STRESS TEST   2. Moderate episode of recurrent major depressive disorder (HCC)  37940 - [place] FETAL NON-STRESS TEST   3. Advanced maternal age in multigravida, third trimester  0 - [place] FETAL NON-STRESS TEST   4. History of MI (myocardial infarction)  14458 - [place] FETAL NON-STRESS TEST   5. History of CVA (cerebrovascular accident)  34481 - [place] FETAL NON-STRESS TEST   6. Rh negative status during pregnancy in second trimester, antepartum     7. Abnormal glucose affecting pregnancy  48960 - [place] FETAL NON-STRESS TEST   8. High-risk pregnancy in second trimester  13869 - [place] FETAL NON-STRESS TEST   9. 31 weeks gestation of pregnancy     10. Non-stress test reactive       Plan:Pt counseled on GHTN precautions, Kick count and  labor  Continue with routine prenatal care. RTC in 2 wk for prenatal visit    MEDICATIONS:  No orders of the defined types were placed in this encounter.       ORDERS:  Orders Placed This Encounter   Procedures    0 - [place] FETAL NON-STRESS TEST
Pt presents today for routine prenatal visit. Pt denies vaginal bleeding, cramping, or leaking of fluid +fetal movement.
PAST MEDICAL HISTORY:  CVA (cerebral vascular accident) 1987    Essential hypertension     Hypothyroidism     Lupus

## 2025-06-22 NOTE — ED PROVIDER NOTE - OBJECTIVE STATEMENT
69-year-old female with past medical history of lupus on immunosuppression meds, hypertension, hx of CVA, hypothyroidism, chronic venous insufficiency presenting today for evaluation of erythema and pain to the right lower extremity.  Patient has had intermittent episodes of cellulitis to the right lower extremity over the last few years, Was recently discharged 2 weeks ago on p.o. Keflex stated that her swelling has worsened despite taking her antibiotics as prescribed. Patient denies having any fever, chills, n/v, cp, sob, pleuritic cp, palpitations, diaphoresis, cough, neck pain/stiffness, abd pain, diarrhea, constipation, urinary symptoms, trauma, ha/lh/dizziness, numbness/tingling, sick contacts, recent travel.

## 2025-06-22 NOTE — H&P ADULT - ASSESSMENT
#RLE Cellulitis  #Chronic venous insufficiency with Rt ankle venous stasis ulcer  - c/w home oxycodone 5mg q12 as needed for pain  - c/w home triamcinolone cream bid for RLE    #SLE  - c/w Azathioprine and HCQ  - Pt also on prednisone currently     #Chronic Normocytic Anemia  - on iron replacement therapy outpatient    #HTN  #h/o CVA  - c/w home mds    #hypothyrodism  - c/w home levothyroxine      # Misc  DVT ppx: Lovenox  Diet: DASH  GI ppx: N/A  Activity: IAT  Dispo: Med-Surg   #Suspected RLE Cellulitis  #Chronic venous insufficiency with Rt ankle venous stasis ulcer  - Afebrile, WBC 10k  - c/w vancomycin and aztreonam  - f/u RLE duplex  - f/u BCx and MRSA  - c/w home oxycodone 5mg q12 as needed for pain  - c/w home triamcinolone cream bid for RLE  - f/u podiatry for possibly infected rt foot callus    #SLE  - c/w Azathioprine and HCQ  - Pt also on prednisone currently     #Chronic Normocytic Anemia  - on iron replacement therapy outpatient    #HTN  #h/o CVA  - c/w home mds    #hypothyrodism  - c/w home levothyroxine      # Misc  DVT ppx: Lovenox  Diet: DASH  GI ppx: N/A  Activity: IAT  Dispo: Med-Surg 69-year-old female with past medical history of SLE on immunosuppression meds, HTN, hx of CVA, hypothyroidism, b/l hip and knee replacement, chronic venous insufficiency presenting today for evaluation of erythema and pain to the right lower extremity and pain at the sole of right foot limiting ambulation.Patient has had intermittent episodes of cellulitis to the right lower extremity over the last few years, Was recently admitted two weeks ago for similar symptoms. X-Ray rt leg at that time showed soft tissue edema without subcutaneous emphysema. RLE arterial and venous duplex were negative. Pt was treated with 7 day course of Keflex 500mg BID. Pt states her swelling has worsened despite taking Keflex as prescribed. Now she is also experiencing pain in the right forefoot. Patient denies having any fever, chills, n/v, cp, sob, pleuritic cp, palpitations, diaphoresis, cough, neck pain/stiffness, abd pain, diarrhea, constipation, urinary symptoms, trauma, ha/lh/dizziness, numbness/tingling, sick contacts, recent travel.      #Suspected RLE Cellulitis  #Infected rt forefoot base callus   #Chronic venous insufficiency with Rt ankle venous stasis ulcer  - Afebrile, WBC 10k, HD stable  - RLE erythema extending from midfoot and terminating just below the knee, tender to touch. Superficial dry 2x3cm wound of rt lateral leg  - rt foot base callus, tender to palpation  - c/w vancomycin and aztreonam (pt reports penicillin allergy - diffuse rash)  - f/u RLE duplex  - f/u BCx and MRSA  - f/u ESR, CRP  - f/u xray rt fibula and rt foot --> consider CT RLE w/ IV cont   - c/w home oxycodone 5mg q6h as needed for severe pain  - c/w home triamcinolone cream bid for RLE  - f/u podiatry for possibly infected rt foot callus  - f/u ID  - wound care    #SLE  - c/w Azathioprine and HCQ    #Chronic Normocytic Anemia  - on iron replacement therapy outpatient  - will hold iron supplementation in setting of infection    #HTN  #h/o CVA  - c/w atenolol 50mg QD and lasix 40mg QD  - recently stopped taking aspirin (pt says it gave her rectal bleed)    #hypothyrodism  - c/w home levothyroxine 175mcg QD      # Misc  DVT ppx: Lovenox  Diet: DASH  GI ppx: N/A  Activity: IAT  Dispo: Med-Surg  Med rec: Confirmed by pt 69-year-old female with past medical history of SLE on immunosuppression meds, HTN, hx of CVA, hypothyroidism, b/l hip and knee replacement, chronic venous insufficiency presenting today for evaluation of erythema and pain to the right lower extremity and pain at the sole of right foot limiting ambulation.Patient has had intermittent episodes of cellulitis to the right lower extremity over the last few years, Was recently admitted two weeks ago for similar symptoms. X-Ray rt leg at that time showed soft tissue edema without subcutaneous emphysema. RLE arterial and venous duplex were negative. Pt was treated with 7 day course of Keflex 500mg BID. Pt states her swelling has worsened despite taking Keflex as prescribed. Now she is also experiencing pain in the right forefoot. Patient denies having any fever, chills, n/v, cp, sob, pleuritic cp, palpitations, diaphoresis, cough, neck pain/stiffness, abd pain, diarrhea, constipation, urinary symptoms, trauma, ha/lh/dizziness, numbness/tingling, sick contacts, recent travel.      [] R Leg / foot pain likely secondary to Suspected RLE Cellulitis/  #Infected rt forefoot base callus   #Chronic venous insufficiency with Rt ankle ulcer could be venous stasis   [] stasis dermatitis   no sepsis POA   - Afebrile, WBC 10k, HD stable  - RLE erythema extending from midfoot and terminating just below the knee, tender to touch. Superficial dry 2x3cm wound of rt lateral leg  - rt foot base callus, tender to palpation  - c/w vancomycin and aztreonam (pt reports penicillin allergy - diffuse rash)  - f/u RLE duplex  - f/u BCx and MRSA  - f/u ESR, CRP  - f/u xray rt fibula and rt foot --consider   CT RLE w/ IV cont if any concerning findings     - c/w home oxycodone 5mg q6h as needed for severe pain  - c/w home triamcinolone cream bid for RLE  - f/u podiatry for possibly infected rt foot callus  - f/u ID  - wound care    #SLE  - c/w Azathioprine and HCQ    #Chronic Normocytic Anemia  - on iron replacement therapy outpatient  - will hold iron supplementation in setting of infection    #HTN  #h/o CVA  - c/w atenolol 50mg QD and lasix 40mg QD  - recently stopped taking aspirin (pt says it gave her rectal bleed)    #hypothyrodism  - c/w home levothyroxine 175mcg QD      # Misc  DVT ppx: Lovenox  Diet: DASH  GI ppx: N/A  Activity: IAT  Dispo: Med-Surg  Med rec: Confirmed by pt 69-year-old female with past medical history of SLE on immunosuppression meds, HTN, hx of CVA, hypothyroidism, b/l hip and knee replacement, chronic venous insufficiency presenting today for evaluation of erythema and pain to the right lower extremity and pain at the sole of right foot limiting ambulation.Patient has had intermittent episodes of cellulitis to the right lower extremity over the last few years, Was recently admitted two weeks ago for similar symptoms. X-Ray rt leg at that time showed soft tissue edema without subcutaneous emphysema. RLE arterial and venous duplex were negative. Pt was treated with 7 day course of Keflex 500mg BID. Pt states her swelling has worsened despite taking Keflex as prescribed. Now she is also experiencing pain in the right forefoot. Patient denies having any fever, chills, n/v, cp, sob, pleuritic cp, palpitations, diaphoresis, cough, neck pain/stiffness, abd pain, diarrhea, constipation, urinary symptoms, trauma, ha/lh/dizziness, numbness/tingling, sick contacts, recent travel.      [] R Leg / foot pain likely secondary to Suspected RLE Cellulitis/  #Infected rt forefoot base callus   #Chronic venous insufficiency with Rt ankle ulcer could be venous stasis   [] stasis dermatitis   no sepsis POA   - Afebrile, WBC 10k, HD stable  - RLE erythema extending from midfoot and terminating just below the knee, tender to touch. Superficial dry 2x3cm wound of rt lateral leg  - rt foot base callus, tender to palpation  - c/w vancomycin and aztreonam (pt reports penicillin allergy - diffuse rash)  -Vancomycin dose  based on AUC/ANNE-MARIE as per Pharm ID recommendations   - f/u RLE duplex  - f/u BCx and MRSA  - f/u ESR, CRP  - f/u xray rt fibula and rt foot --consider   CT RLE w/ IV cont if any concerning findings     - c/w home oxycodone 5mg q6h as needed for severe pain  - c/w home triamcinolone cream bid for RLE  - f/u podiatry for possibly infected rt foot callus  - f/u ID  - wound care    #SLE  - c/w Azathioprine and HCQ    #Chronic Normocytic Anemia  - on iron replacement therapy outpatient  - will hold iron supplementation in setting of infection    #HTN  #h/o CVA  - c/w atenolol 50mg QD and lasix 40mg QD  - recently stopped taking aspirin (pt says it gave her rectal bleed)    #hypothyrodism  - c/w home levothyroxine 175mcg QD      # Misc  DVT ppx: Lovenox  Diet: DASH  GI ppx: N/A  Activity: IAT  Dispo: Med-Surg  Med rec: Confirmed by pt 69-year-old female with past medical history of SLE on immunosuppression meds, HTN, hx of CVA, hypothyroidism, b/l hip and knee replacement, chronic venous insufficiency presenting today for evaluation of erythema and pain to the right lower extremity and pain at the sole of right foot limiting ambulation.Patient has had intermittent episodes of cellulitis to the right lower extremity over the last few years, Was recently admitted two weeks ago for similar symptoms. X-Ray rt leg at that time showed soft tissue edema without subcutaneous emphysema. RLE arterial and venous duplex were negative. Pt was treated with 7 day course of Keflex 500mg BID. Pt states her swelling has worsened despite taking Keflex as prescribed. Now she is also experiencing pain in the right forefoot. Patient denies having any fever, chills, n/v, cp, sob, pleuritic cp, palpitations, diaphoresis, cough, neck pain/stiffness, abd pain, diarrhea, constipation, urinary symptoms, trauma, ha/lh/dizziness, numbness/tingling, sick contacts, recent travel.      [] R Leg / foot pain likely secondary to Suspected RLE Cellulitis/  #Infected rt forefoot base callus   #Chronic venous insufficiency with Rt ankle ulcer could be venous stasis   [] stasis dermatitis   no sepsis POA   - Afebrile, WBC 10k, HD stable  - RLE erythema extending from midfoot and terminating just below the knee, tender to touch. Superficial dry 2x3cm wound of rt lateral leg  - rt foot base callus, tender to palpation  - c/w vancomycin and aztreonam (pt reports penicillin allergy - diffuse rash)  -Vancomycin dose  based on AUC/ANNE-MARIE as per Pharm ID recommendations   arterial and venous duplex Negative on June 9th   - f/u RLE duplex  - f/u BCx and MRSA  - f/u ESR, CRP  - f/u xray rt fibula and rt foot --consider   CT RLE w/ IV cont if any concerning findings     - c/w home oxycodone 5mg q6h as needed for severe pain  - c/w home triamcinolone cream bid for RLE  - f/u podiatry for possibly infected rt foot callus  - f/u ID  - wound care    #SLE  - c/w Azathioprine and HCQ    #Chronic Normocytic Anemia  - on iron replacement therapy outpatient  - will hold iron supplementation in setting of infection    #HTN  #h/o CVA  - c/w atenolol 50mg QD and lasix 40mg QD  - recently stopped taking aspirin (pt says it gave her rectal bleed)    #hypothyrodism  - c/w home levothyroxine 175mcg QD      # Misc  DVT ppx: Lovenox  Diet: DASH  GI ppx: N/A  Activity: IAT  Dispo: Med-Surg  Med rec: Confirmed by pt

## 2025-06-22 NOTE — H&P ADULT - NSHPROSALLOTHERNEGRD_GEN_ALL_CORE
Loar Scott is a 32 y.o. [id number] at 29w6d presents as a transfer from [facility] complaining of kyree-gluteal and kyree-rectal abscesses. She reports that she started having swelling and redness to the buttock about a week ago. She was being evaluated by her Ob in clinic and was given a prescription of Keflex for these abscesses and for a UTI. She reports she has been taking the Keflex without improvement of these abscesses. States they have grown in size and that she can no longer sit on her bottom or lay on her back due to significant pain. Reports the pain and abscesses have extended to surround her rectum. She denies any fevers, chills, dysuria, hematuria, N/V/D.    This IUP is complicated by h/o Crohns disease (2019>s/p bowel resection, ileostomy;  ileostomy takedown), Subchorionic hematoma (3cm), Ecoli UTI, anemia.  Patient denies contractions, denies vaginal bleeding, denies LOF.   Fetal Movement: normal.     
All other review of systems negative, except as noted in HPI

## 2025-06-23 LAB
ALBUMIN SERPL ELPH-MCNC: 3.2 G/DL — LOW (ref 3.5–5.2)
ALP SERPL-CCNC: 65 U/L — SIGNIFICANT CHANGE UP (ref 30–115)
ALT FLD-CCNC: 7 U/L — SIGNIFICANT CHANGE UP (ref 0–41)
ANION GAP SERPL CALC-SCNC: 13 MMOL/L — SIGNIFICANT CHANGE UP (ref 7–14)
AST SERPL-CCNC: 11 U/L — SIGNIFICANT CHANGE UP (ref 0–41)
BASOPHILS # BLD AUTO: 0.02 K/UL — SIGNIFICANT CHANGE UP (ref 0–0.2)
BASOPHILS NFR BLD AUTO: 0.2 % — SIGNIFICANT CHANGE UP (ref 0–1)
BILIRUB SERPL-MCNC: 0.2 MG/DL — SIGNIFICANT CHANGE UP (ref 0.2–1.2)
BUN SERPL-MCNC: 15 MG/DL — SIGNIFICANT CHANGE UP (ref 10–20)
CALCIUM SERPL-MCNC: 8.2 MG/DL — LOW (ref 8.4–10.5)
CHLORIDE SERPL-SCNC: 107 MMOL/L — SIGNIFICANT CHANGE UP (ref 98–110)
CO2 SERPL-SCNC: 22 MMOL/L — SIGNIFICANT CHANGE UP (ref 17–32)
CREAT SERPL-MCNC: 0.7 MG/DL — SIGNIFICANT CHANGE UP (ref 0.7–1.5)
CRP SERPL-MCNC: 18.8 MG/L — HIGH
EGFR: 94 ML/MIN/1.73M2 — SIGNIFICANT CHANGE UP
EGFR: 94 ML/MIN/1.73M2 — SIGNIFICANT CHANGE UP
EOSINOPHIL # BLD AUTO: 0.01 K/UL — SIGNIFICANT CHANGE UP (ref 0–0.7)
EOSINOPHIL NFR BLD AUTO: 0.1 % — SIGNIFICANT CHANGE UP (ref 0–8)
ERYTHROCYTE [SEDIMENTATION RATE] IN BLOOD: 88 MM/HR — HIGH (ref 0–20)
GLUCOSE SERPL-MCNC: 80 MG/DL — SIGNIFICANT CHANGE UP (ref 70–99)
HCT VFR BLD CALC: 33.3 % — LOW (ref 37–47)
HGB BLD-MCNC: 10.7 G/DL — LOW (ref 12–16)
IMM GRANULOCYTES NFR BLD AUTO: 0.6 % — HIGH (ref 0.1–0.3)
LYMPHOCYTES # BLD AUTO: 1.17 K/UL — LOW (ref 1.2–3.4)
LYMPHOCYTES # BLD AUTO: 13.7 % — LOW (ref 20.5–51.1)
MAGNESIUM SERPL-MCNC: 1.9 MG/DL — SIGNIFICANT CHANGE UP (ref 1.8–2.4)
MCHC RBC-ENTMCNC: 29.5 PG — SIGNIFICANT CHANGE UP (ref 27–31)
MCHC RBC-ENTMCNC: 32.1 G/DL — SIGNIFICANT CHANGE UP (ref 32–37)
MCV RBC AUTO: 91.7 FL — SIGNIFICANT CHANGE UP (ref 81–99)
MONOCYTES # BLD AUTO: 0.45 K/UL — SIGNIFICANT CHANGE UP (ref 0.1–0.6)
MONOCYTES NFR BLD AUTO: 5.3 % — SIGNIFICANT CHANGE UP (ref 1.7–9.3)
NEUTROPHILS # BLD AUTO: 6.86 K/UL — HIGH (ref 1.4–6.5)
NEUTROPHILS NFR BLD AUTO: 80.1 % — HIGH (ref 42.2–75.2)
NRBC BLD AUTO-RTO: 0 /100 WBCS — SIGNIFICANT CHANGE UP (ref 0–0)
PLATELET # BLD AUTO: 303 K/UL — SIGNIFICANT CHANGE UP (ref 130–400)
PMV BLD: 9 FL — SIGNIFICANT CHANGE UP (ref 7.4–10.4)
POTASSIUM SERPL-MCNC: 4.1 MMOL/L — SIGNIFICANT CHANGE UP (ref 3.5–5)
POTASSIUM SERPL-SCNC: 4.1 MMOL/L — SIGNIFICANT CHANGE UP (ref 3.5–5)
PROT SERPL-MCNC: 5.6 G/DL — LOW (ref 6–8)
RBC # BLD: 3.63 M/UL — LOW (ref 4.2–5.4)
RBC # FLD: 14.2 % — SIGNIFICANT CHANGE UP (ref 11.5–14.5)
SODIUM SERPL-SCNC: 142 MMOL/L — SIGNIFICANT CHANGE UP (ref 135–146)
VANCOMYCIN TROUGH SERPL-MCNC: 15.3 UG/ML — HIGH (ref 5–10)
WBC # BLD: 8.56 K/UL — SIGNIFICANT CHANGE UP (ref 4.8–10.8)
WBC # FLD AUTO: 8.56 K/UL — SIGNIFICANT CHANGE UP (ref 4.8–10.8)

## 2025-06-23 PROCEDURE — 99232 SBSQ HOSP IP/OBS MODERATE 35: CPT

## 2025-06-23 RX ORDER — CEFAZOLIN SODIUM IN 0.9 % NACL 3 G/100 ML
1000 INTRAVENOUS SOLUTION, PIGGYBACK (ML) INTRAVENOUS EVERY 8 HOURS
Refills: 0 | Status: DISCONTINUED | OUTPATIENT
Start: 2025-06-24 | End: 2025-06-25

## 2025-06-23 RX ADMIN — Medication 20 MILLIGRAM(S): at 11:59

## 2025-06-23 RX ADMIN — TRIAMCINOLONE ACETONIDE 1 APPLICATION(S): 1 CREAM TOPICAL at 17:50

## 2025-06-23 RX ADMIN — LISINOPRIL 50 MILLIGRAM(S): 30 TABLET ORAL at 06:06

## 2025-06-23 RX ADMIN — Medication 250 MILLIGRAM(S): at 07:00

## 2025-06-23 RX ADMIN — HYDROXYCHLOROQUINE SULFATE 200 MILLIGRAM(S): 200 TABLET, FILM COATED ORAL at 17:50

## 2025-06-23 RX ADMIN — Medication 2 TABLET(S): at 22:10

## 2025-06-23 RX ADMIN — Medication 175 MICROGRAM(S): at 06:06

## 2025-06-23 RX ADMIN — AZTREONAM 50 MILLIGRAM(S): 2 INJECTION, POWDER, LYOPHILIZED, FOR SOLUTION INTRAMUSCULAR; INTRAVENOUS at 06:06

## 2025-06-23 RX ADMIN — AZTREONAM 50 MILLIGRAM(S): 2 INJECTION, POWDER, LYOPHILIZED, FOR SOLUTION INTRAMUSCULAR; INTRAVENOUS at 15:32

## 2025-06-23 RX ADMIN — Medication 10 MILLIGRAM(S): at 22:09

## 2025-06-23 RX ADMIN — TRIAMCINOLONE ACETONIDE 1 APPLICATION(S): 1 CREAM TOPICAL at 07:11

## 2025-06-23 RX ADMIN — HYDROXYCHLOROQUINE SULFATE 200 MILLIGRAM(S): 200 TABLET, FILM COATED ORAL at 06:06

## 2025-06-23 RX ADMIN — AZATHIOPRINE 50 MILLIGRAM(S): 50 TABLET ORAL at 12:01

## 2025-06-23 RX ADMIN — ENOXAPARIN SODIUM 40 MILLIGRAM(S): 100 INJECTION SUBCUTANEOUS at 18:55

## 2025-06-23 RX ADMIN — FUROSEMIDE 40 MILLIGRAM(S): 10 INJECTION INTRAMUSCULAR; INTRAVENOUS at 06:11

## 2025-06-23 RX ADMIN — Medication 250 MILLIGRAM(S): at 17:50

## 2025-06-23 NOTE — PATIENT PROFILE ADULT - NSPROGENPREVTRANSF_GEN_A_NUR
----- Message from Farheen Grant MD sent at 5/5/2025  5:09 PM CDT -----  (degenerative changes can be due to wear and tear due to age or overuse, and sometimes after remote trauma).    foraminal stenosis (narrowing where nerves exit).       
no

## 2025-06-23 NOTE — PROGRESS NOTE ADULT - SUBJECTIVE AND OBJECTIVE BOX
LATOYA FULTON  69y, Female  Allergy: morphine (Other)  Compazine (Other)  penicillins (Other)  Levaquin (Other)  aspirin (Other)    Hospital Day: 1d    Patient seen and examined earlier today.  No acute events overnight.     PMH/PSH:  PAST MEDICAL & SURGICAL HISTORY:  Lupus      Essential hypertension      CVA (cerebral vascular accident)  1987      Hypothyroidism      FH: bilateral hip replacements      H/O total knee replacement      H/O abdominal hysterectomy          LAST 24-Hr EVENTS:    VITALS:  T(F): 97.6 (06-23-25 @ 07:40), Max: 98.2 (06-22-25 @ 20:48)  HR: 54 (06-23-25 @ 07:40)  BP: 108/63 (06-23-25 @ 07:40) (105/69 - 120/58)  RR: 18 (06-23-25 @ 07:40)  SpO2: 98% (06-23-25 @ 07:40)          TESTS & MEASUREMENTS:  Weight/BMI                            10.7   8.56  )-----------( 303      ( 23 Jun 2025 06:11 )             33.3         06-23    142  |  107  |  15  ----------------------------<  80  4.1   |  22  |  0.7    Ca    8.2[L]      23 Jun 2025 06:11  Mg     1.9     06-23    TPro  5.6[L]  /  Alb  3.2[L]  /  TBili  0.2  /  DBili  x   /  AST  11  /  ALT  7   /  AlkPhos  65  06-23    LIVER FUNCTIONS - ( 23 Jun 2025 06:11 )  Alb: 3.2 g/dL / Pro: 5.6 g/dL / ALK PHOS: 65 U/L / ALT: 7 U/L / AST: 11 U/L / GGT: x                 Urinalysis Basic - ( 23 Jun 2025 06:11 )    Color: x / Appearance: x / SG: x / pH: x  Gluc: 80 mg/dL / Ketone: x  / Bili: x / Urobili: x   Blood: x / Protein: x / Nitrite: x   Leuk Esterase: x / RBC: x / WBC x   Sq Epi: x / Non Sq Epi: x / Bacteria: x                            RADIOLOGY, ECG, & ADDITIONAL TESTS:      RECENT DIAGNOSTIC ORDERS:  CT Ankle No Cont, Right: Routine   Indication: assess for possible emphysema superior to calc read on XR  Transport: Stret[facility] (06-23-25 @ 09:17)  Magnesium: AM Sched. Collection: 24-Jun-2025 04:30 (06-23-25 @ 07:23)  Comprehensive Metabolic Panel: AM Sched. Collection: 24-Jun-2025 04:30 (06-23-25 @ 07:23)  Complete Blood Count + Automated Diff: AM Sched. Collection: 24-Jun-2025 04:30 (06-23-25 @ 07:23)  Diet, Regular (06-22-25 @ 18:49)  MRSA/MSSA PCR: Routine (06-22-25 @ 18:49)      MEDICATIONS:  MEDICATIONS  (STANDING):  atenolol  Tablet 50 milliGRAM(s) Oral daily  azaTHIOprine 50 milliGRAM(s) Oral daily  aztreonam  IVPB 1000 milliGRAM(s) IV Intermittent every 8 hours  enoxaparin Injectable 40 milliGRAM(s) SubCutaneous every 24 hours  famotidine    Tablet 20 milliGRAM(s) Oral daily  furosemide    Tablet 40 milliGRAM(s) Oral daily  hydroxychloroquine 200 milliGRAM(s) Oral two times a day  levothyroxine 175 MICROGram(s) Oral daily  melatonin 10 milliGRAM(s) Oral at bedtime  senna 2 Tablet(s) Oral at bedtime  triamcinolone 0.1% Oral Paste 1 Application(s) Topical two times a day  vancomycin  IVPB 1000 milliGRAM(s) IV Intermittent every 12 hours    MEDICATIONS  (PRN):  oxycodone    5 mG/acetaminophen 325 mG 1 Tablet(s) Oral every 6 hours PRN Severe Pain (7 - 10)      HOME MEDICATIONS:  alendronate 70 mg oral tablet (06-08)  atenolol 50 mg oral tablet (06-08)  azaTHIOprine 50 mg oral tablet (06-08)  cephalexin 500 mg oral capsule (06-10)  ferrous fumarate 324 mg (106 mg elemental iron) oral tablet (06-08)  fluticasone 50 mcg/inh inhalation powder (06-08)  furosemide 40 mg oral tablet (06-08)  levothyroxine 175 mcg (0.175 mg) oral tablet (06-08)  Melatonin 10 mg oral tablet (06-08)  oxycodone-acetaminophen 7.5 mg-325 mg oral tablet (06-08)  Pepcid 20 mg oral tablet (06-08)  Plaquenil 200 mg oral tablet (06-08)  predniSONE 20 mg oral tablet (06-10)  senna (sennosides) 8.6 mg oral tablet (06-08)  triamcinolone 0.1% topical cream (06-08)  Vitamin C 250 mg oral tablet, chewable (06-08)      PHYSICAL EXAM:  General: awake, alert, NAD, chronic ill appearance  Lungs:  clear to ausculation b/l, normal resp effort  Heart: regular rhythm   Abdomen: soft, non tender non distended  Ext: no edema LEFT LE, R leg erythema, with wound back of lower leg, foot sole with ? callus tender, edema , can move all  his extremities

## 2025-06-23 NOTE — PATIENT PROFILE ADULT - FALL HARM RISK - HARM RISK INTERVENTIONS

## 2025-06-23 NOTE — CONSULT NOTE ADULT - SUBJECTIVE AND OBJECTIVE BOX
LATOYA FULTON  69y, Female  Allergy: morphine (Other)  Compazine (Other)  penicillins (Other)  Levaquin (Other)  aspirin (Other)      CHIEF COMPLAINT:     LOS  1d    HPI:  69-year-old female with past medical history of SLE on immunosuppression meds, HTN, hx of CVA, hypothyroidism, b/l hip and knee replacement, chronic venous insufficiency presenting today for evaluation of erythema and pain to the right lower extremity and pain at the sole of right foot limiting ambulation.Patient has had intermittent episodes of cellulitis to the right lower extremity over the last few years, Was recently admitted two weeks ago for similar symptoms. X-Ray rt leg at that time showed soft tissue edema without subcutaneous emphysema. RLE arterial and venous duplex were negative. Pt was treated with 7 day course of Keflex 500mg BID and PO prednisone. Pt states her swelling has worsened despite taking Keflex as prescribed. Now she is also experiencing pain in the right forefoot. Patient denies having any fever, chills, n/v, cp, sob, pleuritic cp, palpitations, diaphoresis, cough, neck pain/stiffness, abd pain, diarrhea, constipation, urinary symptoms, trauma, ha/lh/dizziness, numbness/tingling, sick contacts, recent travel.    ED Vitals  Temp 98.7, HR 66, /82, RR 19, Sat 99 on RA    Labs   WBC 10k, Hb 12.1 (at baseline), Na 142, K 4.2, Creat 0.8, HCO3 24    ED interventions  Vancomycin 1g IV once, RLE venous duplex    Admitted to medicine for RLE cellulitis and infected callus on base of rt foot (22 Jun 2025 16:00)      INFECTIOUS DISEASE HISTORY:    PAST MEDICAL & SURGICAL HISTORY:  Lupus      Essential hypertension      CVA (cerebral vascular accident)  1987      Hypothyroidism      FH: bilateral hip replacements      H/O total knee replacement      H/O abdominal hysterectomy          FAMILY HISTORY  No pertinent family history in first degree relatives        SOCIAL HISTORY  Social History:        ROS  General: Denies rigors, nightsweats  HEENT: Denies headache, rhinorrhea, sore throat, eye pain  CV: Denies CP, palpitations  PULM: Denies wheezing, hemoptysis  GI: Denies hematemesis, hematochezia, melena  : Denies discharge, hematuria  MSK: Denies arthralgias, myalgias  SKIN: Denies rash, lesions  NEURO: Denies paresthesias, weakness  PSYCH: Denies depression, anxiety    VITALS:  T(F): 97.6, Max: 98.2 (06-22-25 @ 20:48)  HR: 54  BP: 108/63  RR: 18Vital Signs Last 24 Hrs  T(C): 36.4 (23 Jun 2025 07:40), Max: 36.8 (22 Jun 2025 20:48)  T(F): 97.6 (23 Jun 2025 07:40), Max: 98.2 (22 Jun 2025 20:48)  HR: 54 (23 Jun 2025 07:40) (54 - 65)  BP: 108/63 (23 Jun 2025 07:40) (105/69 - 120/58)  BP(mean): 78 (23 Jun 2025 07:40) (78 - 81)  RR: 18 (23 Jun 2025 07:40) (18 - 18)  SpO2: 98% (23 Jun 2025 07:40) (96% - 98%)    Parameters below as of 23 Jun 2025 07:40  Patient On (Oxygen Delivery Method): room air        PHYSICAL EXAM:  Gen: NAD, resting in bed  HEENT: Normocephalic, atraumatic  Neck: supple, no lymphadenopathy  CV: Regular rate & regular rhythm  Lungs: decreased BS at bases, no fremitus  Abdomen: Soft, BS present  Ext: Warm, well perfused  Neuro: non focal, awake  Skin: no rash, no erythema  Lines: no phlebitis    TESTS & MEASUREMENTS:                        10.7   8.56  )-----------( 303      ( 23 Jun 2025 06:11 )             33.3     06-23    142  |  107  |  15  ----------------------------<  80  4.1   |  22  |  0.7    Ca    8.2[L]      23 Jun 2025 06:11  Mg     1.9     06-23    TPro  5.6[L]  /  Alb  3.2[L]  /  TBili  0.2  /  DBili  x   /  AST  11  /  ALT  7   /  AlkPhos  65  06-23      LIVER FUNCTIONS - ( 23 Jun 2025 06:11 )  Alb: 3.2 g/dL / Pro: 5.6 g/dL / ALK PHOS: 65 U/L / ALT: 7 U/L / AST: 11 U/L / GGT: x           Urinalysis Basic - ( 23 Jun 2025 06:11 )    Color: x / Appearance: x / SG: x / pH: x  Gluc: 80 mg/dL / Ketone: x  / Bili: x / Urobili: x   Blood: x / Protein: x / Nitrite: x   Leuk Esterase: x / RBC: x / WBC x   Sq Epi: x / Non Sq Epi: x / Bacteria: x        Culture - Blood (collected 06-08-25 @ 15:45)  Source: Blood Blood-Peripheral  Final Report (06-14-25 @ 02:00):    No growth at 5 days    Culture - Blood (collected 06-08-25 @ 15:45)  Source: Blood Blood-Peripheral  Final Report (06-14-25 @ 02:00):    No growth at 5 days    Culture - Urine (collected 01-29-25 @ 01:00)  Source: Clean Catch Clean Catch (Midstream)  Final Report (02-01-25 @ 00:43):    >100,000 CFU/ml Proteus mirabilis ESBL  Organism: Proteus mirabilis ESBL (02-01-25 @ 00:43)  Organism: Proteus mirabilis ESBL (02-01-25 @ 00:43)      -  Levofloxacin: R >4      -  Tobramycin: R 8      -  Nitrofurantoin: R 64 Should not be used to treat pyelonephritis      -  Aztreonam: R <=4      -  Gentamicin: R >8      -  Cefazolin: R >16 For uncomplicated UTI with K. pneumoniae, E. coli, or P. mirablis: ANNE-MARIE <=16 is sensitive and ANNE-MARIE >=32 is resistant. This also predicts results for oral agents cefaclor, cefdinir, cefpodoxime, cefprozil, cefuroxime axetil, cephalexin and locarbef for uncomplicated UTI. Note that some isolates may be susceptible to these agents while testing resistant to cefazolin.      -  Cefepime: R 8      -  Piperacillin/Tazobactam: S <=8      -  Ciprofloxacin: R >2      -  Ceftriaxone: R >32      -  Ampicillin: R >16 These ampicillin results predict results for amoxicillin      Method Type: ANNE-MARIE      -  Meropenem: S <=1      -  Ampicillin/Sulbactam: S <=4/2      -  Cefuroxime: R >16      -  Trimethoprim/Sulfamethoxazole: R >2/38      -  Ertapenem: S <=0.5    Culture - Blood (collected 11-25-24 @ 12:42)  Source: .Blood BLOOD  Final Report (11-30-24 @ 22:01):    No growth at 5 days    Culture - Blood (collected 11-25-24 @ 12:42)  Source: .Blood BLOOD  Final Report (11-30-24 @ 22:01):    No growth at 5 days    Culture - Blood (collected 07-16-24 @ 19:49)  Source: .Blood Blood  Final Report (07-22-24 @ 02:00):    No growth at 5 days    Culture - Blood (collected 07-16-24 @ 19:49)  Source: .Blood Blood  Final Report (07-22-24 @ 02:00):    No growth at 5 days            INFECTIOUS DISEASES TESTING  Procalcitonin: 0.03 ng/mL (06-09-25 @ 05:37)  MRSA PCR Result.: Negative (11-27-24 @ 04:10)  Procalcitonin: 0.04 ng/mL (11-25-24 @ 17:35)      RADIOLOGY & ADDITIONAL TESTS:  I have personally reviewed the last Chest xray  CXR      CT      CARDIOLOGY TESTING      MEDICATIONS  atenolol  Tablet 50 Oral daily  azaTHIOprine 50 Oral daily  aztreonam  IVPB 1000 IV Intermittent every 8 hours  enoxaparin Injectable 40 SubCutaneous every 24 hours  famotidine    Tablet 20 Oral daily  furosemide    Tablet 40 Oral daily  hydroxychloroquine 200 Oral two times a day  levothyroxine 175 Oral daily  melatonin 10 Oral at bedtime  senna 2 Oral at bedtime  triamcinolone 0.1% Oral Paste 1 Topical two times a day  vancomycin  IVPB 1000 IV Intermittent every 12 hours      Weight  Weight (kg): 61.2 (06-08-25 @ 11:55)    ANTIBIOTICS:  aztreonam  IVPB 1000 milliGRAM(s) IV Intermittent every 8 hours  hydroxychloroquine 200 milliGRAM(s) Oral two times a day  vancomycin  IVPB 1000 milliGRAM(s) IV Intermittent every 12 hours      ALLERGIES:  morphine (Other)  Compazine (Other)  penicillins (Other)  Levaquin (Other)  aspirin (Other)         LATOYA FULTON  69y, Female  Allergy: morphine (Other)  Compazine (Other)  penicillins (Other)  Levaquin (Other)  aspirin (Other)      CHIEF COMPLAINT:     LOS  1d    HPI:  69-year-old female with past medical history of SLE on immunosuppression meds, HTN, hx of CVA, hypothyroidism, b/l hip and knee replacement, chronic venous insufficiency presenting today for evaluation of erythema and pain to the right lower extremity and pain at the sole of right foot limiting ambulation.Patient has had intermittent episodes of cellulitis to the right lower extremity over the last few years, Was recently admitted two weeks ago for similar symptoms. X-Ray rt leg at that time showed soft tissue edema without subcutaneous emphysema. RLE arterial and venous duplex were negative. Pt was treated with 7 day course of Keflex 500mg BID and PO prednisone. Pt states her swelling has worsened despite taking Keflex as prescribed. Now she is also experiencing pain in the right forefoot. Patient denies having any fever, chills, n/v, cp, sob, pleuritic cp, palpitations, diaphoresis, cough, neck pain/stiffness, abd pain, diarrhea, constipation, urinary symptoms, trauma, ha/lh/dizziness, numbness/tingling, sick contacts, recent travel.    ED Vitals  Temp 98.7, HR 66, /82, RR 19, Sat 99 on RA    Labs   WBC 10k, Hb 12.1 (at baseline), Na 142, K 4.2, Creat 0.8, HCO3 24    ED interventions  Vancomycin 1g IV once, RLE venous duplex    Admitted to medicine for RLE cellulitis and infected callus on base of rt foot (22 Jun 2025 16:00)      INFECTIOUS DISEASE HISTORY:  History as above.   ID consulted for antibiotic management.   RLE with erythema.   Noted ulcer in posterior-lateral aspect - no drainage noted.   X rays showing soft tissue air at dorsal aspect of calcaneous   She is pending CT imaging.   On vancomycin + Aztreonam.   Denies worsening pain.     PAST MEDICAL & SURGICAL HISTORY:  Lupus      Essential hypertension      CVA (cerebral vascular accident)  1987      Hypothyroidism      FH: bilateral hip replacements      H/O total knee replacement      H/O abdominal hysterectomy          FAMILY HISTORY  No pertinent family history in first degree relatives        SOCIAL HISTORY  Social History:        ROS  General: Denies rigors, nightsweats  HEENT: Denies headache, rhinorrhea, sore throat, eye pain  CV: Denies CP, palpitations  PULM: Denies wheezing, hemoptysis  GI: Denies hematemesis, hematochezia, melena  : Denies discharge, hematuria  MSK: Denies arthralgias, myalgias  SKIN: Denies rash, lesions  NEURO: Denies paresthesias, weakness  PSYCH: Denies depression, anxiety    VITALS:  T(F): 97.6, Max: 98.2 (06-22-25 @ 20:48)  HR: 54  BP: 108/63  RR: 18Vital Signs Last 24 Hrs  T(C): 36.4 (23 Jun 2025 07:40), Max: 36.8 (22 Jun 2025 20:48)  T(F): 97.6 (23 Jun 2025 07:40), Max: 98.2 (22 Jun 2025 20:48)  HR: 54 (23 Jun 2025 07:40) (54 - 65)  BP: 108/63 (23 Jun 2025 07:40) (105/69 - 120/58)  BP(mean): 78 (23 Jun 2025 07:40) (78 - 81)  RR: 18 (23 Jun 2025 07:40) (18 - 18)  SpO2: 98% (23 Jun 2025 07:40) (96% - 98%)    Parameters below as of 23 Jun 2025 07:40  Patient On (Oxygen Delivery Method): room air        PHYSICAL EXAM:  Gen: NAD, resting in bed  HEENT: Normocephalic, atraumatic  Neck: supple, no lymphadenopathy  CV: Regular rate & regular rhythm  Lungs: decreased BS at bases, no fremitus  Abdomen: Soft, BS present  Ext: Warm, well perfused  Neuro: non focal, awake  Skin: no rash, no erythema  Lines: no phlebitis    TESTS & MEASUREMENTS:                        10.7   8.56  )-----------( 303      ( 23 Jun 2025 06:11 )             33.3     06-23    142  |  107  |  15  ----------------------------<  80  4.1   |  22  |  0.7    Ca    8.2[L]      23 Jun 2025 06:11  Mg     1.9     06-23    TPro  5.6[L]  /  Alb  3.2[L]  /  TBili  0.2  /  DBili  x   /  AST  11  /  ALT  7   /  AlkPhos  65  06-23      LIVER FUNCTIONS - ( 23 Jun 2025 06:11 )  Alb: 3.2 g/dL / Pro: 5.6 g/dL / ALK PHOS: 65 U/L / ALT: 7 U/L / AST: 11 U/L / GGT: x           Urinalysis Basic - ( 23 Jun 2025 06:11 )    Color: x / Appearance: x / SG: x / pH: x  Gluc: 80 mg/dL / Ketone: x  / Bili: x / Urobili: x   Blood: x / Protein: x / Nitrite: x   Leuk Esterase: x / RBC: x / WBC x   Sq Epi: x / Non Sq Epi: x / Bacteria: x        Culture - Blood (collected 06-08-25 @ 15:45)  Source: Blood Blood-Peripheral  Final Report (06-14-25 @ 02:00):    No growth at 5 days    Culture - Blood (collected 06-08-25 @ 15:45)  Source: Blood Blood-Peripheral  Final Report (06-14-25 @ 02:00):    No growth at 5 days    Culture - Urine (collected 01-29-25 @ 01:00)  Source: Clean Catch Clean Catch (Midstream)  Final Report (02-01-25 @ 00:43):    >100,000 CFU/ml Proteus mirabilis ESBL  Organism: Proteus mirabilis ESBL (02-01-25 @ 00:43)  Organism: Proteus mirabilis ESBL (02-01-25 @ 00:43)      -  Levofloxacin: R >4      -  Tobramycin: R 8      -  Nitrofurantoin: R 64 Should not be used to treat pyelonephritis      -  Aztreonam: R <=4      -  Gentamicin: R >8      -  Cefazolin: R >16 For uncomplicated UTI with K. pneumoniae, E. coli, or P. mirablis: ANNE-MARIE <=16 is sensitive and ANNE-MARIE >=32 is resistant. This also predicts results for oral agents cefaclor, cefdinir, cefpodoxime, cefprozil, cefuroxime axetil, cephalexin and locarbef for uncomplicated UTI. Note that some isolates may be susceptible to these agents while testing resistant to cefazolin.      -  Cefepime: R 8      -  Piperacillin/Tazobactam: S <=8      -  Ciprofloxacin: R >2      -  Ceftriaxone: R >32      -  Ampicillin: R >16 These ampicillin results predict results for amoxicillin      Method Type: ANNE-MARIE      -  Meropenem: S <=1      -  Ampicillin/Sulbactam: S <=4/2      -  Cefuroxime: R >16      -  Trimethoprim/Sulfamethoxazole: R >2/38      -  Ertapenem: S <=0.5    Culture - Blood (collected 11-25-24 @ 12:42)  Source: .Blood BLOOD  Final Report (11-30-24 @ 22:01):    No growth at 5 days    Culture - Blood (collected 11-25-24 @ 12:42)  Source: .Blood BLOOD  Final Report (11-30-24 @ 22:01):    No growth at 5 days    Culture - Blood (collected 07-16-24 @ 19:49)  Source: .Blood Blood  Final Report (07-22-24 @ 02:00):    No growth at 5 days    Culture - Blood (collected 07-16-24 @ 19:49)  Source: .Blood Blood  Final Report (07-22-24 @ 02:00):    No growth at 5 days            INFECTIOUS DISEASES TESTING  Procalcitonin: 0.03 ng/mL (06-09-25 @ 05:37)  MRSA PCR Result.: Negative (11-27-24 @ 04:10)  Procalcitonin: 0.04 ng/mL (11-25-24 @ 17:35)      RADIOLOGY & ADDITIONAL TESTS:  I have personally reviewed the last Chest xray  CXR      CT      CARDIOLOGY TESTING      MEDICATIONS  atenolol  Tablet 50 Oral daily  azaTHIOprine 50 Oral daily  aztreonam  IVPB 1000 IV Intermittent every 8 hours  enoxaparin Injectable 40 SubCutaneous every 24 hours  famotidine    Tablet 20 Oral daily  furosemide    Tablet 40 Oral daily  hydroxychloroquine 200 Oral two times a day  levothyroxine 175 Oral daily  melatonin 10 Oral at bedtime  senna 2 Oral at bedtime  triamcinolone 0.1% Oral Paste 1 Topical two times a day  vancomycin  IVPB 1000 IV Intermittent every 12 hours      Weight  Weight (kg): 61.2 (06-08-25 @ 11:55)    ANTIBIOTICS:  aztreonam  IVPB 1000 milliGRAM(s) IV Intermittent every 8 hours  hydroxychloroquine 200 milliGRAM(s) Oral two times a day  vancomycin  IVPB 1000 milliGRAM(s) IV Intermittent every 12 hours      ALLERGIES:  morphine (Other)  Compazine (Other)  penicillins (Other)  Levaquin (Other)  aspirin (Other)

## 2025-06-23 NOTE — PROGRESS NOTE ADULT - ASSESSMENT
69-year-old female with past medical history of SLE on immunosuppression meds, HTN, hx of CVA, hypothyroidism, b/l hip and knee replacement, chronic venous insufficiency presenting today for evaluation of erythema and pain to the right lower extremity and pain at the sole of right foot limiting ambulation.Patient has had intermittent episodes of cellulitis to the right lower extremity over the last few years, Was recently admitted two weeks ago for similar symptoms. X-Ray rt leg at that time showed soft tissue edema without subcutaneous emphysema. RLE arterial and venous duplex were negative. Pt was treated with 7 day course of Keflex 500mg BID. Pt states her swelling has worsened despite taking Keflex as prescribed. Now she is also experiencing pain in the right forefoot. Patient denies having any fever, chills, n/v, cp, sob, pleuritic cp, palpitations, diaphoresis, cough, neck pain/stiffness, abd pain, diarrhea, constipation, urinary symptoms, trauma, ha/lh/dizziness, numbness/tingling, sick contacts, recent travel.      [] R Leg / foot pain likely secondary to Suspected RLE Cellulitis/  #Infected rt forefoot base callus   #Chronic venous insufficiency with Rt ankle ulcer could be venous stasis   [] stasis dermatitis   no sepsis POA   - Afebrile, WBC 10k, HD stable  - RLE erythema extending from midfoot and terminating just below the knee, tender to touch. Superficial dry 2x3cm wound of rt lateral leg  - rt foot base callus, tender to palpation  - c/w vancomycin and aztreonam (pt reports penicillin allergy - diffuse rash)  -Vancomycin dose  based on AUC/ANNE-MARIE as per Pharm ID recommendations   arterial and venous duplex Negative on June 9th   - f/u RLE duplex  - f/u BCx and MRSA  - ESR, CRP - 88 & 18.8 respectively  Xray Foot AP + Lateral + Oblique, Right (06.22.25 @ 20:41) >  impression:    Bones appear to be osteopenic. There is a chronic deformity of the base   of the fifth metatarsal. 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    There is a halluxvalgus deformity. Hammertoes are suggested throughout.   There is no acute fracture.    There is suggestion of soft tissue air at the dorsal aspect of the   calcaneus. No definite radiographic evidence of osteomyelitis. If there   is high suspicion for osteomyelitis, correlate with MRI.      - c/w home oxycodone 5mg q6h as needed for severe pain  - c/w home triamcinolone cream bid for RLE  - podiatry recommending CT non con R ankle   - f/u ID - recommending Ancef - follow full note  - wound care    #SLE  Immunocompromised due to Autoimmune disorder and medication  - c/w Azathioprine and HCQ    #Chronic Normocytic Anemia  - on iron replacement therapy outpatient  - will hold iron supplementation in setting of infection    #HTN  #h/o CVA  - c/w atenolol 50mg QD and lasix 40mg QD  - recently stopped taking aspirin (pt says it gave her rectal bleed)    #hypothyrodism  c/w home levothyroxine 175mcg QD        DVT ppx: Lovenox  Diet: DASH  Dispo: Med-Surg

## 2025-06-23 NOTE — CONSULT NOTE ADULT - SUBJECTIVE AND OBJECTIVE BOX
Podiatry Consult Note    Subjective:  LATOYA FULTON  Seen Bedside 69y Female  .   Patient is a 69y old  Female who presents with a chief complaint of   HPI:  69-year-old female with past medical history of SLE on immunosuppression meds, HTN, hx of CVA, hypothyroidism, b/l hip and knee replacement, chronic venous insufficiency presenting today for evaluation of erythema and pain to the right lower extremity and pain at the sole of right foot limiting ambulation.Patient has had intermittent episodes of cellulitis to the right lower extremity over the last few years, Was recently admitted two weeks ago for similar symptoms. X-Ray rt leg at that time showed soft tissue edema without subcutaneous emphysema. RLE arterial and venous duplex were negative. Pt was treated with 7 day course of Keflex 500mg BID and PO prednisone. Pt states her swelling has worsened despite taking Keflex as prescribed. Now she is also experiencing pain in the right forefoot. Patient denies having any fever, chills, n/v, cp, sob, pleuritic cp, palpitations, diaphoresis, cough, neck pain/stiffness, abd pain, diarrhea, constipation, urinary symptoms, trauma, ha/lh/dizziness, numbness/tingling, sick contacts, recent travel.    ED Vitals  Temp 98.7, HR 66, /82, RR 19, Sat 99 on RA    Labs   WBC 10k, Hb 12.1 (at baseline), Na 142, K 4.2, Creat 0.8, HCO3 24    ED interventions  Vancomycin 1g IV once, RLE venous duplex    Admitted to medicine for RLE cellulitis and infected callus on base of rt foot (22 Jun 2025 16:00)      Past Medical History and Surgical History  PAST MEDICAL & SURGICAL HISTORY:  Lupus      Essential hypertension      CVA (cerebral vascular accident)  1987      Hypothyroidism      FH: bilateral hip replacements      H/O total knee replacement      H/O abdominal hysterectomy           Review of Systems:  [X] Ten point review of systems is otherwise negative except as noted     Objective:  Vital Signs Last 24 Hrs  T(C): 36.4 (23 Jun 2025 07:40), Max: 36.8 (22 Jun 2025 20:48)  T(F): 97.6 (23 Jun 2025 07:40), Max: 98.2 (22 Jun 2025 20:48)  HR: 54 (23 Jun 2025 07:40) (54 - 65)  BP: 108/63 (23 Jun 2025 07:40) (105/69 - 120/58)  BP(mean): 78 (23 Jun 2025 07:40) (78 - 81)  RR: 18 (23 Jun 2025 07:40) (18 - 18)  SpO2: 98% (23 Jun 2025 07:40) (96% - 98%)    Parameters below as of 23 Jun 2025 07:40  Patient On (Oxygen Delivery Method): room air                            10.7   8.56  )-----------( 303      ( 23 Jun 2025 06:11 )             33.3                 06-23    142  |  107  |  15  ----------------------------<  80  4.1   |  22  |  0.7    Ca    8.2[L]      23 Jun 2025 06:11  Mg     1.9     06-23    TPro  5.6[L]  /  Alb  3.2[L]  /  TBili  0.2  /  DBili  x   /  AST  11  /  ALT  7   /  AlkPhos  65  06-23        Physical Exam Right Lower Extremity Focused:   Derm: Lateral leg fibrotic ulcer, diffuse erythema to the extremity stopping at the tibial tuberosity, black and blue bruising to the anterior distal shin.    Vascular: DP and PT Pulses Diminished; Foot is Warm to the touch; Capillary Refill Time < 3 Seconds;    Neuro: Protective Sensation intact   MSK: Pain On Palpation at Wound Site and  , pain on ROM , localized pain to anterior distal shin     Assessment:  Right leg cellulitis     Plan:  -Chart reviewed and Patient evaluated. All Questions and Concerns Addressed and Answered  -XR Imaging - Foot; Per Radiology : There is suggestion of soft tissue air at the dorsal aspect of the   calcaneus. No definite radiographic evidence of osteomyelitis.  - CT ankle ordered to rule out soft tissue emphysema   -Local Wound Care;  Xeroform, Xerlix   -Weight Bearing Status; WBAT   -ID consult would be appreciated;   - No surgical intervention at this time, pending CT results, if soft tissue air is ruled out patient can follow up as outpatient in podiatry clinic with Dr. Carey.  -Discussed Plan w/ Dr. Carey   Podiatry appreciates the consult!    Podiatry   Please message on teams for further questions

## 2025-06-23 NOTE — PHARMACOTHERAPY INTERVENTION NOTE - COMMENTS
Recommended discontinuing aztreonam as per ID consult recommendations.    Cassius Fagan, PharmD, [facility]  Clinical Pharmacy Specialist, Infectious Diseases  Tele-Antimicrobial Stewardship Program (Tele-ASP)  Tele-ASP Phone: [phone] 
Recommended discontinuing vancomycin as per ID consult recommendations.    Cassius Fagan, PharmD, [facility]  Clinical Pharmacy Specialist, Infectious Diseases  Tele-Antimicrobial Stewardship Program (Tele-ASP)  Tele-ASP Phone: [phone] 
Recommended cefazolin 1g IV q8h, dosed based on an eGFR of 94mL/min/1.73m2, as per ID consult recommendations.    Cassius Fagan, PharmD, [facility]  Clinical Pharmacy Specialist, Infectious Diseases  Tele-Antimicrobial Stewardship Program (Tele-ASP)  Tele-ASP Phone: [phone]

## 2025-06-23 NOTE — CONSULT NOTE ADULT - ASSESSMENT
ASSESSMENT  69-year-old female with past medical history of SLE on immunosuppression meds, HTN, hx of CVA, hypothyroidism, b/l hip and knee replacement, chronic venous insufficiency presenting today for evaluation of erythema and pain to the right lower extremity and pain at the sole of right foot limiting ambulation    IMPRESSION  #RLE erythema/edema  #Chronic Venous insuficicency     #SLE   #Hx of CVA   #S/p bilateral hip and knee replacement     #Obesity BMI (kg/m2): 24.7, 24.7  #DM   #Abx allergy: morphine (Other)  Compazine (Other)  Levaquin (Other)  aspirin (Other)        RECOMMENDATIONS  This is a preliminary incomplete pended note, all final recommendations to follow after interview and examination of the patient.    Please call or message on [employer] Teams if with any questions.  Spectra [phone]   ASSESSMENT  69-year-old female with past medical history of SLE on immunosuppression meds, HTN, hx of CVA, hypothyroidism, b/l hip and knee replacement, chronic venous insufficiency presenting today for evaluation of erythema and pain to the right lower extremity and pain at the sole of right foot limiting ambulation    IMPRESSION  #RLE Cellulitis with possible soft tissue air   -  Xray Foot AP + Lateral + Oblique, Right (06.22.25 @ 20:41): There is suggestion of soft tissue air at the dorsal aspect of the  calcaneus. No definite radiographic evidence of osteomyelitis. If there   is high suspicion for osteomyelitis, correlate with MRI.  - WBC Count: 10.07 K/uL (06.22.25 @ 12:10)    #Chronic Venous insuficicency     #SLE   #Hx of CVA   #S/p bilateral hip and knee replacement     #Obesity BMI (kg/m2): 24.7, 24.7  #DM   #Abx allergy: morphine (Other)  Compazine (Other)  Levaquin (Other)  aspirin (Other)    RECOMMENDATIONS  - noted x ray with possible soft tissue air -- Exam not consistent with necrotizing infection   - follow-up CT of RLE   - narrow vancomycin and aztreonam to cefazolin 1g q 8 hours -- reviewed penicillin allergy (tolerated amoxicillin in the past)     Please call or message on [employer] Teams if with any questions.  Spectra [phone]

## 2025-06-24 LAB
ALBUMIN SERPL ELPH-MCNC: 3.4 G/DL — LOW (ref 3.5–5.2)
ALP SERPL-CCNC: 77 U/L — SIGNIFICANT CHANGE UP (ref 30–115)
ALT FLD-CCNC: 8 U/L — SIGNIFICANT CHANGE UP (ref 0–41)
ANION GAP SERPL CALC-SCNC: 11 MMOL/L — SIGNIFICANT CHANGE UP (ref 7–14)
AST SERPL-CCNC: 15 U/L — SIGNIFICANT CHANGE UP (ref 0–41)
BASOPHILS # BLD AUTO: 0.03 K/UL — SIGNIFICANT CHANGE UP (ref 0–0.2)
BASOPHILS NFR BLD AUTO: 0.3 % — SIGNIFICANT CHANGE UP (ref 0–1)
BILIRUB SERPL-MCNC: 0.3 MG/DL — SIGNIFICANT CHANGE UP (ref 0.2–1.2)
BUN SERPL-MCNC: 11 MG/DL — SIGNIFICANT CHANGE UP (ref 10–20)
CALCIUM SERPL-MCNC: 8.5 MG/DL — SIGNIFICANT CHANGE UP (ref 8.4–10.5)
CHLORIDE SERPL-SCNC: 106 MMOL/L — SIGNIFICANT CHANGE UP (ref 98–110)
CO2 SERPL-SCNC: 24 MMOL/L — SIGNIFICANT CHANGE UP (ref 17–32)
CREAT SERPL-MCNC: 0.7 MG/DL — SIGNIFICANT CHANGE UP (ref 0.7–1.5)
EGFR: 94 ML/MIN/1.73M2 — SIGNIFICANT CHANGE UP
EGFR: 94 ML/MIN/1.73M2 — SIGNIFICANT CHANGE UP
EOSINOPHIL # BLD AUTO: 0.01 K/UL — SIGNIFICANT CHANGE UP (ref 0–0.7)
EOSINOPHIL NFR BLD AUTO: 0.1 % — SIGNIFICANT CHANGE UP (ref 0–8)
GLUCOSE SERPL-MCNC: 84 MG/DL — SIGNIFICANT CHANGE UP (ref 70–99)
HCT VFR BLD CALC: 36 % — LOW (ref 37–47)
HGB BLD-MCNC: 11.7 G/DL — LOW (ref 12–16)
IMM GRANULOCYTES NFR BLD AUTO: 0.6 % — HIGH (ref 0.1–0.3)
LYMPHOCYTES # BLD AUTO: 1.1 K/UL — LOW (ref 1.2–3.4)
LYMPHOCYTES # BLD AUTO: 11.6 % — LOW (ref 20.5–51.1)
MAGNESIUM SERPL-MCNC: 1.6 MG/DL — LOW (ref 1.8–2.4)
MCHC RBC-ENTMCNC: 29.3 PG — SIGNIFICANT CHANGE UP (ref 27–31)
MCHC RBC-ENTMCNC: 32.5 G/DL — SIGNIFICANT CHANGE UP (ref 32–37)
MCV RBC AUTO: 90.2 FL — SIGNIFICANT CHANGE UP (ref 81–99)
MONOCYTES # BLD AUTO: 0.46 K/UL — SIGNIFICANT CHANGE UP (ref 0.1–0.6)
MONOCYTES NFR BLD AUTO: 4.9 % — SIGNIFICANT CHANGE UP (ref 1.7–9.3)
MRSA PCR RESULT.: POSITIVE
NEUTROPHILS # BLD AUTO: 7.79 K/UL — HIGH (ref 1.4–6.5)
NEUTROPHILS NFR BLD AUTO: 82.5 % — HIGH (ref 42.2–75.2)
NRBC BLD AUTO-RTO: 0 /100 WBCS — SIGNIFICANT CHANGE UP (ref 0–0)
PLATELET # BLD AUTO: 289 K/UL — SIGNIFICANT CHANGE UP (ref 130–400)
PMV BLD: 9.2 FL — SIGNIFICANT CHANGE UP (ref 7.4–10.4)
POTASSIUM SERPL-MCNC: 3.8 MMOL/L — SIGNIFICANT CHANGE UP (ref 3.5–5)
POTASSIUM SERPL-SCNC: 3.8 MMOL/L — SIGNIFICANT CHANGE UP (ref 3.5–5)
PROT SERPL-MCNC: 6.1 G/DL — SIGNIFICANT CHANGE UP (ref 6–8)
RBC # BLD: 3.99 M/UL — LOW (ref 4.2–5.4)
RBC # FLD: 13.6 % — SIGNIFICANT CHANGE UP (ref 11.5–14.5)
SODIUM SERPL-SCNC: 141 MMOL/L — SIGNIFICANT CHANGE UP (ref 135–146)
WBC # BLD: 9.45 K/UL — SIGNIFICANT CHANGE UP (ref 4.8–10.8)
WBC # FLD AUTO: 9.45 K/UL — SIGNIFICANT CHANGE UP (ref 4.8–10.8)

## 2025-06-24 PROCEDURE — 99232 SBSQ HOSP IP/OBS MODERATE 35: CPT

## 2025-06-24 PROCEDURE — 73701 CT LOWER EXTREMITY W/DYE: CPT | Mod: 26,RT

## 2025-06-24 RX ORDER — MAGNESIUM SULFATE 500 MG/ML
2 SYRINGE (ML) INJECTION ONCE
Refills: 0 | Status: COMPLETED | OUTPATIENT
Start: 2025-06-24 | End: 2025-06-24

## 2025-06-24 RX ORDER — SODIUM CHLORIDE 9 G/1000ML
1000 INJECTION, SOLUTION INTRAVENOUS
Refills: 0 | Status: COMPLETED | OUTPATIENT
Start: 2025-06-24 | End: 2025-06-25

## 2025-06-24 RX ADMIN — HYDROXYCHLOROQUINE SULFATE 200 MILLIGRAM(S): 200 TABLET, FILM COATED ORAL at 05:08

## 2025-06-24 RX ADMIN — Medication 25 GRAM(S): at 14:30

## 2025-06-24 RX ADMIN — ENOXAPARIN SODIUM 40 MILLIGRAM(S): 100 INJECTION SUBCUTANEOUS at 17:28

## 2025-06-24 RX ADMIN — Medication 100 MILLIGRAM(S): at 12:02

## 2025-06-24 RX ADMIN — SODIUM CHLORIDE 75 MILLILITER(S): 9 INJECTION, SOLUTION INTRAVENOUS at 21:48

## 2025-06-24 RX ADMIN — FUROSEMIDE 40 MILLIGRAM(S): 10 INJECTION INTRAMUSCULAR; INTRAVENOUS at 05:09

## 2025-06-24 RX ADMIN — Medication 2 TABLET(S): at 21:39

## 2025-06-24 RX ADMIN — AZATHIOPRINE 50 MILLIGRAM(S): 50 TABLET ORAL at 12:03

## 2025-06-24 RX ADMIN — Medication 10 MILLIGRAM(S): at 21:39

## 2025-06-24 RX ADMIN — LISINOPRIL 50 MILLIGRAM(S): 30 TABLET ORAL at 05:08

## 2025-06-24 RX ADMIN — Medication 100 MILLIGRAM(S): at 17:28

## 2025-06-24 RX ADMIN — Medication 175 MICROGRAM(S): at 05:09

## 2025-06-24 RX ADMIN — HYDROXYCHLOROQUINE SULFATE 200 MILLIGRAM(S): 200 TABLET, FILM COATED ORAL at 17:27

## 2025-06-24 RX ADMIN — Medication 20 MILLIGRAM(S): at 12:03

## 2025-06-24 RX ADMIN — Medication 1 APPLICATION(S): at 05:09

## 2025-06-24 NOTE — PROGRESS NOTE ADULT - REASON FOR ADMISSION
Patient is a 69y old  Female who presents with a chief complaint of Patient is a 69y old  Female who presents with a chief complaint of Right Foot Pain (23 Jun 2025 14:19)
Patient is a 69y old  Female who presents with a chief complaint of Right Foot Pain

## 2025-06-24 NOTE — PROGRESS NOTE ADULT - SUBJECTIVE AND OBJECTIVE BOX
LATOYA FULTON  69y, Female  Allergy: morphine (Other)  Compazine (Other)  penicillins (Other)  Levaquin (Other)  aspirin (Other)      LOS  2d    CHIEF COMPLAINT: Patient is a 69y old  Female who presents with a chief complaint of Patient is a 69y old  Female who presents with a chief complaint of Right Foot Pain (23 Jun 2025 14:19) (24 Jun 2025 09:48)      INTERVAL EVENTS/HPI  - No acute events overnight  - T(F): , Max: 98.6 (06-24-25 @ 00:45)  - Denies any worsening symptoms  - Tolerating medication  - WBC Count: 9.45 (06-24-25 @ 04:30)  WBC Count: 8.56 (06-23-25 @ 06:11)     - Creatinine: 0.7 (06-24-25 @ 04:30)  Creatinine: 0.7 (06-23-25 @ 06:11)       ROS  General: Denies rigors, nightsweats  HEENT: Denies headache, rhinorrhea, sore throat, eye pain  CV: Denies CP, palpitations  PULM: Denies wheezing, hemoptysis  GI: Denies hematemesis, hematochezia, melena  : Denies discharge, hematuria  MSK: Denies arthralgias, myalgias  SKIN: Denies rash, lesions  NEURO: Denies paresthesias, weakness  PSYCH: Denies depression, anxiety    VITALS:  T(F): 98.1, Max: 98.6 (06-24-25 @ 00:45)  HR: 56  BP: 114/67  RR: 18Vital Signs Last 24 Hrs  T(C): 36.7 (24 Jun 2025 08:02), Max: 37 (24 Jun 2025 00:45)  T(F): 98.1 (24 Jun 2025 08:02), Max: 98.6 (24 Jun 2025 00:45)  HR: 56 (24 Jun 2025 08:02) (56 - 72)  BP: 114/67 (24 Jun 2025 08:02) (100/67 - 115/74)  BP(mean): --  RR: 18 (24 Jun 2025 08:02) (18 - 18)  SpO2: 97% (24 Jun 2025 08:02) (96% - 97%)    Parameters below as of 24 Jun 2025 08:02  Patient On (Oxygen Delivery Method): room air        PHYSICAL EXAM:  Gen: NAD, resting in bed  HEENT: Normocephalic, atraumatic  Neck: supple, no lymphadenopathy  CV: Regular rate & regular rhythm  Lungs: decreased BS at bases, no fremitus  Abdomen: Soft, BS present  Ext: Warm, well perfused  Neuro: non focal, awake  Skin: no rash, no erythema  Lines: no phlebitis    FH: Non-contributory  Social Hx: Non-contributory    TESTS & MEASUREMENTS:                        11.7   9.45  )-----------( 289      ( 24 Jun 2025 04:30 )             36.0     06-24    141  |  106  |  11  ----------------------------<  84  3.8   |  24  |  0.7    Ca    8.5      24 Jun 2025 04:30  Mg     1.6     06-24    TPro  6.1  /  Alb  3.4[L]  /  TBili  0.3  /  DBili  x   /  AST  15  /  ALT  8   /  AlkPhos  77  06-24      LIVER FUNCTIONS - ( 24 Jun 2025 04:30 )  Alb: 3.4 g/dL / Pro: 6.1 g/dL / ALK PHOS: 77 U/L / ALT: 8 U/L / AST: 15 U/L / GGT: x           Urinalysis Basic - ( 24 Jun 2025 04:30 )    Color: x / Appearance: x / SG: x / pH: x  Gluc: 84 mg/dL / Ketone: x  / Bili: x / Urobili: x   Blood: x / Protein: x / Nitrite: x   Leuk Esterase: x / RBC: x / WBC x   Sq Epi: x / Non Sq Epi: x / Bacteria: x        Culture - Blood (collected 06-22-25 @ 12:17)  Source: Blood Blood-Peripheral  Preliminary Report (06-23-25 @ 18:02):    No growth at 24 hours    Culture - Blood (collected 06-22-25 @ 12:17)  Source: Blood Blood-Peripheral  Preliminary Report (06-23-25 @ 18:02):    No growth at 24 hours    Culture - Blood (collected 06-08-25 @ 15:45)  Source: Blood Blood-Peripheral  Final Report (06-14-25 @ 02:00):    No growth at 5 days    Culture - Blood (collected 06-08-25 @ 15:45)  Source: Blood Blood-Peripheral  Final Report (06-14-25 @ 02:00):    No growth at 5 days            INFECTIOUS DISEASES TESTING  MRSA PCR Result.: Positive (06-24-25 @ 12:30)  Procalcitonin: 0.03 (06-09-25 @ 05:37)  MRSA PCR Result.: Negative (11-27-24 @ 04:10)  Procalcitonin: 0.04 (11-25-24 @ 17:35)      INFLAMMATORY MARKERS  Sedimentation Rate, Erythrocyte: 88 mm/hr (06-23-25 @ 06:11)  C-Reactive Protein: 18.8 mg/L (06-23-25 @ 06:11)      RADIOLOGY & ADDITIONAL TESTS:  I have personally reviewed the last available Chest xray  CXR      CT      CARDIOLOGY TESTING      MEDICATIONS  atenolol  Tablet 50 Oral daily  azaTHIOprine 50 Oral daily  ceFAZolin   IVPB 1000 IV Intermittent every 8 hours  chlorhexidine 2% Cloths 1 Topical <User Schedule>  enoxaparin Injectable 40 SubCutaneous every 24 hours  famotidine    Tablet 20 Oral daily  furosemide    Tablet 40 Oral daily  hydroxychloroquine 200 Oral two times a day  lactated ringers. 1000 IV Continuous <Continuous>  levothyroxine 175 Oral daily  melatonin 10 Oral at bedtime  senna 2 Oral at bedtime  triamcinolone 0.1% Oral Paste 1 Topical two times a day      WEIGHT  Weight (kg): 61.5 (06-24-25 @ 00:45)  Creatinine: 0.7 mg/dL (06-24-25 @ 04:30)      ANTIBIOTICS:  ceFAZolin   IVPB 1000 milliGRAM(s) IV Intermittent every 8 hours  hydroxychloroquine 200 milliGRAM(s) Oral two times a day      All available historical records have been reviewed

## 2025-06-24 NOTE — PROGRESS NOTE ADULT - ASSESSMENT
69-year-old female with past medical history of SLE on immunosuppression meds, HTN, hx of CVA, hypothyroidism, b/l hip and knee replacement, chronic venous insufficiency presenting today for evaluation of erythema and pain to the right lower extremity and pain at the sole of right foot limiting ambulation.Patient has had intermittent episodes of cellulitis to the right lower extremity over the last few years, Was recently admitted two weeks ago for similar symptoms. X-Ray rt leg at that time showed soft tissue edema without subcutaneous emphysema. RLE arterial and venous duplex were negative. Pt was treated with 7 day course of Keflex 500mg BID. Pt states her swelling has worsened despite taking Keflex as prescribed. Now she is also experiencing pain in the right forefoot. Patient denies having any fever, chills, n/v, cp, sob, pleuritic cp, palpitations, diaphoresis, cough, neck pain/stiffness, abd pain, diarrhea, constipation, urinary symptoms, trauma, ha/lh/dizziness, numbness/tingling, sick contacts, recent travel.      [] R Leg / foot pain likely secondary to Suspected RLE Cellulitis/  #Infected rt forefoot base callus   #Chronic venous insufficiency with Rt ankle ulcer could be venous stasis   [] stasis dermatitis   no sepsis POA   - Afebrile, WBC 10k, HD stable  - RLE erythema extending from midfoot and terminating just below the knee, tender to touch. Superficial dry 2x3cm wound of rt lateral leg  - rt foot base callus, tender to palpation  - c/w vancomycin and aztreonam (pt reports penicillin allergy - diffuse rash)  -Vancomycin dose  based on AUC/ANNE-MARIE as per Pharm ID recommendations   arterial and venous duplex Negative on June 9th   - f/u RLE duplex  - f/u BCx and MRSA  - ESR, CRP - 88 & 18.8 respectively  Xray Foot AP + Lateral + Oblique, Right (06.22.25 @ 20:41) >  impression:    Bones appear to be osteopenic. There is a chronic deformity of the base   of the fifth metatarsal. 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    There is a halluxvalgus deformity. Hammertoes are suggested throughout.   There is no acute fracture.    There is suggestion of soft tissue air at the dorsal aspect of the   calcaneus. No definite radiographic evidence of osteomyelitis. If there   is high suspicion for osteomyelitis, correlate with MRI.      - c/w home oxycodone 5mg q6h as needed for severe pain  - c/w home triamcinolone cream bid for RLE  - podiatry recommending CT non con R ankle - ordered f/u   - f/u ID - recommending Ancef - follow full note  - wound care    #SLE  Immunocompromised due to Autoimmune disorder and medication  - c/w Azathioprine and HCQ    #Chronic Normocytic Anemia  - on iron replacement therapy outpatient  - will hold iron supplementation in setting of infection    #HTN  #h/o CVA  - c/w atenolol 50mg QD and lasix 40mg QD  - recently stopped taking aspirin (pt says it gave her rectal bleed)    #hypothyrodism  c/w home levothyroxine 175mcg QD        DVT ppx: Lovenox  Diet: DASH  Dispo: Med-Surg   69-year-old female with past medical history of SLE on immunosuppression meds, HTN, hx of CVA, hypothyroidism, b/l hip and knee replacement, chronic venous insufficiency presenting today for evaluation of erythema and pain to the right lower extremity and pain at the sole of right foot limiting ambulation.Patient has had intermittent episodes of cellulitis to the right lower extremity over the last few years, Was recently admitted two weeks ago for similar symptoms. X-Ray rt leg at that time showed soft tissue edema without subcutaneous emphysema. RLE arterial and venous duplex were negative. Pt was treated with 7 day course of Keflex 500mg BID. Pt states her swelling has worsened despite taking Keflex as prescribed. Now she is also experiencing pain in the right forefoot. Patient denies having any fever, chills, n/v, cp, sob, pleuritic cp, palpitations, diaphoresis, cough, neck pain/stiffness, abd pain, diarrhea, constipation, urinary symptoms, trauma, ha/lh/dizziness, numbness/tingling, sick contacts, recent travel.      [] R Leg / foot pain likely secondary to Suspected RLE Cellulitis/  #Infected rt forefoot base callus   #Chronic venous insufficiency with Rt ankle ulcer could be venous stasis   [] stasis dermatitis   no sepsis POA   - Afebrile, WBC 10k, HD stable  - RLE erythema extending from midfoot and terminating just below the knee, tender to touch. Superficial dry 2x3cm wound of rt lateral leg  - rt foot base callus, tender to palpation  - c/w vancomycin and aztreonam (pt reports penicillin allergy - diffuse rash)  -Vancomycin dose  based on AUC/ANNE-MARIE as per Pharm ID recommendations   arterial and venous duplex Negative on June 9th   - RLE duplex neg for DVT   - f/u BCx and MRSA  - ESR, CRP - 88 & 18.8 respectively  Xray Foot AP + Lateral + Oblique, Right (06.22.25 @ 20:41) >  impression:    Bones appear to be osteopenic. There is a chronic deformity of the base   of the fifth metatarsal. 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    There is a halluxvalgus deformity. Hammertoes are suggested throughout.   There is no acute fracture.    There is suggestion of soft tissue air at the dorsal aspect of the   calcaneus. No definite radiographic evidence of osteomyelitis. If there   is high suspicion for osteomyelitis, correlate with MRI.      - c/w home oxycodone 5mg q6h as needed for severe pain  - c/w home triamcinolone cream bid for RLE  - podiatry recommending CT R ankle - ordered f/u   - f/u ID - recommending Ancef   - wound care    #SLE  Immunocompromised due to Autoimmune disorder and medication  - c/w Azathioprine and HCQ    #Chronic Normocytic Anemia  - on iron replacement therapy outpatient  - will hold iron supplementation in setting of infection    #HTN  #h/o CVA  - c/w atenolol 50mg QD and lasix 40mg QD  - recently stopped taking aspirin (pt says it gave her rectal bleed)    #hypothyrodism  c/w home levothyroxine 175mcg QD        DVT ppx: Lovenox  Diet: DASH  Dispo: Med-Surg    Pending: CT, ID f/u Abx

## 2025-06-24 NOTE — PROGRESS NOTE ADULT - SUBJECTIVE AND OBJECTIVE BOX
SUBJECTIVE/OVERNIGHT EVENTS  Today is hospital day 2d. This morning patient was seen and examined at bedside, resting comfortably in bed. No acute or major events overnight.      MEDICATIONS  STANDING MEDICATIONS  atenolol  Tablet 50 milliGRAM(s) Oral daily  azaTHIOprine 50 milliGRAM(s) Oral daily  ceFAZolin   IVPB 1000 milliGRAM(s) IV Intermittent every 8 hours  chlorhexidine 2% Cloths 1 Application(s) Topical <User Schedule>  enoxaparin Injectable 40 milliGRAM(s) SubCutaneous every 24 hours  famotidine    Tablet 20 milliGRAM(s) Oral daily  furosemide    Tablet 40 milliGRAM(s) Oral daily  hydroxychloroquine 200 milliGRAM(s) Oral two times a day  levothyroxine 175 MICROGram(s) Oral daily  magnesium sulfate  IVPB 2 Gram(s) IV Intermittent once  melatonin 10 milliGRAM(s) Oral at bedtime  senna 2 Tablet(s) Oral at bedtime  triamcinolone 0.1% Oral Paste 1 Application(s) Topical two times a day    PRN MEDICATIONS  oxycodone    5 mG/acetaminophen 325 mG 1 Tablet(s) Oral every 6 hours PRN    VITALS  T(F): 98.1 (06-24-25 @ 08:02), Max: 98.6 (06-24-25 @ 00:45)  HR: 56 (06-24-25 @ 08:02) (56 - 72)  BP: 114/67 (06-24-25 @ 08:02) (100/67 - 116/71)  RR: 18 (06-24-25 @ 08:02) (18 - 18)  SpO2: 97% (06-24-25 @ 08:02) (96% - 99%)    PHYSICAL EXAM  GENERAL: NAD, lying in bed comfortably  HEAD:  Atraumatic, normocephalic  EYES: EOMI, PERRLA, conjunctiva and sclera clear  ENT: Moist mucous membranes  NECK: Supple, no JVD  HEART: Regular rate and rhythm, no murmurs, rubs, or gallops  LUNGS: Unlabored respirations.  Clear to auscultation bilaterally, no crackles, wheezing, or rhonchi  ABDOMEN: Soft, nontender, nondistended, +BS  EXTREMITIES: RLE erythema improving, callus on plantar lt forefoot  NERVOUS SYSTEM:  A&Ox3, no focal deficits   SKIN: No rashes or lesions    LABS             11.7   9.45  )-----------( 289      ( 06-24-25 @ 04:30 )             36.0     141  |  106  |  11  -------------------------<  84   06-24-25 @ 04:30  3.8  |  24  |  0.7    Ca      8.5     06-24-25 @ 04:30  Mg     1.6     06-24-25 @ 04:30    TPro  6.1  /  Alb  3.4  /  TBili  0.3  /  DBili  x   /  AST  15  /  ALT  8   /  AlkPhos  77  /  GGT  x     06-24-25 @ 04:30        Urinalysis Basic - ( 24 Jun 2025 04:30 )    Color: x / Appearance: x / SG: x / pH: x  Gluc: 84 mg/dL / Ketone: x  / Bili: x / Urobili: x   Blood: x / Protein: x / Nitrite: x   Leuk Esterase: x / RBC: x / WBC x   Sq Epi: x / Non Sq Epi: x / Bacteria: x          Culture - Blood (collected 22 Jun 2025 12:17)  Source: Blood Blood-Peripheral  Preliminary Report (23 Jun 2025 18:02):    No growth at 24 hours    Culture - Blood (collected 22 Jun 2025 12:17)  Source: Blood Blood-Peripheral  Preliminary Report (23 Jun 2025 18:02):    No growth at 24 hours      IMAGING

## 2025-06-24 NOTE — PROGRESS NOTE ADULT - ASSESSMENT
69-year-old female with past medical history of SLE on immunosuppression meds, HTN, hx of CVA, hypothyroidism, b/l hip and knee replacement, chronic venous insufficiency presenting today for evaluation of erythema and pain to the right lower extremity and pain at the sole of right foot limiting ambulation.Patient has had intermittent episodes of cellulitis to the right lower extremity over the last few years, Was recently admitted two weeks ago for similar symptoms. X-Ray rt leg at that time showed soft tissue edema without subcutaneous emphysema. RLE arterial and venous duplex were negative. Pt was treated with 7 day course of Keflex 500mg BID. Pt states her swelling has worsened despite taking Keflex as prescribed. Now she is also experiencing pain in the right forefoot. Patient denies having any fever, chills, n/v, cp, sob, pleuritic cp, palpitations, diaphoresis, cough, neck pain/stiffness, abd pain, diarrhea, constipation, urinary symptoms, trauma, ha/lh/dizziness, numbness/tingling, sick contacts, recent travel.      [] R Leg / foot pain likely secondary to Suspected RLE Cellulitis/  #Infected rt forefoot base callus   #Chronic venous insufficiency with Rt ankle ulcer could be venous stasis   [] stasis dermatitis   no sepsis POA   - Afebrile, WBC 10k, HD stable  - RLE erythema extending from midfoot and terminating just below the knee, tender to touch. Superficial dry 2x3cm wound of rt lateral leg  - rt foot base callus, tender to palpation  - c/w vancomycin and aztreonam (pt reports penicillin allergy - diffuse rash)  -Vancomycin dose  based on AUC/ANNE-MARIE as per Pharm ID recommendations   arterial and venous duplex Negative on June 9th   - RLE duplex negative for DVT  - BCx NGTD  - f/u MRSA  - ESR, CRP - 88 & 18.8 respectively  Xray Foot AP + Lateral + Oblique, Right (06.22.25 @ 20:41) >  impression:  Bones appear to be osteopenic. There is a chronic deformity of the base   of the fifth metatarsal. 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  There is a halluxvalgus deformity. Hammertoes are suggested throughout.   There is no acute fracture.  There is suggestion of soft tissue air at the dorsal aspect of the   calcaneus. No definite radiographic evidence of osteomyelitis. If there   is high suspicion for osteomyelitis, correlate with MRI.  - c/w home oxycodone 5mg q6h as needed for severe pain  - c/w home triamcinolone cream bid for RLE  - podiatry - no sx intervention, f/u CT R ankle   - f/u ID - c/w Ancef 1g q8h, f/u CT RLE,  Exam not consistent with necrotizing infection   - RLE erythema w/ significant improvement  - wound care    #SLE  Immunocompromised due to Autoimmune disorder and medication  - c/w Azathioprine and HCQ    #Chronic Normocytic Anemia  - on iron replacement therapy outpatient  - will hold iron supplementation in setting of infection    #HTN  #h/o CVA  - c/w atenolol 50mg QD and lasix 40mg QD  - recently stopped taking aspirin (pt says it gave her rectal bleed)    #hypothyrodism  c/w home levothyroxine 175mcg QD        DVT ppx: Lovenox  Diet: DASH  Dispo: Med-Surg

## 2025-06-24 NOTE — PROGRESS NOTE ADULT - SUBJECTIVE AND OBJECTIVE BOX
LATOYA FULTON  69y, Female  Allergy: morphine (Other)  Compazine (Other)  penicillins (Other)  Levaquin (Other)  aspirin (Other)    Hospital Day: 2d    Patient seen and examined earlier today.  No acute events overnight.     PMH/PSH:  PAST MEDICAL & SURGICAL HISTORY:  Lupus      Essential hypertension      CVA (cerebral vascular accident)  1987      Hypothyroidism      FH: bilateral hip replacements      H/O total knee replacement      H/O abdominal hysterectomy          LAST 24-Hr EVENTS:    VITALS:  T(F): 98.1 (06-24-25 @ 08:02), Max: 98.6 (06-24-25 @ 00:45)  HR: 56 (06-24-25 @ 08:02)  BP: 114/67 (06-24-25 @ 08:02) (100/67 - 116/71)  RR: 18 (06-24-25 @ 08:02)  SpO2: 97% (06-24-25 @ 08:02)          TESTS & MEASUREMENTS:  Weight/BMI  61.5 (06-24-25 @ 00:45)  24.8 (06-24-25 @ 00:45)                          11.7   9.45  )-----------( 289      ( 24 Jun 2025 04:30 )             36.0         06-24    141  |  106  |  11  ----------------------------<  84  3.8   |  24  |  0.7    Ca    8.5      24 Jun 2025 04:30  Mg     1.6     06-24    TPro  6.1  /  Alb  3.4[L]  /  TBili  0.3  /  DBili  x   /  AST  15  /  ALT  8   /  AlkPhos  77  06-24    LIVER FUNCTIONS - ( 24 Jun 2025 04:30 )  Alb: 3.4 g/dL / Pro: 6.1 g/dL / ALK PHOS: 77 U/L / ALT: 8 U/L / AST: 15 U/L / GGT: x                 Culture - Blood (collected 06-22-25 @ 12:17)  Source: Blood Blood-Peripheral  Preliminary Report (06-23-25 @ 18:02):    No growth at 24 hours    Culture - Blood (collected 06-22-25 @ 12:17)  Source: Blood Blood-Peripheral  Preliminary Report (06-23-25 @ 18:02):    No growth at 24 hours      Urinalysis Basic - ( 24 Jun 2025 04:30 )    Color: x / Appearance: x / SG: x / pH: x  Gluc: 84 mg/dL / Ketone: x  / Bili: x / Urobili: x   Blood: x / Protein: x / Nitrite: x   Leuk Esterase: x / RBC: x / WBC x   Sq Epi: x / Non Sq Epi: x / Bacteria: x                  Vancomycin Level, Trough: 15.3 ug/mL (06-23-25 @ 20:40)            RADIOLOGY, ECG, & ADDITIONAL TESTS:      RECENT DIAGNOSTIC ORDERS:      MEDICATIONS:  MEDICATIONS  (STANDING):  atenolol  Tablet 50 milliGRAM(s) Oral daily  azaTHIOprine 50 milliGRAM(s) Oral daily  ceFAZolin   IVPB 1000 milliGRAM(s) IV Intermittent every 8 hours  chlorhexidine 2% Cloths 1 Application(s) Topical <User Schedule>  enoxaparin Injectable 40 milliGRAM(s) SubCutaneous every 24 hours  famotidine    Tablet 20 milliGRAM(s) Oral daily  furosemide    Tablet 40 milliGRAM(s) Oral daily  hydroxychloroquine 200 milliGRAM(s) Oral two times a day  levothyroxine 175 MICROGram(s) Oral daily  melatonin 10 milliGRAM(s) Oral at bedtime  senna 2 Tablet(s) Oral at bedtime  triamcinolone 0.1% Oral Paste 1 Application(s) Topical two times a day    MEDICATIONS  (PRN):  oxycodone    5 mG/acetaminophen 325 mG 1 Tablet(s) Oral every 6 hours PRN Severe Pain (7 - 10)      HOME MEDICATIONS:  alendronate 70 mg oral tablet (06-08)  atenolol 50 mg oral tablet (06-08)  azaTHIOprine 50 mg oral tablet (06-08)  cephalexin 500 mg oral capsule (06-10)  ferrous fumarate 324 mg (106 mg elemental iron) oral tablet (06-08)  fluticasone 50 mcg/inh inhalation powder (06-08)  furosemide 40 mg oral tablet (06-08)  levothyroxine 175 mcg (0.175 mg) oral tablet (06-08)  Melatonin 10 mg oral tablet (06-08)  oxycodone-acetaminophen 7.5 mg-325 mg oral tablet (06-08)  Pepcid 20 mg oral tablet (06-08)  Plaquenil 200 mg oral tablet (06-08)  predniSONE 20 mg oral tablet (06-10)  senna (sennosides) 8.6 mg oral tablet (06-08)  triamcinolone 0.1% topical cream (06-08)  Vitamin C 250 mg oral tablet, chewable (06-08)      PHYSICAL EXAM:  General: awake, alert, NAD, chronic ill appearance  Lungs:  clear to ausculation b/l, normal resp effort  Heart: regular rhythm   Abdomen: soft, non tender non distended  Ext: no edema LEFT LE, R leg erythema, with wound back of lower leg, foot sole with ? callus tender, edema , can move all  his extremities

## 2025-06-24 NOTE — PROGRESS NOTE ADULT - ASSESSMENT
ASSESSMENT  69-year-old female with past medical history of SLE on immunosuppression meds, HTN, hx of CVA, hypothyroidism, b/l hip and knee replacement, chronic venous insufficiency presenting today for evaluation of erythema and pain to the right lower extremity and pain at the sole of right foot limiting ambulation    IMPRESSION  #RLE Cellulitis with possible soft tissue air   -  Xray Foot AP + Lateral + Oblique, Right (06.22.25 @ 20:41): There is suggestion of soft tissue air at the dorsal aspect of the  calcaneus. No definite radiographic evidence of osteomyelitis. If there   is high suspicion for osteomyelitis, correlate with MRI.  - WBC Count: 10.07 K/uL (06.22.25 @ 12:10)    #Chronic Venous insuficicency     #SLE   #Hx of CVA   #S/p bilateral hip and knee replacement     #Obesity BMI (kg/m2): 24.7, 24.7  #DM   #Abx allergy: morphine (Other)  Compazine (Other)  Levaquin (Other)  aspirin (Other)    RECOMMENDATIONS  - RLE erythema and swelling has improved since yesterday -- still with some tenderness present   - continue cefazolin 1g q 8 hours   - plan to switch to PO cefadroxil 500 mg BID tomorrow to complete 10 days total (end date 7/1)       Please call or message on [employer] Teams if with any questions.  Spectra [phone]

## 2025-06-25 ENCOUNTER — TRANSCRIPTION ENCOUNTER (OUTPATIENT)
Age: 70
End: 2025-06-25

## 2025-06-25 LAB
ALBUMIN SERPL ELPH-MCNC: 3.2 G/DL — LOW (ref 3.5–5.2)
ALP SERPL-CCNC: 75 U/L — SIGNIFICANT CHANGE UP (ref 30–115)
ALT FLD-CCNC: 8 U/L — SIGNIFICANT CHANGE UP (ref 0–41)
ANION GAP SERPL CALC-SCNC: 14 MMOL/L — SIGNIFICANT CHANGE UP (ref 7–14)
AST SERPL-CCNC: 15 U/L — SIGNIFICANT CHANGE UP (ref 0–41)
BASOPHILS # BLD AUTO: 0.02 K/UL — SIGNIFICANT CHANGE UP (ref 0–0.2)
BASOPHILS NFR BLD AUTO: 0.2 % — SIGNIFICANT CHANGE UP (ref 0–1)
BILIRUB SERPL-MCNC: <0.2 MG/DL — SIGNIFICANT CHANGE UP (ref 0.2–1.2)
BUN SERPL-MCNC: 18 MG/DL — SIGNIFICANT CHANGE UP (ref 10–20)
CALCIUM SERPL-MCNC: 8.6 MG/DL — SIGNIFICANT CHANGE UP (ref 8.4–10.5)
CHLORIDE SERPL-SCNC: 104 MMOL/L — SIGNIFICANT CHANGE UP (ref 98–110)
CO2 SERPL-SCNC: 21 MMOL/L — SIGNIFICANT CHANGE UP (ref 17–32)
CREAT SERPL-MCNC: 0.7 MG/DL — SIGNIFICANT CHANGE UP (ref 0.7–1.5)
EGFR: 94 ML/MIN/1.73M2 — SIGNIFICANT CHANGE UP
EGFR: 94 ML/MIN/1.73M2 — SIGNIFICANT CHANGE UP
EOSINOPHIL # BLD AUTO: 0.02 K/UL — SIGNIFICANT CHANGE UP (ref 0–0.7)
EOSINOPHIL NFR BLD AUTO: 0.2 % — SIGNIFICANT CHANGE UP (ref 0–8)
GLUCOSE SERPL-MCNC: 87 MG/DL — SIGNIFICANT CHANGE UP (ref 70–99)
HCT VFR BLD CALC: 33.5 % — LOW (ref 37–47)
HGB BLD-MCNC: 10.8 G/DL — LOW (ref 12–16)
IMM GRANULOCYTES NFR BLD AUTO: 0.5 % — HIGH (ref 0.1–0.3)
LYMPHOCYTES # BLD AUTO: 1.33 K/UL — SIGNIFICANT CHANGE UP (ref 1.2–3.4)
LYMPHOCYTES # BLD AUTO: 15.2 % — LOW (ref 20.5–51.1)
MAGNESIUM SERPL-MCNC: 1.9 MG/DL — SIGNIFICANT CHANGE UP (ref 1.8–2.4)
MCHC RBC-ENTMCNC: 28.8 PG — SIGNIFICANT CHANGE UP (ref 27–31)
MCHC RBC-ENTMCNC: 32.2 G/DL — SIGNIFICANT CHANGE UP (ref 32–37)
MCV RBC AUTO: 89.3 FL — SIGNIFICANT CHANGE UP (ref 81–99)
MONOCYTES # BLD AUTO: 0.45 K/UL — SIGNIFICANT CHANGE UP (ref 0.1–0.6)
MONOCYTES NFR BLD AUTO: 5.2 % — SIGNIFICANT CHANGE UP (ref 1.7–9.3)
NEUTROPHILS # BLD AUTO: 6.87 K/UL — HIGH (ref 1.4–6.5)
NEUTROPHILS NFR BLD AUTO: 78.7 % — HIGH (ref 42.2–75.2)
NRBC BLD AUTO-RTO: 0 /100 WBCS — SIGNIFICANT CHANGE UP (ref 0–0)
PLATELET # BLD AUTO: 303 K/UL — SIGNIFICANT CHANGE UP (ref 130–400)
PMV BLD: 9.2 FL — SIGNIFICANT CHANGE UP (ref 7.4–10.4)
POTASSIUM SERPL-MCNC: 3.9 MMOL/L — SIGNIFICANT CHANGE UP (ref 3.5–5)
POTASSIUM SERPL-SCNC: 3.9 MMOL/L — SIGNIFICANT CHANGE UP (ref 3.5–5)
PROT SERPL-MCNC: 5.7 G/DL — LOW (ref 6–8)
RBC # BLD: 3.75 M/UL — LOW (ref 4.2–5.4)
RBC # FLD: 14 % — SIGNIFICANT CHANGE UP (ref 11.5–14.5)
SODIUM SERPL-SCNC: 139 MMOL/L — SIGNIFICANT CHANGE UP (ref 135–146)
WBC # BLD: 8.73 K/UL — SIGNIFICANT CHANGE UP (ref 4.8–10.8)
WBC # FLD AUTO: 8.73 K/UL — SIGNIFICANT CHANGE UP (ref 4.8–10.8)

## 2025-06-25 PROCEDURE — 99232 SBSQ HOSP IP/OBS MODERATE 35: CPT

## 2025-06-25 RX ORDER — CEPHALEXIN 250 MG/1
500 CAPSULE ORAL
Refills: 0 | Status: DISCONTINUED | OUTPATIENT
Start: 2025-06-25 | End: 2025-06-27

## 2025-06-25 RX ORDER — FLUTICASONE PROPIONATE 220 UG/1
1 AEROSOL, METERED RESPIRATORY (INHALATION)
Refills: 0 | DISCHARGE

## 2025-06-25 RX ORDER — MUPIROCIN CALCIUM 20 MG/G
1 CREAM TOPICAL
Refills: 0 | Status: DISCONTINUED | OUTPATIENT
Start: 2025-06-25 | End: 2025-06-27

## 2025-06-25 RX ORDER — CEFADROXIL 500 MG/1
1 CAPSULE ORAL
Qty: 14 | Refills: 0
Start: 2025-06-25 | End: 2025-07-01

## 2025-06-25 RX ORDER — ASPIRIN 325 MG
1 TABLET ORAL
Refills: 0 | DISCHARGE

## 2025-06-25 RX ORDER — MUPIROCIN CALCIUM 20 MG/G
1 CREAM TOPICAL
Qty: 1 | Refills: 0
Start: 2025-06-25 | End: 2025-06-29

## 2025-06-25 RX ORDER — CEFAZOLIN SODIUM IN 0.9 % NACL 3 G/100 ML
1000 INTRAVENOUS SOLUTION, PIGGYBACK (ML) INTRAVENOUS EVERY 8 HOURS
Refills: 0 | Status: DISCONTINUED | OUTPATIENT
Start: 2025-06-25 | End: 2025-06-25

## 2025-06-25 RX ADMIN — CEPHALEXIN 500 MILLIGRAM(S): 250 CAPSULE ORAL at 23:58

## 2025-06-25 RX ADMIN — MUPIROCIN CALCIUM 1 APPLICATION(S): 20 CREAM TOPICAL at 11:28

## 2025-06-25 RX ADMIN — Medication 20 MILLIGRAM(S): at 11:30

## 2025-06-25 RX ADMIN — AZATHIOPRINE 50 MILLIGRAM(S): 50 TABLET ORAL at 11:29

## 2025-06-25 RX ADMIN — MUPIROCIN CALCIUM 1 APPLICATION(S): 20 CREAM TOPICAL at 17:12

## 2025-06-25 RX ADMIN — Medication 175 MICROGRAM(S): at 05:11

## 2025-06-25 RX ADMIN — Medication 10 MILLIGRAM(S): at 21:18

## 2025-06-25 RX ADMIN — Medication 100 MILLIGRAM(S): at 02:02

## 2025-06-25 RX ADMIN — ENOXAPARIN SODIUM 40 MILLIGRAM(S): 100 INJECTION SUBCUTANEOUS at 17:10

## 2025-06-25 RX ADMIN — SODIUM CHLORIDE 75 MILLILITER(S): 9 INJECTION, SOLUTION INTRAVENOUS at 05:13

## 2025-06-25 RX ADMIN — CEPHALEXIN 500 MILLIGRAM(S): 250 CAPSULE ORAL at 14:29

## 2025-06-25 RX ADMIN — Medication 2 TABLET(S): at 21:17

## 2025-06-25 RX ADMIN — Medication 1 APPLICATION(S): at 05:11

## 2025-06-25 RX ADMIN — CEPHALEXIN 500 MILLIGRAM(S): 250 CAPSULE ORAL at 17:11

## 2025-06-25 RX ADMIN — HYDROXYCHLOROQUINE SULFATE 200 MILLIGRAM(S): 200 TABLET, FILM COATED ORAL at 17:11

## 2025-06-25 RX ADMIN — Medication 100 MILLIGRAM(S): at 11:26

## 2025-06-25 RX ADMIN — HYDROXYCHLOROQUINE SULFATE 200 MILLIGRAM(S): 200 TABLET, FILM COATED ORAL at 05:11

## 2025-06-25 NOTE — DISCHARGE NOTE PROVIDER - NSDCFUSCHEDAPPT_GEN_ALL_CORE_FT
[facility]  VASCULAR [address] [place] [address] A  Scheduled Appointment: 06/27/2025    Shayne Matias  [facility]  VASCULAR [address] [place] [address] A  Scheduled Appointment: 06/27/2025

## 2025-06-25 NOTE — PHYSICAL THERAPY INITIAL EVALUATION ADULT - GENERAL OBSERVATIONS, REHAB EVAL
Pt seen from 0805 - 0827. Pt encountered semi-christy in bed in NAD. +Dressing RLE C/D/I. Pt participated in bed mobility, transfers, and ambulation. Pt left in b/s chair following session. DENIA sanchez.

## 2025-06-25 NOTE — DISCHARGE NOTE PROVIDER - CARE PROVIDER_API CALL
Cesar Carey  Podiatry Foot & Ankle Surgery  [address] [facility], 1st [address]  [place] [address]  Phone: [phone]  Fax: [phone]  Follow Up Time: 1 week    Katie Crespo  Physician Assistant  [address] [facility], [address]  [place] [address]  Phone: [phone]  Fax: [phone]  Established Patient  Follow Up Time: 1 week   Cesar Carey  Podiatry Foot & Ankle Surgery  [address] [facility], [address]st [address]  [place] [address]  Phone: [phone]  Fax: [phone]  Follow Up Time: 1 week    Katie Crespo  Physician Assistant  [address] [facility], [address]  [place] [address]  Phone: [phone]  Fax: [phone]  Established Patient  Follow Up Time: 1 week    Amadeo Morse  Otolaryngology Head And Neck Surgery  [address] [place] [address]  Phone: [phone]  Fax: [phone]  Follow Up Time: 2 weeks

## 2025-06-25 NOTE — DISCHARGE NOTE PROVIDER - NSDCMRMEDTOKEN_GEN_ALL_CORE_FT
alendronate 70 mg oral tablet: 1 tab(s) orally once a week  atenolol 50 mg oral tablet: 1 tab(s) orally once a day  azaTHIOprine 50 mg oral tablet: 1 tab(s) orally once a day  cefadroxil 500 mg oral capsule: 1 cap(s) orally 2 times a day To end on 7/1/25  ferrous fumarate 324 mg (106 mg elemental iron) oral tablet: 1 tab(s) orally 3 times a day  furosemide 40 mg oral tablet: 1 tab(s) orally once a day  levothyroxine 175 mcg (0.175 mg) oral tablet: 1 tab(s) orally once a day  Melatonin 10 mg oral tablet: 1 tab(s) orally once a day (at bedtime)  mupirocin 2% topical ointment: Apply topically to affected area 2 times a day  oxycodone-acetaminophen 7.5 mg-325 mg oral tablet: 1 tab(s) orally 3 times a day  Pepcid 20 mg oral tablet: 1 tab(s) orally once a day  Plaquenil 200 mg oral tablet: 1 tab(s) orally 2 times a day  senna (sennosides) 8.6 mg oral tablet: 1 tab(s) orally once a day (at bedtime)  triamcinolone 0.1% topical cream: 1 Apply topically to affected area every 12 hours  Vitamin C 250 mg oral tablet, chewable: 1 tab(s) chewed once a day   alendronate 70 mg oral tablet: 1 tab(s) orally once a week  aspirin 81 mg oral tablet: 1 tab(s) orally once a day  atenolol 50 mg oral tablet: 1 tab(s) orally once a day  azaTHIOprine 50 mg oral tablet: 1 tab(s) orally once a day  cefadroxil 500 mg oral capsule: 1 cap(s) orally 2 times a day To end on 7/1/25  ferrous fumarate 324 mg (106 mg elemental iron) oral tablet: 1 tab(s) orally 3 times a day  fluticasone 50 mcg/inh inhalation powder: 1 spray(s) inhaled 2 times a day  furosemide 40 mg oral tablet: 1 tab(s) orally once a day  levothyroxine 175 mcg (0.175 mg) oral tablet: 1 tab(s) orally once a day  Melatonin 10 mg oral tablet: 1 tab(s) orally once a day (at bedtime)  mupirocin 2% topical ointment: Apply topically to affected area 2 times a day  oxycodone-acetaminophen 7.5 mg-325 mg oral tablet: 1 tab(s) orally 3 times a day  Pepcid 20 mg oral tablet: 1 tab(s) orally once a day  Plaquenil 200 mg oral tablet: 1 tab(s) orally 2 times a day  senna (sennosides) 8.6 mg oral tablet: 1 tab(s) orally once a day (at bedtime)  triamcinolone 0.1% topical cream: 1 Apply topically to affected area every 12 hours  Vitamin C 250 mg oral tablet, chewable: 1 tab(s) chewed once a day   alendronate 70 mg oral tablet: 1 tab(s) orally once a week  aspirin 81 mg oral tablet: 1 tab(s) orally once a day  atenolol 50 mg oral tablet: 1 tab(s) orally once a day  azaTHIOprine 50 mg oral tablet: 1 tab(s) orally once a day  cefadroxil 500 mg oral capsule: 1 cap(s) orally 2 times a day To end on 7/1/25  DermaPhor topical ointment: Apply topically to affected area once a day Apply once a day to right lower extremity  ferrous fumarate 324 mg (106 mg elemental iron) oral tablet: 1 tab(s) orally 3 times a day  fluticasone 50 mcg/inh inhalation powder: 1 spray(s) inhaled 2 times a day  furosemide 40 mg oral tablet: 1 tab(s) orally once a day  levothyroxine 175 mcg (0.175 mg) oral tablet: 1 tab(s) orally once a day  Melatonin 10 mg oral tablet: 1 tab(s) orally once a day (at bedtime)  mupirocin 2% topical ointment: Apply topically to affected area 2 times a day  oxycodone-acetaminophen 7.5 mg-325 mg oral tablet: 1 tab(s) orally 3 times a day  Pepcid 20 mg oral tablet: 1 tab(s) orally once a day  Plaquenil 200 mg oral tablet: 1 tab(s) orally 2 times a day  senna (sennosides) 8.6 mg oral tablet: 1 tab(s) orally once a day (at bedtime)  triamcinolone 0.1% topical cream: 1 Apply topically to affected area every 12 hours  Vitamin C 250 mg oral tablet, chewable: 1 tab(s) chewed once a day

## 2025-06-25 NOTE — DISCHARGE NOTE PROVIDER - HOSPITAL COURSE
69-year-old female with past medical history of SLE on immunosuppression meds, HTN, hx of CVA, hypothyroidism, b/l hip and knee replacement, chronic venous insufficiency presenting today for evaluation of erythema and pain to the right lower extremity and pain at the sole of right foot limiting ambulation.Patient has had intermittent episodes of cellulitis to the right lower extremity over the last few years, Was recently admitted two weeks ago for similar symptoms. X-Ray rt leg at that time showed soft tissue edema without subcutaneous emphysema. RLE arterial and venous duplex were negative. Pt was treated with 7 day course of Keflex 500mg BID and PO prednisone. Pt states her swelling has worsened despite taking Keflex as prescribed. Now she is also experiencing pain in the right forefoot. Patient denies having any fever, chills, n/v, cp, sob, pleuritic cp, palpitations, diaphoresis, cough, neck pain/stiffness, abd pain, diarrhea, constipation, urinary symptoms, trauma, ha/lh/dizziness, numbness/tingling, sick contacts, recent travel.    Admitted to medicine for RLE cellulitis and infected callus on base of rt foot    #R Leg / foot pain likely secondary to Suspected RLE Cellulitis/  #Infected rt forefoot base callus   #Chronic venous insufficiency with Rt ankle ulcer could be venous stasis   #stasis dermatitis   - no sepsis POA   - Afebrile, WBC 10k, HD stable  - RLE erythema extending from midfoot and terminating just below the knee, tender to touch. Superficial dry 2x3cm wound of rt lateral leg  - rt foot base callus, tender to palpation  - arterial and venous duplex Negative on June 9th   - RLE duplex negative for DVT  - BCx NGTD  - MRSA positive - Nasal Mupirocin BID and CHG wipe  - ESR, CRP - 88 & 18.8 respectively  - Xray Foot AP + Lateral + Oblique, Right (06.22.25 @ 20:41) >  There is suggestion of soft tissue air at the dorsal aspect of the   calcaneus. No definite radiographic evidence of osteomyelitis. If there   is high suspicion for osteomyelitis, correlate with MRI.  - CT R ankle w/ IV cont: 1.  No soft tissue gas. No acute fracture or dislocation.  2.  Circumferential skin thickening of the ankle with areas of   subcutaneous edema, consistent with cellulitis. No drainable fluid   collection.  3.  Severe degenerative changes of the ankle joint, with degenerative   osteochondral lesion measuring up to 1.7 cm at the medial talar dome   demonstrating chondral collapse.  4.  Osteonecrotic foci of the distal tibia and calcaneus.  - podiatry - no sx intervention, OP f/u w/ Dr. Carey  - ID - c/w Ancef 1g q8h, Exam not consistent with necrotizing infection   - Switch to PO cefadroxil 500 mg BID to complete 10 days total (end date 7/1)   - RLE erythema w/ significant improvement  - wound care    #SLE  Immunocompromised due to Autoimmune disorder and medication  - c/w Azathioprine and HCQ    #Chronic Normocytic Anemia  - on iron replacement therapy outpatient  - will hold iron supplementation in setting of infection    #HTN  #h/o CVA  - c/w atenolol 50mg QD and lasix 40mg QD  - recently stopped taking aspirin (pt says it gave her rectal bleed)  - c/w asprin    #hypothyrodism  c/w home levothyroxine 175mcg QD   69-year-old female with past medical history of SLE on immunosuppression meds, HTN, hx of CVA, hypothyroidism, b/l hip and knee replacement, chronic venous insufficiency presenting today for evaluation of erythema and pain to the right lower extremity and pain at the sole of right foot limiting ambulation.Patient has had intermittent episodes of cellulitis to the right lower extremity over the last few years, Was recently admitted two weeks ago for similar symptoms. X-Ray rt leg at that time showed soft tissue edema without subcutaneous emphysema. RLE arterial and venous duplex were negative. Pt was treated with 7 day course of Keflex 500mg BID and PO prednisone. Pt states her swelling has worsened despite taking Keflex as prescribed. Now she is also experiencing pain in the right forefoot. Patient denies having any fever, chills, n/v, cp, sob, pleuritic cp, palpitations, diaphoresis, cough, neck pain/stiffness, abd pain, diarrhea, constipation, urinary symptoms, trauma, ha/lh/dizziness, numbness/tingling, sick contacts, recent travel.    Admitted to medicine for RLE cellulitis and infected callus on base of rt foot    #R Leg / foot pain likely secondary to Suspected RLE Cellulitis/  #Infected rt forefoot base callus   #Chronic venous insufficiency with Rt ankle ulcer could be venous stasis   #stasis dermatitis   - no sepsis POA   - Afebrile, WBC 10k, HD stable  - RLE erythema extending from midfoot and terminating just below the knee, tender to touch. Superficial dry 2x3cm wound of rt lateral leg  - rt foot base callus, tender to palpation  - arterial and venous duplex Negative on June 9th   - RLE duplex negative for DVT  - BCx NGTD  - MRSA positive - Nasal Mupirocin BID and CHG wipe  - ESR, CRP - 88 & 18.8 respectively  - Xray Foot AP + Lateral + Oblique, Right (06.22.25 @ 20:41) >  There is suggestion of soft tissue air at the dorsal aspect of the   calcaneus. No definite radiographic evidence of osteomyelitis. If there   is high suspicion for osteomyelitis, correlate with MRI.  - CT R ankle w/ IV cont: 1.  No soft tissue gas. No acute fracture or dislocation.  2.  Circumferential skin thickening of the ankle with areas of   subcutaneous edema, consistent with cellulitis. No drainable fluid   collection.  3.  Severe degenerative changes of the ankle joint, with degenerative   osteochondral lesion measuring up to 1.7 cm at the medial talar dome   demonstrating chondral collapse.  4.  Osteonecrotic foci of the distal tibia and calcaneus.  - podiatry - no sx intervention, OP f/u w/ Dr. Carey  - ID - c/w Ancef 1g q8h, Exam not consistent with necrotizing infection   - Switch to PO cefadroxil 500 mg BID to complete 10 days total (end date 7/1)   - RLE erythema w/ significant improvement  - wound care    #SLE  Immunocompromised due to Autoimmune disorder and medication  - c/w Azathioprine and HCQ    #Chronic Normocytic Anemia  - on iron replacement therapy outpatient  - will hold iron supplementation in setting of infection    #HTN  #h/o CVA  - c/w atenolol 50mg QD and lasix 40mg QD  - recently stopped taking aspirin (pt says it gave her rectal bleed)  - Advised pt to take aspirin but not agreeable    #hypothyrodism  c/w home levothyroxine 175mcg QD   69-year-old female with past medical history of SLE on immunosuppression meds, HTN, hx of CVA, hypothyroidism, b/l hip and knee replacement, chronic venous insufficiency presenting today for evaluation of erythema and pain to the right lower extremity and pain at the sole of right foot limiting ambulation.Patient has had intermittent episodes of cellulitis to the right lower extremity over the last few years, Was recently admitted two weeks ago for similar symptoms. X-Ray rt leg at that time showed soft tissue edema without subcutaneous emphysema. RLE arterial and venous duplex were negative. Pt was treated with 7 day course of Keflex 500mg BID and PO prednisone. Pt states her swelling has worsened despite taking Keflex as prescribed. Now she is also experiencing pain in the right forefoot. Patient denies having any fever, chills, n/v, cp, sob, pleuritic cp, palpitations, diaphoresis, cough, neck pain/stiffness, abd pain, diarrhea, constipation, urinary symptoms, trauma, ha/lh/dizziness, numbness/tingling, sick contacts, recent travel.    Admitted to medicine for RLE cellulitis and infected callus on base of rt foot    #R Leg / foot pain likely secondary to Suspected RLE Cellulitis/  #Infected rt forefoot base callus   #Chronic venous insufficiency with Rt ankle ulcer could be venous stasis   #stasis dermatitis   - no sepsis POA   - Afebrile, WBC 10k, HD stable  - RLE erythema extending from midfoot and terminating just below the knee, tender to touch. Superficial dry 2x3cm wound of rt lateral leg  - rt foot base callus, tender to palpation  - arterial and venous duplex Negative on June 9th   - RLE duplex negative for DVT  - BCx NGTD  - MRSA positive - Nasal Mupirocin BID and CHG wipe  - ESR, CRP - 88 & 18.8 respectively  - Xray Foot AP + Lateral + Oblique, Right (06.22.25 @ 20:41) >  There is suggestion of soft tissue air at the dorsal aspect of the   calcaneus. No definite radiographic evidence of osteomyelitis. If there   is high suspicion for osteomyelitis, correlate with MRI.  - CT R ankle w/ IV cont: 1.  No soft tissue gas. No acute fracture or dislocation.  2.  Circumferential skin thickening of the ankle with areas of   subcutaneous edema, consistent with cellulitis. No drainable fluid   collection.  3.  Severe degenerative changes of the ankle joint, with degenerative   osteochondral lesion measuring up to 1.7 cm at the medial talar dome   demonstrating chondral collapse.  4.  Osteonecrotic foci of the distal tibia and calcaneus.  - podiatry - no sx intervention, OP f/u w/ Dr. Carey  - ID - c/w Ancef 1g q8h, Exam not consistent with necrotizing infection   - Switch to PO cefadroxil 500 mg BID to complete 10 days total (end date 7/1)   - RLE erythema w/ significant improvement  - wound care    #SLE  Immunocompromised due to Autoimmune disorder and medication  - c/w Azathioprine and HCQ    #Chronic Normocytic Anemia  - on iron replacement therapy outpatient  - will hold iron supplementation in setting of infection    #HTN  #h/o CVA  - c/w atenolol 50mg QD and lasix 40mg QD  - recently stopped taking aspirin   - Advised pt to continue take aspirin     #hypothyrodism  c/w home levothyroxine 175mcg QD   69-year-old female with past medical history of SLE on immunosuppression meds, HTN, hx of CVA, hypothyroidism, b/l hip and knee replacement, chronic venous insufficiency presenting today for evaluation of erythema and pain to the right lower extremity and pain at the sole of right foot limiting ambulation.Patient has had intermittent episodes of cellulitis to the right lower extremity over the last few years, Was recently admitted two weeks ago for similar symptoms. X-Ray rt leg at that time showed soft tissue edema without subcutaneous emphysema. RLE arterial and venous duplex were negative. Pt was treated with 7 day course of Keflex 500mg BID and PO prednisone. Pt states her swelling has worsened despite taking Keflex as prescribed. Now she is also experiencing pain in the right forefoot. Patient denies having any fever, chills, n/v, cp, sob, pleuritic cp, palpitations, diaphoresis, cough, neck pain/stiffness, abd pain, diarrhea, constipation, urinary symptoms, trauma, ha/lh/dizziness, numbness/tingling, sick contacts, recent travel.    Admitted to medicine for RLE cellulitis and infected callus on base of rt foot    #R Leg / foot pain likely secondary to Suspected RLE Cellulitis/  #Infected rt forefoot base callus   #Chronic venous insufficiency with Rt ankle ulcer could be venous stasis   #stasis dermatitis   - no sepsis POA   - Afebrile, WBC 10k, HD stable  - RLE erythema extending from midfoot and terminating just below the knee, tender to touch. Superficial dry 2x3cm wound of rt lateral leg  - rt foot base callus, tender to palpation  - arterial and venous duplex Negative on June 9th   - RLE duplex negative for DVT  - BCx NGTD  - MRSA positive - Nasal Mupirocin BID and CHG wipe  - ESR, CRP - 88 & 18.8 respectively  - Xray Foot AP + Lateral + Oblique, Right (06.22.25 @ 20:41) >  There is suggestion of soft tissue air at the dorsal aspect of the   calcaneus. No definite radiographic evidence of osteomyelitis. If there   is high suspicion for osteomyelitis, correlate with MRI.  - CT R ankle w/ IV cont: 1.  No soft tissue gas. No acute fracture or dislocation.  2.  Circumferential skin thickening of the ankle with areas of   subcutaneous edema, consistent with cellulitis. No drainable fluid   collection.  3.  Severe degenerative changes of the ankle joint, with degenerative   osteochondral lesion measuring up to 1.7 cm at the medial talar dome   demonstrating chondral collapse.  4.  Osteonecrotic foci of the distal tibia and calcaneus.  - podiatry - no sx intervention, OP f/u w/ Dr. Carey  - ID - c/w Ancef 1g q8h, Exam not consistent with necrotizing infection   - Switch to PO cefadroxil 500 mg BID to complete 10 days total (end date 7/1)   - RLE erythema w/ significant improvement  - wound care- Apply Dermaphor/Aquaphor daily to right lower extremity.     #SLE  Immunocompromised due to Autoimmune disorder and medication  - c/w Azathioprine and HCQ    #Chronic Normocytic Anemia  - on iron replacement therapy outpatient  - will hold iron supplementation in setting of infection    #HTN  #h/o CVA  - c/w atenolol 50mg QD and lasix 40mg QD  - recently stopped taking aspirin   - Advised pt to continue take aspirin     #hypothyrodism  c/w home levothyroxine 175mcg QD   69-year-old female with past medical history of SLE on immunosuppression meds, HTN, hx of CVA, hypothyroidism, b/l hip and knee replacement, chronic venous insufficiency presenting today for evaluation of erythema and pain to the right lower extremity and pain at the sole of right foot limiting ambulation.Patient has had intermittent episodes of cellulitis to the right lower extremity over the last few years, Was recently admitted two weeks ago for similar symptoms. X-Ray rt leg at that time showed soft tissue edema without subcutaneous emphysema. RLE arterial and venous duplex were negative. Pt was treated with 7 day course of Keflex 500mg BID and PO prednisone. Pt states her swelling has worsened despite taking Keflex as prescribed. Now she is also experiencing pain in the right forefoot. Patient denies having any fever, chills, n/v, cp, sob, pleuritic cp, palpitations, diaphoresis, cough, neck pain/stiffness, abd pain, diarrhea, constipation, urinary symptoms, trauma, ha/lh/dizziness, numbness/tingling, sick contacts, recent travel.    Admitted to medicine for RLE cellulitis and infected callus on base of rt foot      #R Leg / foot pain likely secondary to Suspected RLE Cellulitis/  #Infected rt forefoot base callus   #Chronic venous insufficiency with Rt ankle ulcer could be venous stasis   #stasis dermatitis   - no sepsis POA   - Afebrile, WBC 10k, HD stable  - RLE erythema extending from midfoot and terminating just below the knee, tender to touch. Superficial dry 2x3cm wound of rt lateral leg  - rt foot base callus, tender to palpation  - arterial and venous duplex Negative on June 9th   - RLE duplex negative for DVT  - BCx NGTD  - MRSA positive - Nasal Mupirocin BID and CHG wipe  - ESR, CRP - 88 & 18.8 respectively  - Xray Foot AP + Lateral + Oblique, Right (06.22.25 @ 20:41) >  There is suggestion of soft tissue air at the dorsal aspect of the   calcaneus. No definite radiographic evidence of osteomyelitis. If there   is high suspicion for osteomyelitis, correlate with MRI.  - CT R ankle w/ IV cont: 1.  No soft tissue gas. No acute fracture or dislocation.  2.  Circumferential skin thickening of the ankle with areas of   subcutaneous edema, consistent with cellulitis. No drainable fluid   collection.  3.  Severe degenerative changes of the ankle joint, with degenerative   osteochondral lesion measuring up to 1.7 cm at the medial talar dome   demonstrating chondral collapse.  4.  Osteonecrotic foci of the distal tibia and calcaneus.  - podiatry - no sx intervention, OP f/u w/ Dr. Carey  - ID - c/w Ancef 1g q8h, Exam not consistent with necrotizing infection   - Switch to PO Keflex 500mg QID and then on dc cefadroxil 500 mg BID to complete 10 days total (end date 7/1)   - RLE erythema w/ significant improvement  - wound care- Apply Dermaphor/Aquaphor daily to right lower extremity, Xeroform, kerlix  - Assisted Living Facility refused to accept pt back due to nasal MRSA positivity  - Pt to be dcd to [facility]    #SLE  Immunocompromised due to Autoimmune disorder and medication  - c/w Azathioprine and HCQ    #Chronic Normocytic Anemia  - on iron replacement therapy outpatient  - will hold iron supplementation in setting of infection    #HTN  #h/o CVA  - c/w atenolol 50mg QD and lasix 40mg QD  - recently stopped taking aspirin   - Advised pt to continue take aspirin     #hypothyrodism  c/w home levothyroxine 175mcg QD    #Rt ear Cerumen Impaction  - Pt reports she has rt ear wax  - OP ENT f/u

## 2025-06-25 NOTE — PROGRESS NOTE ADULT - SUBJECTIVE AND OBJECTIVE BOX
LATOYA FULTON  69y, Female  Allergy: morphine (Other)  Compazine (Other)  penicillins (Other)  Levaquin (Other)  aspirin (Other)      LOS  3d    CHIEF COMPLAINT: Patient is a 69y old  Female who presents with a chief complaint of Patient is a 69y old  Female who presents with a chief complaint of Right Foot Pain (23 Jun 2025 14:19) (24 Jun 2025 09:48)      INTERVAL EVENTS/HPI  - No acute events overnight  - T(F): , Max: 99.2 (06-25-25 @ 17:01)  - Denies any worsening symptoms  - Tolerating medication  - WBC Count: 8.73 (06-25-25 @ 05:56)  WBC Count: 9.45 (06-24-25 @ 04:30)     - Creatinine: 0.7 (06-25-25 @ 05:56)  Creatinine: 0.7 (06-24-25 @ 04:30)       ROS  General: Denies rigors, nightsweats  HEENT: Denies headache, rhinorrhea, sore throat, eye pain  CV: Denies CP, palpitations  PULM: Denies wheezing, hemoptysis  GI: Denies hematemesis, hematochezia, melena  : Denies discharge, hematuria  MSK: Denies arthralgias, myalgias  SKIN: Denies rash, lesions  NEURO: Denies paresthesias, weakness  PSYCH: Denies depression, anxiety    VITALS:  T(F): 99.2, Max: 99.2 (06-25-25 @ 17:01)  HR: 67  BP: 104/53  RR: 16Vital Signs Last 24 Hrs  T(C): 37.3 (25 Jun 2025 17:01), Max: 37.3 (25 Jun 2025 17:01)  T(F): 99.2 (25 Jun 2025 17:01), Max: 99.2 (25 Jun 2025 17:01)  HR: 67 (25 Jun 2025 17:01) (56 - 68)  BP: 104/53 (25 Jun 2025 17:01) (89/57 - 121/71)  BP(mean): --  RR: 16 (25 Jun 2025 17:01) (16 - 18)  SpO2: 96% (25 Jun 2025 17:01) (96% - 96%)    Parameters below as of 25 Jun 2025 17:01  Patient On (Oxygen Delivery Method): room air        PHYSICAL EXAM:  Gen: NAD, resting in bed  HEENT: Normocephalic, atraumatic  Neck: supple, no lymphadenopathy  CV: Regular rate & regular rhythm  Lungs: decreased BS at bases, no fremitus  Abdomen: Soft, BS present  Ext: Warm, well perfused  Neuro: non focal, awake  Skin: no rash, no erythema  Lines: no phlebitis    FH: Non-contributory  Social Hx: Non-contributory    TESTS & MEASUREMENTS:                        10.8   8.73  )-----------( 303      ( 25 Jun 2025 05:56 )             33.5     06-25    139  |  104  |  18  ----------------------------<  87  3.9   |  21  |  0.7    Ca    8.6      25 Jun 2025 05:56  Mg     1.9     06-25    TPro  5.7[L]  /  Alb  3.2[L]  /  TBili  <0.2  /  DBili  x   /  AST  15  /  ALT  8   /  AlkPhos  75  06-25      LIVER FUNCTIONS - ( 25 Jun 2025 05:56 )  Alb: 3.2 g/dL / Pro: 5.7 g/dL / ALK PHOS: 75 U/L / ALT: 8 U/L / AST: 15 U/L / GGT: x           Urinalysis Basic - ( 25 Jun 2025 05:56 )    Color: x / Appearance: x / SG: x / pH: x  Gluc: 87 mg/dL / Ketone: x  / Bili: x / Urobili: x   Blood: x / Protein: x / Nitrite: x   Leuk Esterase: x / RBC: x / WBC x   Sq Epi: x / Non Sq Epi: x / Bacteria: x        Culture - Blood (collected 06-22-25 @ 12:17)  Source: Blood Blood-Peripheral  Preliminary Report (06-25-25 @ 18:01):    No growth at 72 Hours    Culture - Blood (collected 06-22-25 @ 12:17)  Source: Blood Blood-Peripheral  Preliminary Report (06-25-25 @ 18:01):    No growth at 72 Hours    Culture - Blood (collected 06-08-25 @ 15:45)  Source: Blood Blood-Peripheral  Final Report (06-14-25 @ 02:00):    No growth at 5 days    Culture - Blood (collected 06-08-25 @ 15:45)  Source: Blood Blood-Peripheral  Final Report (06-14-25 @ 02:00):    No growth at 5 days            INFECTIOUS DISEASES TESTING  MRSA PCR Result.: Positive (06-24-25 @ 12:30)  Procalcitonin: 0.03 (06-09-25 @ 05:37)  MRSA PCR Result.: Negative (11-27-24 @ 04:10)  Procalcitonin: 0.04 (11-25-24 @ 17:35)      INFLAMMATORY MARKERS  Sedimentation Rate, Erythrocyte: 88 mm/hr (06-23-25 @ 06:11)  C-Reactive Protein: 18.8 mg/L (06-23-25 @ 06:11)      RADIOLOGY & ADDITIONAL TESTS:  I have personally reviewed the last available Chest xray  CXR      CT      CARDIOLOGY TESTING      MEDICATIONS  atenolol  Tablet 50 Oral daily  azaTHIOprine 50 Oral daily  cephalexin 500 Oral four times a day  chlorhexidine 2% Cloths 1 Topical <User Schedule>  enoxaparin Injectable 40 SubCutaneous every 24 hours  famotidine    Tablet 20 Oral daily  furosemide    Tablet 40 Oral daily  hydroxychloroquine 200 Oral two times a day  levothyroxine 175 Oral daily  melatonin 10 Oral at bedtime  mupirocin 2% Nasal 1 Both Nostrils two times a day  senna 2 Oral at bedtime  triamcinolone 0.1% Oral Paste 1 Topical two times a day      WEIGHT  Weight (kg): 61.5 (06-24-25 @ 00:45)  Creatinine: 0.7 mg/dL (06-25-25 @ 05:56)      ANTIBIOTICS:  cephalexin 500 milliGRAM(s) Oral four times a day  hydroxychloroquine 200 milliGRAM(s) Oral two times a day      All available historical records have been reviewed

## 2025-06-25 NOTE — PHYSICAL THERAPY INITIAL EVALUATION ADULT - ADDITIONAL COMMENTS
Pt lives in [facility] assisted living. No stairs within the facility. Pt uses a rollator for ambulation and requires some assistance for ADLs from [facility] staff.

## 2025-06-25 NOTE — DISCHARGE NOTE PROVIDER - NSDCFUADDINST_GEN_ALL_CORE_FT
Rt leg/foot Local Wound Care;  Xeroform, Xerlix  RLE wound care:  Cleanse wound with soap and water, pat dry.  Apply Dermaphor/Aquaphor daily to right lower extremity.   Xeroform, Xerlix   Skin and incontinence care.  Pressure Injury Prevention.

## 2025-06-25 NOTE — PHYSICAL THERAPY INITIAL EVALUATION ADULT - PERTINENT HX OF CURRENT PROBLEM, REHAB EVAL
69-year-old female with past medical history of SLE on immunosuppression meds, HTN, hx of CVA, hypothyroidism, b/l hip and knee replacement, chronic venous insufficiency presenting today for evaluation of erythema and pain to the right lower extremity and pain at the sole of right foot limiting ambulation. Patient has had intermittent episodes of cellulitis to the right lower extremity over the last few years, Was recently admitted two weeks ago for similar symptoms. X-Ray rt leg at that time showed soft tissue edema without subcutaneous emphysema. RLE arterial and venous duplex were negative. Pt was treated with 7 day course of Keflex 500mg BID and PO prednisone. Pt states her swelling has worsened despite taking Keflex as prescribed. Now she is also experiencing pain in the right forefoot

## 2025-06-25 NOTE — DISCHARGE NOTE PROVIDER - PROVIDER TOKENS
PROVIDER:[TOKEN:[[id number]],FOLLOWUP:[1 week]],PROVIDER:[TOKEN:[[id number]],FOLLOWUP:[1 week],ESTABLISHEDPATIENT:[T]] PROVIDER:[TOKEN:[[id number]],FOLLOWUP:[1 week]],PROVIDER:[TOKEN:[[id number]],FOLLOWUP:[1 week],ESTABLISHEDPATIENT:[T]],PROVIDER:[TOKEN:[[id number]],FOLLOWUP:[2 weeks]]

## 2025-06-25 NOTE — PROGRESS NOTE ADULT - ASSESSMENT
ASSESSMENT  69-year-old female with past medical history of SLE on immunosuppression meds, HTN, hx of CVA, hypothyroidism, b/l hip and knee replacement, chronic venous insufficiency presenting today for evaluation of erythema and pain to the right lower extremity and pain at the sole of right foot limiting ambulation    IMPRESSION  #RLE Cellulitis with possible soft tissue air   -  Xray Foot AP + Lateral + Oblique, Right (06.22.25 @ 20:41): There is suggestion of soft tissue air at the dorsal aspect of the  calcaneus. No definite radiographic evidence of osteomyelitis. If there   is high suspicion for osteomyelitis, correlate with MRI.  - WBC Count: 10.07 K/uL (06.22.25 @ 12:10)    #Chronic Venous insuficicency     #SLE   #Hx of CVA   #S/p bilateral hip and knee replacement     #Obesity BMI (kg/m2): 24.7, 24.7  #DM   #Abx allergy: morphine (Other)  Compazine (Other)  Levaquin (Other)  aspirin (Other)    RECOMMENDATIONS  - on PO keflex 500 mg QID - switch to PO cefadroxil 500 mg BID on discharge for ease of administration  to complete 10 days total (end date 7/1)   - recall as needed    Please call or message on Microsoft Teams if with any questions.  Spectra [phone]

## 2025-06-25 NOTE — DISCHARGE NOTE PROVIDER - CARE PROVIDERS DIRECT ADDRESSES
,DirectAddress_Unknown,[email or web address] ,DirectAddress_Unknown,[email or web address]

## 2025-06-25 NOTE — DISCHARGE NOTE PROVIDER - ATTENDING DISCHARGE PHYSICAL EXAMINATION:
GENERAL: NAD, lying in bed comfortably  CHEST/LUNG: Clear to auscultation bilaterally; No rales, rhonchi, wheezing, or rubs. Unlabored respirations  HEART: Regular rate and rhythm; No murmurs, rubs, or gallops  ABDOMEN: Bowel sounds present; Soft, Nontender, Nondistended. No hepatomegally  EXTREMITIES:  2+ Peripheral Pulses, brisk capillary refill. No clubbing, cyanosis, or edema  NERVOUS SYSTEM:  Alert & Oriented X3, speech clear. No deficits   MSK: FROM all 4 extremities, full and equal strength  SKIN: RLE erythema dry swelling imrpoved   GENERAL: NAD, lying in bed comfortably  CHEST/LUNG: Clear to auscultation bilaterally; No rales, rhonchi, wheezing, or rubs. Unlabored respirations  HEART: Regular rate and rhythm; No murmurs, rubs, or gallops  ABDOMEN: Bowel sounds present; Soft, Nontender, Nondistended. No hepatomegally  EXTREMITIES:  2+ Peripheral Pulses, brisk capillary refill. No clubbing, cyanosis, or edema  NERVOUS SYSTEM:  Alert & Oriented X3, speech clear. No deficits   SKIN: RLE erythema dry swelling imrpoved

## 2025-06-26 ENCOUNTER — TRANSCRIPTION ENCOUNTER (OUTPATIENT)
Age: 70
End: 2025-06-26

## 2025-06-26 PROCEDURE — 99239 HOSP IP/OBS DSCHRG MGMT >30: CPT

## 2025-06-26 RX ORDER — ASPIRIN 325 MG
81 TABLET ORAL DAILY
Refills: 0 | Status: DISCONTINUED | OUTPATIENT
Start: 2025-06-26 | End: 2025-06-27

## 2025-06-26 RX ADMIN — ENOXAPARIN SODIUM 40 MILLIGRAM(S): 100 INJECTION SUBCUTANEOUS at 18:20

## 2025-06-26 RX ADMIN — Medication 10 MILLIGRAM(S): at 21:40

## 2025-06-26 RX ADMIN — HYDROXYCHLOROQUINE SULFATE 200 MILLIGRAM(S): 200 TABLET, FILM COATED ORAL at 06:02

## 2025-06-26 RX ADMIN — CEPHALEXIN 500 MILLIGRAM(S): 250 CAPSULE ORAL at 06:02

## 2025-06-26 RX ADMIN — LISINOPRIL 50 MILLIGRAM(S): 30 TABLET ORAL at 06:01

## 2025-06-26 RX ADMIN — MUPIROCIN CALCIUM 1 APPLICATION(S): 20 CREAM TOPICAL at 18:15

## 2025-06-26 RX ADMIN — Medication 81 MILLIGRAM(S): at 11:21

## 2025-06-26 RX ADMIN — MUPIROCIN CALCIUM 1 APPLICATION(S): 20 CREAM TOPICAL at 06:02

## 2025-06-26 RX ADMIN — AZATHIOPRINE 50 MILLIGRAM(S): 50 TABLET ORAL at 13:12

## 2025-06-26 RX ADMIN — CEPHALEXIN 500 MILLIGRAM(S): 250 CAPSULE ORAL at 11:20

## 2025-06-26 RX ADMIN — Medication 175 MICROGRAM(S): at 06:01

## 2025-06-26 RX ADMIN — TRIAMCINOLONE ACETONIDE 1 APPLICATION(S): 1 CREAM TOPICAL at 18:14

## 2025-06-26 RX ADMIN — CEPHALEXIN 500 MILLIGRAM(S): 250 CAPSULE ORAL at 18:17

## 2025-06-26 RX ADMIN — TRIAMCINOLONE ACETONIDE 1 APPLICATION(S): 1 CREAM TOPICAL at 06:02

## 2025-06-26 RX ADMIN — Medication 1 APPLICATION(S): at 06:07

## 2025-06-26 RX ADMIN — FUROSEMIDE 40 MILLIGRAM(S): 10 INJECTION INTRAMUSCULAR; INTRAVENOUS at 06:02

## 2025-06-26 RX ADMIN — Medication 20 MILLIGRAM(S): at 11:21

## 2025-06-26 RX ADMIN — HYDROXYCHLOROQUINE SULFATE 200 MILLIGRAM(S): 200 TABLET, FILM COATED ORAL at 18:14

## 2025-06-26 NOTE — DISCHARGE NOTE NURSING/CASE MANAGEMENT/SOCIAL WORK - FINANCIAL ASSISTANCE
[facility] provides services at a reduced cost to those who are determined to be eligible through [facility]’s financial assistance program. Information regarding [facility]’s financial assistance program can be found by going to [email or web address] or by calling [phone].

## 2025-06-26 NOTE — DISCHARGE NOTE NURSING/CASE MANAGEMENT/SOCIAL WORK - NSDCPEFALRISK_GEN_ALL_CORE
For information on Fall & Injury Prevention, visit: [email or web address] OR  [email or web address] OR  https://www.cdc.gov/steadi/patient.html

## 2025-06-26 NOTE — PROGRESS NOTE ADULT - SUBJECTIVE AND OBJECTIVE BOX
SUBJECTIVE/OVERNIGHT EVENTS  Today is hospital day 4d. This morning patient was seen and examined at bedside, resting comfortably in bed. No acute or major events overnight.    MEDICATIONS  STANDING MEDICATIONS  aspirin  chewable 81 milliGRAM(s) Oral daily  atenolol  Tablet 50 milliGRAM(s) Oral daily  azaTHIOprine 50 milliGRAM(s) Oral daily  cephalexin 500 milliGRAM(s) Oral four times a day  chlorhexidine 2% Cloths 1 Application(s) Topical <User Schedule>  enoxaparin Injectable 40 milliGRAM(s) SubCutaneous every 24 hours  famotidine    Tablet 20 milliGRAM(s) Oral daily  furosemide    Tablet 40 milliGRAM(s) Oral daily  hydroxychloroquine 200 milliGRAM(s) Oral two times a day  levothyroxine 175 MICROGram(s) Oral daily  melatonin 10 milliGRAM(s) Oral at bedtime  mupirocin 2% Nasal 1 Application(s) Both Nostrils two times a day  senna 2 Tablet(s) Oral at bedtime  triamcinolone 0.1% Oral Paste 1 Application(s) Topical two times a day    PRN MEDICATIONS  oxycodone    5 mG/acetaminophen 325 mG 1 Tablet(s) Oral every 6 hours PRN    VITALS  T(F): 98.2 (06-26-25 @ 08:33), Max: 99.2 (06-25-25 @ 17:01)  HR: 61 (06-26-25 @ 08:33) (61 - 67)  BP: 106/63 (06-26-25 @ 08:33) (104/53 - 120/70)  RR: 18 (06-26-25 @ 08:33) (16 - 18)  SpO2: 98% (06-26-25 @ 08:33) (96% - 98%)    PHYSICAL EXAM  GENERAL: NAD, lying in bed comfortably  HEAD:  Atraumatic, normocephalic  EYES: EOMI, PERRLA, conjunctiva and sclera clear  ENT: Moist mucous membranes  NECK: Supple, no JVD  HEART: Regular rate and rhythm, no murmurs, rubs, or gallops  LUNGS: Unlabored respirations.  Clear to auscultation bilaterally, no crackles, wheezing, or rhonchi  ABDOMEN: Soft, nontender, nondistended, +BS  EXTREMITIES: RLE erythema improving, callus on plantar lt forefoot  NERVOUS SYSTEM:  A&Ox3, no focal deficits   SKIN: No rashes or lesions    LABS             10.8   8.73  )-----------( 303      ( 06-25-25 @ 05:56 )             33.5     139  |  104  |  18  -------------------------<  87   06-25-25 @ 05:56  3.9  |  21  |  0.7    Ca      8.6     06-25-25 @ 05:56  Mg     1.9     06-25-25 @ 05:56    TPro  5.7  /  Alb  3.2  /  TBili  <0.2  /  DBili  x   /  AST  15  /  ALT  8   /  AlkPhos  75  /  GGT  x     06-25-25 @ 05:56        Urinalysis Basic - ( 25 Jun 2025 05:56 )    Color: x / Appearance: x / SG: x / pH: x  Gluc: 87 mg/dL / Ketone: x  / Bili: x / Urobili: x   Blood: x / Protein: x / Nitrite: x   Leuk Esterase: x / RBC: x / WBC x   Sq Epi: x / Non Sq Epi: x / Bacteria: x          IMAGING

## 2025-06-26 NOTE — DISCHARGE NOTE NURSING/CASE MANAGEMENT/SOCIAL WORK - PATIENT PORTAL LINK FT
You can access the FollowMyHealth Patient Portal offered by [facility] by registering at the following website: [email or web address]. By joining [facility]’s FollowMyHealth portal, you will also be able to view your health information using other applications (apps) compatible with our system.

## 2025-06-26 NOTE — PROGRESS NOTE ADULT - ASSESSMENT
69-year-old female with past medical history of SLE on immunosuppression meds, HTN, hx of CVA, hypothyroidism, b/l hip and knee replacement, chronic venous insufficiency presenting today for evaluation of erythema and pain to the right lower extremity and pain at the sole of right foot limiting ambulation.Patient has had intermittent episodes of cellulitis to the right lower extremity over the last few years, Was recently admitted two weeks ago for similar symptoms. X-Ray rt leg at that time showed soft tissue edema without subcutaneous emphysema. RLE arterial and venous duplex were negative. Pt was treated with 7 day course of Keflex 500mg BID. Pt states her swelling has worsened despite taking Keflex as prescribed. Now she is also experiencing pain in the right forefoot. Patient denies having any fever, chills, n/v, cp, sob, pleuritic cp, palpitations, diaphoresis, cough, neck pain/stiffness, abd pain, diarrhea, constipation, urinary symptoms, trauma, ha/lh/dizziness, numbness/tingling, sick contacts, recent travel.      #R Leg / foot pain likely secondary to Suspected RLE Cellulitis/  #Infected rt forefoot base callus   #Chronic venous insufficiency with Rt ankle ulcer could be venous stasis   #stasis dermatitis   - no sepsis POA   - Afebrile, WBC 10k, HD stable  - RLE erythema extending from midfoot and terminating just below the knee, tender to touch. Superficial dry 2x3cm wound of rt lateral leg  - rt foot base callus, tender to palpation  - arterial and venous duplex Negative on June 9th   - RLE duplex negative for DVT  - BCx NGTD  - MRSA positive - Nasal Mupirocin BID and CHG wipe  - ESR, CRP - 88 & 18.8 respectively  - Xray Foot AP + Lateral + Oblique, Right (06.22.25 @ 20:41) >  There is suggestion of soft tissue air at the dorsal aspect of the   calcaneus. No definite radiographic evidence of osteomyelitis. If there   is high suspicion for osteomyelitis, correlate with MRI.  - CT R ankle w/ IV cont: 1.  No soft tissue gas. No acute fracture or dislocation.  2.  Circumferential skin thickening of the ankle with areas of   subcutaneous edema, consistent with cellulitis. No drainable fluid   collection.  3.  Severe degenerative changes of the ankle joint, with degenerative   osteochondral lesion measuring up to 1.7 cm at the medial talar dome   demonstrating chondral collapse.  4.  Osteonecrotic foci of the distal tibia and calcaneus.  - podiatry - no sx intervention, OP f/u w/ Dr. Carey  - ID - c/w Ancef 1g q8h, Exam not consistent with necrotizing infection   - Switch to PO Keflex 500mg QID and then on dc cefadroxil 500 mg BID to complete 10 days total (end date 7/1)   - RLE erythema w/ significant improvement  - wound care- Apply Dermaphor/Aquaphor daily to right lower extremity  - Facility refused to take pt back yesterday due to nasal MRSA positivity, clarified to SW pt is not on contact precautions and it is not wound MRSA  - Pt to be dcd to [facility] today    #SLE  Immunocompromised due to Autoimmune disorder and medication  - c/w Azathioprine and HCQ    #Chronic Normocytic Anemia  - on iron replacement therapy outpatient  - will hold iron supplementation in setting of infection    #HTN  #h/o CVA  - c/w atenolol 50mg QD and lasix 40mg QD  - recently stopped taking aspirin   - Advised pt to continue take aspirin     #hypothyrodism  c/w home levothyroxine 175mcg QD    DVT ppx: Lovenox  Diet: DASH  Dispo: [facility] 69-year-old female with past medical history of SLE on immunosuppression meds, HTN, hx of CVA, hypothyroidism, b/l hip and knee replacement, chronic venous insufficiency presenting today for evaluation of erythema and pain to the right lower extremity and pain at the sole of right foot limiting ambulation.Patient has had intermittent episodes of cellulitis to the right lower extremity over the last few years, Was recently admitted two weeks ago for similar symptoms. X-Ray rt leg at that time showed soft tissue edema without subcutaneous emphysema. RLE arterial and venous duplex were negative. Pt was treated with 7 day course of Keflex 500mg BID. Pt states her swelling has worsened despite taking Keflex as prescribed. Now she is also experiencing pain in the right forefoot. Patient denies having any fever, chills, n/v, cp, sob, pleuritic cp, palpitations, diaphoresis, cough, neck pain/stiffness, abd pain, diarrhea, constipation, urinary symptoms, trauma, ha/lh/dizziness, numbness/tingling, sick contacts, recent travel.      #R Leg / foot pain likely secondary to Suspected RLE Cellulitis/  #Infected rt forefoot base callus   #Chronic venous insufficiency with Rt ankle ulcer could be venous stasis   #stasis dermatitis   - no sepsis POA   - Afebrile, WBC 10k, HD stable  - RLE erythema extending from midfoot and terminating just below the knee, tender to touch. Superficial dry 2x3cm wound of rt lateral leg  - rt foot base callus, tender to palpation  - arterial and venous duplex Negative on June 9th   - RLE duplex negative for DVT  - BCx NGTD  - MRSA positive - Nasal Mupirocin BID and CHG wipe  - ESR, CRP - 88 & 18.8 respectively  - Xray Foot AP + Lateral + Oblique, Right (06.22.25 @ 20:41) >  There is suggestion of soft tissue air at the dorsal aspect of the   calcaneus. No definite radiographic evidence of osteomyelitis. If there   is high suspicion for osteomyelitis, correlate with MRI.  - CT R ankle w/ IV cont: 1.  No soft tissue gas. No acute fracture or dislocation.  2.  Circumferential skin thickening of the ankle with areas of   subcutaneous edema, consistent with cellulitis. No drainable fluid   collection.  3.  Severe degenerative changes of the ankle joint, with degenerative   osteochondral lesion measuring up to 1.7 cm at the medial talar dome   demonstrating chondral collapse.  4.  Osteonecrotic foci of the distal tibia and calcaneus.  - podiatry - no sx intervention, OP f/u w/ Dr. Carey  - ID - c/w Ancef 1g q8h, Exam not consistent with necrotizing infection   - Switch to PO Keflex 500mg QID and then on dc cefadroxil 500 mg BID to complete 10 days total (end date 7/1)   - RLE erythema w/ significant improvement  - wound care- Apply Dermaphor/Aquaphor daily to right lower extremity  - Facility refused to take pt back yesterday due to nasal MRSA positivity  - Pt to be dcd to [facility]    #SLE  Immunocompromised due to Autoimmune disorder and medication  - c/w Azathioprine and HCQ    #Chronic Normocytic Anemia  - on iron replacement therapy outpatient  - will hold iron supplementation in setting of infection    #HTN  #h/o CVA  - c/w atenolol 50mg QD and lasix 40mg QD  - recently stopped taking aspirin   - Advised pt to continue take aspirin     #hypothyrodism  c/w home levothyroxine 175mcg QD    DVT ppx: Lovenox  Diet: DASH  [facility]

## 2025-06-27 ENCOUNTER — APPOINTMENT (OUTPATIENT)
Dept: VASCULAR SURGERY | Facility: CLINIC | Age: 70
End: 2025-06-27

## 2025-06-27 VITALS
RESPIRATION RATE: 18 BRPM | TEMPERATURE: 98 F | HEART RATE: 54 BPM | DIASTOLIC BLOOD PRESSURE: 66 MMHG | SYSTOLIC BLOOD PRESSURE: 108 MMHG | OXYGEN SATURATION: 98 %

## 2025-06-27 PROCEDURE — 99231 SBSQ HOSP IP/OBS SF/LOW 25: CPT

## 2025-06-27 RX ADMIN — Medication 20 MILLIGRAM(S): at 11:41

## 2025-06-27 RX ADMIN — AZATHIOPRINE 50 MILLIGRAM(S): 50 TABLET ORAL at 11:40

## 2025-06-27 RX ADMIN — HYDROXYCHLOROQUINE SULFATE 200 MILLIGRAM(S): 200 TABLET, FILM COATED ORAL at 05:30

## 2025-06-27 RX ADMIN — TRIAMCINOLONE ACETONIDE 1 APPLICATION(S): 1 CREAM TOPICAL at 05:33

## 2025-06-27 RX ADMIN — Medication 1 APPLICATION(S): at 05:31

## 2025-06-27 RX ADMIN — MUPIROCIN CALCIUM 1 APPLICATION(S): 20 CREAM TOPICAL at 05:32

## 2025-06-27 RX ADMIN — Medication 175 MICROGRAM(S): at 05:30

## 2025-06-27 RX ADMIN — CEPHALEXIN 500 MILLIGRAM(S): 250 CAPSULE ORAL at 00:02

## 2025-06-27 RX ADMIN — Medication 81 MILLIGRAM(S): at 11:40

## 2025-06-27 RX ADMIN — CEPHALEXIN 500 MILLIGRAM(S): 250 CAPSULE ORAL at 05:30

## 2025-06-27 RX ADMIN — CEPHALEXIN 500 MILLIGRAM(S): 250 CAPSULE ORAL at 11:40

## 2025-06-27 NOTE — PROGRESS NOTE ADULT - SUBJECTIVE AND OBJECTIVE BOX
SUBJECTIVE/OVERNIGHT EVENTS  Today is hospital day 5d. This morning patient was seen and examined at bedside, resting comfortably in bed. No acute or major events overnight.      MEDICATIONS  STANDING MEDICATIONS  aspirin  chewable 81 milliGRAM(s) Oral daily  atenolol  Tablet 50 milliGRAM(s) Oral daily  azaTHIOprine 50 milliGRAM(s) Oral daily  cephalexin 500 milliGRAM(s) Oral four times a day  chlorhexidine 2% Cloths 1 Application(s) Topical <User Schedule>  enoxaparin Injectable 40 milliGRAM(s) SubCutaneous every 24 hours  famotidine    Tablet 20 milliGRAM(s) Oral daily  furosemide    Tablet 40 milliGRAM(s) Oral daily  hydroxychloroquine 200 milliGRAM(s) Oral two times a day  levothyroxine 175 MICROGram(s) Oral daily  melatonin 10 milliGRAM(s) Oral at bedtime  mupirocin 2% Nasal 1 Application(s) Both Nostrils two times a day  senna 2 Tablet(s) Oral at bedtime  triamcinolone 0.1% Oral Paste 1 Application(s) Topical two times a day    PRN MEDICATIONS  oxycodone    5 mG/acetaminophen 325 mG 1 Tablet(s) Oral every 6 hours PRN    VITALS  T(F): 98.5 (06-27-25 @ 07:37), Max: 98.5 (06-26-25 @ 15:39)  HR: 54 (06-27-25 @ 07:37) (54 - 76)  BP: 108/66 (06-27-25 @ 07:37) (102/64 - 108/66)  RR: 18 (06-27-25 @ 07:37) (18 - 18)  SpO2: 98% (06-27-25 @ 07:37) (96% - 98%)    PHYSICAL EXAM  GENERAL: NAD, lying in bed comfortably  HEAD:  Atraumatic, normocephalic  EYES: EOMI, PERRLA, conjunctiva and sclera clear  ENT: Moist mucous membranes  NECK: Supple  HEART: Regular rate and rhythm, no murmurs, rubs, or gallops  LUNGS: Unlabored respirations.  Clear to auscultation bilaterally, no crackles, wheezing, or rhonchi  ABDOMEN: Soft, nontender, nondistended, +BS  EXTREMITIES: RLE erythema improving, kerlix wrapped RLE  NERVOUS SYSTEM:  A&Ox3, no focal deficits   SKIN: No rashes or lesions    LABS                    IMAGING SUBJECTIVE/OVERNIGHT EVENTS  Today is hospital day 5d. This morning patient was seen and examined at bedside, resting comfortably in bed. No acute or major events overnight.      MEDICATIONS  STANDING MEDICATIONS  aspirin  chewable 81 milliGRAM(s) Oral daily  atenolol  Tablet 50 milliGRAM(s) Oral daily  azaTHIOprine 50 milliGRAM(s) Oral daily  cephalexin 500 milliGRAM(s) Oral four times a day  chlorhexidine 2% Cloths 1 Application(s) Topical <User Schedule>  enoxaparin Injectable 40 milliGRAM(s) SubCutaneous every 24 hours  famotidine    Tablet 20 milliGRAM(s) Oral daily  furosemide    Tablet 40 milliGRAM(s) Oral daily  hydroxychloroquine 200 milliGRAM(s) Oral two times a day  levothyroxine 175 MICROGram(s) Oral daily  melatonin 10 milliGRAM(s) Oral at bedtime  mupirocin 2% Nasal 1 Application(s) Both Nostrils two times a day  senna 2 Tablet(s) Oral at bedtime  triamcinolone 0.1% Oral Paste 1 Application(s) Topical two times a day    PRN MEDICATIONS  oxycodone    5 mG/acetaminophen 325 mG 1 Tablet(s) Oral every 6 hours PRN    VITALS  T(F): 98.5 (06-27-25 @ 07:37), Max: 98.5 (06-26-25 @ 15:39)  HR: 54 (06-27-25 @ 07:37) (54 - 76)  BP: 108/66 (06-27-25 @ 07:37) (102/64 - 108/66)  RR: 18 (06-27-25 @ 07:37) (18 - 18)  SpO2: 98% (06-27-25 @ 07:37) (96% - 98%)    PHYSICAL EXAM  GENERAL: NAD, lying in bed comfortably  HEART: Regular rate and rhythm, no murmurs, rubs, or gallops  LUNGS: Unlabored respirations.  Clear to auscultation bilaterally, no crackles, wheezing, or rhonchi  ABDOMEN: Soft, nontender, nondistended, +BS  EXTREMITIES: RLE erythema improving, kerlix wrapped RLE  NERVOUS SYSTEM:  A&Ox3, no focal deficits   SKIN: No rashes or lesions    LABS                    IMAGING

## 2025-06-27 NOTE — PROGRESS NOTE ADULT - ATTENDING COMMENTS
Edited the note above   Examined the patient independently of resident. Modified the note and physical exam to reflect my findings. Please review above a/p for detailed plan.

## 2025-06-27 NOTE — PROGRESS NOTE ADULT - TIME BILLING
I have personally seen and examined this patient.    I have reviewed all pertinent clinical information and reviewed all relevant imaging and diagnostic studies personally.   I counseled the patient about diagnostic testing and treatment plan. All questions were answered.   I discussed recommendations with the primary team.
I have personally seen and examined this patient.    I have reviewed all pertinent clinical information and reviewed all relevant imaging and diagnostic studies personally.   I counseled the patient about diagnostic testing and treatment plan. All questions were answered.   I discussed recommendations with the primary team.
Patient seen at bedside, time spent , excluding teaching time , evaluating and treating the patient's acute illness as well as time spent reviewing labs, radiology, discussing with patient and/or patient's family and discussing the case with a multidisciplinary team.

## 2025-06-27 NOTE — PROGRESS NOTE ADULT - ASSESSMENT
69-year-old female with past medical history of SLE on immunosuppression meds, HTN, hx of CVA, hypothyroidism, b/l hip and knee replacement, chronic venous insufficiency presenting today for evaluation of erythema and pain to the right lower extremity and pain at the sole of right foot limiting ambulation.Patient has had intermittent episodes of cellulitis to the right lower extremity over the last few years, Was recently admitted two weeks ago for similar symptoms. X-Ray rt leg at that time showed soft tissue edema without subcutaneous emphysema. RLE arterial and venous duplex were negative. Pt was treated with 7 day course of Keflex 500mg BID. Pt states her swelling has worsened despite taking Keflex as prescribed. Now she is also experiencing pain in the right forefoot. Patient denies having any fever, chills, n/v, cp, sob, pleuritic cp, palpitations, diaphoresis, cough, neck pain/stiffness, abd pain, diarrhea, constipation, urinary symptoms, trauma, ha/lh/dizziness, numbness/tingling, sick contacts, recent travel.      #R Leg / foot pain likely secondary to Suspected RLE Cellulitis/  #Infected rt forefoot base callus   #Chronic venous insufficiency with Rt ankle ulcer could be venous stasis   #stasis dermatitis   - no sepsis POA   - Afebrile, WBC 10k, HD stable  - RLE erythema extending from midfoot and terminating just below the knee, tender to touch. Superficial dry 2x3cm wound of rt lateral leg  - rt foot base callus, tender to palpation  - arterial and venous duplex Negative on June 9th   - RLE duplex negative for DVT  - BCx NGTD  - MRSA positive - Nasal Mupirocin BID and CHG wipe  - ESR, CRP - 88 & 18.8 respectively  - Xray Foot AP + Lateral + Oblique, Right (06.22.25 @ 20:41) >  There is suggestion of soft tissue air at the dorsal aspect of the   calcaneus. No definite radiographic evidence of osteomyelitis. If there   is high suspicion for osteomyelitis, correlate with MRI.  - CT R ankle w/ IV cont: 1.  No soft tissue gas. No acute fracture or dislocation.  2.  Circumferential skin thickening of the ankle with areas of   subcutaneous edema, consistent with cellulitis. No drainable fluid   collection.  3.  Severe degenerative changes of the ankle joint, with degenerative   osteochondral lesion measuring up to 1.7 cm at the medial talar dome   demonstrating chondral collapse.  4.  Osteonecrotic foci of the distal tibia and calcaneus.  - podiatry - no sx intervention, OP f/u w/ Dr. Carey  - ID - c/w Ancef 1g q8h, Exam not consistent with necrotizing infection   - Switch to PO Keflex 500mg QID and then on dc cefadroxil 500 mg BID to complete 10 days total (end date 7/1)   - RLE erythema w/ significant improvement  - wound care- Apply Dermaphor/Aquaphor daily to right lower extremity, Xeroform, kerlix  - Assisted Living Facility refused to accept pt back due to nasal MRSA positivity  - Pt to be dcd to [facility]    #SLE  Immunocompromised due to Autoimmune disorder and medication  - c/w Azathioprine and HCQ    #Chronic Normocytic Anemia  - on iron replacement therapy outpatient  - will hold iron supplementation in setting of infection    #HTN  #h/o CVA  - c/w atenolol 50mg QD and lasix 40mg QD  - recently stopped taking aspirin   - Advised pt to continue take aspirin     #hypothyrodism  c/w home levothyroxine 175mcg QD    #Rt ear Cerumen Impaction  - f/u ENT as OP    DVT ppx: Lovenox  Diet: DASH  Dispo: [facility] 69-year-old female with past medical history of SLE on immunosuppression meds, HTN, hx of CVA, hypothyroidism, b/l hip and knee replacement, chronic venous insufficiency presenting today for evaluation of erythema and pain to the right lower extremity and pain at the sole of right foot limiting ambulation.Patient has had intermittent episodes of cellulitis to the right lower extremity over the last few years, Was recently admitted two weeks ago for similar symptoms. X-Ray rt leg at that time showed soft tissue edema without subcutaneous emphysema. RLE arterial and venous duplex were negative. Pt was treated with 7 day course of Keflex 500mg BID. Pt states her swelling has worsened despite taking Keflex as prescribed. Now she is also experiencing pain in the right forefoot. Patient denies having any fever, chills, n/v, cp, sob, pleuritic cp, palpitations, diaphoresis, cough, neck pain/stiffness, abd pain, diarrhea, constipation, urinary symptoms, trauma, ha/lh/dizziness, numbness/tingling, sick contacts, recent travel.      #R Leg / foot pain likely secondary to Suspected RLE Cellulitis/  #Infected rt forefoot base callus   #Chronic venous insufficiency with Rt ankle ulcer could be venous stasis   #stasis dermatitis   - no sepsis POA   - Afebrile, WBC 10k, HD stable  - RLE erythema extending from midfoot and terminating just below the knee, tender to touch. Superficial dry 2x3cm wound of rt lateral leg  - rt foot base callus, tender to palpation  - arterial and venous duplex Negative on June 9th   - RLE duplex negative for DVT  - BCx NGTD  - MRSA positive - Nasal Mupirocin BID and CHG wipe  - ESR, CRP - 88 & 18.8 respectively  - Xray Foot AP + Lateral + Oblique, Right (06.22.25 @ 20:41) >  There is suggestion of soft tissue air at the dorsal aspect of the   calcaneus. No definite radiographic evidence of osteomyelitis. If there   is high suspicion for osteomyelitis, correlate with MRI.  - CT R ankle w/ IV cont: 1.  No soft tissue gas. No acute fracture or dislocation.  2.  Circumferential skin thickening of the ankle with areas of   subcutaneous edema, consistent with cellulitis. No drainable fluid   collection.  3.  Severe degenerative changes of the ankle joint, with degenerative   osteochondral lesion measuring up to 1.7 cm at the medial talar dome   demonstrating chondral collapse.  4.  Osteonecrotic foci of the distal tibia and calcaneus.  - podiatry - no sx intervention, OP f/u w/ Dr. Carey  - ID - c/w Ancef 1g q8h, Exam not consistent with necrotizing infection   - Switch to PO Keflex 500mg QID and then on dc cefadroxil 500 mg BID to complete 10 days total (end date 7/1)   - RLE erythema w/ significant improvement  - wound care- Apply Dermaphor/Aquaphor daily to right lower extremity, Xeroform, kerlix  - Assisted Living Facility refused to accept pt back due to nasal MRSA positivity  - Was planned for DC to [facility] yesterday, ?discharge held by [facility]   - Pt to be dcd to [facility] today    #SLE  Immunocompromised due to Autoimmune disorder and medication  - c/w Azathioprine and HCQ    #Chronic Normocytic Anemia  - on iron replacement therapy outpatient  - will hold iron supplementation in setting of infection    #HTN  #h/o CVA  - c/w atenolol 50mg QD and lasix 40mg QD  - recently stopped taking aspirin   - Advised pt to continue take aspirin     #hypothyrodism  c/w home levothyroxine 175mcg QD    #Rt ear Cerumen Impaction  - f/u ENT as OP    DVT ppx: Lovenox  Diet: DASH  Dispo: [facility]

## 2025-06-27 NOTE — PROGRESS NOTE ADULT - PROVIDER SPECIALTY LIST ADULT
Internal Medicine
Infectious Disease
Internal Medicine
Infectious Disease

## 2025-07-02 DIAGNOSIS — Z96.643 PRESENCE OF ARTIFICIAL HIP JOINT, BILATERAL: ICD-10-CM

## 2025-07-02 DIAGNOSIS — Z88.6 ALLERGY STATUS TO ANALGESIC AGENT: ICD-10-CM

## 2025-07-02 DIAGNOSIS — T50.995A ADVERSE EFFECT OF OTHER DRUGS, MEDICAMENTS AND BIOLOGICAL SUBSTANCES, INITIAL ENCOUNTER: ICD-10-CM

## 2025-07-02 DIAGNOSIS — H61.21 IMPACTED CERUMEN, RIGHT EAR: ICD-10-CM

## 2025-07-02 DIAGNOSIS — Z88.8 ALLERGY STATUS TO OTHER DRUGS, MEDICAMENTS AND BIOLOGICAL SUBSTANCES: ICD-10-CM

## 2025-07-02 DIAGNOSIS — E11.9 TYPE 2 DIABETES MELLITUS WITHOUT COMPLICATIONS: ICD-10-CM

## 2025-07-02 DIAGNOSIS — D84.81 IMMUNODEFICIENCY DUE TO CONDITIONS CLASSIFIED ELSEWHERE: ICD-10-CM

## 2025-07-02 DIAGNOSIS — M32.9 SYSTEMIC LUPUS ERYTHEMATOSUS, UNSPECIFIED: ICD-10-CM

## 2025-07-02 DIAGNOSIS — Z88.1 ALLERGY STATUS TO OTHER ANTIBIOTIC AGENTS: ICD-10-CM

## 2025-07-02 DIAGNOSIS — L84 CORNS AND CALLOSITIES: ICD-10-CM

## 2025-07-02 DIAGNOSIS — I10 ESSENTIAL (PRIMARY) HYPERTENSION: ICD-10-CM

## 2025-07-02 DIAGNOSIS — L03.115 CELLULITIS OF RIGHT LOWER LIMB: ICD-10-CM

## 2025-07-02 DIAGNOSIS — Z86.73 PERSONAL HISTORY OF TRANSIENT ISCHEMIC ATTACK (TIA), AND CEREBRAL INFARCTION WITHOUT RESIDUAL DEFICITS: ICD-10-CM

## 2025-07-02 DIAGNOSIS — Z88.5 ALLERGY STATUS TO NARCOTIC AGENT: ICD-10-CM

## 2025-07-02 DIAGNOSIS — D64.9 ANEMIA, UNSPECIFIED: ICD-10-CM

## 2025-07-02 DIAGNOSIS — Z90.710 ACQUIRED ABSENCE OF BOTH CERVIX AND UTERUS: ICD-10-CM
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
## 2025-07-02 DIAGNOSIS — Z79.82 LONG TERM (CURRENT) USE OF ASPIRIN: ICD-10-CM

## 2025-07-02 DIAGNOSIS — Z88.0 ALLERGY STATUS TO PENICILLIN: ICD-10-CM

## 2025-07-02 DIAGNOSIS — M87.861: ICD-10-CM

## 2025-07-02 DIAGNOSIS — Z79.60 LONG TERM (CURRENT) USE OF UNSPECIFIED IMMUNOMODULATORS AND IMMUNOSUPPRESSANTS: ICD-10-CM

## 2025-07-02 DIAGNOSIS — E03.9 HYPOTHYROIDISM, UNSPECIFIED: ICD-10-CM

## 2025-07-02 DIAGNOSIS — Z79.52 LONG TERM (CURRENT) USE OF SYSTEMIC STEROIDS: ICD-10-CM

## 2025-07-02 DIAGNOSIS — Z96.653 PRESENCE OF ARTIFICIAL KNEE JOINT, BILATERAL: ICD-10-CM

## 2025-07-02 DIAGNOSIS — I87.2 VENOUS INSUFFICIENCY (CHRONIC) (PERIPHERAL): ICD-10-CM

## 2025-07-02 DIAGNOSIS — L97.318 NON-PRESSURE CHRONIC ULCER OF RIGHT ANKLE WITH OTHER SPECIFIED SEVERITY: ICD-10-CM

## 2025-07-02 DIAGNOSIS — Z79.891 LONG TERM (CURRENT) USE OF OPIATE ANALGESIC: ICD-10-CM

## 2025-07-02 DIAGNOSIS — Z79.890 HORMONE REPLACEMENT THERAPY: ICD-10-CM

## 2025-07-02 DIAGNOSIS — M87.874 OTHER OSTEONECROSIS, RIGHT FOOT: ICD-10-CM

## 2025-07-10 ENCOUNTER — APPOINTMENT (OUTPATIENT)
Dept: PODIATRY | Facility: CLINIC | Age: 70
End: 2025-07-10

## 2025-07-10 ENCOUNTER — OUTPATIENT (OUTPATIENT)
Dept: OUTPATIENT SERVICES | Facility: HOSPITAL | Age: 70
LOS: 1 days | End: 2025-07-10
Payer: MEDICARE

## 2025-07-10 DIAGNOSIS — L84 CORNS AND CALLOSITIES: ICD-10-CM

## 2025-07-10 DIAGNOSIS — M79.671 PAIN IN RIGHT FOOT: ICD-10-CM

## 2025-07-10 DIAGNOSIS — I87.2 VENOUS INSUFFICIENCY (CHRONIC) (PERIPHERAL): ICD-10-CM

## 2025-07-10 DIAGNOSIS — Z82.69 FAMILY HISTORY OF OTHER DISEASES OF THE MUSCULOSKELETAL SYSTEM AND CONNECTIVE TISSUE: Chronic | ICD-10-CM

## 2025-07-10 DIAGNOSIS — I83.019 VARICOSE VEINS OF RIGHT LOWER EXTREMITY WITH ULCER OF UNSPECIFIED SITE: ICD-10-CM

## 2025-07-10 DIAGNOSIS — I83.013 VARICOSE VEINS OF RIGHT LOWER EXTREMITY WITH ULCER OF ANKLE: ICD-10-CM

## 2025-07-10 DIAGNOSIS — Z96.659 PRESENCE OF UNSPECIFIED ARTIFICIAL KNEE JOINT: Chronic | ICD-10-CM

## 2025-07-10 DIAGNOSIS — Z90.710 ACQUIRED ABSENCE OF BOTH CERVIX AND UTERUS: Chronic | ICD-10-CM

## 2025-07-10 DIAGNOSIS — Z00.00 ENCOUNTER FOR GENERAL ADULT MEDICAL EXAMINATION WITHOUT ABNORMAL FINDINGS: ICD-10-CM
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
## 2025-07-10 DIAGNOSIS — R60.0 LOCALIZED EDEMA: ICD-10-CM
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
## 2025-07-10 DIAGNOSIS — M79.672 PAIN IN LEFT FOOT: ICD-10-CM

## 2025-07-10 PROCEDURE — 99203 OFFICE O/P NEW LOW 30 MIN: CPT | Mod: 25

## 2025-07-10 PROCEDURE — 11056 PARNG/CUTG B9 HYPRKR LES 2-4: CPT

## 2025-07-10 PROCEDURE — 99213 OFFICE O/P EST LOW 20 MIN: CPT | Mod: 25

## 2025-07-25 ENCOUNTER — APPOINTMENT (OUTPATIENT)
Dept: VASCULAR SURGERY | Facility: CLINIC | Age: 70
End: 2025-07-25
Payer: MEDICARE

## 2025-07-25 VITALS
BODY MASS INDEX: 30.32 KG/M2 | DIASTOLIC BLOOD PRESSURE: 78 MMHG | HEART RATE: 66 BPM | WEIGHT: 131 LBS | SYSTOLIC BLOOD PRESSURE: 120 MMHG | HEIGHT: 55 IN

## 2025-07-25 DIAGNOSIS — I87.2 VENOUS INSUFFICIENCY (CHRONIC) (PERIPHERAL): ICD-10-CM

## 2025-07-25 PROCEDURE — 93970 EXTREMITY STUDY: CPT

## 2025-07-25 PROCEDURE — 99212 OFFICE O/P EST SF 10 MIN: CPT

## 2025-08-07 NOTE — H&P ADULT - ASSESSMENT
[facility] - NEUROLOGY [facility]TH [place] [facility], [place]    Date: 8/7/25  Patient Name: Macario Dior Jr.   MRN: [id number]   Referring Provider: No ref. provider found    Thank you so much No ref. provider found for your patient referral to Neuromuscular team at [facility]. We take pride in our care coordination and look forward to your feedback and questions.    Assessment:   Macario Dior Jr. is a 75 y.o. male is a very pleasant patient with probable diabetic polyneuropathy, CKD stage 3, lumbar spondylosis and inability to walk due to right hip pain for follow-up.    Unfortunately, Mr. Dior was 45 minutes late to his appointment today, so today's visit was limited given time constraints. We shortly discussed his neuropathic pain which persists, although he did receive significant benefit from Qutenza for about 3-4 weeks. Will schedule him for his next application since he is due. Started cymablta 30mg daily for ongoing neuropathic pain. Use caution increasing in the future given patient's CKD. Will continue to let orthopedic team take the lead regarding his significant R hip pain since it is likely not neuromuscular in origin. He is scheduled for R hip aspiration on 8/12/25.    Follow-up in 6 months    Plan:     Problem List Items Addressed This Visit          Neuro    Sensory polyneuropathy due to diabetes mellitus - Primary    Relevant Medications    DULoxetine (CYMBALTA) 30 MG capsule            This evaluation was completed in >20  Minutes over 50% of the time spent on education & counseling. This includes face to face time and non-face to face time preparing to see the patient (eg, review of tests), obtaining and/or reviewing separately obtained history, documenting clinical information in the electronic or other health record, independently interpreting results and communicating results to the patient/family/caregiver, or care coordinator.    Supervising physician  Tomer Malik MD was available for all questions during this exam.          Marshall J Kellerman, PA-C  [facility]  [address],  [facility] [address] [place] [address].       Interval history on 8/7/25   Patient presents for follow-up for pain and paresthesias in the lower extremities and the back/R hip.    He continues to report significant neuropathic pain in BLE, particularly the B/L feet. However, he got significant relieve from his Qutenza application that was done on 5/6/25. Relief lasted for approximately 3-4 weeks. He is still dealing with significant R hip pain and follows with orthopedics for this issue. Of note, he had a hospital admission in March given concern for L4-5 facet sepctic arthritis seen on MRI pelvis and lumbar spine. Received empiric antibiotics and therapy during this admission and was stably discharged on 3/23/25.    Interval workup:   MRI Right Hip (3/17/25)  Limited but negative exam of the right hip for fluid collection or edema surrounding prosthesis.  Abnormal edematous signal and morphology with marrow replacement in the foot set joint on the right at L4-5.  Correlate for septic facet joint with osteomyelitis.  AVN without collapse or active edema in the left femoral head.    MRI Lumbar Spine (3/16/25)  Lumbar spondylosis most advanced at L4-L5 with up to moderate neural foraminal narrowing and mild spinal canal stenosis at multiple levels.  Findings are overall stable compared to prior MRI.     New edema signal in the soft tissues at the level of the right L3-L4 and L4-L5 facets suggesting an infectious/inflammatory process.  This is especially prominent at the L4-5 facet in the L4 component with bone marrow edema.  Correlate for septic facet joint and osteomyelitis.    Details provided by:    Patient    Chief complaint today:  Pain and paresthesia in lower extremities as well as back    Interval history on 12/30/24   Patient is here today  "for f/u of pain and paresthesia in his lower extremities and back. He says his pain is in the "right iliac crest", and it occurs every time his right foot hits the ground while walking. He describes it as "feeling like an open sore". He states that this problem has been going on for 3 months and is worsening. Still experiencing numbness and tingling in his bilateral feel. Currently using capsaicin cream which provides no relief. However, he states the numbness and tingling is not nearly as concerning as the pain in his R hip. Patient states this pain has causes him to present to the ER multiple times recently. He has fallen 8-9 times this year, the last being about 2 weeks ago. Patient attributes all of these falls to the pain in his R hip. Patient uses an electric wheelchair at home to help get around this walking has become very painful for him. Currently taking oxycodone and baclofen for this pain, but he states neither give him any relief. Can't take pregabalin because of reported swelling. He states that the only thing that's helped so far is a recent epidural injection.    On examination, Pain with right hip abduction with movement with diminished deep tendon reflexes in lower extremities. Absent vibration bilaterally in toes, R>L. Decreased vibration up to ankles, decreased sharp sensation to pinprick bilaterally in lower extremities up ankles, worst in bilateral feet. Slow, antalgic gait.    Interval work up:  HIV 1 and 2 antibody negative   Vitamin B1 level 40   RPR negative   Protein electrophoresis with mildly high beta chains, Normal total protein, normal pattern.   Folate level 7.0 normal   Vitamin B12 level 522   RANDALL negative   Immunofixation electrophoresis normal, No monoclonal peaks identified.     NCS/EMG on 12/09/2024  length-dependent axonal sensorimotor peripheral polyneuropathy.    MRI hip joint on 7/1/24   Postoperative changes of right total hip arthroplasty with unchanged mild heterotopic " calcification. No evidence for loosening or osteolysis.     MRI lumbar spine on 05/29/2024.    Multilevel degenerative change contributes to multilevel mild neural foraminal narrowing. Mild canal narrowing as given in detail level by level as above.       =========================================================================    Initial visit on 09/07/2023  History of Present Illness:      Mr. Macario Dior Jr. is a 75 y.o. male presenting for evaluation of  pain and paresthesia in lower extremities as well as back.  He has PMH of probable diabetic peripheral neuropathy, lumbar radiculopathy, HTN, CKD stage 3, DM2, and DVT history     The detail was confirmed with the patient who mentioned severe exacerbation of lower back pain progressively resulting in an inability to walk for the last six months. Patient states that the problems began after his R Hip replacement he had around [id number]. Since then, he has had chronic low back pain, initially confined to the R side, that has no spread to the entirety of his lower back. The pain travels down his lower extremities bilaterally. There is a stabbing pain in his back that turns into a dull ache traveling posteriorly to his feet. The pain is present when standing, laying flat, or sitting, but is worst when he walks. The patient follows with pain management and has tried various medications and procedures, but currently takes oxycodone to manage the pain. The patient states that the oxycodone does not relieve the pain, but makes the pain more tolerable.     He is also complaining of bilateral burning and numbness in his feet for past 3 years. Patient states that he has previously been told this is diabetic neuropathy, but patient does not believe he is diabetic. The patient takes pregabalin for this pain and states that it has reduced the burning sensation by approximately 50%.  He is unable to walk because of pain as well as paresthesia.  The patient denies  nausea/vomiting/diarrhea/constipation or loss of bladder control. The patient is a former smoker and does not consume alcohol. The patient does not have a consistent diet.       Chart Review:  Neurology note on 12/16/2022.    He has PMH of idiopathic peripheral neuropathy, lumbar radiculopathy, HTN, CKD stage 3, DM2, and DVT history. His current therapy regimen is  Lyrica 100mg TID. 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  He has left tibial/fibula fracture almost 10 years ago.  He had injections in the back by pain management which he states did not help.  He is retired [employer]    Pertinent work up based on chart review for current condition:  CBC with hemoglobin 14, hematocrit 43, and MCV 98  Hemoglobin A1c 6.7  Basic metabolic panel with creatinine 2.2 and BUN 15 and GFR of 31%  ESR 22.    C-reactive protein 7.5     MRI lumbar spine on 09/17/2022  Multilevel lumbar spondylosis as detailed level by level above, mild bilateral neural foraminal from L3 to S1.  No more than mild spinal canal stenosis at L3-L4 and L4-L5.    NCS/EMG on 03/10/2020   suspicion for L5-S1 lumbar radiculopathy on EMG      Last CBC Results:   Lab Results   Component Value Date    WBC 5.42 03/23/2025    HGB 10.3 (L) 03/23/2025    HCT 32.4 (L) 03/23/2025     03/23/2025       Last CMP Results  Lab Results   Component Value Date     03/22/2025    K 3.8 03/22/2025     (H) 03/22/2025    CO2 21 (L) 03/22/2025    BUN 25 (H) 03/22/2025    CREATININE 1.8 (H) 03/22/2025    CALCIUM 8.6 (L) 03/22/2025    ALBUMIN 2.7 (L) 03/19/2025    AST 15 03/19/2025    ALT 11 03/19/2025       Last A1C  Lab Results   Component Value Date    HGBA1C 6.7 (H) 12/21/2022       Review of Systems:  12 system review of systems is negative except for the symptoms mentioned in HPI.       PAST MEDICAL HISTORY:  Past  Medical History:   Diagnosis Date    Arthritis     Asthma     chronic    CKD (chronic kidney disease), stage III     patient denies    Deep vein thrombosis     Demyelinating neuropathy     polyneuropathy    Diabetes mellitus type 2 without retinopathy 12/14/2022    Gout     History of blood clots 2013    lower part of both legs    HLD (hyperlipidemia)     Hypertension     Iron deficiency anemia     BUZZ (obstructive sleep apnea)     CPAP    Pulmonary embolism     Status post hip replacement right 8/31/2015 09/15/2015       PAST SURGICAL HISTORY:  Past Surgical History:   Procedure Laterality Date    COLONOSCOPY N/A 11/07/2016    Procedure: COLONOSCOPY;  Surgeon: Shar Weiss MD;  Location: [facility] (4TH FLR);  Service: Endoscopy;  Laterality: N/A;  [facility] Referral. Okay to hold Coumadin 5 days prior to procedure. See Referral scaned in media tab on 10/20/16.    COLONOSCOPY N/A 09/21/2017    Procedure: COLONOSCOPY;  Surgeon: Stuart Trinidad MD;  Location: [facility] (4TH FLR);  Service: Endoscopy;  Laterality: N/A;  [facility] from [facility] Olivarez-see scanned docs under media tab          9/6/17-current [facility] authorization and last clinic visit scanned into chart    EPIDURAL STEROID INJECTION N/A 12/17/2020    Procedure: INJECTION, STEROID, EPIDURAL, L5-S1 clear to hold Xarelto 2 days;  Surgeon: Moy Vasquez MD;  Location: [facility] PAIN MGT;  Service: Pain Management;  Laterality: N/A;    EXCISION OF LESION OF FEMUR Right 01/04/2023    Procedure: EXCISION, LESION, FEMUR: POSTERIOR APPROACH;  Surgeon: Prieto Sinha MD;  [facility];  Service: Orthopedics;  Laterality: Right;    HIP SURGERY Right 2008    exploratory at [facility]    HIP SURGERY Right 08/31/2015    THR    INJECTION OF ANESTHETIC AGENT AROUND NERVE Right 10/23/2019    Procedure: BLOCK, NERVE, Obturator and Femoral cleared to hold xarelto 3 days pt. said he's not taking coumadin;  Surgeon: Moy Vasquez MD;  Location: [facility] PAIN MGT;   Service: Pain Management;  Laterality: Right;    INJECTION OF ANESTHETIC AGENT AROUND NERVE Right 07/06/2020    Procedure: BLOCK, NERVE, RIGHT MBB L3,L5,L5;  Surgeon: Moy Vasquez MD;  Location: [facility] PAIN MGT;  Service: Pain Management;  Laterality: Right;  BLOCK, NERVE, RIGHT MBB L3,L5,L5    INJECTION OF ANESTHETIC AGENT AROUND NERVE Right 07/30/2020    Procedure: BLOCK, NERVE RIGHT L3, L4, L5 2ND;  Surgeon: Moy Vasquez MD;  Location: [facility] PAIN MGT;  Service: Pain Management;  Laterality: Right;  NEEDS CONSENT, XARELTO    LEG SURGERY Left 2012    fibula and tibia    RADIOFREQUENCY ABLATION Right 10/05/2020    Procedure: RADIOFREQUENCY ABLATION RIGHT L3,4,5;  Surgeon: Moy Vasquez MD;  Location: [facility] PAIN MGT;  Service: Pain Management;  Laterality: Right;  RADIOFREQUENCY ABLATION RIGHT L3,4,5  ok to hold xarelto, scanned in Media    SHOULDER SURGERY Right 1989    torn ligament    TRANSFORAMINAL EPIDURAL INJECTION OF STEROID Bilateral 12/04/2023    Procedure: LUMBAR TRANSFORAMINAL BILATERAL L4/5 DIRECT REFERRAL;  Surgeon: Moy Vasquez MD;  Location: [facility] PAIN MGT;  Service: Pain Management;  Laterality: Bilateral;    TRANSFORAMINAL EPIDURAL INJECTION OF STEROID Right 11/21/2024    Procedure: LUMBAR TRANSFORAMINAL RIGHT L1/2 AND L2/3 please add xarelto to pt's med list when admitted 11/21;  Surgeon: Moy Vasquez MD;  Location: [facility] PAIN MGT;  Service: Pain Management;  Laterality: Right;  [phone]  2 WK F/U ELEAZAR       CURRENT MEDS:  I have reconciled the patient's home medications and discharge medications with the patient/family. I have updated all changes.  Refer to After-Visit Medication List.    Current Outpatient Medications   Medication Sig Dispense Refill    albuterol 90 mcg/actuation inhaler Inhale 2 puffs into the lungs every 6 (six) hours as needed for Wheezing.       atorvastatin (LIPITOR) 80 MG tablet Take 80 mg by mouth once daily.      calcitRIOL (ROCALTROL) 0.25 MCG Cap Take 0.25 mcg by mouth.       carvediloL (COREG) 25 MG tablet Take 1 tablet (25 mg total) by mouth 2 (two) times daily. (Patient taking differently: Take 25 mg by mouth Daily.) 60 tablet 11    chlorhexidine (PERIDEX) 0.12 % solution RINSE 1 CAPFUL BY MOUTH ONCE DAILY AS NEEDED SWISH FOR 30 SECONDS AFTER AND SPIT OUT      chlorthalidone (HYGROTEN) 25 MG Tab Take 25 mg by mouth once daily.      dulaglutide (TRULICITY) 4.5 mg/0.5 mL pen injector Inject 4.5 mg into the skin every Saturday.      DULoxetine (CYMBALTA) 30 MG capsule Take 1 capsule (30 mg total) by mouth once daily. 30 capsule 11    eplerenone (INSPRA) 25 MG Tab Take 1 tablet by mouth once daily.      ergocalciferol (ERGOCALCIFEROL) 50,000 unit Cap Take 1 capsule (50,000 Units total) by mouth every 7 days.      ferrous sulfate (FEOSOL) 325 mg (65 mg iron) Tab tablet Take 325 mg by mouth.      finasteride (PROSCAR) 5 mg tablet Take 1 tablet (5 mg total) by mouth once daily.      furosemide (LASIX) 40 MG tablet Take 40 mg by mouth once daily.      hydrALAZINE (APRESOLINE) 50 MG tablet Take 1 tablet (50 mg total) by mouth every 8 (eight) hours.      latanoprost 0.005 % ophthalmic solution Place 1 drop into both eyes every evening.      morphine (MSIR) 15 MG tablet Take 1 tablet (15 mg total) by mouth every 4 (four) hours as needed (Severe pain). 25 tablet 0    naloxone (NARCAN) 4 mg/actuation Spry as needed.      oxyCODONE (ROXICODONE) 10 mg Tab immediate release tablet Take 1 tablet (10 mg total) by mouth every 4 (four) hours as needed for Pain. 42 tablet 0    rivaroxaban (XARELTO) 15 mg Tab Take 15 mg by mouth Daily.      valsartan (DIOVAN) 320 MG tablet Take 320 mg by mouth once daily.       No current facility-administered medications for this visit.       ALLERGIES:  Review of patient's allergies indicates:   Allergen Reactions    Amlodipine Swelling    Lisinopril Tinitus and Swelling    Pregabalin Swelling    Prednisone Palpitations    Azithromycin      Muscles tense up     Levetiracetam Other (See Comments)    Acetaminophen Rash     Feels like needles are sticking him per pt       FAMILY HISTORY:  Family History   Problem Relation Name Age of Onset    Cancer Mother          ovarian    Cancer Father      Cancer Sister          breast    Cancer Sister          breast    No Known Problems Brother      No Known Problems Daughter      No Known Problems Daughter      No Known Problems Daughter      No Known Problems Son      No Known Problems Son         SOCIAL HISTORY:  Social History     Tobacco Use    Smoking status: Former     Types: Cigars     Quit date: 8/13/2009     Years since quitting: 15.9    Smokeless tobacco: Never   Substance Use Topics    Alcohol use: No     Alcohol/week: 0.0 standard drinks of alcohol    Drug use: No         Objective:     There were no vitals filed for this visit.    Wt Readings from Last 3 Encounters:   08/05/25 0844 100.1 kg (220 lb 12.7 oz)   05/19/25 1312 104.3 kg (229 lb 15 oz)   05/06/25 1318 105.5 kg (232 lb 9.4 oz)     There is no height or weight on file to calculate BMI.       Eyes: no tearing, discharge, no erythema   ENT: moist mucous membranes of the oral cavity, nares patent    Neck: Supple, full range of motion  Cardiovascular: Warm and well perfused, pulses equal and symmetrical  Lungs: Normal work of breathing, normal chest wall excursions  Skin: No rash, lesions, or breakdown on exposed skin  Psychiatry: Mood and affect are appropriate   Extremeties: No cyanosis, clubbing or edema.    GENERAL/CONSTITUTIONAL:    -Well appearing; well nourished    HIGHER INTEGRATIVE FUNCTIONS:   -Attention & concentration: Normal   -Orientation: Oriented to person, place & time  -Memory: Normal  -Language: Normal   -Fund of Knowledge: Normal     CRANIAL NERVES:   -CN 2: Visual fields full  -CN 2,3: PERRL  -CN 3,4,6: EOMI  -CN 5: Facial sensation intact bilaterally  -CN 7: Facial strength/movement intact bilaterally  -CN 8: Hearing normal bilaterally  -CN  69F w/ PMHx HTN, SLE, Hypothyroidism, Anemia, Chronic Venous Statis and h/o CVA w/o residual deficits (1987) sent in for right lower extremity inflammation.    #RLE venous stasis dermatitis vs less likely cellulitis  - no sepsis PoA  - WC elevated on Prednisone outpatient  - s/p doxycycline 7 days course  - Started on Bactrim on 06.07.2025  - c/w 5 days Bactrim course  - c/w 4 days Prednisone course   - c/w Oxycodone for pain control started outpatient  - wound care consult   - ID consult     #SLE  - c/w home Immunosuppressive therapy regimen    #Chronic Normocytic Anemia  - on iron replacement therapy outpatient    #HTN  #h/o CVA  - c/w home meds    #DVT ppx: Heparin  #GI ppx: n/a  #Diet: Regular  #Activity: IAT  #Dispo: Patient from [facility] 9,10: Palate elevates symmetrically  -CN 11: Normal shoulder shrug and head turn  -CN 12: Tongue protrudes midline     MOTOR:   -Tone: normal in upper and lower extremities  -UE/LE motor: 5/5 throughout, no pronator drift    Upper Ext Right Left Lower Ext Right Left   Shoulder Abd 5 5 Hip flexion 5 5   Elbow flexion 5 5 Knee extension 5 5   Elbow extension 5 5 Knee flexion 5 5   Fingers abduction 5 5 Ankle dorsiflexion 5 5   Wrist extension   Ankle plantar flexion     Wrist flexion   Great toe dorsiflexion     Finger extension   Thigh adduction     Finger flexion   Thigh abduction     Thumb abduction            REFLEXES:      R L  R L   Triceps 2 2 Knee 2 2   Biceps 2 2 Ankle 2 2   BR 2 2        -extensor plantar reflex on the left side     SENSATION:   -decreased vibration bilaterally in lower extremities up to the knees   -decreased sharp sensation to pinprick bilaterally in lower extremities up to mid shin.        COORDINATION:   -FNF normal bilaterally     GAIT:   -Normal casual gait. Able to walk on heels and toes without difficulty.  Came in wheelchair due to pain  -he is able to do squat without difficulty  -SLR negative bilaterally    Scheduled Follow-up :  Future Appointments   Date Time Provider Department Center   8/12/2025  4:00 PM [facility]       After Visit Medication List :     Medication List            Accurate as of August 7, 2025  4:39 PM. If you have any questions, ask your nurse or doctor.                START taking these medications      DULoxetine 30 MG capsule  Commonly known as: CYMBALTA  Take 1 capsule (30 mg total) by mouth once daily.  Started by: Marshall Kellerman, PA-C            CHANGE how you take these medications      carvediloL 25 MG tablet  Commonly known as: COREG  Take 1 tablet (25 mg total) by mouth 2 (two) times daily.  What changed: when to take this            CONTINUE taking these medications      albuterol 90 mcg/actuation  inhaler  Commonly known as: PROVENTIL/VENTOLIN HFA     atorvastatin 80 MG tablet  Commonly known as: LIPITOR     calcitRIOL 0.25 MCG Cap  Commonly known as: ROCALTROL     chlorhexidine 0.12 % solution  Commonly known as: PERIDEX     chlorthalidone 25 MG Tab  Commonly known as: HYGROTEN     dulaglutide 4.5 mg/0.5 mL pen injector  Commonly known as: TRULICITY     eplerenone 25 MG Tab  Commonly known as: INSPRA     ergocalciferol 50,000 unit Cap  Commonly known as: ERGOCALCIFEROL  Take 1 capsule (50,000 Units total) by mouth every 7 days.     ferrous sulfate 325 mg (65 mg iron) Tab tablet  Commonly known as: FEOSOL     finasteride 5 mg tablet  Commonly known as: PROSCAR  Take 1 tablet (5 mg total) by mouth once daily.     furosemide 40 MG tablet  Commonly known as: LASIX     hydrALAZINE 50 MG tablet  Commonly known as: APRESOLINE  Take 1 tablet (50 mg total) by mouth every 8 (eight) hours.     latanoprost 0.005 % ophthalmic solution  Place 1 drop into both eyes every evening.     morphine 15 MG tablet  Commonly known as: MSIR  Take 1 tablet (15 mg total) by mouth every 4 (four) hours as needed (Severe pain).     naloxone 4 mg/actuation Spry  Commonly known as: NARCAN     oxyCODONE 10 mg Tab immediate release tablet  Commonly known as: ROXICODONE  Take 1 tablet (10 mg total) by mouth every 4 (four) hours as needed for Pain.     rivaroxaban 15 mg Tab  Commonly known as: XARELTO     valsartan 320 MG tablet  Commonly known as: DIOVAN               Where to Get Your Medications        These medications were sent to [facility] - [facility] [address]  [address] [address]      Phone: [phone]   DULoxetine 30 MG capsule